# Patient Record
Sex: MALE | Race: WHITE | NOT HISPANIC OR LATINO | Employment: OTHER | URBAN - NONMETROPOLITAN AREA
[De-identification: names, ages, dates, MRNs, and addresses within clinical notes are randomized per-mention and may not be internally consistent; named-entity substitution may affect disease eponyms.]

---

## 2017-09-11 ENCOUNTER — OFFICE VISIT (OUTPATIENT)
Dept: URGENT CARE | Facility: CLINIC | Age: 57
End: 2017-09-11
Payer: COMMERCIAL

## 2017-09-11 PROCEDURE — 99283 EMERGENCY DEPT VISIT LOW MDM: CPT

## 2017-09-11 PROCEDURE — G0382 LEV 3 HOSP TYPE B ED VISIT: HCPCS

## 2018-11-29 ENCOUNTER — OFFICE VISIT (OUTPATIENT)
Dept: URGENT CARE | Facility: CLINIC | Age: 58
End: 2018-11-29
Payer: COMMERCIAL

## 2018-11-29 VITALS
RESPIRATION RATE: 20 BRPM | BODY MASS INDEX: 24.5 KG/M2 | TEMPERATURE: 99.2 F | WEIGHT: 175 LBS | HEIGHT: 71 IN | DIASTOLIC BLOOD PRESSURE: 75 MMHG | SYSTOLIC BLOOD PRESSURE: 133 MMHG | HEART RATE: 89 BPM

## 2018-11-29 DIAGNOSIS — J01.00 ACUTE MAXILLARY SINUSITIS, RECURRENCE NOT SPECIFIED: Primary | ICD-10-CM

## 2018-11-29 DIAGNOSIS — J45.909 UNCOMPLICATED ASTHMA, UNSPECIFIED ASTHMA SEVERITY, UNSPECIFIED WHETHER PERSISTENT: ICD-10-CM

## 2018-11-29 PROCEDURE — 99213 OFFICE O/P EST LOW 20 MIN: CPT | Performed by: PHYSICIAN ASSISTANT

## 2018-11-29 PROCEDURE — S9088 SERVICES PROVIDED IN URGENT: HCPCS | Performed by: PHYSICIAN ASSISTANT

## 2018-11-29 RX ORDER — ALBUTEROL SULFATE 90 UG/1
1 AEROSOL, METERED RESPIRATORY (INHALATION)
COMMUNITY
Start: 2015-10-09 | End: 2018-11-29 | Stop reason: SDUPTHER

## 2018-11-29 RX ORDER — ALBUTEROL SULFATE 2.5 MG/3ML
1 SOLUTION RESPIRATORY (INHALATION)
COMMUNITY
Start: 2017-09-11 | End: 2018-11-29 | Stop reason: SDUPTHER

## 2018-11-29 RX ORDER — ALBUTEROL SULFATE 2.5 MG/3ML
2.5 SOLUTION RESPIRATORY (INHALATION) EVERY 6 HOURS PRN
Qty: 25 VIAL | Refills: 0 | Status: SHIPPED | OUTPATIENT
Start: 2018-11-29 | End: 2019-12-30 | Stop reason: SDUPTHER

## 2018-11-29 RX ORDER — AMOXICILLIN AND CLAVULANATE POTASSIUM 875; 125 MG/1; MG/1
1 TABLET, FILM COATED ORAL EVERY 12 HOURS SCHEDULED
Qty: 20 TABLET | Refills: 0 | Status: SHIPPED | OUTPATIENT
Start: 2018-11-29 | End: 2018-12-09

## 2018-11-29 RX ORDER — ALBUTEROL SULFATE 90 UG/1
1 AEROSOL, METERED RESPIRATORY (INHALATION) EVERY 6 HOURS PRN
Qty: 1 INHALER | Refills: 0 | Status: SHIPPED | OUTPATIENT
Start: 2018-11-29 | End: 2018-12-08

## 2018-11-29 NOTE — PROGRESS NOTES
[address] [facility]  [facility] [phone]  (fax) [phone]        NAME: Vipul No is a 60 y o  male  : 1960    MRN: [id number]  DATE: 2018  TIME: 3:17 PM    Assessment and Plan   Acute maxillary sinusitis, recurrence not specified [J01 00]  1  Acute maxillary sinusitis, recurrence not specified  amoxicillin-clavulanate (AUGMENTIN) 875-125 mg per tablet   2  Uncomplicated asthma, unspecified asthma severity, unspecified whether persistent  albuterol (2 5 mg/3 mL) 0 083 % nebulizer solution    albuterol (PROVENTIL HFA) 90 mcg/act inhaler       Patient Instructions   I have prescribed an antibiotic for the infection  Please take the antibiotic as prescribed and finish the entire prescription  I recommend that the patient takes an over the counter probiotic or eats yogurt with live cultures in it [place]) to keep good bacteria in the gut and help prevent diarrhea  Wash hands frequently to prevent the spread of infection  Can use over the counter cough and cold medications to help with symptoms  Ibuprofen and/or tylenol as needed for pain or fever  If not improving over the next 3-5 days, follow up with PCP  To establish with PCP for maintainence medications, given find a doc sheet and instructed to call today for an apt  To present to the ER if symptoms worsen  Chief Complaint     Chief Complaint   Patient presents with    Cold Like Symptoms     nasal congestion and cough x 3 days          History of Present Illness   Vipul No presents to the clinic c/o  Patient also reports he is here for a refill on his asthma meds  He travels a lot and does not have a current PCP  Sinusitis   This is a new problem  The current episode started in the past 7 days  The problem is unchanged  There has been no fever  The pain is moderate  Associated symptoms include sinus pressure   Pertinent negatives include no chills, congestion, coughing, diaphoresis, ear pain, headaches, neck pain, shortness of breath, sneezing, sore throat or swollen glands  Past treatments include saline sprays  The treatment provided mild relief  Review of Systems   Review of Systems   Constitutional: Negative for activity change, appetite change, chills, diaphoresis, fatigue and fever  HENT: Positive for sinus pressure  Negative for congestion, ear discharge, ear pain, facial swelling, rhinorrhea, sinus pain, sneezing and sore throat  Eyes: Negative for photophobia, pain, discharge, redness, itching and visual disturbance  Respiratory: Negative for apnea, cough, chest tightness, shortness of breath and wheezing  Cardiovascular: Negative for chest pain  Gastrointestinal: Negative for abdominal distention, abdominal pain, anal bleeding, blood in stool, constipation, diarrhea, nausea and vomiting  Genitourinary: Negative for dysuria, flank pain, frequency, hematuria and urgency  Musculoskeletal: Negative for arthralgias, back pain, gait problem, joint swelling, myalgias, neck pain and neck stiffness  Skin: Negative for color change, rash and wound  Allergic/Immunologic: Negative for immunocompromised state  Neurological: Negative for dizziness, facial asymmetry and headaches  Hematological: Negative for adenopathy  Psychiatric/Behavioral: Negative for confusion and decreased concentration           Current Medications     Long-Term Prescriptions   Medication Sig Dispense Refill    mometasone-formoterol (DULERA) 100-5 MCG/ACT inhaler Inhale 2 puffs 2 (two) times a day         Current Allergies     Allergies as of 11/29/2018 - Reviewed 11/29/2018   Allergen Reaction Noted    Aspirin  10/06/2016    Ibuprofen Other (See Comments) 10/09/2015            The following portions of the patient's history were reviewed and updated as appropriate: allergies, current medications, past family history, past medical history, past social history, past surgical history and problem list   No past medical history on file  No past surgical history on file  Social History     Social History    Marital status: Single     Spouse name: N/A    Number of children: N/A    Years of education: N/A     Occupational History    Not on file  Social History Main Topics    Smoking status: Current Some Day Smoker    Smokeless tobacco: Never Used    Alcohol use Not on file    Drug use: Unknown    Sexual activity: Not on file     Other Topics Concern    Not on file     Social History Narrative    No narrative on file       Objective   /75 (BP Location: Right arm, Patient Position: Sitting, Cuff Size: Standard)   Pulse 89   Temp 99 2 °F (37 3 °C) (Tympanic)   Resp 20   Ht 5' 11" (1 803 m)   Wt 79 4 kg (175 lb)   BMI 24 41 kg/m²      Physical Exam     Physical Exam   Constitutional: He is oriented to person, place, and time  He appears well-developed and well-nourished  No distress  HENT:   Head: Normocephalic and atraumatic  Right Ear: Tympanic membrane and external ear normal    Left Ear: Tympanic membrane and external ear normal    Nose: Nose normal    Mouth/Throat: Oropharynx is clear and moist  No oropharyngeal exudate  Eyes: Pupils are equal, round, and reactive to light  Conjunctivae and EOM are normal  Right eye exhibits no discharge  Left eye exhibits no discharge  No scleral icterus  Neck: Normal range of motion  Neck supple  No JVD present  No tracheal deviation present  No thyromegaly present  Cardiovascular: Normal rate, regular rhythm and normal heart sounds  Exam reveals no gallop and no friction rub  No murmur heard  Pulmonary/Chest: Effort normal  No stridor  No respiratory distress  He has no decreased breath sounds  He has wheezes (resolved after coughing) in the right upper field  He has no rhonchi  He has no rales  He exhibits no tenderness  Musculoskeletal: Normal range of motion  He exhibits no tenderness or deformity  Lymphadenopathy:     He has no cervical adenopathy  Neurological: He is alert and oriented to person, place, and time  He has normal reflexes  Coordination normal    Skin: Skin is warm and dry  No rash noted  He is not diaphoretic  No erythema  No pallor  Psychiatric: He has a normal mood and affect  His behavior is normal  Judgment and thought content normal    Nursing note and vitals reviewed        Amira Rossi PA-C

## 2018-12-08 ENCOUNTER — OFFICE VISIT (OUTPATIENT)
Dept: URGENT CARE | Facility: CLINIC | Age: 58
End: 2018-12-08
Payer: COMMERCIAL

## 2018-12-08 VITALS
OXYGEN SATURATION: 93 % | DIASTOLIC BLOOD PRESSURE: 84 MMHG | WEIGHT: 175 LBS | BODY MASS INDEX: 24.5 KG/M2 | SYSTOLIC BLOOD PRESSURE: 135 MMHG | TEMPERATURE: 98.3 F | HEIGHT: 71 IN | HEART RATE: 76 BPM | RESPIRATION RATE: 20 BRPM

## 2018-12-08 DIAGNOSIS — J32.9 RECURRENT SINUSITIS: Primary | ICD-10-CM

## 2018-12-08 DIAGNOSIS — J45.20 MILD INTERMITTENT ASTHMA, UNSPECIFIED WHETHER COMPLICATED: ICD-10-CM

## 2018-12-08 DIAGNOSIS — J45.909 UNCOMPLICATED ASTHMA, UNSPECIFIED ASTHMA SEVERITY, UNSPECIFIED WHETHER PERSISTENT: ICD-10-CM

## 2018-12-08 PROCEDURE — 99213 OFFICE O/P EST LOW 20 MIN: CPT | Performed by: PHYSICIAN ASSISTANT

## 2018-12-08 PROCEDURE — S9088 SERVICES PROVIDED IN URGENT: HCPCS | Performed by: PHYSICIAN ASSISTANT

## 2018-12-08 RX ORDER — CEFPROZIL 250 MG/1
250 TABLET, FILM COATED ORAL 2 TIMES DAILY
Qty: 20 TABLET | Refills: 0 | Status: SHIPPED | OUTPATIENT
Start: 2018-12-08 | End: 2018-12-18

## 2018-12-08 RX ORDER — PREDNISONE 20 MG/1
TABLET ORAL
Qty: 15 TABLET | Refills: 0 | Status: SHIPPED | OUTPATIENT
Start: 2018-12-08 | End: 2019-10-31

## 2018-12-08 RX ORDER — ALBUTEROL SULFATE 2.5 MG/3ML
SOLUTION RESPIRATORY (INHALATION)
Qty: 75 ML | Refills: 0 | OUTPATIENT
Start: 2018-12-08

## 2018-12-08 RX ORDER — ALBUTEROL SULFATE 90 UG/1
2 AEROSOL, METERED RESPIRATORY (INHALATION) EVERY 4 HOURS PRN
Qty: 1 INHALER | Refills: 0 | Status: SHIPPED | OUTPATIENT
Start: 2018-12-08 | End: 2019-01-07

## 2018-12-08 NOTE — PROGRESS NOTES
[facility]        NAME: Adelita Wong is a 60 y o  male  : 1960    MRN: [id number]  DATE: 2018  TIME: 11:18 AM    Assessment and Plan   Recurrent sinusitis [J32 9]  1  Recurrent sinusitis  cefprozil (CEFZIL) 250 mg tablet    predniSONE 20 mg tablet   2  Mild intermittent asthma, unspecified whether complicated  albuterol (PROVENTIL HFA,VENTOLIN HFA) 90 mcg/act inhaler         Patient Instructions     Stop Augmentin and start Cefzil today  Take all the steroids in the morning with breakfast   If you take steroids later in the day they can keep you up at night  Follow up with PCP in 3-5 days  Proceed to  ER if symptoms worsen  Chief Complaint     Chief Complaint   Patient presents with    Cold Like Symptoms     sinus pressure and congestion and cough x 2weeks          History of Present Illness       HPI    Review of Systems   Review of Systems      Current Medications       Current Outpatient Prescriptions:     albuterol (2 5 mg/3 mL) 0 083 % nebulizer solution, Take 1 vial (2 5 mg total) by nebulization every 6 (six) hours as needed for wheezing or shortness of breath, Disp: 25 vial, Rfl: 0    amoxicillin-clavulanate (AUGMENTIN) 875-125 mg per tablet, Take 1 tablet by mouth every 12 (twelve) hours for 10 days, Disp: 20 tablet, Rfl: 0    FLUTICASONE PROPIONATE NA, Inhale 2 puffs, Disp: , Rfl:     mometasone-formoterol (DULERA) 100-5 MCG/ACT inhaler, Inhale 2 puffs 2 (two) times a day, Disp: , Rfl:     albuterol (PROVENTIL HFA,VENTOLIN HFA) 90 mcg/act inhaler, Inhale 2 puffs every 4 (four) hours as needed for wheezing or shortness of breath for up to 30 days, Disp: 1 Inhaler, Rfl: 0    cefprozil (CEFZIL) 250 mg tablet, Take 1 tablet (250 mg total) by mouth 2 (two) times a day for 10 days, Disp: 20 tablet, Rfl: 0    predniSONE 20 mg tablet, Three tablets once daily x3 days, 2 tablets once daily x2 days, 1 tablet daily x2 days  , Disp: 15 tablet, Rfl: 0    Current Allergies Allergies as of 12/08/2018 - Reviewed 12/08/2018   Allergen Reaction Noted    Aspirin  10/06/2016    Ibuprofen Other (See Comments) 10/09/2015            The following portions of the patient's history were reviewed and updated as appropriate: allergies, current medications, past family history, past medical history, past social history, past surgical history and problem list      History reviewed  No pertinent past medical history  No past surgical history on file  No family history on file  Medications have been verified          Objective   /84 (BP Location: Left arm, Patient Position: Sitting, Cuff Size: Standard)   Pulse 76   Temp 98 3 °F (36 8 °C) (Tympanic)   Resp 20   Ht 5' 11" (1 803 m)   Wt 79 4 kg (175 lb)   SpO2 93%   BMI 24 41 kg/m²        Physical Exam     Physical Exam

## 2018-12-08 NOTE — PATIENT INSTRUCTIONS

## 2019-08-26 ENCOUNTER — OFFICE VISIT (OUTPATIENT)
Dept: URGENT CARE | Facility: CLINIC | Age: 59
End: 2019-08-26
Payer: COMMERCIAL

## 2019-08-26 VITALS
DIASTOLIC BLOOD PRESSURE: 68 MMHG | RESPIRATION RATE: 18 BRPM | HEIGHT: 71 IN | HEART RATE: 93 BPM | WEIGHT: 175 LBS | SYSTOLIC BLOOD PRESSURE: 131 MMHG | TEMPERATURE: 98.7 F | OXYGEN SATURATION: 95 % | BODY MASS INDEX: 24.5 KG/M2

## 2019-08-26 DIAGNOSIS — J45.20 MILD INTERMITTENT ASTHMA WITHOUT COMPLICATION: ICD-10-CM

## 2019-08-26 DIAGNOSIS — J01.90 ACUTE NON-RECURRENT SINUSITIS, UNSPECIFIED LOCATION: Primary | ICD-10-CM

## 2019-08-26 PROCEDURE — S9088 SERVICES PROVIDED IN URGENT: HCPCS | Performed by: PHYSICIAN ASSISTANT

## 2019-08-26 PROCEDURE — 99213 OFFICE O/P EST LOW 20 MIN: CPT | Performed by: PHYSICIAN ASSISTANT

## 2019-08-26 RX ORDER — AZITHROMYCIN 250 MG/1
TABLET, FILM COATED ORAL
Qty: 6 TABLET | Refills: 0 | Status: SHIPPED | OUTPATIENT
Start: 2019-08-26 | End: 2019-08-30

## 2019-08-26 RX ORDER — ALBUTEROL SULFATE 90 UG/1
2 AEROSOL, METERED RESPIRATORY (INHALATION) EVERY 4 HOURS PRN
Qty: 1 INHALER | Refills: 0 | Status: SHIPPED | OUTPATIENT
Start: 2019-08-26 | End: 2019-09-25

## 2019-08-26 NOTE — PROGRESS NOTES
[facility]        NAME: Lisa Mcacin is a 60 y o  male  : 1960    MRN: [id number]  DATE: 2019  TIME: 1:00 PM    Assessment and Plan   Acute non-recurrent sinusitis, unspecified location [J01 90]  1  Acute non-recurrent sinusitis, unspecified location  azithromycin (ZITHROMAX) 250 mg tablet   2  Mild intermittent asthma without complication  albuterol (PROVENTIL HFA,VENTOLIN HFA) 90 mcg/act inhaler         Patient Instructions       Follow up with PCP in 3-5 days  Proceed to  ER if symptoms worsen  Chief Complaint     Chief Complaint   Patient presents with    Cold Like Symptoms     sinus pressure and congestion, cough x 1 day         History of Present Illness       Patient presents with a 2 day hx of sinus pressure, purulent nasal drainage and productive cough  Felt feverish this morning but didn't take his temperature  Also need refill on Albuterol inhaler for his asthma  Review of Systems   Review of Systems   Constitutional: Positive for chills and fever  HENT: Positive for congestion, postnasal drip, rhinorrhea and sinus pressure  Negative for ear pain and sore throat  Respiratory: Positive for cough  Negative for chest tightness, shortness of breath and wheezing  Cardiovascular: Negative for chest pain  Gastrointestinal: Negative for diarrhea, nausea and vomiting  Musculoskeletal: Negative for myalgias  Skin: Negative for rash  Neurological: Positive for headaches  Hematological: Negative for adenopathy           Current Medications       Current Outpatient Medications:     albuterol (2 5 mg/3 mL) 0 083 % nebulizer solution, Take 1 vial (2 5 mg total) by nebulization every 6 (six) hours as needed for wheezing or shortness of breath (Patient not taking: Reported on 2019), Disp: 25 vial, Rfl: 0    albuterol (PROVENTIL HFA,VENTOLIN HFA) 90 mcg/act inhaler, Inhale 2 puffs every 4 (four) hours as needed for wheezing or shortness of breath for up to 30 days, Disp: 1 Inhaler, Rfl: 0    azithromycin (ZITHROMAX) 250 mg tablet, Take 2 tablets today then 1 tablet daily x 4 days, Disp: 6 tablet, Rfl: 0    FLUTICASONE PROPIONATE NA, Inhale 2 puffs, Disp: , Rfl:     mometasone-formoterol (DULERA) 100-5 MCG/ACT inhaler, Inhale 2 puffs 2 (two) times a day, Disp: , Rfl:     predniSONE 20 mg tablet, Three tablets once daily x3 days, 2 tablets once daily x2 days, 1 tablet daily x2 days  (Patient not taking: Reported on 8/26/2019), Disp: 15 tablet, Rfl: 0    Current Allergies     Allergies as of 08/26/2019 - Reviewed 08/26/2019   Allergen Reaction Noted    Aspirin  10/06/2016    Ibuprofen Other (See Comments) 10/09/2015            The following portions of the patient's history were reviewed and updated as appropriate: allergies, current medications, past family history, past medical history, past social history, past surgical history and problem list      Past Medical History:   Diagnosis Date    Asthma        History reviewed  No pertinent surgical history  History reviewed  No pertinent family history  Medications have been verified  Objective   /68   Pulse 93   Temp 98 7 °F (37 1 °C)   Resp 18   Ht 5' 11" (1 803 m)   Wt 79 4 kg (175 lb)   SpO2 95%   BMI 24 41 kg/m²        Physical Exam     Physical Exam   Constitutional: He is oriented to person, place, and time  He appears well-developed and well-nourished  HENT:   Head: Normocephalic and atraumatic  Right Ear: External ear normal    Left Ear: External ear normal    Nose: Nose normal    Mouth/Throat: Oropharynx is clear and moist    Neck: Neck supple  Cardiovascular: Normal rate, regular rhythm and normal heart sounds  Pulmonary/Chest: Effort normal and breath sounds normal    Lymphadenopathy:     He has no cervical adenopathy  Neurological: He is alert and oriented to person, place, and time  Skin: Skin is warm and dry  Psychiatric: He has a normal mood and affect  His behavior is normal    Nursing note and vitals reviewed

## 2019-08-26 NOTE — PATIENT INSTRUCTIONS

## 2019-10-31 ENCOUNTER — OFFICE VISIT (OUTPATIENT)
Dept: FAMILY MEDICINE CLINIC | Facility: CLINIC | Age: 59
End: 2019-10-31
Payer: COMMERCIAL

## 2019-10-31 VITALS
RESPIRATION RATE: 18 BRPM | BODY MASS INDEX: 27.02 KG/M2 | HEART RATE: 101 BPM | HEIGHT: 71 IN | OXYGEN SATURATION: 94 % | WEIGHT: 193 LBS | SYSTOLIC BLOOD PRESSURE: 124 MMHG | DIASTOLIC BLOOD PRESSURE: 72 MMHG | TEMPERATURE: 98.3 F

## 2019-10-31 DIAGNOSIS — J01.10 ACUTE NON-RECURRENT FRONTAL SINUSITIS: ICD-10-CM

## 2019-10-31 DIAGNOSIS — H26.9 CATARACT OF BOTH EYES, UNSPECIFIED CATARACT TYPE: Primary | ICD-10-CM

## 2019-10-31 PROCEDURE — T1015 CLINIC SERVICE: HCPCS | Performed by: FAMILY MEDICINE

## 2019-10-31 RX ORDER — FLUTICASONE FUROATE AND VILANTEROL TRIFENATATE 100; 25 UG/1; UG/1
1 POWDER RESPIRATORY (INHALATION) DAILY
Refills: 1 | COMMUNITY
Start: 2019-10-05 | End: 2020-02-10 | Stop reason: SDUPTHER

## 2019-10-31 RX ORDER — AZITHROMYCIN 250 MG/1
250 TABLET, FILM COATED ORAL DAILY
Qty: 6 TABLET | Refills: 0 | Status: SHIPPED | OUTPATIENT
Start: 2019-10-31 | End: 2019-11-05

## 2019-10-31 NOTE — PROGRESS NOTES
Assessment/Plan:     Diagnoses and all orders for this visit:    Cataract of both eyes, unspecified cataract type  -     Ambulatory referral to Ophthalmology; Future    Acute non-recurrent frontal sinusitis  -     azithromycin (ZITHROMAX) 250 mg tablet; Take 1 tablet (250 mg total) by mouth daily for 5 days 2 pills today, then one daily for 4 more days    Other orders  -     BREO ELLIPTA 100-25 MCG/INH inhaler; INHALE 1 PUFF ONCE A DAY        RTC 3 weeks          Subjective:        Patient ID: Farheen Manuel is a 60 y o  male  Chief Complaint   Patient presents with    Asthma     routine check up    Nasal Congestion     patient states he started getting sick 3 days ago    Cough    Sore Throat    Generalized Body Aches    Cataract     patient states he was diagnosed with cataracts at the [facility] and needs a referral to opthalmology       61 yo male presents today with c/o nasal congestion, cough and sore throat x 3 days  Cough is productive yellow green colored mucus  Denies any fever or chills  Sore throat is from post nasal drip he reports  He also has sinus pressure  Needs referral to Ophthalmology  Cataracts were recently diagnosed at [facility] he reports  The following portions of the patient's history were reviewed and updated as appropriate: allergies, current medications, past family history, past medical history, past social history, past surgical history and problem list     There is no problem list on file for this patient        Current Outpatient Medications   Medication Sig Dispense Refill    albuterol (2 5 mg/3 mL) 0 083 % nebulizer solution Take 1 vial (2 5 mg total) by nebulization every 6 (six) hours as needed for wheezing or shortness of breath (Patient not taking: Reported on 8/26/2019) 25 vial 0    azithromycin (ZITHROMAX) 250 mg tablet Take 1 tablet (250 mg total) by mouth daily for 5 days 2 pills today, then one daily for 4 more days 6 tablet 0    BREO ELLIPTA 100-25 MCG/INH inhaler INHALE 1 PUFF ONCE A DAY  1    mometasone-formoterol (DULERA) 100-5 MCG/ACT inhaler Inhale 2 puffs 2 (two) times a day       No current facility-administered medications for this visit  Past Medical History:   Diagnosis Date    Asthma     Cataract         History reviewed  No pertinent surgical history  Social History     Socioeconomic History    Marital status: Single     Spouse name: Not on file    Number of children: Not on file    Years of education: Not on file    Highest education level: Not on file   Occupational History    Not on file   Social Needs    Financial resource strain: Not on file    Food insecurity:     Worry: Not on file     Inability: Not on file    Transportation needs:     Medical: Not on file     Non-medical: Not on file   Tobacco Use    Smoking status: Current Some Day Smoker    Smokeless tobacco: Never Used    Tobacco comment: smokes only when drinks   Substance and Sexual Activity    Alcohol use: Yes    Drug use: Never    Sexual activity: Not on file   Lifestyle    Physical activity:     Days per week: Not on file     Minutes per session: Not on file    Stress: Not on file   Relationships    Social connections:     Talks on phone: Not on file     Gets together: Not on file     Attends Muslim service: Not on file     Active member of club or organization: Not on file     Attends meetings of clubs or organizations: Not on file     Relationship status: Not on file    Intimate partner violence:     Fear of current or ex partner: Not on file     Emotionally abused: Not on file     Physically abused: Not on file     Forced sexual activity: Not on file   Other Topics Concern    Not on file   Social History Narrative    Not on file        Review of Systems   Constitutional: Positive for fatigue  HENT: Positive for congestion, postnasal drip, sinus pressure and sore throat  Eyes: Negative      Respiratory: Positive for cough and shortness of breath  Cardiovascular: Negative  Psychiatric/Behavioral: Negative  Objective:      /72   Pulse 101   Temp 98 3 °F (36 8 °C)   Resp 18   Ht 5' 11" (1 803 m)   Wt 87 5 kg (193 lb)   SpO2 94%   BMI 26 92 kg/m²          Physical Exam   Constitutional: He is oriented to person, place, and time  He appears well-developed and well-nourished  HENT:   Head: Normocephalic and atraumatic  Right Ear: External ear normal    Left Ear: External ear normal    Nasal mucosa is red and swollen with thick yellow discharge seen  PND is visible   Eyes: Pupils are equal, round, and reactive to light  Neck: Neck supple  No thyromegaly present  Cardiovascular: Regular rhythm  tachycardic   Pulmonary/Chest: Effort normal and breath sounds normal  He has no wheezes  He has no rales  Musculoskeletal:   Tenderness with palpation over both cheekbones   Lymphadenopathy:     He has no cervical adenopathy  Neurological: He is alert and oriented to person, place, and time  Skin: Skin is warm and dry  Psychiatric: He has a normal mood and affect  Nursing note and vitals reviewed

## 2019-12-10 ENCOUNTER — OFFICE VISIT (OUTPATIENT)
Dept: FAMILY MEDICINE CLINIC | Facility: CLINIC | Age: 59
End: 2019-12-10
Payer: COMMERCIAL

## 2019-12-10 VITALS
SYSTOLIC BLOOD PRESSURE: 152 MMHG | TEMPERATURE: 98.6 F | HEART RATE: 107 BPM | WEIGHT: 190 LBS | BODY MASS INDEX: 26.6 KG/M2 | DIASTOLIC BLOOD PRESSURE: 100 MMHG | RESPIRATION RATE: 18 BRPM | HEIGHT: 71 IN | OXYGEN SATURATION: 97 %

## 2019-12-10 DIAGNOSIS — Z11.59 ENCOUNTER FOR HEPATITIS C SCREENING TEST FOR LOW RISK PATIENT: ICD-10-CM

## 2019-12-10 DIAGNOSIS — Z12.5 SCREENING FOR PROSTATE CANCER: ICD-10-CM

## 2019-12-10 DIAGNOSIS — R03.0 ELEVATED BLOOD PRESSURE READING IN OFFICE WITHOUT DIAGNOSIS OF HYPERTENSION: ICD-10-CM

## 2019-12-10 DIAGNOSIS — Z12.11 SCREEN FOR COLON CANCER: ICD-10-CM

## 2019-12-10 DIAGNOSIS — J45.20 MILD INTERMITTENT ASTHMA WITHOUT COMPLICATION: Primary | ICD-10-CM

## 2019-12-10 DIAGNOSIS — E55.9 VITAMIN D DEFICIENCY: ICD-10-CM

## 2019-12-10 DIAGNOSIS — Z11.4 SCREENING FOR HIV (HUMAN IMMUNODEFICIENCY VIRUS): ICD-10-CM

## 2019-12-10 PROCEDURE — T1015 CLINIC SERVICE: HCPCS | Performed by: FAMILY MEDICINE

## 2019-12-10 NOTE — PROGRESS NOTES
Assessment/Plan:     Diagnoses and all orders for this visit:    Screen for colon cancer  -     Cologuard; Future    Mild intermittent asthma without complication  -     CBC and differential; Future    Elevated blood pressure reading in office without diagnosis of hypertension  -     Comprehensive metabolic panel; Future  -     Lipid panel; Future  -     TSH, 3rd generation with Free T4 reflex; Future    Encounter for hepatitis C screening test for low risk patient  -     Hepatitis C Qualitative by PCR; Future    Screening for HIV (human immunodeficiency virus)  -     HIV 1/2 AG-AB combo; Future    Vitamin D deficiency  -     Vitamin D 25 hydroxy; Future        Repeat b/p 142/90  He will RTC 7-10 days for b/p check with MA  LABS AS ORDERED  RTC 3 weeks with PCP            Subjective:        Patient ID: Andi Charles is a 60 y o  male  Chief Complaint   Patient presents with    Follow-up     patient is here for regular check up and refill on his inhaler        59 yo male presents for routine follow up  Today he reports having a hang over  Drinks too much "when I do drink"  Discussed b/p reading today  Previous c/o sinus infection have resolved  No complaints today otherwise  The following portions of the patient's history were reviewed and updated as appropriate: allergies, current medications, past family history, past medical history, past social history, past surgical history and problem list     There is no problem list on file for this patient        Current Outpatient Medications   Medication Sig Dispense Refill    albuterol (2 5 mg/3 mL) 0 083 % nebulizer solution Take 1 vial (2 5 mg total) by nebulization every 6 (six) hours as needed for wheezing or shortness of breath (Patient not taking: Reported on 8/26/2019) 25 vial 0    BREO ELLIPTA 100-25 MCG/INH inhaler INHALE 1 PUFF ONCE A DAY  1    mometasone-formoterol (DULERA) 100-5 MCG/ACT inhaler Inhale 2 puffs 2 (two) times a day       No current facility-administered medications for this visit  Past Medical History:   Diagnosis Date    Asthma     Cataract         History reviewed  No pertinent surgical history  Social History     Socioeconomic History    Marital status: Single     Spouse name: Not on file    Number of children: Not on file    Years of education: Not on file    Highest education level: Not on file   Occupational History    Not on file   Social Needs    Financial resource strain: Not on file    Food insecurity:     Worry: Not on file     Inability: Not on file    Transportation needs:     Medical: Not on file     Non-medical: Not on file   Tobacco Use    Smoking status: Current Some Day Smoker    Smokeless tobacco: Never Used    Tobacco comment: smokes only when drinks   Substance and Sexual Activity    Alcohol use: Yes    Drug use: Never    Sexual activity: Not on file   Lifestyle    Physical activity:     Days per week: Not on file     Minutes per session: Not on file    Stress: Not on file   Relationships    Social connections:     Talks on phone: Not on file     Gets together: Not on file     Attends Islam service: Not on file     Active member of club or organization: Not on file     Attends meetings of clubs or organizations: Not on file     Relationship status: Not on file    Intimate partner violence:     Fear of current or ex partner: Not on file     Emotionally abused: Not on file     Physically abused: Not on file     Forced sexual activity: Not on file   Other Topics Concern    Not on file   Social History Narrative    Not on file        Review of Systems   Constitutional: Negative  HENT: Negative  Eyes: Negative  Respiratory: Negative  Cardiovascular: Negative  Gastrointestinal: Negative  Endocrine: Negative  Genitourinary: Negative  Musculoskeletal: Negative  Skin: Negative  Allergic/Immunologic: Negative  Neurological: Negative      Hematological: Negative  Psychiatric/Behavioral: Negative  Objective:      /100   Pulse (!) 107   Temp 98 6 °F (37 °C)   Resp 18   Ht 5' 11" (1 803 m)   Wt 86 2 kg (190 lb)   SpO2 97%   BMI 26 50 kg/m²          Physical Exam   Constitutional: He is oriented to person, place, and time  He appears well-developed and well-nourished  HENT:   Head: Normocephalic and atraumatic  Right Ear: External ear normal    Left Ear: External ear normal    Eyes: Pupils are equal, round, and reactive to light  Neck: Neck supple  No thyromegaly present  Cardiovascular: Regular rhythm  tachycardic   Pulmonary/Chest: Effort normal and breath sounds normal  He has no wheezes  He has no rales  Musculoskeletal: He exhibits no edema  Lymphadenopathy:     He has no cervical adenopathy  Neurological: He is alert and oriented to person, place, and time  No cranial nerve deficit  Psychiatric: He has a normal mood and affect  Nursing note and vitals reviewed  BMI Counseling: Body mass index is 26 5 kg/m²  The BMI is above normal  No BMI follow-up plan is appropriate  Patient refused follow-up plan

## 2019-12-11 ENCOUNTER — APPOINTMENT (OUTPATIENT)
Dept: LAB | Facility: MEDICAL CENTER | Age: 59
End: 2019-12-11
Payer: COMMERCIAL

## 2019-12-11 ENCOUNTER — APPOINTMENT (OUTPATIENT)
Dept: RADIOLOGY | Facility: MEDICAL CENTER | Age: 59
End: 2019-12-11
Payer: COMMERCIAL

## 2019-12-11 DIAGNOSIS — Z12.5 SCREENING FOR PROSTATE CANCER: ICD-10-CM

## 2019-12-11 DIAGNOSIS — Z11.4 SCREENING FOR HIV (HUMAN IMMUNODEFICIENCY VIRUS): ICD-10-CM

## 2019-12-11 DIAGNOSIS — E55.9 VITAMIN D DEFICIENCY: ICD-10-CM

## 2019-12-11 DIAGNOSIS — R03.0 ELEVATED BLOOD PRESSURE READING IN OFFICE WITHOUT DIAGNOSIS OF HYPERTENSION: ICD-10-CM

## 2019-12-11 DIAGNOSIS — Z11.59 ENCOUNTER FOR HEPATITIS C SCREENING TEST FOR LOW RISK PATIENT: ICD-10-CM

## 2019-12-11 DIAGNOSIS — J45.20 MILD INTERMITTENT ASTHMA WITHOUT COMPLICATION: ICD-10-CM

## 2019-12-11 LAB
25(OH)D3 SERPL-MCNC: 19.7 NG/ML (ref 30–100)
ALBUMIN SERPL BCP-MCNC: 4 G/DL (ref 3.5–5)
ALP SERPL-CCNC: 74 U/L (ref 46–116)
ALT SERPL W P-5'-P-CCNC: 28 U/L (ref 12–78)
ANION GAP SERPL CALCULATED.3IONS-SCNC: 6 MMOL/L (ref 4–13)
AST SERPL W P-5'-P-CCNC: 16 U/L (ref 5–45)
BASOPHILS # BLD AUTO: 0.08 THOUSANDS/ΜL (ref 0–0.1)
BASOPHILS NFR BLD AUTO: 1 % (ref 0–1)
BILIRUB SERPL-MCNC: 0.74 MG/DL (ref 0.2–1)
BUN SERPL-MCNC: 16 MG/DL (ref 5–25)
CALCIUM SERPL-MCNC: 8.4 MG/DL (ref 8.3–10.1)
CHLORIDE SERPL-SCNC: 107 MMOL/L (ref 100–108)
CHOLEST SERPL-MCNC: 183 MG/DL (ref 50–200)
CO2 SERPL-SCNC: 26 MMOL/L (ref 21–32)
CREAT SERPL-MCNC: 1.13 MG/DL (ref 0.6–1.3)
EOSINOPHIL # BLD AUTO: 0.76 THOUSAND/ΜL (ref 0–0.61)
EOSINOPHIL NFR BLD AUTO: 10 % (ref 0–6)
ERYTHROCYTE [DISTWIDTH] IN BLOOD BY AUTOMATED COUNT: 12.5 % (ref 11.6–15.1)
GFR SERPL CREATININE-BSD FRML MDRD: 70 ML/MIN/1.73SQ M
GLUCOSE P FAST SERPL-MCNC: 91 MG/DL (ref 65–99)
HCT VFR BLD AUTO: 47.9 % (ref 36.5–49.3)
HDLC SERPL-MCNC: 43 MG/DL
HGB BLD-MCNC: 16 G/DL (ref 12–17)
IMM GRANULOCYTES # BLD AUTO: 0.02 THOUSAND/UL (ref 0–0.2)
IMM GRANULOCYTES NFR BLD AUTO: 0 % (ref 0–2)
LDLC SERPL CALC-MCNC: 88 MG/DL (ref 0–100)
LYMPHOCYTES # BLD AUTO: 1.88 THOUSANDS/ΜL (ref 0.6–4.47)
LYMPHOCYTES NFR BLD AUTO: 25 % (ref 14–44)
MCH RBC QN AUTO: 30.1 PG (ref 26.8–34.3)
MCHC RBC AUTO-ENTMCNC: 33.4 G/DL (ref 31.4–37.4)
MCV RBC AUTO: 90 FL (ref 82–98)
MONOCYTES # BLD AUTO: 0.93 THOUSAND/ΜL (ref 0.17–1.22)
MONOCYTES NFR BLD AUTO: 12 % (ref 4–12)
NEUTROPHILS # BLD AUTO: 4.01 THOUSANDS/ΜL (ref 1.85–7.62)
NEUTS SEG NFR BLD AUTO: 52 % (ref 43–75)
NONHDLC SERPL-MCNC: 140 MG/DL
NRBC BLD AUTO-RTO: 0 /100 WBCS
PLATELET # BLD AUTO: 231 THOUSANDS/UL (ref 149–390)
PMV BLD AUTO: 9.7 FL (ref 8.9–12.7)
POTASSIUM SERPL-SCNC: 4.1 MMOL/L (ref 3.5–5.3)
PROT SERPL-MCNC: 7 G/DL (ref 6.4–8.2)
RBC # BLD AUTO: 5.32 MILLION/UL (ref 3.88–5.62)
SODIUM SERPL-SCNC: 139 MMOL/L (ref 136–145)
TRIGL SERPL-MCNC: 259 MG/DL
TSH SERPL DL<=0.05 MIU/L-ACNC: 1.61 UIU/ML (ref 0.36–3.74)
WBC # BLD AUTO: 7.68 THOUSAND/UL (ref 4.31–10.16)

## 2019-12-11 PROCEDURE — 36415 COLL VENOUS BLD VENIPUNCTURE: CPT

## 2019-12-11 PROCEDURE — 85025 COMPLETE CBC W/AUTO DIFF WBC: CPT

## 2019-12-11 PROCEDURE — 87521 HEPATITIS C PROBE&RVRS TRNSC: CPT

## 2019-12-11 PROCEDURE — 80061 LIPID PANEL: CPT

## 2019-12-11 PROCEDURE — 87389 HIV-1 AG W/HIV-1&-2 AB AG IA: CPT

## 2019-12-11 PROCEDURE — 80053 COMPREHEN METABOLIC PANEL: CPT

## 2019-12-11 PROCEDURE — 71046 X-RAY EXAM CHEST 2 VIEWS: CPT

## 2019-12-11 PROCEDURE — 84153 ASSAY OF PSA TOTAL: CPT

## 2019-12-11 PROCEDURE — 84443 ASSAY THYROID STIM HORMONE: CPT

## 2019-12-11 PROCEDURE — 82306 VITAMIN D 25 HYDROXY: CPT

## 2019-12-11 PROCEDURE — 84154 ASSAY OF PSA FREE: CPT

## 2019-12-12 DIAGNOSIS — E55.9 VITAMIN D DEFICIENCY: Primary | ICD-10-CM

## 2019-12-12 LAB
HIV 1+2 AB+HIV1 P24 AG SERPL QL IA: NORMAL
PSA FREE MFR SERPL: 14.4 %
PSA FREE SERPL-MCNC: 0.62 NG/ML
PSA SERPL-MCNC: 4.3 NG/ML (ref 0–4)

## 2019-12-12 RX ORDER — ERGOCALCIFEROL 1.25 MG/1
50000 CAPSULE ORAL WEEKLY
Qty: 4 CAPSULE | Refills: 2 | Status: SHIPPED | OUTPATIENT
Start: 2019-12-12 | End: 2020-03-02

## 2019-12-13 ENCOUNTER — TELEPHONE (OUTPATIENT)
Dept: FAMILY MEDICINE CLINIC | Facility: HOME HEALTHCARE | Age: 59
End: 2019-12-13

## 2019-12-13 NOTE — TELEPHONE ENCOUNTER
Patient made aware    ----- Message from Lizette Lawrence PA-C sent at 12/12/2019  2:21 PM EST -----  Please call the patient regarding his abnormal result  Vit D is low  Rx sent to his pharmacy  Also PSA is mildly elevated  It will need to be followed closely

## 2019-12-14 LAB — HCV RNA SERPL QL NAA+PROBE: NEGATIVE

## 2019-12-27 ENCOUNTER — DOCTOR'S OFFICE (OUTPATIENT)
Dept: URBAN - NONMETROPOLITAN AREA CLINIC 1 | Facility: CLINIC | Age: 59
Setting detail: OPHTHALMOLOGY
End: 2019-12-27
Payer: COMMERCIAL

## 2019-12-27 ENCOUNTER — RX ONLY (RX ONLY)
Age: 59
End: 2019-12-27

## 2019-12-27 DIAGNOSIS — H25.13: ICD-10-CM

## 2019-12-27 PROCEDURE — 99204 OFFICE O/P NEW MOD 45 MIN: CPT | Performed by: OPHTHALMOLOGY

## 2019-12-27 ASSESSMENT — REFRACTION_CURRENTRX
OS_SPHERE: -4.25
OS_CYLINDER: -0.75
OD_CYLINDER: -1.25
OD_OVR_VA: 20/
OD_VPRISM_DIRECTION: SV
OS_OVR_VA: 20/
OD_SPHERE: -6.50
OS_VPRISM_DIRECTION: SV
OS_AXIS: 48
OD_AXIS: 84

## 2019-12-27 ASSESSMENT — REFRACTION_AUTOREFRACTION
OS_AXIS: 64
OD_CYLINDER: -0.75
OS_SPHERE: -6.00
OD_AXIS: 124
OS_CYLINDER: -0.75
OD_SPHERE: -9.25

## 2019-12-27 ASSESSMENT — CONFRONTATIONAL VISUAL FIELD TEST (CVF)
OD_FINDINGS: FULL
OS_FINDINGS: FULL

## 2019-12-27 ASSESSMENT — SPHEQUIV_DERIVED
OS_SPHEQUIV: -6.375
OD_SPHEQUIV: -9.625

## 2019-12-27 ASSESSMENT — VISUAL ACUITY
OD_BCVA: 20/70-2
OS_BCVA: 20/100-1

## 2019-12-30 ENCOUNTER — OFFICE VISIT (OUTPATIENT)
Dept: FAMILY MEDICINE CLINIC | Facility: CLINIC | Age: 59
End: 2019-12-30
Payer: COMMERCIAL

## 2019-12-30 VITALS
RESPIRATION RATE: 18 BRPM | TEMPERATURE: 97.6 F | OXYGEN SATURATION: 96 % | SYSTOLIC BLOOD PRESSURE: 144 MMHG | WEIGHT: 197 LBS | DIASTOLIC BLOOD PRESSURE: 94 MMHG | HEART RATE: 104 BPM | HEIGHT: 71 IN | BODY MASS INDEX: 27.58 KG/M2

## 2019-12-30 DIAGNOSIS — I10 ESSENTIAL HYPERTENSION: Primary | ICD-10-CM

## 2019-12-30 DIAGNOSIS — J06.9 VIRAL URI: ICD-10-CM

## 2019-12-30 DIAGNOSIS — J45.909 UNCOMPLICATED ASTHMA, UNSPECIFIED ASTHMA SEVERITY, UNSPECIFIED WHETHER PERSISTENT: ICD-10-CM

## 2019-12-30 PROCEDURE — T1015 CLINIC SERVICE: HCPCS | Performed by: FAMILY MEDICINE

## 2019-12-30 RX ORDER — LISINOPRIL 5 MG/1
5 TABLET ORAL DAILY
Qty: 30 TABLET | Refills: 3 | Status: SHIPPED | OUTPATIENT
Start: 2019-12-30 | End: 2020-04-28 | Stop reason: SDUPTHER

## 2019-12-30 RX ORDER — ALBUTEROL SULFATE 2.5 MG/3ML
2.5 SOLUTION RESPIRATORY (INHALATION) EVERY 6 HOURS PRN
Qty: 25 VIAL | Refills: 1 | Status: SHIPPED | OUTPATIENT
Start: 2019-12-30 | End: 2020-03-31

## 2019-12-30 RX ORDER — ALBUTEROL SULFATE 90 UG/1
2 AEROSOL, METERED RESPIRATORY (INHALATION) EVERY 6 HOURS PRN
COMMUNITY
End: 2020-01-17 | Stop reason: SDUPTHER

## 2019-12-30 NOTE — PROGRESS NOTES
Assessment/Plan:     Diagnoses and all orders for this visit:    Essential hypertension  -     lisinopril (ZESTRIL) 5 mg tablet; Take 1 tablet (5 mg total) by mouth daily    Uncomplicated asthma, unspecified asthma severity, unspecified whether persistent  -     albuterol (2 5 mg/3 mL) 0 083 % nebulizer solution; Take 1 vial (2 5 mg total) by nebulization every 6 (six) hours as needed for wheezing or shortness of breath    Viral URI    Other orders  -     albuterol (PROVENTIL HFA,VENTOLIN HFA) 90 mcg/act inhaler; Inhale 2 puffs every 6 (six) hours as needed for wheezing        Start Lisinopril as directed   Continue other meds  RTC 10 days for b/p check with MA  rtc 1 MONTH with PCP          Subjective:        Patient ID: Desire Bass is a 60 y o  male  Chief Complaint   Patient presents with    Asthma     patient needs albuterol refilled    Sinus Problem    Cough       61 yo male presents for follow up  Needs albuterol refilled today  Discussed b/p reading again and he is agreeable to meds  Reports he has a sinus infection again  He admits to post nasal drip  Has sinus pressure  Long standing hx of sinus issues  The following portions of the patient's history were reviewed and updated as appropriate: allergies, current medications, past family history, past medical history, past social history, past surgical history and problem list     There is no problem list on file for this patient        Current Outpatient Medications   Medication Sig Dispense Refill    albuterol (2 5 mg/3 mL) 0 083 % nebulizer solution Take 1 vial (2 5 mg total) by nebulization every 6 (six) hours as needed for wheezing or shortness of breath 25 vial 1    albuterol (PROVENTIL HFA,VENTOLIN HFA) 90 mcg/act inhaler Inhale 2 puffs every 6 (six) hours as needed for wheezing      BREO ELLIPTA 100-25 MCG/INH inhaler INHALE 1 PUFF ONCE A DAY  1    ergocalciferol (VITAMIN D2) 50,000 units Take 1 capsule (50,000 Units total) by mouth once a week 4 capsule 2    lisinopril (ZESTRIL) 5 mg tablet Take 1 tablet (5 mg total) by mouth daily 30 tablet 3    mometasone-formoterol (DULERA) 100-5 MCG/ACT inhaler Inhale 2 puffs 2 (two) times a day       No current facility-administered medications for this visit  Past Medical History:   Diagnosis Date    Asthma     Cataract         No past surgical history on file  Social History     Socioeconomic History    Marital status: Single     Spouse name: Not on file    Number of children: Not on file    Years of education: Not on file    Highest education level: Not on file   Occupational History    Not on file   Social Needs    Financial resource strain: Not on file    Food insecurity:     Worry: Not on file     Inability: Not on file    Transportation needs:     Medical: Not on file     Non-medical: Not on file   Tobacco Use    Smoking status: Current Some Day Smoker    Smokeless tobacco: Never Used    Tobacco comment: smokes only when drinks   Substance and Sexual Activity    Alcohol use: Yes    Drug use: Never    Sexual activity: Not on file   Lifestyle    Physical activity:     Days per week: Not on file     Minutes per session: Not on file    Stress: Not on file   Relationships    Social connections:     Talks on phone: Not on file     Gets together: Not on file     Attends Jewish service: Not on file     Active member of club or organization: Not on file     Attends meetings of clubs or organizations: Not on file     Relationship status: Not on file    Intimate partner violence:     Fear of current or ex partner: Not on file     Emotionally abused: Not on file     Physically abused: Not on file     Forced sexual activity: Not on file   Other Topics Concern    Not on file   Social History Narrative    Not on file        Review of Systems   Constitutional: Negative  HENT: Positive for congestion and postnasal drip  Eyes: Negative      Respiratory: Positive for cough  Cardiovascular: Negative  Psychiatric/Behavioral: Negative  Objective:      /94   Pulse 104   Temp 97 6 °F (36 4 °C)   Resp 18   Ht 5' 11" (1 803 m)   Wt 89 4 kg (197 lb)   SpO2 96%   BMI 27 48 kg/m²          Physical Exam   Constitutional: He is oriented to person, place, and time  He appears well-developed and well-nourished  HENT:   Head: Normocephalic and atraumatic  Right Ear: External ear normal    Left Ear: External ear normal    Mouth/Throat: Oropharynx is clear and moist    Mild nasal redness and swelling   Eyes: Pupils are equal, round, and reactive to light  Neck: Normal range of motion  Neck supple  Cardiovascular: Normal rate, regular rhythm and normal heart sounds  Pulmonary/Chest: Effort normal and breath sounds normal  He has no wheezes  He has no rales  Musculoskeletal: He exhibits no edema  Lymphadenopathy:     He has no cervical adenopathy  Neurological: He is alert and oriented to person, place, and time  Psychiatric: He has a normal mood and affect  Nursing note and vitals reviewed

## 2020-01-07 ENCOUNTER — OFFICE VISIT (OUTPATIENT)
Dept: URGENT CARE | Facility: CLINIC | Age: 60
End: 2020-01-07
Payer: COMMERCIAL

## 2020-01-07 VITALS
HEART RATE: 88 BPM | TEMPERATURE: 97.7 F | OXYGEN SATURATION: 94 % | DIASTOLIC BLOOD PRESSURE: 86 MMHG | HEIGHT: 71 IN | SYSTOLIC BLOOD PRESSURE: 143 MMHG | BODY MASS INDEX: 27.58 KG/M2 | RESPIRATION RATE: 18 BRPM | WEIGHT: 197 LBS
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
## 2020-01-07 DIAGNOSIS — A69.20 ERYTHEMA MIGRANS (LYME DISEASE): Primary | ICD-10-CM

## 2020-01-07 PROCEDURE — 99213 OFFICE O/P EST LOW 20 MIN: CPT | Performed by: PHYSICIAN ASSISTANT

## 2020-01-07 PROCEDURE — S9088 SERVICES PROVIDED IN URGENT: HCPCS | Performed by: PHYSICIAN ASSISTANT

## 2020-01-07 RX ORDER — DOXYCYCLINE 100 MG/1
100 TABLET ORAL 2 TIMES DAILY
Qty: 14 TABLET | Refills: 0 | Status: SHIPPED | OUTPATIENT
Start: 2020-01-07 | End: 2020-01-14

## 2020-01-07 NOTE — PROGRESS NOTES
[address] [facility]  (office) [phone]  (fax) [phone]        NAME: Topher Rivas is a 60 y o  male  : 1960    MRN: [id number]  DATE: 2020  TIME: 2:42 PM    Assessment and Plan   Erythema migrans (Lyme disease) [A69 20]  1  Erythema migrans (Lyme disease)     2  Tick bite, initial encounter  doxycycline (ADOXA) 100 MG tablet       Patient Instructions   One week of doxycycline provided for patient  To follow up with PCP within next week for further treatment  Did discuss with patient that one week of treatment is not enough if lyme  Furthermore further labs can be run by PCP to confirm lyme disease  Patient did verbalize understanding and reports that he will proceed to his PCP to make an apt now  To present to the ER if symptoms worsen  Chief Complaint     Chief Complaint   Patient presents with    Insect Bite     tick bite to right arm pulled out tick 3 days ago, bulls eye noted          History of Present Illness   Topher Rivas presents to the clinic c/o    Insect Bite   This is a new problem  The current episode started in the past 7 days (3 days ago removed tick, unsure how long tick was on for)  The problem occurs constantly  The problem has been unchanged  Associated symptoms include a rash (bulls eye rash around tick bite on right forearm)  Pertinent negatives include no abdominal pain, arthralgias, chest pain, chills, congestion, coughing, diaphoresis, fatigue, fever, headaches, joint swelling, myalgias, nausea, neck pain, sore throat or vomiting  Nothing aggravates the symptoms  He has tried nothing for the symptoms  The treatment provided no relief  Review of Systems   Review of Systems   Constitutional: Negative for activity change, appetite change, chills, diaphoresis, fatigue and fever     HENT: Negative for congestion, ear discharge, ear pain, facial swelling, rhinorrhea, sinus pressure, sinus pain, sneezing and sore throat  Eyes: Negative for photophobia, pain, discharge, redness, itching and visual disturbance  Respiratory: Negative for apnea, cough, chest tightness, shortness of breath and wheezing  Cardiovascular: Negative for chest pain  Gastrointestinal: Negative for abdominal distention, abdominal pain, constipation, diarrhea, nausea and vomiting  Genitourinary: Negative for dysuria, flank pain, frequency, hematuria and urgency  Musculoskeletal: Negative for arthralgias, back pain, gait problem, joint swelling, myalgias, neck pain and neck stiffness  Skin: Positive for rash (bulls eye rash around tick bite on right forearm)  Negative for color change and wound  Allergic/Immunologic: Negative for immunocompromised state  Neurological: Negative for dizziness and headaches  Hematological: Negative for adenopathy  Psychiatric/Behavioral: Negative for confusion  Current Medications     Long-Term Medications   Medication Sig Dispense Refill    BREO ELLIPTA 100-25 MCG/INH inhaler INHALE 1 PUFF ONCE A DAY  1    ergocalciferol (VITAMIN D2) 50,000 units Take 1 capsule (50,000 Units total) by mouth once a week 4 capsule 2    lisinopril (ZESTRIL) 5 mg tablet Take 1 tablet (5 mg total) by mouth daily 30 tablet 3    mometasone-formoterol (DULERA) 100-5 MCG/ACT inhaler Inhale 2 puffs 2 (two) times a day         Current Allergies     Allergies as of 01/07/2020 - Reviewed 01/07/2020   Allergen Reaction Noted    Aspirin  10/06/2016    Ibuprofen Other (See Comments) 10/09/2015            The following portions of the patient's history were reviewed and updated as appropriate: allergies, current medications, past family history, past medical history, past social history, past surgical history and problem list   Past Medical History:   Diagnosis Date    Asthma     Cataract     Hypertension      History reviewed  No pertinent surgical history    Social History     Socioeconomic History    Marital status: Single     Spouse name: Not on file    Number of children: Not on file    Years of education: Not on file    Highest education level: Not on file   Occupational History    Not on file   Social Needs    Financial resource strain: Not on file    Food insecurity:     Worry: Not on file     Inability: Not on file    Transportation needs:     Medical: Not on file     Non-medical: Not on file   Tobacco Use    Smoking status: Current Some Day Smoker    Smokeless tobacco: Never Used    Tobacco comment: smokes only when drinks   Substance and Sexual Activity    Alcohol use: Yes    Drug use: Never    Sexual activity: Not on file   Lifestyle    Physical activity:     Days per week: Not on file     Minutes per session: Not on file    Stress: Not on file   Relationships    Social connections:     Talks on phone: Not on file     Gets together: Not on file     Attends Amish service: Not on file     Active member of club or organization: Not on file     Attends meetings of clubs or organizations: Not on file     Relationship status: Not on file    Intimate partner violence:     Fear of current or ex partner: Not on file     Emotionally abused: Not on file     Physically abused: Not on file     Forced sexual activity: Not on file   Other Topics Concern    Not on file   Social History Narrative    Not on file       Objective   /86   Pulse 88   Temp 97 7 °F (36 5 °C)   Resp 18   Ht 5' 11" (1 803 m)   Wt 89 4 kg (197 lb)   SpO2 94%   BMI 27 48 kg/m²      Physical Exam     Physical Exam   Constitutional: He is oriented to person, place, and time  He appears well-developed and well-nourished  No distress  HENT:   Head: Normocephalic and atraumatic  Right Ear: Tympanic membrane and external ear normal    Left Ear: Tympanic membrane and external ear normal    Nose: Nose normal    Mouth/Throat: Oropharynx is clear and moist  No oropharyngeal exudate     Eyes: Pupils are equal, round, and reactive to light  Conjunctivae and EOM are normal  Right eye exhibits no discharge  Left eye exhibits no discharge  No scleral icterus  Neck: Normal range of motion  Neck supple  No JVD present  No tracheal deviation present  No thyromegaly present  Cardiovascular: Normal rate, regular rhythm and normal heart sounds  Exam reveals no gallop and no friction rub  No murmur heard  Pulmonary/Chest: Effort normal and breath sounds normal  No stridor  No respiratory distress  He has no decreased breath sounds  He has no wheezes  He has no rhonchi  He has no rales  He exhibits no tenderness  Musculoskeletal: Normal range of motion  He exhibits no tenderness or deformity  Lymphadenopathy:     He has no cervical adenopathy  Neurological: He is alert and oriented to person, place, and time  Coordination normal    Skin: Skin is warm and dry  No rash noted  He is not diaphoretic  No erythema  No pallor  Psychiatric: He has a normal mood and affect  His behavior is normal  Judgment and thought content normal    Nursing note and vitals reviewed        Melvi Dumont PA-C

## 2020-01-17 ENCOUNTER — OFFICE VISIT (OUTPATIENT)
Dept: FAMILY MEDICINE CLINIC | Facility: CLINIC | Age: 60
End: 2020-01-17
Payer: COMMERCIAL

## 2020-01-17 VITALS
OXYGEN SATURATION: 94 % | SYSTOLIC BLOOD PRESSURE: 114 MMHG | TEMPERATURE: 97.4 F | RESPIRATION RATE: 18 BRPM | DIASTOLIC BLOOD PRESSURE: 70 MMHG | HEIGHT: 71 IN | HEART RATE: 92 BPM | BODY MASS INDEX: 27.58 KG/M2 | WEIGHT: 197 LBS
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
## 2020-01-17 DIAGNOSIS — J01.90 ACUTE SINUSITIS, RECURRENCE NOT SPECIFIED, UNSPECIFIED LOCATION: ICD-10-CM

## 2020-01-17 DIAGNOSIS — J45.20 MILD INTERMITTENT ASTHMA WITHOUT COMPLICATION: ICD-10-CM

## 2020-01-17 PROCEDURE — 86618 LYME DISEASE ANTIBODY: CPT | Performed by: STUDENT IN AN ORGANIZED HEALTH CARE EDUCATION/TRAINING PROGRAM

## 2020-01-17 RX ORDER — DOXYCYCLINE HYCLATE 100 MG/1
100 CAPSULE ORAL EVERY 12 HOURS SCHEDULED
Qty: 14 CAPSULE | Refills: 0 | Status: SHIPPED | OUTPATIENT
Start: 2020-01-17 | End: 2020-01-24

## 2020-01-17 RX ORDER — DOXYCYCLINE HYCLATE 100 MG/1
CAPSULE ORAL
Qty: 2 CAPSULE | Refills: 0 | Status: CANCELLED | OUTPATIENT
Start: 2020-01-17 | End: 2020-01-17

## 2020-01-17 RX ORDER — ALBUTEROL SULFATE 90 UG/1
2 AEROSOL, METERED RESPIRATORY (INHALATION) EVERY 6 HOURS PRN
Qty: 1 INHALER | Refills: 3 | Status: SHIPPED | OUTPATIENT
Start: 2020-01-17 | End: 2020-06-30 | Stop reason: SDUPTHER

## 2020-01-17 NOTE — PROGRESS NOTES
Assessment/Plan:    No problem-specific Assessment & Plan notes found for this encounter  Diagnoses and all orders for this visit:    Mild intermittent asthma without complication  -     albuterol (PROVENTIL HFA,VENTOLIN HFA) 90 mcg/act inhaler; Inhale 2 puffs every 6 (six) hours as needed for wheezing    Tick bite, initial encounter  -     Lyme Antibody Profile with reflex to WB; Future  -     Lyme Antibody Profile with reflex to WB  -     doxycycline hyclate (VIBRAMYCIN) 100 mg capsule; Take 1 capsule (100 mg total) by mouth every 12 (twelve) hours for 7 days    Acute sinusitis, recurrence not specified, unspecified location    Other orders  -     Cancel: doxycycline hyclate (VIBRAMYCIN) 100 mg capsule; Take 2 100 mg caps once      Charito Holden is to RTC in 2-4 weeks for annual exam and flu shot  Charito Holden was prescribed another 7 day course of doxy to complete a total of 14 days of abx therapy for Tx of Lyme Disease as he had erythema migrans rash at the site of the tick bite  Lyme titers were also ordered  Doxy will also cover for the sinus infection  As his ASCVD risk is 13% he will need to be started on a low intensity statin therapy  Charito Holden understood, acknowledged and agreed to the plan above  All of his questions and concerns were answered and addressed  Case discussed with Dr Noe Joshi    Subjective:      Patient ID: Farheen Manuel is a 60 y o  male  Charito Holden is a 61 y/o man that was seen and examined in the office today for having a tick bit on his right forearm  He went to the ED where he was prescribed Doxy for 7 days  He stated that he had a rash at the site of the tick bite that was like a target  He also states that he may have a sinus infection  Review of Systems   All other systems reviewed and are negative          Objective:      /70   Pulse 92   Temp (!) 97 4 °F (36 3 °C) (Tympanic)   Resp 18   Ht 5' 11" (1 803 m)   Wt 89 4 kg (197 lb)   SpO2 94%   BMI 27 48 kg/m²          Physical Exam   Cardiovascular: Normal rate, regular rhythm, normal heart sounds and intact distal pulses  Pulmonary/Chest: Effort normal and breath sounds normal    Abdominal: Soft  Bowel sounds are normal    Skin: Rash noted  There is erythema  Right forearm redness at the sight of tick bite  Nontender  Blanching  Macular  1/2 cm well circumscribed/demarcated   Nursing note and vitals reviewed

## 2020-01-18 LAB — B BURGDOR IGG+IGM SER-ACNC: <0.91 ISR (ref 0–0.9)

## 2020-01-19 PROBLEM — J45.20 MILD INTERMITTENT ASTHMA WITHOUT COMPLICATION: Status: ACTIVE | Noted: 2020-01-19

## 2020-01-21 ENCOUNTER — DOCTOR'S OFFICE (OUTPATIENT)
Dept: URBAN - NONMETROPOLITAN AREA CLINIC 1 | Facility: CLINIC | Age: 60
Setting detail: OPHTHALMOLOGY
End: 2020-01-21
Payer: COMMERCIAL

## 2020-01-21 DIAGNOSIS — H25.11: ICD-10-CM

## 2020-01-21 PROCEDURE — 76519 ECHO EXAM OF EYE: CPT | Performed by: OPHTHALMOLOGY

## 2020-01-27 ENCOUNTER — OFFICE VISIT (OUTPATIENT)
Dept: FAMILY MEDICINE CLINIC | Facility: CLINIC | Age: 60
End: 2020-01-27
Payer: COMMERCIAL

## 2020-01-27 VITALS
SYSTOLIC BLOOD PRESSURE: 118 MMHG | OXYGEN SATURATION: 95 % | WEIGHT: 197.4 LBS | DIASTOLIC BLOOD PRESSURE: 78 MMHG | TEMPERATURE: 98.2 F | BODY MASS INDEX: 27.64 KG/M2 | RESPIRATION RATE: 18 BRPM | HEIGHT: 71 IN | HEART RATE: 98 BPM

## 2020-01-27 DIAGNOSIS — J34.2 DEVIATED SEPTUM: Primary | ICD-10-CM

## 2020-01-27 DIAGNOSIS — Z01.818 PRE-OPERATIVE CLEARANCE: ICD-10-CM

## 2020-01-27 PROCEDURE — T1015 CLINIC SERVICE: HCPCS | Performed by: FAMILY MEDICINE

## 2020-01-27 RX ORDER — OFLOXACIN 3 MG/ML
SOLUTION/ DROPS OPHTHALMIC
COMMUNITY
Start: 2020-01-21 | End: 2020-05-27

## 2020-01-27 RX ORDER — PREDNISOLONE ACETATE 10 MG/ML
SUSPENSION/ DROPS OPHTHALMIC
COMMUNITY
Start: 2020-01-21 | End: 2020-05-27

## 2020-01-27 NOTE — PROGRESS NOTES
Assessment/Plan:     Diagnoses and all orders for this visit:    Deviated septum  -     Ambulatory Referral to Otolaryngology; Future    Pre-operative clearance    Other orders  -     ofloxacin (OCUFLOX) 0 3 % ophthalmic solution  -     prednisoLONE acetate (PRED FORTE) 1 % ophthalmic suspension        He will continue on current meds  ENT referral  He is cleared for cataract extraction  RTC as scheduled          Subjective:        Patient ID: Aide Stephens is a 60 y o  male  Chief Complaint   Patient presents with    Pre-op Exam     cataracts     Follow-up     review lyme test        59 yo male presents for pre op clearance prior to cataract extraction  He forgot his paperwork and is unsure which eye is being first   Recent Lyme testing was negative  Today he requests appt with ENT for repair of deviated septum  Long standing hx of        The following portions of the patient's history were reviewed and updated as appropriate: allergies, current medications, past family history, past medical history, past social history, past surgical history and problem list     Patient Active Problem List   Diagnosis    Mild intermittent asthma without complication       Current Outpatient Medications   Medication Sig Dispense Refill    albuterol (2 5 mg/3 mL) 0 083 % nebulizer solution Take 1 vial (2 5 mg total) by nebulization every 6 (six) hours as needed for wheezing or shortness of breath 25 vial 1    albuterol (PROVENTIL HFA,VENTOLIN HFA) 90 mcg/act inhaler Inhale 2 puffs every 6 (six) hours as needed for wheezing 1 Inhaler 3    BREO ELLIPTA 100-25 MCG/INH inhaler Inhale 1 puff daily   1    ergocalciferol (VITAMIN D2) 50,000 units Take 1 capsule (50,000 Units total) by mouth once a week 4 capsule 2    lisinopril (ZESTRIL) 5 mg tablet Take 1 tablet (5 mg total) by mouth daily 30 tablet 3    ofloxacin (OCUFLOX) 0 3 % ophthalmic solution       prednisoLONE acetate (PRED FORTE) 1 % ophthalmic suspension       mometasone-formoterol (DULERA) 100-5 MCG/ACT inhaler Inhale 2 puffs 2 (two) times a day       No current facility-administered medications for this visit  Past Medical History:   Diagnosis Date    Asthma     Cataract     Hypertension         History reviewed  No pertinent surgical history  Social History     Socioeconomic History    Marital status: Single     Spouse name: Not on file    Number of children: Not on file    Years of education: Not on file    Highest education level: Not on file   Occupational History    Not on file   Social Needs    Financial resource strain: Not on file    Food insecurity:     Worry: Not on file     Inability: Not on file    Transportation needs:     Medical: Not on file     Non-medical: Not on file   Tobacco Use    Smoking status: Current Some Day Smoker    Smokeless tobacco: Never Used    Tobacco comment: smokes only when drinks   Substance and Sexual Activity    Alcohol use: Yes     Comment: occasionally    Drug use: Never    Sexual activity: Not on file   Lifestyle    Physical activity:     Days per week: Not on file     Minutes per session: Not on file    Stress: Not on file   Relationships    Social connections:     Talks on phone: Not on file     Gets together: Not on file     Attends Alevism service: Not on file     Active member of club or organization: Not on file     Attends meetings of clubs or organizations: Not on file     Relationship status: Not on file    Intimate partner violence:     Fear of current or ex partner: Not on file     Emotionally abused: Not on file     Physically abused: Not on file     Forced sexual activity: Not on file   Other Topics Concern    Not on file   Social History Narrative    Not on file        Review of Systems   Constitutional: Negative  HENT: Negative  Eyes:        Per HPI   Respiratory: Negative  Cardiovascular: Negative  Gastrointestinal: Negative  Endocrine: Negative  Genitourinary: Negative  Musculoskeletal: Negative  Skin: Negative  Allergic/Immunologic: Negative  Neurological: Negative  Hematological: Negative  Psychiatric/Behavioral: Negative  Objective:      /78   Pulse 98   Temp 98 2 °F (36 8 °C) (Tympanic)   Resp 18   Ht 5' 11" (1 803 m)   Wt 89 5 kg (197 lb 6 4 oz)   SpO2 95%   BMI 27 53 kg/m²          Physical Exam   Constitutional: He is oriented to person, place, and time  He appears well-developed and well-nourished  HENT:   Head: Normocephalic and atraumatic  Right Ear: External ear normal    Left Ear: External ear normal    Minimal PND visible  Eyes: Pupils are equal, round, and reactive to light  Neck: Normal range of motion  Neck supple  No thyromegaly present  Cardiovascular: Normal rate, regular rhythm and normal heart sounds  No murmur heard  Pulmonary/Chest: Effort normal and breath sounds normal  He has no wheezes  He has no rales  Abdominal: Soft  Bowel sounds are normal  He exhibits no mass  There is no tenderness  Musculoskeletal: Normal range of motion  He exhibits no edema  Lymphadenopathy:     He has no cervical adenopathy  Neurological: He is alert and oriented to person, place, and time  No cranial nerve deficit  Skin: Skin is warm and dry  Psychiatric: He has a normal mood and affect  Nursing note and vitals reviewed

## 2020-02-06 ENCOUNTER — AMBUL SURGICAL CARE (OUTPATIENT)
Dept: URBAN - NONMETROPOLITAN AREA SURGERY 1 | Facility: SURGERY | Age: 60
Setting detail: OPHTHALMOLOGY
End: 2020-02-06
Payer: COMMERCIAL

## 2020-02-06 DIAGNOSIS — H25.11: ICD-10-CM

## 2020-02-06 DIAGNOSIS — H25.011: ICD-10-CM

## 2020-02-06 PROCEDURE — 66984 XCAPSL CTRC RMVL W/O ECP: CPT | Performed by: OPHTHALMOLOGY

## 2020-02-06 PROCEDURE — G8918 PT W/O PREOP ORDER IV AB PRO: HCPCS | Performed by: CLINIC/CENTER

## 2020-02-06 PROCEDURE — G8907 PT DOC NO EVENTS ON DISCHARG: HCPCS | Performed by: CLINIC/CENTER

## 2020-02-06 PROCEDURE — G8907 PT DOC NO EVENTS ON DISCHARG: HCPCS | Performed by: OPHTHALMOLOGY

## 2020-02-06 PROCEDURE — G8918 PT W/O PREOP ORDER IV AB PRO: HCPCS | Performed by: OPHTHALMOLOGY

## 2020-02-06 PROCEDURE — 66984 XCAPSL CTRC RMVL W/O ECP: CPT | Performed by: CLINIC/CENTER

## 2020-02-07 ENCOUNTER — DOCTOR'S OFFICE (OUTPATIENT)
Dept: URBAN - NONMETROPOLITAN AREA CLINIC 1 | Facility: CLINIC | Age: 60
Setting detail: OPHTHALMOLOGY
End: 2020-02-07
Payer: COMMERCIAL

## 2020-02-07 ENCOUNTER — RX ONLY (RX ONLY)
Age: 60
End: 2020-02-07

## 2020-02-07 DIAGNOSIS — H25.12: ICD-10-CM

## 2020-02-07 DIAGNOSIS — Z96.1: ICD-10-CM

## 2020-02-07 DIAGNOSIS — J40 BRONCHITIS: Primary | ICD-10-CM

## 2020-02-07 PROCEDURE — 99024 POSTOP FOLLOW-UP VISIT: CPT | Performed by: OPHTHALMOLOGY

## 2020-02-07 PROCEDURE — 76519 ECHO EXAM OF EYE: CPT | Performed by: OPHTHALMOLOGY

## 2020-02-10 DIAGNOSIS — J45.30 MILD PERSISTENT ASTHMA WITHOUT COMPLICATION: Primary | ICD-10-CM

## 2020-02-10 RX ORDER — FLUTICASONE FUROATE AND VILANTEROL TRIFENATATE 100; 25 UG/1; UG/1
1 POWDER RESPIRATORY (INHALATION) DAILY
Qty: 3 INHALER | Refills: 1 | Status: SHIPPED | OUTPATIENT
Start: 2020-02-10 | End: 2020-03-27 | Stop reason: SDUPTHER

## 2020-02-10 RX ORDER — FLUTICASONE FUROATE AND VILANTEROL TRIFENATATE 100; 25 UG/1; UG/1
1 POWDER RESPIRATORY (INHALATION) DAILY
Qty: 1 INHALER | Refills: 1 | Status: CANCELLED | OUTPATIENT
Start: 2020-02-10

## 2020-02-10 ASSESSMENT — SPHEQUIV_DERIVED
OS_SPHEQUIV: -6.125
OD_SPHEQUIV: 0

## 2020-02-10 ASSESSMENT — VISUAL ACUITY
OD_BCVA: 20/70-2
OS_BCVA: 20/25-1

## 2020-02-10 ASSESSMENT — REFRACTION_CURRENTRX
OS_VPRISM_DIRECTION: SV
OS_SPHERE: -4.25
OD_VPRISM_DIRECTION: SV
OD_OVR_VA: 20/
OD_AXIS: 84
OS_OVR_VA: 20/
OS_CYLINDER: -0.75
OD_CYLINDER: -1.25
OS_AXIS: 48
OD_SPHERE: -6.50

## 2020-02-10 ASSESSMENT — REFRACTION_AUTOREFRACTION
OD_AXIS: 077
OS_SPHERE: -5.25
OS_CYLINDER: -1.75
OD_CYLINDER: -0.50
OS_AXIS: 054
OD_SPHERE: +0.25

## 2020-02-24 ENCOUNTER — TELEPHONE (OUTPATIENT)
Dept: FAMILY MEDICINE CLINIC | Facility: HOME HEALTHCARE | Age: 60
End: 2020-02-24

## 2020-02-24 NOTE — TELEPHONE ENCOUNTER
Tried calling patient but number is no longer working  I will mail out letter for patient to call office      ----- Message from Emiliano Lopez MD sent at 2/23/2020  6:10 PM EST -----  Jurgen Tapia,    Lyme Disease test came back negative  Thank you      Emiliano Lopez MD    ----- Message -----  From: Lab, Background User  Sent: 1/18/2020   8:05 PM EST  To: Emiliano Lopez MD

## 2020-02-29 DIAGNOSIS — E55.9 VITAMIN D DEFICIENCY: ICD-10-CM

## 2020-03-02 RX ORDER — ERGOCALCIFEROL 1.25 MG/1
CAPSULE ORAL
Qty: 4 CAPSULE | Refills: 2 | Status: SHIPPED | OUTPATIENT
Start: 2020-03-02 | End: 2021-06-21

## 2020-03-03 ENCOUNTER — OFFICE VISIT (OUTPATIENT)
Dept: URGENT CARE | Facility: CLINIC | Age: 60
End: 2020-03-03
Payer: COMMERCIAL

## 2020-03-03 VITALS
SYSTOLIC BLOOD PRESSURE: 130 MMHG | HEART RATE: 103 BPM | WEIGHT: 197 LBS | TEMPERATURE: 97.9 F | DIASTOLIC BLOOD PRESSURE: 65 MMHG | RESPIRATION RATE: 18 BRPM | BODY MASS INDEX: 27.58 KG/M2 | HEIGHT: 71 IN | OXYGEN SATURATION: 95 %

## 2020-03-03 DIAGNOSIS — J45.909 UNCOMPLICATED ASTHMA, UNSPECIFIED ASTHMA SEVERITY, UNSPECIFIED WHETHER PERSISTENT: ICD-10-CM

## 2020-03-03 DIAGNOSIS — J01.00 ACUTE MAXILLARY SINUSITIS, RECURRENCE NOT SPECIFIED: Primary | ICD-10-CM

## 2020-03-03 PROCEDURE — S9088 SERVICES PROVIDED IN URGENT: HCPCS | Performed by: PHYSICIAN ASSISTANT

## 2020-03-03 PROCEDURE — 99213 OFFICE O/P EST LOW 20 MIN: CPT | Performed by: PHYSICIAN ASSISTANT

## 2020-03-03 RX ORDER — AMOXICILLIN AND CLAVULANATE POTASSIUM 875; 125 MG/1; MG/1
1 TABLET, FILM COATED ORAL EVERY 12 HOURS SCHEDULED
Qty: 20 TABLET | Refills: 0 | Status: SHIPPED | OUTPATIENT
Start: 2020-03-03 | End: 2020-03-13

## 2020-03-03 NOTE — PROGRESS NOTES
[address] [facility]  (office) [phone]  (fax) [phone]        NAME: Marianne Gonsalez is a 60 y o  male  : 1960    MRN: [id number]  DATE: March 3, 2020  TIME: 9:15 AM    Assessment and Plan   Acute maxillary sinusitis, recurrence not specified [J01 00]  1  Acute maxillary sinusitis, recurrence not specified  amoxicillin-clavulanate (AUGMENTIN) 875-125 mg per tablet   2  Uncomplicated asthma, unspecified asthma severity, unspecified whether persistent         Patient Instructions   I have prescribed an antibiotic for the infection  Please take the antibiotic as prescribed and finish the entire prescription  I recommend that the patient takes an over the counter probiotic or eats yogurt with live cultures in it [place]) to keep good bacteria in the gut and help prevent diarrhea  Wash hands frequently to prevent the spread of infection  Can use over the counter cough and cold medications to help with symptoms  Ibuprofen and/or tylenol as needed for pain or fever  If not improving over the next 3-5 days, follow up with PCP  To present to the ER if symptoms worsen  Chief Complaint     Chief Complaint   Patient presents with    Cold Like Symptoms     simus pressure and congestion, and cough x 2 weeks          History of Present Illness   Marianne Gonsalez presents to the clinic c/o    Sinusitis   This is a new problem  The current episode started 1 to 4 weeks ago  The problem has been gradually worsening since onset  There has been no fever  The pain is moderate  Associated symptoms include congestion, coughing, ear pain and sinus pressure  Pertinent negatives include no chills, diaphoresis, headaches, neck pain, shortness of breath, sneezing or sore throat  Past treatments include saline sprays  The treatment provided no relief         Review of Systems   Review of Systems   Constitutional: Negative for activity change, appetite change, chills, diaphoresis, fatigue and fever  HENT: Positive for congestion, ear pain and sinus pressure  Negative for ear discharge, facial swelling, rhinorrhea, sinus pain, sneezing and sore throat  Eyes: Negative for photophobia, pain, discharge, redness, itching and visual disturbance  Respiratory: Positive for cough  Negative for apnea, chest tightness, shortness of breath and wheezing  Cardiovascular: Negative for chest pain  Gastrointestinal: Negative for abdominal distention, abdominal pain, constipation, diarrhea, nausea and vomiting  Genitourinary: Negative for dysuria, flank pain, frequency, hematuria and urgency  Musculoskeletal: Negative for arthralgias, back pain, gait problem, joint swelling, myalgias, neck pain and neck stiffness  Skin: Negative for color change, rash and wound  Allergic/Immunologic: Negative for immunocompromised state  Neurological: Negative for dizziness and headaches  Hematological: Negative for adenopathy  Psychiatric/Behavioral: Negative for confusion           Current Medications     Long-Term Medications   Medication Sig Dispense Refill    BREO ELLIPTA 100-25 MCG/INH inhaler Inhale 1 puff daily 3 Inhaler 1    ergocalciferol (VITAMIN D2) 50,000 units take 1 capsule by mouth weekly 4 capsule 2    lisinopril (ZESTRIL) 5 mg tablet Take 1 tablet (5 mg total) by mouth daily 30 tablet 3    prednisoLONE acetate (PRED FORTE) 1 % ophthalmic suspension          Current Allergies     Allergies as of 03/03/2020 - Reviewed 03/03/2020   Allergen Reaction Noted    Aspirin  10/06/2016    Ibuprofen Other (See Comments) 10/09/2015            The following portions of the patient's history were reviewed and updated as appropriate: allergies, current medications, past family history, past medical history, past social history, past surgical history and problem list   Past Medical History:   Diagnosis Date    Asthma     Cataract     Hypertension      Past Surgical History:   Procedure Laterality Date    EYE SURGERY       Social History     Socioeconomic History    Marital status: Single     Spouse name: Not on file    Number of children: Not on file    Years of education: Not on file    Highest education level: Not on file   Occupational History    Not on file   Social Needs    Financial resource strain: Not on file    Food insecurity:     Worry: Not on file     Inability: Not on file    Transportation needs:     Medical: Not on file     Non-medical: Not on file   Tobacco Use    Smoking status: Current Some Day Smoker    Smokeless tobacco: Never Used    Tobacco comment: smokes only when drinks   Substance and Sexual Activity    Alcohol use: Yes     Comment: occasionally    Drug use: Never    Sexual activity: Not on file   Lifestyle    Physical activity:     Days per week: Not on file     Minutes per session: Not on file    Stress: Not on file   Relationships    Social connections:     Talks on phone: Not on file     Gets together: Not on file     Attends [employer] service: Not on file     Active member of club or organization: Not on file     Attends meetings of clubs or organizations: Not on file     Relationship status: Not on file    Intimate partner violence:     Fear of current or ex partner: Not on file     Emotionally abused: Not on file     Physically abused: Not on file     Forced sexual activity: Not on file   Other Topics Concern    Not on file   Social History Narrative    Not on file       Objective   /65   Pulse 103   Temp 97 9 °F (36 6 °C)   Resp 18   Ht 5' 11" (1 803 m)   Wt 89 4 kg (197 lb)   SpO2 95%   BMI 27 48 kg/m²      Physical Exam     Physical Exam   Constitutional: He is oriented to person, place, and time  He appears well-developed and well-nourished  No distress  HENT:   Head: Normocephalic and atraumatic     Right Ear: Tympanic membrane and external ear normal    Left Ear: Tympanic membrane and external ear normal    Nose: Mucosal edema present  Right sinus exhibits maxillary sinus tenderness  Right sinus exhibits no frontal sinus tenderness  Left sinus exhibits maxillary sinus tenderness  Left sinus exhibits no frontal sinus tenderness  Mouth/Throat: Posterior oropharyngeal erythema present  No oropharyngeal exudate  Eyes: Conjunctivae are normal  Right eye exhibits no discharge  Left eye exhibits no discharge  No scleral icterus  Neck: Normal range of motion  Neck supple  No JVD present  No tracheal deviation present  No thyromegaly present  Cardiovascular: Normal rate, regular rhythm and normal heart sounds  Exam reveals no gallop and no friction rub  No murmur heard  Pulmonary/Chest: Effort normal and breath sounds normal  No stridor  No respiratory distress  He has no decreased breath sounds  He has no wheezes  He has no rhonchi  He has no rales  He exhibits no tenderness  Musculoskeletal: Normal range of motion  He exhibits no tenderness or deformity  Lymphadenopathy:     He has no cervical adenopathy  Neurological: He is alert and oriented to person, place, and time  Coordination normal    Skin: Skin is warm and dry  No rash noted  He is not diaphoretic  No erythema  No pallor  Psychiatric: He has a normal mood and affect  His behavior is normal  Judgment and thought content normal    Nursing note and vitals reviewed        Shadia Alejo PA-C

## 2020-03-27 DIAGNOSIS — J45.30 MILD PERSISTENT ASTHMA WITHOUT COMPLICATION: ICD-10-CM

## 2020-03-30 DIAGNOSIS — J45.40 MODERATE PERSISTENT ASTHMA WITHOUT COMPLICATION: Primary | ICD-10-CM

## 2020-03-30 DIAGNOSIS — J45.30 MILD PERSISTENT ASTHMA WITHOUT COMPLICATION: Primary | ICD-10-CM

## 2020-03-30 RX ORDER — FLUTICASONE FUROATE AND VILANTEROL TRIFENATATE 100; 25 UG/1; UG/1
1 POWDER RESPIRATORY (INHALATION) DAILY
Qty: 3 INHALER | Refills: 1 | Status: SHIPPED | OUTPATIENT
Start: 2020-03-30 | End: 2020-03-30 | Stop reason: CLARIF

## 2020-03-30 NOTE — TELEPHONE ENCOUNTER
Pt went to pharmacy for a RF on his Breo Inhaler and the pharmacy told him it needed a prior auth  If there is something else in that  Class that he can be switched to that would be fine or can you do a prior [place] [facility]  Please let him know which it will be so he knows when to go check at the pharmacy again    TY

## 2020-03-31 DIAGNOSIS — J45.909 UNCOMPLICATED ASTHMA, UNSPECIFIED ASTHMA SEVERITY, UNSPECIFIED WHETHER PERSISTENT: ICD-10-CM

## 2020-03-31 RX ORDER — ALBUTEROL SULFATE 2.5 MG/3ML
SOLUTION RESPIRATORY (INHALATION)
Qty: 75 ML | Refills: 1 | Status: SHIPPED | OUTPATIENT
Start: 2020-03-31 | End: 2020-08-03 | Stop reason: SDUPTHER

## 2020-04-28 DIAGNOSIS — I10 ESSENTIAL HYPERTENSION: ICD-10-CM

## 2020-04-28 RX ORDER — LISINOPRIL 5 MG/1
5 TABLET ORAL DAILY
Qty: 30 TABLET | Refills: 3 | Status: SHIPPED | OUTPATIENT
Start: 2020-04-28 | End: 2020-06-30 | Stop reason: SDUPTHER

## 2020-04-29 ENCOUNTER — TELEPHONE (OUTPATIENT)
Dept: FAMILY MEDICINE CLINIC | Facility: CLINIC | Age: 60
End: 2020-04-29

## 2020-04-29 DIAGNOSIS — J01.10 ACUTE NON-RECURRENT FRONTAL SINUSITIS: Primary | ICD-10-CM

## 2020-04-29 RX ORDER — AMOXICILLIN AND CLAVULANATE POTASSIUM 875; 125 MG/1; MG/1
1 TABLET, FILM COATED ORAL 2 TIMES DAILY
Qty: 14 TABLET | Refills: 0 | Status: SHIPPED | OUTPATIENT
Start: 2020-04-29 | End: 2020-05-06

## 2020-05-18 ENCOUNTER — DOCTOR'S OFFICE (OUTPATIENT)
Dept: URBAN - NONMETROPOLITAN AREA CLINIC 1 | Facility: CLINIC | Age: 60
Setting detail: OPHTHALMOLOGY
End: 2020-05-18
Payer: COMMERCIAL

## 2020-05-18 DIAGNOSIS — Z96.1: ICD-10-CM

## 2020-05-18 DIAGNOSIS — H43.811: ICD-10-CM

## 2020-05-18 DIAGNOSIS — H25.12: ICD-10-CM

## 2020-05-18 PROCEDURE — 92014 COMPRE OPH EXAM EST PT 1/>: CPT | Performed by: OPHTHALMOLOGY

## 2020-05-18 ASSESSMENT — REFRACTION_AUTOREFRACTION
OD_AXIS: 0
OS_CYLINDER: -0.75
OS_SPHERE: -5.50
OD_CYLINDER: 0.00
OS_AXIS: 30
OD_SPHERE: 0.00

## 2020-05-18 ASSESSMENT — CONFRONTATIONAL VISUAL FIELD TEST (CVF)
OS_FINDINGS: FULL
OD_FINDINGS: FULL

## 2020-05-18 ASSESSMENT — SPHEQUIV_DERIVED
OS_SPHEQUIV: -5.875
OD_SPHEQUIV: 0

## 2020-05-18 ASSESSMENT — REFRACTION_CURRENTRX
OD_OVR_VA: 20/
OD_VPRISM_DIRECTION: SV
OD_CYLINDER: -1.25
OD_SPHERE: -6.50
OS_VPRISM_DIRECTION: SV
OS_AXIS: 48
OS_CYLINDER: -0.75
OD_AXIS: 84
OS_OVR_VA: 20/
OS_SPHERE: -4.25

## 2020-05-18 ASSESSMENT — VISUAL ACUITY
OS_BCVA: 20/20
OD_BCVA: 20/70-2

## 2020-05-27 ENCOUNTER — OFFICE VISIT (OUTPATIENT)
Dept: FAMILY MEDICINE CLINIC | Facility: CLINIC | Age: 60
End: 2020-05-27
Payer: COMMERCIAL

## 2020-05-27 VITALS
BODY MASS INDEX: 28.28 KG/M2 | TEMPERATURE: 97.6 F | OXYGEN SATURATION: 97 % | HEART RATE: 99 BPM | HEIGHT: 71 IN | DIASTOLIC BLOOD PRESSURE: 94 MMHG | SYSTOLIC BLOOD PRESSURE: 162 MMHG | RESPIRATION RATE: 18 BRPM | WEIGHT: 202 LBS

## 2020-05-27 DIAGNOSIS — Z01.818 PRE-OP EXAMINATION: Primary | ICD-10-CM

## 2020-05-27 DIAGNOSIS — I10 ESSENTIAL HYPERTENSION: ICD-10-CM

## 2020-05-27 PROCEDURE — T1015 CLINIC SERVICE: HCPCS | Performed by: FAMILY MEDICINE

## 2020-06-02 ENCOUNTER — CLINICAL SUPPORT (OUTPATIENT)
Dept: FAMILY MEDICINE CLINIC | Facility: CLINIC | Age: 60
End: 2020-06-02

## 2020-06-02 VITALS — DIASTOLIC BLOOD PRESSURE: 74 MMHG | HEART RATE: 84 BPM | TEMPERATURE: 97.2 F | SYSTOLIC BLOOD PRESSURE: 122 MMHG

## 2020-06-02 DIAGNOSIS — I10 ESSENTIAL HYPERTENSION: Primary | ICD-10-CM

## 2020-06-08 ENCOUNTER — AMBUL SURGICAL CARE (OUTPATIENT)
Dept: URBAN - NONMETROPOLITAN AREA SURGERY 1 | Facility: SURGERY | Age: 60
Setting detail: OPHTHALMOLOGY
End: 2020-06-08
Payer: COMMERCIAL

## 2020-06-08 DIAGNOSIS — H25.12: ICD-10-CM

## 2020-06-08 DIAGNOSIS — H25.012: ICD-10-CM

## 2020-06-08 PROCEDURE — 66984 XCAPSL CTRC RMVL W/O ECP: CPT | Performed by: OPHTHALMOLOGY

## 2020-06-08 PROCEDURE — G8907 PT DOC NO EVENTS ON DISCHARG: HCPCS | Performed by: OPHTHALMOLOGY

## 2020-06-08 PROCEDURE — G8907 PT DOC NO EVENTS ON DISCHARG: HCPCS | Performed by: CLINIC/CENTER

## 2020-06-08 PROCEDURE — G8918 PT W/O PREOP ORDER IV AB PRO: HCPCS | Performed by: OPHTHALMOLOGY

## 2020-06-08 PROCEDURE — 66984 XCAPSL CTRC RMVL W/O ECP: CPT | Performed by: CLINIC/CENTER

## 2020-06-08 PROCEDURE — G8918 PT W/O PREOP ORDER IV AB PRO: HCPCS | Performed by: CLINIC/CENTER

## 2020-06-09 ENCOUNTER — RX ONLY (RX ONLY)
Age: 60
End: 2020-06-09

## 2020-06-09 ENCOUNTER — DOCTOR'S OFFICE (OUTPATIENT)
Dept: URBAN - NONMETROPOLITAN AREA CLINIC 1 | Facility: CLINIC | Age: 60
Setting detail: OPHTHALMOLOGY
End: 2020-06-09
Payer: COMMERCIAL

## 2020-06-09 DIAGNOSIS — Z96.1: ICD-10-CM

## 2020-06-09 PROBLEM — H25.12 CATARACT NUCLEAR SCLEROSIS; LEFT EYE: Status: RESOLVED | Noted: 2020-02-07 | Resolved: 2020-06-09

## 2020-06-09 PROCEDURE — 99024 POSTOP FOLLOW-UP VISIT: CPT | Performed by: OPHTHALMOLOGY

## 2020-06-09 ASSESSMENT — KERATOMETRY
OS_AXISANGLE_DEGREES: 136
OD_K1POWER_DIOPTERS: 42.50
OS_K2POWER_DIOPTERS: 42.75
OD_K2POWER_DIOPTERS: 42.50
OS_K1POWER_DIOPTERS: 42.00

## 2020-06-09 ASSESSMENT — SPHEQUIV_DERIVED
OD_SPHEQUIV: 0
OS_SPHEQUIV: 0.25

## 2020-06-09 ASSESSMENT — REFRACTION_AUTOREFRACTION
OS_AXIS: 029
OD_AXIS: 0
OS_CYLINDER: -1.00
OD_CYLINDER: 0.00
OD_SPHERE: 0.00
OS_SPHERE: +0.75

## 2020-06-09 ASSESSMENT — REFRACTION_CURRENTRX
OD_AXIS: 84
OD_OVR_VA: 20/
OS_SPHERE: -4.25
OS_OVR_VA: 20/
OD_CYLINDER: -1.25
OD_SPHERE: -6.50
OS_CYLINDER: -0.75
OS_AXIS: 48
OS_VPRISM_DIRECTION: SV
OD_VPRISM_DIRECTION: SV

## 2020-06-09 ASSESSMENT — VISUAL ACUITY
OS_BCVA: 20/30
OD_BCVA: 20/30-2

## 2020-06-09 ASSESSMENT — AXIALLENGTH_DERIVED
OS_AL: 23.912
OD_AL: 23.965

## 2020-06-15 ENCOUNTER — DOCTOR'S OFFICE (OUTPATIENT)
Dept: URBAN - NONMETROPOLITAN AREA CLINIC 1 | Facility: CLINIC | Age: 60
Setting detail: OPHTHALMOLOGY
End: 2020-06-15

## 2020-06-15 DIAGNOSIS — Z96.1: ICD-10-CM

## 2020-06-15 PROCEDURE — 99024 POSTOP FOLLOW-UP VISIT: CPT | Performed by: OPTOMETRIST

## 2020-06-15 ASSESSMENT — KERATOMETRY
OS_K2POWER_DIOPTERS: 42.75
OD_K2POWER_DIOPTERS: 42.50
OS_K1POWER_DIOPTERS: 42.00
OS_AXISANGLE_DEGREES: 136
OD_K1POWER_DIOPTERS: 42.50

## 2020-06-15 ASSESSMENT — AXIALLENGTH_DERIVED
OS_AL: 23.3263
OD_AL: 24.1167

## 2020-06-15 ASSESSMENT — REFRACTION_CURRENTRX
OD_CYLINDER: -1.25
OD_VPRISM_DIRECTION: SV
OS_VPRISM_DIRECTION: SV
OS_SPHERE: -4.25
OD_SPHERE: -6.50
OS_OVR_VA: 20/
OS_CYLINDER: -0.75
OD_OVR_VA: 20/
OD_AXIS: 84
OS_AXIS: 48

## 2020-06-15 ASSESSMENT — VISUAL ACUITY
OD_BCVA: 20/30-1
OS_BCVA: 20/25+2

## 2020-06-15 ASSESSMENT — SPHEQUIV_DERIVED
OS_SPHEQUIV: 1.75
OD_SPHEQUIV: -0.375

## 2020-06-15 ASSESSMENT — REFRACTION_AUTOREFRACTION
OD_CYLINDER: -0.25
OS_CYLINDER: -1.50
OD_SPHERE: -0.25
OS_SPHERE: +2.50
OS_AXIS: 068
OD_AXIS: 068

## 2020-06-30 DIAGNOSIS — J45.20 MILD INTERMITTENT ASTHMA WITHOUT COMPLICATION: ICD-10-CM

## 2020-06-30 DIAGNOSIS — I10 ESSENTIAL HYPERTENSION: ICD-10-CM

## 2020-06-30 DIAGNOSIS — J45.40 MODERATE PERSISTENT ASTHMA WITHOUT COMPLICATION: ICD-10-CM

## 2020-06-30 RX ORDER — ALBUTEROL SULFATE 90 UG/1
2 AEROSOL, METERED RESPIRATORY (INHALATION) EVERY 6 HOURS PRN
Qty: 1 INHALER | Refills: 3 | Status: SHIPPED | OUTPATIENT
Start: 2020-06-30 | End: 2020-08-03 | Stop reason: SDUPTHER

## 2020-06-30 RX ORDER — LISINOPRIL 5 MG/1
5 TABLET ORAL DAILY
Qty: 30 TABLET | Refills: 3 | Status: SHIPPED | OUTPATIENT
Start: 2020-06-30 | End: 2020-12-07

## 2020-08-03 DIAGNOSIS — J45.909 UNCOMPLICATED ASTHMA, UNSPECIFIED ASTHMA SEVERITY, UNSPECIFIED WHETHER PERSISTENT: ICD-10-CM

## 2020-08-03 DIAGNOSIS — J45.40 MODERATE PERSISTENT ASTHMA WITHOUT COMPLICATION: ICD-10-CM

## 2020-08-03 DIAGNOSIS — J45.20 MILD INTERMITTENT ASTHMA WITHOUT COMPLICATION: ICD-10-CM

## 2020-08-03 RX ORDER — ALBUTEROL SULFATE 90 UG/1
2 AEROSOL, METERED RESPIRATORY (INHALATION) EVERY 6 HOURS PRN
Qty: 1 INHALER | Refills: 3 | Status: SHIPPED | OUTPATIENT
Start: 2020-08-03 | End: 2021-03-04 | Stop reason: SDUPTHER

## 2020-08-03 RX ORDER — ALBUTEROL SULFATE 2.5 MG/3ML
2.5 SOLUTION RESPIRATORY (INHALATION) EVERY 6 HOURS PRN
Qty: 75 ML | Refills: 1 | Status: SHIPPED | OUTPATIENT
Start: 2020-08-03 | End: 2020-12-07

## 2020-11-24 ENCOUNTER — TELEPHONE (OUTPATIENT)
Dept: FAMILY MEDICINE CLINIC | Facility: CLINIC | Age: 60
End: 2020-11-24

## 2020-11-24 DIAGNOSIS — J01.90 ACUTE NON-RECURRENT SINUSITIS, UNSPECIFIED LOCATION: Primary | ICD-10-CM

## 2020-11-24 RX ORDER — AZITHROMYCIN 250 MG/1
250 TABLET, FILM COATED ORAL DAILY
Qty: 6 TABLET | Refills: 0 | Status: SHIPPED | OUTPATIENT
Start: 2020-11-24 | End: 2020-11-29

## 2020-12-05 DIAGNOSIS — J45.909 UNCOMPLICATED ASTHMA, UNSPECIFIED ASTHMA SEVERITY, UNSPECIFIED WHETHER PERSISTENT: ICD-10-CM

## 2020-12-05 DIAGNOSIS — I10 ESSENTIAL HYPERTENSION: ICD-10-CM

## 2020-12-07 RX ORDER — ALBUTEROL SULFATE 2.5 MG/3ML
SOLUTION RESPIRATORY (INHALATION)
Qty: 75 ML | Refills: 1 | Status: SHIPPED | OUTPATIENT
Start: 2020-12-07 | End: 2021-02-11

## 2020-12-07 RX ORDER — LISINOPRIL 5 MG/1
TABLET ORAL
Qty: 30 TABLET | Refills: 3 | Status: SHIPPED | OUTPATIENT
Start: 2020-12-07 | End: 2021-03-04 | Stop reason: SDUPTHER

## 2020-12-14 ENCOUNTER — OFFICE VISIT (OUTPATIENT)
Dept: URGENT CARE | Facility: CLINIC | Age: 60
End: 2020-12-14
Payer: COMMERCIAL

## 2020-12-14 VITALS
RESPIRATION RATE: 18 BRPM | HEIGHT: 71 IN | BODY MASS INDEX: 25.2 KG/M2 | OXYGEN SATURATION: 97 % | HEART RATE: 75 BPM | TEMPERATURE: 97 F | WEIGHT: 180 LBS

## 2020-12-14 DIAGNOSIS — J01.00 ACUTE NON-RECURRENT MAXILLARY SINUSITIS: Primary | ICD-10-CM

## 2020-12-14 PROCEDURE — 99203 OFFICE O/P NEW LOW 30 MIN: CPT | Performed by: PHYSICIAN ASSISTANT

## 2020-12-14 PROCEDURE — S9088 SERVICES PROVIDED IN URGENT: HCPCS | Performed by: PHYSICIAN ASSISTANT

## 2020-12-14 PROCEDURE — U0003 INFECTIOUS AGENT DETECTION BY NUCLEIC ACID (DNA OR RNA); SEVERE ACUTE RESPIRATORY SYNDROME CORONAVIRUS 2 (SARS-COV-2) (CORONAVIRUS DISEASE [COVID-19]), AMPLIFIED PROBE TECHNIQUE, MAKING USE OF HIGH THROUGHPUT TECHNOLOGIES AS DESCRIBED BY CMS-2020-01-R: HCPCS | Performed by: PHYSICIAN ASSISTANT

## 2020-12-14 RX ORDER — AMOXICILLIN AND CLAVULANATE POTASSIUM 875; 125 MG/1; MG/1
1 TABLET, FILM COATED ORAL EVERY 12 HOURS SCHEDULED
Qty: 20 TABLET | Refills: 0 | Status: SHIPPED | OUTPATIENT
Start: 2020-12-14 | End: 2020-12-24

## 2020-12-15 LAB — SARS-COV-2 RNA SPEC QL NAA+PROBE: NOT DETECTED

## 2020-12-16 ENCOUNTER — TELEPHONE (OUTPATIENT)
Dept: FAMILY MEDICINE CLINIC | Facility: CLINIC | Age: 60
End: 2020-12-16

## 2020-12-16 ENCOUNTER — NURSE TRIAGE (OUTPATIENT)
Dept: OTHER | Facility: OTHER | Age: 60
End: 2020-12-16

## 2020-12-16 ENCOUNTER — ALLERGY (OUTPATIENT)
Dept: FAMILY MEDICINE CLINIC | Facility: CLINIC | Age: 60
End: 2020-12-16

## 2020-12-16 DIAGNOSIS — J01.91 ACUTE RECURRENT SINUSITIS, UNSPECIFIED LOCATION: Primary | ICD-10-CM

## 2020-12-16 DIAGNOSIS — R21 RASH: Primary | ICD-10-CM

## 2020-12-16 RX ORDER — CLARITHROMYCIN 500 MG/1
500 TABLET, COATED ORAL EVERY 12 HOURS SCHEDULED
Qty: 14 TABLET | Refills: 0 | Status: SHIPPED | OUTPATIENT
Start: 2020-12-16 | End: 2020-12-23

## 2021-02-11 DIAGNOSIS — J45.909 UNCOMPLICATED ASTHMA, UNSPECIFIED ASTHMA SEVERITY, UNSPECIFIED WHETHER PERSISTENT: ICD-10-CM

## 2021-02-11 RX ORDER — ALBUTEROL SULFATE 2.5 MG/3ML
SOLUTION RESPIRATORY (INHALATION)
Qty: 75 ML | Refills: 1 | Status: SHIPPED | OUTPATIENT
Start: 2021-02-11 | End: 2021-03-04 | Stop reason: SDUPTHER

## 2021-03-04 ENCOUNTER — TELEMEDICINE (OUTPATIENT)
Dept: FAMILY MEDICINE CLINIC | Facility: HOME HEALTHCARE | Age: 61
End: 2021-03-04
Payer: COMMERCIAL

## 2021-03-04 DIAGNOSIS — J45.40 MODERATE PERSISTENT ASTHMA WITHOUT COMPLICATION: ICD-10-CM

## 2021-03-04 DIAGNOSIS — J45.20 MILD INTERMITTENT ASTHMA WITHOUT COMPLICATION: ICD-10-CM

## 2021-03-04 DIAGNOSIS — I10 ESSENTIAL HYPERTENSION: ICD-10-CM

## 2021-03-04 DIAGNOSIS — J32.9 CHRONIC RECURRENT SINUSITIS: Primary | ICD-10-CM

## 2021-03-04 DIAGNOSIS — J45.909 UNCOMPLICATED ASTHMA, UNSPECIFIED ASTHMA SEVERITY, UNSPECIFIED WHETHER PERSISTENT: ICD-10-CM

## 2021-03-04 PROCEDURE — 99213 OFFICE O/P EST LOW 20 MIN: CPT | Performed by: FAMILY MEDICINE

## 2021-03-04 RX ORDER — LEVOFLOXACIN 500 MG/1
500 TABLET, FILM COATED ORAL EVERY 24 HOURS
Qty: 7 TABLET | Refills: 0 | Status: SHIPPED | OUTPATIENT
Start: 2021-03-04 | End: 2021-03-11

## 2021-03-04 RX ORDER — LISINOPRIL 5 MG/1
5 TABLET ORAL DAILY
Qty: 90 TABLET | Refills: 1 | Status: SHIPPED | OUTPATIENT
Start: 2021-03-04 | End: 2021-08-31 | Stop reason: SDUPTHER

## 2021-03-04 RX ORDER — ALBUTEROL SULFATE 2.5 MG/3ML
2.5 SOLUTION RESPIRATORY (INHALATION) EVERY 6 HOURS PRN
Qty: 75 ML | Refills: 1 | Status: SHIPPED | OUTPATIENT
Start: 2021-03-04 | End: 2021-05-24 | Stop reason: SDUPTHER

## 2021-03-04 RX ORDER — PREDNISONE 20 MG/1
20 TABLET ORAL DAILY
Qty: 7 TABLET | Refills: 0 | Status: SHIPPED | OUTPATIENT
Start: 2021-03-04 | End: 2021-03-11

## 2021-03-04 RX ORDER — ALBUTEROL SULFATE 90 UG/1
2 AEROSOL, METERED RESPIRATORY (INHALATION) EVERY 6 HOURS PRN
Qty: 1 INHALER | Refills: 3 | Status: SHIPPED | OUTPATIENT
Start: 2021-03-04 | End: 2021-05-24 | Stop reason: SDUPTHER

## 2021-03-04 NOTE — PROGRESS NOTES
Virtual Regular Visit      Assessment/Plan:    Problem List Items Addressed This Visit        Respiratory    Mild intermittent asthma without complication    Relevant Medications    albuterol (PROVENTIL HFA,VENTOLIN HFA) 90 mcg/act inhaler    mometasone-formoterol (DULERA) 100-5 MCG/ACT inhaler    albuterol (2 5 mg/3 mL) 0 083 % nebulizer solution      Other Visit Diagnoses     Chronic recurrent sinusitis    -  Primary    Relevant Medications    levofloxacin (LEVAQUIN) 500 mg tablet    predniSONE 20 mg tablet    Other Relevant Orders    Ambulatory Referral to Otolaryngology    Moderate persistent asthma without complication        Relevant Medications    albuterol (PROVENTIL HFA,VENTOLIN HFA) 90 mcg/act inhaler    mometasone-formoterol (DULERA) 100-5 MCG/ACT inhaler    albuterol (2 5 mg/3 mL) 0 083 % nebulizer solution    Uncomplicated asthma, unspecified asthma severity, unspecified whether persistent        Relevant Medications    albuterol (PROVENTIL HFA,VENTOLIN HFA) 90 mcg/act inhaler    mometasone-formoterol (DULERA) 100-5 MCG/ACT inhaler    albuterol (2 5 mg/3 mL) 0 083 % nebulizer solution    Essential hypertension        Relevant Medications    lisinopril (ZESTRIL) 5 mg tablet        -patient with allergy to augmentin  Will treat with levaquin and steroid burst  Advised patient to continue flonase and try humidifier in bedroom at night  ENT referral placed  Reason for visit is   Chief Complaint   Patient presents with    Virtual Regular Visit        Encounter provider Garrett Elder PA-C    Provider located at [address] [place] [address]  [phone]      Recent Visits  No visits were found meeting these conditions     Showing recent visits within past 7 days and meeting all other requirements     Today's Visits  Date Type Provider Dept   03/04/21 Telemedicine Lindsey Cifuentes PA-C [address] today's visits and meeting all other requirements     Future Appointments  No visits were found meeting these conditions  Showing future appointments within next 150 days and meeting all other requirements        The patient was identified by name and date of birth  Woody Cockayne was informed that this is a telemedicine visit and that the visit is being conducted through [employer] and patient was informed that this is not a secure, HIPAA-compliant platform  He agrees to proceed     My office door was closed  No one else was in the room  He acknowledged consent and understanding of privacy and security of the video platform  The patient has agreed to participate and understands they can discontinue the visit at any time  Patient is aware this is a billable service  Subjective  Woody Cockayne is a 63 y o  male who presents via telemedicine for concern of sinusitis  Patient was treated at urgent care for sinusitis December 2020 and states since then he has continues maxillary and frontal pressure, congestion, PND and sore throat  His abx therapy at that time improved the symptoms but did not eliminate them  He has intermittent cough associated with the PND  He states his wife has been taking his temperature, t Max 99 8  No associated headaches or SOB, chest pressure  No sick contacts, COVID exposure or travel  He has been using flonase spray x 1 week with minimal improvement  He notes a history of a deviated septum  He is interested in being evaluated by ENT  He is requesting refills on his albuterol, dulera, and lisinopril       HPI     Past Medical History:   Diagnosis Date    Asthma     Cataract     Hypertension        Past Surgical History:   Procedure Laterality Date    EYE SURGERY         Current Outpatient Medications   Medication Sig Dispense Refill    albuterol (2 5 mg/3 mL) 0 083 % nebulizer solution Take 1 vial (2 5 mg total) by nebulization every 6 (six) hours as needed for wheezing or shortness of breath 75 mL 1    albuterol (PROVENTIL HFA,VENTOLIN HFA) 90 mcg/act inhaler Inhale 2 puffs every 6 (six) hours as needed for wheezing 1 Inhaler 3    lisinopril (ZESTRIL) 5 mg tablet Take 1 tablet (5 mg total) by mouth daily 90 tablet 1    mometasone-formoterol (DULERA) 100-5 MCG/ACT inhaler Inhale 2 puffs 2 (two) times a day Rinse mouth after use  1 Inhaler 3    ergocalciferol (VITAMIN D2) 50,000 units take 1 capsule by mouth weekly (Patient not taking: Reported on 3/4/2021) 4 capsule 2    levofloxacin (LEVAQUIN) 500 mg tablet Take 1 tablet (500 mg total) by mouth every 24 hours for 7 days 7 tablet 0    predniSONE 20 mg tablet Take 1 tablet (20 mg total) by mouth daily for 7 days 7 tablet 0     No current facility-administered medications for this visit  Allergies   Allergen Reactions    Aspirin Anaphylaxis    Ibuprofen Other (See Comments)     Asthma flare    Augmentin [Amoxicillin-Pot Clavulanate] Rash       Review of Systems   Constitutional: Negative for activity change, appetite change, chills, diaphoresis, fatigue and fever  HENT: Positive for congestion, postnasal drip, sinus pressure, sinus pain and sore throat  Negative for rhinorrhea  Respiratory: Positive for cough  Negative for chest tightness, shortness of breath and wheezing  Cardiovascular: Negative for chest pain  Gastrointestinal: Negative for abdominal pain, constipation, diarrhea, nausea and vomiting  Skin: Negative for rash  Neurological: Negative for dizziness and headaches  Video Exam    There were no vitals filed for this visit  Physical Exam  Constitutional:       General: He is not in acute distress  Appearance: Normal appearance  He is normal weight  He is not ill-appearing or toxic-appearing  Comments: Sounds verbally congested   HENT:      Head: Normocephalic and atraumatic  Pulmonary:      Effort: Pulmonary effort is normal  No respiratory distress     Neurological:      Mental Status: He is alert and oriented to person, place, and time  Mental status is at baseline  Psychiatric:         Mood and Affect: Mood normal          Behavior: Behavior normal          Thought Content: Thought content normal          Judgment: Judgment normal           I spent 10 minutes with patient today in which greater than 50% of the time was spent in counseling/coordination of care regarding assessment and plan of care      VIRTUAL VISIT Danielle 159 acknowledges that he has consented to an online visit or consultation  He understands that the online visit is based solely on information provided by him, and that, in the absence of a face-to-face physical evaluation by the physician, the diagnosis he receives is both limited and provisional in terms of accuracy and completeness  This is not intended to replace a full medical face-to-face evaluation by the physician  Lisa Padilla understands and accepts these terms

## 2021-03-12 ENCOUNTER — TELEPHONE (OUTPATIENT)
Dept: OTOLARYNGOLOGY | Facility: CLINIC | Age: 61
End: 2021-03-12

## 2021-04-06 ENCOUNTER — OFFICE VISIT (OUTPATIENT)
Dept: OTOLARYNGOLOGY | Facility: CLINIC | Age: 61
End: 2021-04-06
Payer: COMMERCIAL

## 2021-04-06 VITALS
TEMPERATURE: 96.7 F | HEIGHT: 71 IN | BODY MASS INDEX: 25.06 KG/M2 | SYSTOLIC BLOOD PRESSURE: 120 MMHG | HEART RATE: 83 BPM | DIASTOLIC BLOOD PRESSURE: 78 MMHG | WEIGHT: 179 LBS | OXYGEN SATURATION: 94 %

## 2021-04-06 DIAGNOSIS — J45.909 SAMTER'S TRIAD: ICD-10-CM

## 2021-04-06 DIAGNOSIS — J33.9 NASAL POLYPOSIS: ICD-10-CM

## 2021-04-06 DIAGNOSIS — J33.9 SAMTER'S TRIAD: ICD-10-CM

## 2021-04-06 DIAGNOSIS — J32.9 CHRONIC SINUSITIS, UNSPECIFIED LOCATION: Primary | ICD-10-CM

## 2021-04-06 DIAGNOSIS — Z88.8 ASPIRIN SENSITIVITY: ICD-10-CM

## 2021-04-06 DIAGNOSIS — Z88.6 SAMTER'S TRIAD: ICD-10-CM

## 2021-04-06 PROCEDURE — 99204 OFFICE O/P NEW MOD 45 MIN: CPT | Performed by: OTOLARYNGOLOGY

## 2021-04-06 RX ORDER — FLUTICASONE PROPIONATE 50 MCG
2 SPRAY, SUSPENSION (ML) NASAL DAILY
Qty: 1 BOTTLE | Refills: 3 | Status: SHIPPED | OUTPATIENT
Start: 2021-04-06 | End: 2021-08-31 | Stop reason: SDUPTHER

## 2021-04-06 NOTE — PROGRESS NOTES
Review of Systems   Constitutional: Negative  HENT: Positive for congestion, postnasal drip, rhinorrhea, sinus pressure and sinus pain  Eyes: Negative  Respiratory: Positive for cough  Cardiovascular: Negative  Gastrointestinal: Negative  Endocrine: Negative  Genitourinary: Negative  Musculoskeletal: Negative  Skin: Negative  Allergic/Immunologic: Negative  Neurological: Negative  Hematological: Negative  Psychiatric/Behavioral: Negative
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
## 2021-04-12 ENCOUNTER — HOSPITAL ENCOUNTER (OUTPATIENT)
Dept: CT IMAGING | Facility: HOSPITAL | Age: 61
Discharge: HOME | End: 2021-04-12
Payer: COMMERCIAL

## 2021-04-12 DIAGNOSIS — J33.9 SAMTER'S TRIAD: ICD-10-CM

## 2021-04-12 DIAGNOSIS — J33.9 NASAL POLYPOSIS: ICD-10-CM

## 2021-04-12 DIAGNOSIS — J45.909 SAMTER'S TRIAD: ICD-10-CM

## 2021-04-12 DIAGNOSIS — Z88.6 SAMTER'S TRIAD: ICD-10-CM

## 2021-04-12 DIAGNOSIS — J32.9 CHRONIC SINUSITIS, UNSPECIFIED LOCATION: ICD-10-CM

## 2021-04-12 PROCEDURE — G1004 CDSM NDSC: HCPCS

## 2021-04-12 PROCEDURE — 70486 CT MAXILLOFACIAL W/O DYE: CPT

## 2021-04-16 ENCOUNTER — IMMUNIZATIONS (OUTPATIENT)
Dept: FAMILY MEDICINE CLINIC | Facility: HOSPITAL | Age: 61
End: 2021-04-16

## 2021-04-16 DIAGNOSIS — Z23 ENCOUNTER FOR IMMUNIZATION: Primary | ICD-10-CM

## 2021-04-16 PROCEDURE — 91300 SARS-COV-2 / COVID-19 MRNA VACCINE (PFIZER-BIONTECH) 30 MCG: CPT

## 2021-04-16 PROCEDURE — 0001A SARS-COV-2 / COVID-19 MRNA VACCINE (PFIZER-BIONTECH) 30 MCG: CPT

## 2021-04-30 ENCOUNTER — OFFICE VISIT (OUTPATIENT)
Dept: OTOLARYNGOLOGY | Facility: CLINIC | Age: 61
End: 2021-04-30
Payer: COMMERCIAL

## 2021-04-30 VITALS
TEMPERATURE: 97.3 F | SYSTOLIC BLOOD PRESSURE: 122 MMHG | DIASTOLIC BLOOD PRESSURE: 80 MMHG | HEIGHT: 71 IN | OXYGEN SATURATION: 95 % | HEART RATE: 72 BPM | BODY MASS INDEX: 28 KG/M2 | WEIGHT: 200 LBS

## 2021-04-30 DIAGNOSIS — Z88.6 SAMTER'S TRIAD: ICD-10-CM

## 2021-04-30 DIAGNOSIS — J34.2 NASAL SEPTAL DEVIATION: ICD-10-CM

## 2021-04-30 DIAGNOSIS — J32.4 CHRONIC PANSINUSITIS: Primary | ICD-10-CM

## 2021-04-30 DIAGNOSIS — J32.9 CHRONIC RECURRENT SINUSITIS: ICD-10-CM

## 2021-04-30 DIAGNOSIS — J45.20 MILD INTERMITTENT ASTHMA WITHOUT COMPLICATION: ICD-10-CM

## 2021-04-30 DIAGNOSIS — J34.3 NASAL TURBINATE HYPERTROPHY: ICD-10-CM

## 2021-04-30 DIAGNOSIS — Z88.8 ASPIRIN SENSITIVITY: ICD-10-CM

## 2021-04-30 DIAGNOSIS — J33.9 SAMTER'S TRIAD: ICD-10-CM

## 2021-04-30 DIAGNOSIS — J34.89 NASAL OBSTRUCTION: ICD-10-CM

## 2021-04-30 DIAGNOSIS — J45.909 SAMTER'S TRIAD: ICD-10-CM

## 2021-04-30 DIAGNOSIS — J33.9 NASAL POLYPOSIS: ICD-10-CM

## 2021-04-30 PROCEDURE — 3008F BODY MASS INDEX DOCD: CPT | Performed by: OTOLARYNGOLOGY

## 2021-04-30 PROCEDURE — NC001 PR NO CHARGE: Performed by: OTOLARYNGOLOGY

## 2021-04-30 PROCEDURE — 99214 OFFICE O/P EST MOD 30 MIN: CPT | Performed by: OTOLARYNGOLOGY

## 2021-04-30 PROCEDURE — 31231 NASAL ENDOSCOPY DX: CPT | Performed by: OTOLARYNGOLOGY

## 2021-04-30 NOTE — PROGRESS NOTES
Chencho Correia is a 57 y o male who presents for re-evaluation of  Bilateral nasal polyposis, aspirin sensitivity, and asthma  He underwent CT scan which demonstrated significant for nasal polyposis and chronic pansinusitis  He denies any significant improvement in his symptoms  He has a history of aspirin sensitivity and asthma  He uses [facility] for [facility] asthma and feels that is well controlled  No purulent nasal drainage  Past Medical History:   Diagnosis Date    Asthma     Cataract     Hypertension        /80 (BP Location: Left arm, Cuff Size: Standard)   Pulse 72   Temp (!) 97 3 °F (36 3 °C) (Temporal)   Ht 5' 11" (1 803 m)   Wt 90 7 kg (200 lb)   SpO2 95%   BMI 27 89 kg/m²       Physical Exam   Constitutional: Oriented to person, place, and time  Well-developed and well-nourished, no apparent distress, non-toxic appearance  Cooperative, able to hear and answer questions without difficulty  Voice: Normal voice quality  Head: Normocephalic, atraumatic  No scars, masses or lesions  Face: Symmetric, no edema, no sinus tenderness  Eyes: Vision grossly intact, extra-ocular movement intact  Ears: External ear normal   Bilateral tympanic membranes are intact with intact normal landmarks  No post-auricular erythema or tenderness  Nose: Septum midline, nares clear  Mucosa moist, turbinates well appearing  No crusting, polyps or discharge evident  Oral cavity: Dentition intact  Mucosa moist, lips normal   Tongue mobile, floor of mouth normal   Hard palate unremarkable  No masses or lesions  Oropharynx: Uvula is midline, soft palate normal   Unremarkable oropharyngeal inlet  Tonsils unremarkable  Posterior pharyngeal wall clear  No masses or lesions  Salivary glands:  Parotid glands and submandibular glands symmetric, no enlargement or tenderness  Neck: Normal laryngeal elevation with swallow  Trachea midline  No masses or lesions  No palpable adenopathy    Thyroid: normal in size, unremarkable without tenderness or palpable nodules  Pulmonary/Chest: Normal effort and rate  No respiratory distress  Musculoskeletal: Normal range of motion  Neurological: Cranial nerves 2-12 intact  Skin: Skin is warm and dry  Psychiatric: Normal mood and affect  Procedure: Nasal endoscopy    Indications: Evaluate posterior nasal cavity for areas unvisualized on anterior rhinoscopy  Procedure in detail: After informed verbal consent was obtained the nose was anesthetized using 4% lidocaine and neosynephrine spray  After adequate time for anesthesia the scope was guided easily along the nasal cavity bilaterally  Bilateral middle meatus and sphenoethmoid recesses were evaluated  The scope was removed without difficulty and the patient tolerated the procedure well  Findings: Bilateral severe nasal polyposis  Significant turbinate hypertrophy and S shaped septal deviation to the right caudally and left posteriorly  A/P: Chronic sinusitis:  We discussed risks, benefits, and alternatives to functional endoscopic sinus surgery  We discussed risks including bleeding, infection, and scarring  We discussed risks of hyposmia, injury to the eyes, vision loss, diplopia, injury to the brain, leakage of cerebrospinal fluid, and infection of the brain, among others  He elected to move forward with sinus surgery  We discussed possible need for septoplasty and turbinate reduction for access  We discussed that he will need to irrigate his sinuses afterwards  We discussed follow up 1 week after surgery  He will speak with our office to schedule surgery

## 2021-05-24 ENCOUNTER — TELEMEDICINE (OUTPATIENT)
Dept: FAMILY MEDICINE CLINIC | Facility: CLINIC | Age: 61
End: 2021-05-24

## 2021-05-24 DIAGNOSIS — J45.909 UNCOMPLICATED ASTHMA, UNSPECIFIED ASTHMA SEVERITY, UNSPECIFIED WHETHER PERSISTENT: ICD-10-CM

## 2021-05-24 DIAGNOSIS — J45.20 MILD INTERMITTENT ASTHMA WITHOUT COMPLICATION: ICD-10-CM

## 2021-05-24 DIAGNOSIS — J01.90 ACUTE BACTERIAL SINUSITIS: Primary | ICD-10-CM

## 2021-05-24 DIAGNOSIS — B96.89 ACUTE BACTERIAL SINUSITIS: Primary | ICD-10-CM

## 2021-05-24 DIAGNOSIS — J32.4 CHRONIC PANSINUSITIS: ICD-10-CM

## 2021-05-24 RX ORDER — PREDNISONE 20 MG/1
40 TABLET ORAL DAILY
Qty: 10 TABLET | Refills: 0 | Status: SHIPPED | OUTPATIENT
Start: 2021-05-24 | End: 2021-06-21

## 2021-05-24 RX ORDER — ALBUTEROL SULFATE 2.5 MG/3ML
2.5 SOLUTION RESPIRATORY (INHALATION) EVERY 6 HOURS PRN
Qty: 75 ML | Refills: 2 | Status: SHIPPED | OUTPATIENT
Start: 2021-05-24 | End: 2021-08-31 | Stop reason: SDUPTHER

## 2021-05-24 RX ORDER — DOXYCYCLINE 100 MG/1
100 TABLET ORAL 2 TIMES DAILY
Qty: 14 TABLET | Refills: 0 | Status: SHIPPED | OUTPATIENT
Start: 2021-05-24 | End: 2021-05-31

## 2021-05-24 RX ORDER — ALBUTEROL SULFATE 90 UG/1
2 AEROSOL, METERED RESPIRATORY (INHALATION) EVERY 6 HOURS PRN
Qty: 18 G | Refills: 2 | Status: SHIPPED | OUTPATIENT
Start: 2021-05-24 | End: 2021-08-31 | Stop reason: SDUPTHER

## 2021-05-24 NOTE — PROGRESS NOTES
Virtual Regular Visit      Assessment/Plan:    Problem List Items Addressed This Visit        Respiratory    Mild intermittent asthma without complication    Relevant Medications    albuterol (PROVENTIL HFA,VENTOLIN HFA) 90 mcg/act inhaler    albuterol (2 5 mg/3 mL) 0 083 % nebulizer solution    Chronic pansinusitis      Other Visit Diagnoses     Acute bacterial sinusitis    -  Primary    Doxycycline  F/U with ENT as scheduled for upcoming sinus surgery  Relevant Medications    predniSONE 20 mg tablet    doxycycline (ADOXA) 100 MG tablet    Uncomplicated asthma, unspecified asthma severity, unspecified whether persistent        Prednisone burst  Albuterol reordered  Relevant Medications    albuterol (PROVENTIL HFA,VENTOLIN HFA) 90 mcg/act inhaler    albuterol (2 5 mg/3 mL) 0 083 % nebulizer solution               Reason for visit is   Chief Complaint   Patient presents with    Virtual Regular Visit     Patient has a sinus infection  he often gets them  He has sinus pressure and headache  Encounter provider OSCAR Morgan    Provider located at [address] [id number]      Recent Visits  No visits were found meeting these conditions  Showing recent visits within past 7 days and meeting all other requirements     Today's Visits  Date Type Provider Dept   05/24/21 [facility], [address] today's visits and meeting all other requirements     Future Appointments  No visits were found meeting these conditions  Showing future appointments within next 150 days and meeting all other requirements        The patient was identified by name and date of birth  [place] was informed that this is a telemedicine visit and that the visit is being conducted through [address] [place] [address] and patient was informed that this is not a secure, HIPAA-compliant platform  He agrees to proceed  Leanna Nyica My office door was closed  No one else was in the room  He acknowledged consent and understanding of privacy and security of the video platform  The patient has agreed to participate and understands they can discontinue the visit at any time  Patient is aware this is a billable service  Subjective  Foreign Martinez is a 57 y o  male who presents for sinusitis   57 y o  male for telemedicine video visit  Hx recurrent sinusitis following with ENT as well as hx asthma  Hx sinus CT revealing multiple polyps with plans to undergo sinus surgery in June with Dr Edith Huang  Exact date TBS per patient  Patient reports sinusitis symptoms in addition to some expiratory wheezing and dyspnea with moderate exertion  Reports he has been out of both his albuterol inhaler and albuterol nebs but has been using Flonase  Sinusitis  This is a chronic problem  The current episode started 1 to 4 weeks ago  The problem has been gradually worsening since onset  There has been no fever  His pain is at a severity of 8/10  The pain is severe  Associated symptoms include congestion, coughing (productive for yellow phlegm), ear pain (Left ear), shortness of breath, sinus pressure and a sore throat  Pertinent negatives include no headaches  Past treatments include lying down and sitting up  The treatment provided no relief          Past Medical History:   Diagnosis Date    Asthma     Cataract     Hypertension        Past Surgical History:   Procedure Laterality Date    EYE SURGERY         Current Outpatient Medications   Medication Sig Dispense Refill    albuterol (2 5 mg/3 mL) 0 083 % nebulizer solution Take 1 vial (2 5 mg total) by nebulization every 6 (six) hours as needed for wheezing or shortness of breath 75 mL 2    albuterol (PROVENTIL HFA,VENTOLIN HFA) 90 mcg/act inhaler Inhale 2 puffs every 6 (six) hours as needed for wheezing or shortness of breath 18 g 2    doxycycline (ADOXA) 100 MG tablet Take 1 tablet (100 mg total) by mouth 2 (two) times a day for 7 days 14 tablet 0    ergocalciferol (VITAMIN D2) 50,000 units take 1 capsule by mouth weekly 4 capsule 2    fluticasone (FLONASE) 50 mcg/act nasal spray 2 sprays into each nostril daily 1 Bottle 3    lisinopril (ZESTRIL) 5 mg tablet Take 1 tablet (5 mg total) by mouth daily 90 tablet 1    mometasone-formoterol (DULERA) 100-5 MCG/ACT inhaler Inhale 2 puffs 2 (two) times a day Rinse mouth after use  1 Inhaler 3    predniSONE 20 mg tablet Take 2 tablets (40 mg total) by mouth daily 10 tablet 0     No current facility-administered medications for this visit  Allergies   Allergen Reactions    Aspirin Anaphylaxis    Ibuprofen Other (See Comments)     Asthma flare    Augmentin [Amoxicillin-Pot Clavulanate] Rash       Review of Systems   Constitutional: Negative for activity change, appetite change, fatigue and unexpected weight change  HENT: Positive for congestion, ear pain (Left ear), sinus pressure and sore throat  Negative for hearing loss, nosebleeds, rhinorrhea, sinus pain and trouble swallowing  Eyes: Negative for photophobia  Respiratory: Positive for cough (productive for yellow phlegm) and shortness of breath  Negative for choking and wheezing  Cardiovascular: Negative for chest pain, palpitations and leg swelling  Gastrointestinal: Negative for abdominal pain, blood in stool, constipation, diarrhea and nausea  Endocrine: Negative for polydipsia, polyphagia and polyuria  Genitourinary: Negative for difficulty urinating, dysuria, frequency, hematuria and urgency  Musculoskeletal: Negative for arthralgias, back pain and myalgias  Skin: Negative for color change, rash and wound  Neurological: Negative for dizziness, tremors, syncope, weakness and headaches  Psychiatric/Behavioral: Negative for agitation, confusion, self-injury and suicidal ideas  Video Exam    There were no vitals filed for this visit      Physical Exam  Vitals signs and nursing note reviewed  Constitutional:       Appearance: He is ill-appearing  HENT:      Nose: Congestion present  Mouth/Throat:      Lips: Pink  Eyes:      Conjunctiva/sclera: Conjunctivae normal    Neurological:      Mental Status: He is alert and oriented to person, place, and time  Psychiatric:         Mood and Affect: Mood normal          Behavior: Behavior normal           I spent 11 minutes directly with the patient during this visit      [address] [place] [address] acknowledges that he has consented to an online visit or consultation  He understands that the online visit is based solely on information provided by him, and that, in the absence of a face-to-face physical evaluation by the physician, the diagnosis he receives is both limited and provisional in terms of accuracy and completeness  This is not intended to replace a full medical face-to-face evaluation by the physician  Janet Wan understands and accepts these terms

## 2021-06-04 DIAGNOSIS — B96.89 ACUTE BACTERIAL SINUSITIS: ICD-10-CM

## 2021-06-04 DIAGNOSIS — J01.90 ACUTE BACTERIAL SINUSITIS: ICD-10-CM

## 2021-06-09 ENCOUNTER — TELEPHONE (OUTPATIENT)
Dept: PALLIATIVE MEDICINE | Facility: CLINIC | Age: 61
End: 2021-06-09

## 2021-06-09 NOTE — TELEPHONE ENCOUNTER
EVANS that Julianne Molina should call Monse at [phone] opt #[phone]  She is Dr Mill[facility] surgery scheduler  I also sent Lina Farrar a message to call Julianne Molina about his surgery  That he would like it done ASAP because of all the sinus infections he is having

## 2021-06-21 NOTE — PRE-PROCEDURE INSTRUCTIONS
Pre-Surgery Instructions:   Medication Instructions    albuterol (2 5 mg/3 mL) 0 083 % nebulizer solution Instructed to take as needed including DOS    albuterol (PROVENTIL HFA,VENTOLIN HFA) 90 mcg/act inhaler Instructed to take as needed including DOS    fluticasone (FLONASE) 50 mcg/act nasal spray Instructed to take as needed including DOS    lisinopril (ZESTRIL) 5 mg tablet Instructed to take per normal schedule except DOS    mometasone-formoterol (DULERA) 100-5 MCG/ACT inhaler Instructed to take per normal schedule including DOS     Pre-op medication, and showering instructions with antibacteral soap reviewed  Instructed to avoid all ASA and OTC Vit/Supp 1 week prior to surgery and to avoid NSAIDs 3 days prior to surgery  Tylenol ok to take prn  Pt  Verbalized an understanding of all instructions reviewed and offers no concerns at this time

## 2021-06-22 ENCOUNTER — APPOINTMENT (OUTPATIENT)
Dept: LAB | Facility: HOSPITAL | Age: 61
End: 2021-06-22
Payer: COMMERCIAL

## 2021-06-22 ENCOUNTER — OFFICE VISIT (OUTPATIENT)
Dept: LAB | Facility: HOSPITAL | Age: 61
End: 2021-06-22
Payer: COMMERCIAL

## 2021-06-22 DIAGNOSIS — J34.2 NASAL SEPTAL DEVIATION: ICD-10-CM

## 2021-06-22 DIAGNOSIS — J32.4 CHRONIC PANSINUSITIS: ICD-10-CM

## 2021-06-22 DIAGNOSIS — J34.3 NASAL TURBINATE HYPERTROPHY: ICD-10-CM

## 2021-06-22 DIAGNOSIS — J33.9 NASAL POLYPOSIS: ICD-10-CM

## 2021-06-22 LAB
ANION GAP SERPL CALCULATED.3IONS-SCNC: 6 MMOL/L (ref 4–13)
BASOPHILS # BLD AUTO: 0.05 THOUSANDS/ΜL (ref 0–0.1)
BASOPHILS NFR BLD AUTO: 1 % (ref 0–1)
BUN SERPL-MCNC: 15 MG/DL (ref 5–25)
CALCIUM SERPL-MCNC: 8.1 MG/DL (ref 8.3–10.1)
CHLORIDE SERPL-SCNC: 102 MMOL/L (ref 100–108)
CO2 SERPL-SCNC: 28 MMOL/L (ref 21–32)
CREAT SERPL-MCNC: 1.33 MG/DL (ref 0.6–1.3)
EOSINOPHIL # BLD AUTO: 0.88 THOUSAND/ΜL (ref 0–0.61)
EOSINOPHIL NFR BLD AUTO: 11 % (ref 0–6)
ERYTHROCYTE [DISTWIDTH] IN BLOOD BY AUTOMATED COUNT: 12.5 % (ref 11.6–15.1)
GFR SERPL CREATININE-BSD FRML MDRD: 57 ML/MIN/1.73SQ M
GLUCOSE SERPL-MCNC: 88 MG/DL (ref 65–140)
HCT VFR BLD AUTO: 46.1 % (ref 36.5–49.3)
HGB BLD-MCNC: 15.7 G/DL (ref 12–17)
IMM GRANULOCYTES # BLD AUTO: 0.02 THOUSAND/UL (ref 0–0.2)
IMM GRANULOCYTES NFR BLD AUTO: 0 % (ref 0–2)
LYMPHOCYTES # BLD AUTO: 2.22 THOUSANDS/ΜL (ref 0.6–4.47)
LYMPHOCYTES NFR BLD AUTO: 27 % (ref 14–44)
MCH RBC QN AUTO: 30.5 PG (ref 26.8–34.3)
MCHC RBC AUTO-ENTMCNC: 34.1 G/DL (ref 31.4–37.4)
MCV RBC AUTO: 90 FL (ref 82–98)
MONOCYTES # BLD AUTO: 1.11 THOUSAND/ΜL (ref 0.17–1.22)
MONOCYTES NFR BLD AUTO: 14 % (ref 4–12)
NEUTROPHILS # BLD AUTO: 3.97 THOUSANDS/ΜL (ref 1.85–7.62)
NEUTS SEG NFR BLD AUTO: 47 % (ref 43–75)
NRBC BLD AUTO-RTO: 0 /100 WBCS
PLATELET # BLD AUTO: 247 THOUSANDS/UL (ref 149–390)
PMV BLD AUTO: 9.1 FL (ref 8.9–12.7)
POTASSIUM SERPL-SCNC: 3.8 MMOL/L (ref 3.5–5.3)
RBC # BLD AUTO: 5.15 MILLION/UL (ref 3.88–5.62)
SODIUM SERPL-SCNC: 136 MMOL/L (ref 136–145)
WBC # BLD AUTO: 8.25 THOUSAND/UL (ref 4.31–10.16)

## 2021-06-22 PROCEDURE — 85025 COMPLETE CBC W/AUTO DIFF WBC: CPT

## 2021-06-22 PROCEDURE — 36415 COLL VENOUS BLD VENIPUNCTURE: CPT

## 2021-06-22 PROCEDURE — 93005 ELECTROCARDIOGRAM TRACING: CPT

## 2021-06-22 PROCEDURE — 80048 BASIC METABOLIC PNL TOTAL CA: CPT

## 2021-06-23 ENCOUNTER — ANESTHESIA EVENT (OUTPATIENT)
Dept: PERIOP | Facility: HOSPITAL | Age: 61
End: 2021-06-23
Payer: COMMERCIAL

## 2021-06-25 LAB
ATRIAL RATE: 66 BPM
P AXIS: 34 DEGREES
PR INTERVAL: 188 MS
QRS AXIS: 32 DEGREES
QRSD INTERVAL: 96 MS
QT INTERVAL: 398 MS
QTC INTERVAL: 417 MS
T WAVE AXIS: 15 DEGREES
VENTRICULAR RATE: 66 BPM

## 2021-06-25 PROCEDURE — 93010 ELECTROCARDIOGRAM REPORT: CPT | Performed by: INTERNAL MEDICINE

## 2021-07-01 ENCOUNTER — ANESTHESIA (OUTPATIENT)
Dept: PERIOP | Facility: HOSPITAL | Age: 61
End: 2021-07-01
Payer: COMMERCIAL

## 2021-07-01 ENCOUNTER — HOSPITAL ENCOUNTER (OUTPATIENT)
Facility: HOSPITAL | Age: 61
Setting detail: OUTPATIENT SURGERY
Discharge: HOME | End: 2021-07-01
Attending: OTOLARYNGOLOGY | Admitting: OTOLARYNGOLOGY
Payer: COMMERCIAL

## 2021-07-01 VITALS
OXYGEN SATURATION: 92 % | HEART RATE: 71 BPM | TEMPERATURE: 97 F | HEIGHT: 71 IN | SYSTOLIC BLOOD PRESSURE: 156 MMHG | BODY MASS INDEX: 28 KG/M2 | RESPIRATION RATE: 18 BRPM | DIASTOLIC BLOOD PRESSURE: 88 MMHG | WEIGHT: 200 LBS

## 2021-07-01 DIAGNOSIS — Z88.8 ASPIRIN SENSITIVITY: ICD-10-CM

## 2021-07-01 DIAGNOSIS — J32.4 CHRONIC PANSINUSITIS: ICD-10-CM

## 2021-07-01 DIAGNOSIS — J45.909 SAMTER'S TRIAD: Primary | ICD-10-CM

## 2021-07-01 DIAGNOSIS — J34.3 NASAL TURBINATE HYPERTROPHY: ICD-10-CM

## 2021-07-01 DIAGNOSIS — J33.9 SAMTER'S TRIAD: Primary | ICD-10-CM

## 2021-07-01 DIAGNOSIS — J33.9 NASAL POLYPOSIS: ICD-10-CM

## 2021-07-01 DIAGNOSIS — J34.2 NASAL SEPTAL DEVIATION: ICD-10-CM

## 2021-07-01 DIAGNOSIS — Z88.6 SAMTER'S TRIAD: Primary | ICD-10-CM

## 2021-07-01 DIAGNOSIS — J45.20 MILD INTERMITTENT ASTHMA WITHOUT COMPLICATION: ICD-10-CM

## 2021-07-01 PROBLEM — F17.200 SMOKING: Status: ACTIVE | Noted: 2021-07-01

## 2021-07-01 PROBLEM — I10 HTN (HYPERTENSION): Status: ACTIVE | Noted: 2021-07-01

## 2021-07-01 PROCEDURE — 31276 NSL/SINS NDSC FRNT TISS RMVL: CPT | Performed by: OTOLARYNGOLOGY

## 2021-07-01 PROCEDURE — 88304 TISSUE EXAM BY PATHOLOGIST: CPT | Performed by: PATHOLOGY

## 2021-07-01 PROCEDURE — 31257 NSL/SINS NDSC TOT W/SPHENDT: CPT | Performed by: OTOLARYNGOLOGY

## 2021-07-01 PROCEDURE — 31267 ENDOSCOPY MAXILLARY SINUS: CPT | Performed by: OTOLARYNGOLOGY

## 2021-07-01 PROCEDURE — 30520 REPAIR OF NASAL SEPTUM: CPT | Performed by: OTOLARYNGOLOGY

## 2021-07-01 PROCEDURE — 61782 SCAN PROC CRANIAL EXTRA: CPT | Performed by: OTOLARYNGOLOGY

## 2021-07-01 PROCEDURE — C2625 STENT, NON-COR, TEM W/DEL SY: HCPCS | Performed by: OTOLARYNGOLOGY

## 2021-07-01 DEVICE — PROPEL MINI SINUS IMPLANT
Type: IMPLANTABLE DEVICE | Site: NOSE | Status: FUNCTIONAL
Brand: PROPEL MINI

## 2021-07-01 RX ORDER — PROPOFOL 10 MG/ML
INJECTION, EMULSION INTRAVENOUS CONTINUOUS PRN
Status: DISCONTINUED | OUTPATIENT
Start: 2021-07-01 | End: 2021-07-01

## 2021-07-01 RX ORDER — DEXAMETHASONE SODIUM PHOSPHATE 10 MG/ML
INJECTION, SOLUTION INTRAMUSCULAR; INTRAVENOUS AS NEEDED
Status: DISCONTINUED | OUTPATIENT
Start: 2021-07-01 | End: 2021-07-01

## 2021-07-01 RX ORDER — ROCURONIUM BROMIDE 10 MG/ML
INJECTION, SOLUTION INTRAVENOUS AS NEEDED
Status: DISCONTINUED | OUTPATIENT
Start: 2021-07-01 | End: 2021-07-01

## 2021-07-01 RX ORDER — SUCCINYLCHOLINE/SOD CL,ISO/PF 100 MG/5ML
SYRINGE (ML) INTRAVENOUS AS NEEDED
Status: DISCONTINUED | OUTPATIENT
Start: 2021-07-01 | End: 2021-07-01

## 2021-07-01 RX ORDER — EPHEDRINE SULFATE 50 MG/ML
INJECTION INTRAVENOUS AS NEEDED
Status: DISCONTINUED | OUTPATIENT
Start: 2021-07-01 | End: 2021-07-01

## 2021-07-01 RX ORDER — ACETAMINOPHEN 325 MG/1
650 TABLET ORAL EVERY 6 HOURS PRN
Status: DISCONTINUED | OUTPATIENT
Start: 2021-07-01 | End: 2021-07-01 | Stop reason: HOSPADM

## 2021-07-01 RX ORDER — MIDAZOLAM HYDROCHLORIDE 2 MG/2ML
INJECTION, SOLUTION INTRAMUSCULAR; INTRAVENOUS AS NEEDED
Status: DISCONTINUED | OUTPATIENT
Start: 2021-07-01 | End: 2021-07-01

## 2021-07-01 RX ORDER — GLYCOPYRROLATE 0.2 MG/ML
INJECTION INTRAMUSCULAR; INTRAVENOUS AS NEEDED
Status: DISCONTINUED | OUTPATIENT
Start: 2021-07-01 | End: 2021-07-01

## 2021-07-01 RX ORDER — OXYCODONE HCL 5 MG/5 ML
5 SOLUTION, ORAL ORAL EVERY 4 HOURS PRN
Status: DISCONTINUED | OUTPATIENT
Start: 2021-07-01 | End: 2021-07-01 | Stop reason: HOSPADM

## 2021-07-01 RX ORDER — DEXMEDETOMIDINE HYDROCHLORIDE 100 UG/ML
INJECTION, SOLUTION INTRAVENOUS AS NEEDED
Status: DISCONTINUED | OUTPATIENT
Start: 2021-07-01 | End: 2021-07-01

## 2021-07-01 RX ORDER — MAGNESIUM HYDROXIDE 1200 MG/15ML
LIQUID ORAL AS NEEDED
Status: DISCONTINUED | OUTPATIENT
Start: 2021-07-01 | End: 2021-07-01 | Stop reason: HOSPADM

## 2021-07-01 RX ORDER — OXYMETAZOLINE HYDROCHLORIDE 0.05 G/100ML
SPRAY NASAL AS NEEDED
Status: DISCONTINUED | OUTPATIENT
Start: 2021-07-01 | End: 2021-07-01 | Stop reason: HOSPADM

## 2021-07-01 RX ORDER — SODIUM CHLORIDE, SODIUM LACTATE, POTASSIUM CHLORIDE, CALCIUM CHLORIDE 600; 310; 30; 20 MG/100ML; MG/100ML; MG/100ML; MG/100ML
125 INJECTION, SOLUTION INTRAVENOUS CONTINUOUS
Status: DISCONTINUED | OUTPATIENT
Start: 2021-07-01 | End: 2021-07-01 | Stop reason: HOSPADM

## 2021-07-01 RX ORDER — FENTANYL CITRATE/PF 50 MCG/ML
25 SYRINGE (ML) INJECTION
Status: DISCONTINUED | OUTPATIENT
Start: 2021-07-01 | End: 2021-07-01 | Stop reason: HOSPADM

## 2021-07-01 RX ORDER — LIDOCAINE HYDROCHLORIDE AND EPINEPHRINE 10; 10 MG/ML; UG/ML
INJECTION, SOLUTION INFILTRATION; PERINEURAL AS NEEDED
Status: DISCONTINUED | OUTPATIENT
Start: 2021-07-01 | End: 2021-07-01 | Stop reason: HOSPADM

## 2021-07-01 RX ORDER — LIDOCAINE HYDROCHLORIDE 10 MG/ML
INJECTION, SOLUTION EPIDURAL; INFILTRATION; INTRACAUDAL; PERINEURAL AS NEEDED
Status: DISCONTINUED | OUTPATIENT
Start: 2021-07-01 | End: 2021-07-01

## 2021-07-01 RX ORDER — LIDOCAINE HYDROCHLORIDE 10 MG/ML
0.5 INJECTION, SOLUTION EPIDURAL; INFILTRATION; INTRACAUDAL; PERINEURAL ONCE AS NEEDED
Status: DISCONTINUED | OUTPATIENT
Start: 2021-07-01 | End: 2021-07-01 | Stop reason: HOSPADM

## 2021-07-01 RX ORDER — FENTANYL CITRATE 50 UG/ML
INJECTION, SOLUTION INTRAMUSCULAR; INTRAVENOUS AS NEEDED
Status: DISCONTINUED | OUTPATIENT
Start: 2021-07-01 | End: 2021-07-01

## 2021-07-01 RX ORDER — ONDANSETRON 2 MG/ML
INJECTION INTRAMUSCULAR; INTRAVENOUS AS NEEDED
Status: DISCONTINUED | OUTPATIENT
Start: 2021-07-01 | End: 2021-07-01

## 2021-07-01 RX ORDER — GINSENG 100 MG
CAPSULE ORAL AS NEEDED
Status: DISCONTINUED | OUTPATIENT
Start: 2021-07-01 | End: 2021-07-01 | Stop reason: HOSPADM

## 2021-07-01 RX ORDER — PROPOFOL 10 MG/ML
INJECTION, EMULSION INTRAVENOUS AS NEEDED
Status: DISCONTINUED | OUTPATIENT
Start: 2021-07-01 | End: 2021-07-01

## 2021-07-01 RX ORDER — SODIUM CHLORIDE, SODIUM LACTATE, POTASSIUM CHLORIDE, CALCIUM CHLORIDE 600; 310; 30; 20 MG/100ML; MG/100ML; MG/100ML; MG/100ML
INJECTION, SOLUTION INTRAVENOUS CONTINUOUS PRN
Status: DISCONTINUED | OUTPATIENT
Start: 2021-07-01 | End: 2021-07-01

## 2021-07-01 RX ORDER — OXYMETAZOLINE HYDROCHLORIDE 0.05 G/100ML
2 SPRAY NASAL
Status: DISPENSED | OUTPATIENT
Start: 2021-07-01 | End: 2021-07-01

## 2021-07-01 RX ORDER — NEOSTIGMINE METHYLSULFATE 1 MG/ML
INJECTION INTRAVENOUS AS NEEDED
Status: DISCONTINUED | OUTPATIENT
Start: 2021-07-01 | End: 2021-07-01

## 2021-07-01 RX ORDER — OXYCODONE HYDROCHLORIDE 5 MG/1
5 TABLET ORAL EVERY 4 HOURS PRN
Qty: 10 TABLET | Refills: 0 | Status: SHIPPED | OUTPATIENT
Start: 2021-07-01 | End: 2021-07-11

## 2021-07-01 RX ORDER — MEPERIDINE HYDROCHLORIDE 25 MG/ML
25 INJECTION INTRAMUSCULAR; INTRAVENOUS; SUBCUTANEOUS ONCE AS NEEDED
Status: DISCONTINUED | OUTPATIENT
Start: 2021-07-01 | End: 2021-07-01 | Stop reason: HOSPADM

## 2021-07-01 RX ADMIN — OXYMETAZOLINE HYDROCHLORIDE 2 SPRAY: 0.05 SPRAY NASAL at 11:40

## 2021-07-01 RX ADMIN — PROPOFOL 200 MG: 10 INJECTION, EMULSION INTRAVENOUS at 11:49

## 2021-07-01 RX ADMIN — FENTANYL CITRATE 50 MCG: 50 INJECTION, SOLUTION INTRAMUSCULAR; INTRAVENOUS at 12:08

## 2021-07-01 RX ADMIN — FENTANYL CITRATE 25 MCG: 50 INJECTION INTRAMUSCULAR; INTRAVENOUS at 13:34

## 2021-07-01 RX ADMIN — OXYMETAZOLINE HYDROCHLORIDE 2 SPRAY: 0.05 SPRAY NASAL at 11:26

## 2021-07-01 RX ADMIN — FENTANYL CITRATE 50 MCG: 50 INJECTION, SOLUTION INTRAMUSCULAR; INTRAVENOUS at 11:49

## 2021-07-01 RX ADMIN — ONDANSETRON 4 MG: 2 INJECTION INTRAMUSCULAR; INTRAVENOUS at 11:56

## 2021-07-01 RX ADMIN — Medication 100 MG: at 11:49

## 2021-07-01 RX ADMIN — SODIUM CHLORIDE, SODIUM LACTATE, POTASSIUM CHLORIDE, AND CALCIUM CHLORIDE: .6; .31; .03; .02 INJECTION, SOLUTION INTRAVENOUS at 11:46

## 2021-07-01 RX ADMIN — REMIFENTANIL HYDROCHLORIDE 0.3 MCG/KG/MIN: 1 INJECTION, POWDER, LYOPHILIZED, FOR SOLUTION INTRAVENOUS at 11:54

## 2021-07-01 RX ADMIN — ROCURONIUM BROMIDE 20 MG: 10 SOLUTION INTRAVENOUS at 11:55

## 2021-07-01 RX ADMIN — PROPOFOL 120 MCG/KG/MIN: 10 INJECTION, EMULSION INTRAVENOUS at 11:54

## 2021-07-01 RX ADMIN — MIDAZOLAM HYDROCHLORIDE 2 MG: 1 INJECTION, SOLUTION INTRAMUSCULAR; INTRAVENOUS at 11:45

## 2021-07-01 RX ADMIN — DEXAMETHASONE SODIUM PHOSPHATE 10 MG: 10 INJECTION, SOLUTION INTRAMUSCULAR; INTRAVENOUS at 11:49

## 2021-07-01 RX ADMIN — DEXMEDETOMIDINE HYDROCHLORIDE 12 MCG: 100 INJECTION, SOLUTION INTRAVENOUS at 12:52

## 2021-07-01 RX ADMIN — NEOSTIGMINE METHYLSULFATE 3 MG: 1 INJECTION INTRAVENOUS at 13:03

## 2021-07-01 RX ADMIN — LIDOCAINE HYDROCHLORIDE 100 MG: 10 INJECTION, SOLUTION EPIDURAL; INFILTRATION; INTRACAUDAL at 11:49

## 2021-07-01 RX ADMIN — GLYCOPYRROLATE 0.4 MG: 0.2 INJECTION, SOLUTION INTRAMUSCULAR; INTRAVENOUS at 13:03

## 2021-07-01 RX ADMIN — EPHEDRINE SULFATE 15 MG: 50 INJECTION, SOLUTION INTRAVENOUS at 12:17

## 2021-07-01 RX ADMIN — FENTANYL CITRATE 25 MCG: 50 INJECTION INTRAMUSCULAR; INTRAVENOUS at 13:45

## 2021-07-01 RX ADMIN — OXYCODONE HYDROCHLORIDE 5 MG: 5 SOLUTION ORAL at 14:39

## 2021-07-01 RX ADMIN — EPHEDRINE SULFATE 15 MG: 50 INJECTION, SOLUTION INTRAVENOUS at 12:38

## 2021-07-01 RX ADMIN — SODIUM CHLORIDE, SODIUM LACTATE, POTASSIUM CHLORIDE, AND CALCIUM CHLORIDE: .6; .31; .03; .02 INJECTION, SOLUTION INTRAVENOUS at 12:21

## 2021-07-01 NOTE — DISCHARGE INSTRUCTIONS
Functional Endoscopic Sinus Surgery for Rhinosinusitis   Office [phone]  Cell [phone]      WHAT YOU SHOULD KNOW:   Functional endoscopic sinus surgery (FESS) removes tissue that blocks your sinus openings  AFTER YOU LEAVE:   Medicines:   · Medicines  can help decrease pain or prevent bacterial infections  Ask your healthcare provider how to take prescription pain medicine safely  · Take your medicine as directed  Call your healthcare provider if you think your medicine is not helping or if you have side effects  Tell him if you are allergic to any medicine  Keep a list of the medicines, vitamins, and herbs you take  Include the amounts, and when and why you take them  Bring the list or the pill bottles to follow-up visits  Carry your medicine list with you in case of an emergency  Follow up with your healthcare provider as directed:  Write down your questions so you remember to ask them during your visits  Home care:   · No nose blowing until instructed  · Drink plenty of liquids  Ask your healthcare provider how much liquid to drink each day and which liquids are best for you  Limit the amount of caffeine you drink  · Rinse your nose with salt water  This may help loosen your thick mucous so that it comes out more easily when you blow your nose  Start using the [employer] Sinus rinse the night of surgery and continue to use twice daily until otherwise instructed  · Twice weekly, clean the sinus rinse bottle with hot, soapy water and microwave while wet for 60 seconds  · Use a cool-mist vaporizer or humidifier  to add moisture to the air  This helps thin your nasal discharge and prevent colds  Wash the humidifier each day with soap and warm water to keep it free of germs  · Wash your hands frequently  Wash your hands after you come into contact with a person who has a cold   Germ-killing hand lotion or gel may be used to clean your hands when there is no water available  · Sleep with the head of bed elevated to reduce swelling within the nose        You may use ibuprofen (Motrin, Advil) and acetaminophen (Tyelnol) for pain control in addition to any prescribed pain medications  Nasal packing:  Ask your healthcare provider how long the nasal packing will stay inside your nose  If it needs to be removed and replaced at home, ask your healthcare provider how to properly do this  Contact your healthcare provider if:   · You have a fever  · You have chills, a cough, or feel weak and achy  · You have bruises or swelling around your nose or eyes  · Any vision changes    · You have nausea or vomiting  · Blood soaks through your nasal packing  · Your skin is itchy, swollen, or has a rash  · You have questions or concerns about your condition or care  Seek care immediately or call 911 if:   · You have a stiff neck or eye pain, especially when you look directly at light  · You have a severe headache that does not go away even after you take pain medicine  · You have clear fluid coming from your nose  · You have pus or a foul-smelling odor coming from your nose  · You have trouble breathing, seeing, talking, or thinking clearly  · Your face is getting numb  · Your symptoms come back or become worse

## 2021-07-01 NOTE — H&P
Stephanie Myers is a 57 y o male who presents for re-evaluation of  Bilateral nasal polyposis, aspirin sensitivity, and asthma  He underwent CT scan which demonstrated significant for nasal polyposis and chronic pansinusitis  He denies any significant improvement in his symptoms  He has a history of aspirin sensitivity and asthma  He uses Felicia Porter for his asthma and feels that is well controlled  No purulent nasal drainage  Unchanged from previous  Past Medical History:   Diagnosis Date    Asthma     Cataract     Hypertension        /90   Pulse 76   Temp (!) 97 2 °F (36 2 °C) (Tympanic)   Resp 20   Ht 5' 11" (1 803 m)   Wt 90 7 kg (200 lb)   SpO2 93%   BMI 27 89 kg/m²       Physical Exam   Constitutional: Oriented to person, place, and time  Well-developed and well-nourished, no apparent distress, non-toxic appearance  Cooperative, able to hear and answer questions without difficulty  Voice: Normal voice quality  Head: Normocephalic, atraumatic  No scars, masses or lesions  Face: Symmetric, no edema, no sinus tenderness  Eyes: Vision grossly intact, extra-ocular movement intact  Ears: External ear normal   Bilateral tympanic membranes are intact with intact normal landmarks  No post-auricular erythema or tenderness  Nose: Septum midline, nares clear  Mucosa moist, turbinates well appearing  No crusting, polyps or discharge evident  Oral cavity: Dentition intact  Mucosa moist, lips normal   Tongue mobile, floor of mouth normal   Hard palate unremarkable  No masses or lesions  Oropharynx: Uvula is midline, soft palate normal   Unremarkable oropharyngeal inlet  Tonsils unremarkable  Posterior pharyngeal wall clear  No masses or lesions  Salivary glands:  Parotid glands and submandibular glands symmetric, no enlargement or tenderness  Neck: Normal laryngeal elevation with swallow  Trachea midline  No masses or lesions  No palpable adenopathy    Thyroid: normal in size, unremarkable without tenderness or palpable nodules  Pulmonary/Chest: Normal effort and rate  No respiratory distress  Musculoskeletal: Normal range of motion  Neurological: Cranial nerves 2-12 intact  Skin: Skin is warm and dry  Psychiatric: Normal mood and affect  CTAB  RRR  Abd Soft NTND      A/P: Chronic sinusitis:  We discussed risks, benefits, and alternatives to functional endoscopic sinus surgery  We discussed risks including bleeding, infection, and scarring  We discussed risks of hyposmia, injury to the eyes, vision loss, diplopia, injury to the brain, leakage of cerebrospinal fluid, and infection of the brain, among others  He elected to move forward with sinus surgery  We discussed possible need for septoplasty and turbinate reduction for access  We discussed that he will need to irrigate his sinuses afterwards  We discussed follow up 1 week after surgery

## 2021-07-01 NOTE — ANESTHESIA PREPROCEDURE EVALUATION
Procedure:  SEPTOPLASTY (N/A Nose)  SUBMUCOSAL RESECTION OF TURBINATES (N/A Nose)  FUNCTIONAL ENDOSCOPIC SINUS SURGERY (FESS) IMAGED GUIDED, ALL SINUSES (N/A Nose)    Relevant Problems   ANESTHESIA (within normal limits)      CARDIO   (+) HTN (hypertension)      ENDO (within normal limits)      GI/HEPATIC (within normal limits)      HEMATOLOGY (within normal limits)      PULMONARY   (+) Mild intermittent asthma without complication   (+) Samter's triad   (+) Smoking      Other   (+) Chronic pansinusitis   (+) Nasal polyposis        Physical Exam    Airway    Mallampati score: I  TM Distance: >3 FB  Neck ROM: full     Dental       Cardiovascular      Pulmonary      Other Findings        Anesthesia Plan  ASA Score- 2     Anesthesia Type- general with ASA Monitors  Additional Monitors:   Airway Plan: ETT  Plan Factors-Exercise tolerance (METS): >4 METS  Chart reviewed  EKG reviewed  Existing labs reviewed  Patient summary reviewed  Patient is a current smoker  Patient did not smoke on day of surgery  Induction- intravenous  Postoperative Plan-     Informed Consent- Anesthetic plan and risks discussed with patient  I personally reviewed this patient with the CRNA  Discussed and agreed on the Anesthesia Plan with the CRNA  Alcides Garay

## 2021-07-01 NOTE — OP NOTE
OPERATIVE REPORT  PATIENT NAME: Charito Yu    :  1960  MRN: [id number]  Pt Location: [facility]    SURGERY DATE: 2021    Surgeon(s) and Role:     * Citlalli Cuellar MD - Primary    PREOPERATIVE DIAGNOSES:  1  Chronic maxillary sinusitis  2  Chronic ethmoid sinusitis  3  Chronic sphenoid sinusitis  4  Chronic frontal sinusitis  5  Severe septal deviation    POSTOPERATIVE DIAGNOSES:  Same    PROCEDURES:  1  Bilateral maxillary antrostomies  2  Bilateral total ethmoidectomies  3  Bilateral sphenoidotomies  4  Bilateral frontal sinusotomies  5  Septoplasty  6  Computer guided intraoperative surgical navigation    SURGEON:  Mihai Sadler MD    ASSISTANTS: None    ANESTHESIA:  General endotracheal    ESTIMATED BLOOD LOSS:  < 100 cc    FLUIDS:  Crystalloid    FINDINGS: Highly fractured nasal septum  Bilateral severe nasal polyposis with near complete loss of bone of the ethmoid cavity, bilateral middle turbinates and tissues around the maxillary os  COMPLICATIONS:  None  INDICATIONS FOR PROCEDURE:  This is a 57y o  year old male whom I've seen previously in consultation regarding severe nasal obstruction  Risks and benefits of the above procedures were discussed at length, including but not limited to bleeding, infection, external or internal nasal scars, septal perforation, hyposmia, injury to the eyes, or skull base, vision changes, CSF leakage, or brain injury/infection, among others  Patient understands and consents to proceed  PROCEDURE IN DETAIL:  After informed written consent was obtained from the patient, he was brought to the operating room, laid in the supine position, and general endotracheal anesthesia was administered  Time out was performed  Images were brought up and sides were confirmed  The nose was decongested with oxymetazoline pledges  The nose was anesthetized with 6 5 ml of 1% lidocaine with 1:100,000 epinephrine   The face was prepped and draped in standard fashion  After adequate time for anesthesia the procedure was performed begun  COMPUTER-GUIDED INTRAOPERATIVE SURGICAL NAVIGATION: At this point, the Fusion Image Guidance System was brought near the surgical field, and the patient's facial landmarks were co-registered with the pre-existing image-guided CT scan for accurate, less than 1 mm intraoperative surgical navigation of the bilateral paranasal sinuses  This was used throughout the patient's case to confirm intraoperative localization  Septoplasty was performed as follows: A left-sided hemitransfixion incision was made with a #15 blade  A submucoperichondrial plane was elevated posteriorly to the bony septum, inferiorly to the floor of the nose, and superiorly to the dorsum  An incision was made 1 0 cm from the caudal border of the septum at the maxillary crest  This was carried to the dorsal septum with a 1 5 cm dorsal strut remaining to form an L-shaped strut of cartilage to preserve septal support for the tip support  Care was taken to preserve the connection to the caudal septum to the lower lateral cartilages bilaterally  Dissection was performed septum in the same plane on the opposite side  Cartilaginous septum was removed  Bony deviations were removed using through biting instrumentation  BILATERAL MAXILLARY ANTROSTOMIES: First the left, and then the right, nasal cavities were dissected  The middle turbinate was gently medialized, to allow visualization of the uncinate process  This was first identified using a ball probe, and then a back-biter was used to make superior and inferior flaps along the uncinate process, and into the maxillary sinus  These flaps were removed using hand instrumentation, as well as a microdebrider  A wide maxillary antrostomy was required, given the amount of edematous and floppy uncinate tissue that was found intraoperatively   The antrostomy was taken from the area of the maxillary sinus ostium to define this structure, posteriorly to the posterior wall of the maxillary sinus  On the right side, a similar procedure was performed, in which the uncinate process was identified, and dissected away using back-biters, as well as the microdebrider instrument  A wide antrostomy was also felt to be obtained, and on this side, a fair amount of bleeding was noted upon uncinectomy, probably indicative of the severe nasal polyposis  An edematous mound of tissue was noted along the uncinate process and along the orbit that was safely and carefully dissected away  BILATERAL TOTAL ETHMOIDECTOMIES: First on the left side, the ethmoid bulla was noted, and a J curette was used to fracture this structure forward, and a 45-degree Blakesley forceps was used to remove this from the nasal cavity  Thereafter, the basal lamella was penetrated using the J curette, into the posterior ethmoid sinus, and in so doing, edematous tissue was noted along the bony fragments of the ethmoid sinus and along the basal lamella, quite suggestive of recurrent infections and chronic rhinosinusitis in this patient  Basal lamella was safely resected, leaving a small attachment site of the middle turbinate to the lateral nasal wall  This safely exposed the superior turbinate, and the inferior 1/3 of this was resected to allow proper visualization of the sphenoid ostia on the left side  On the right side, a similar approach was employed in which the ethmoid bulla was safely fractured anteriorly and removed from the nasal cavity, and posterior ethmoid sinus was dissected by penetrating the basal lamella and removing diseased bony partitions and mucosa between the maxillary sinus and the sphenoid sinus  On the right side nearly the entire middle turbinate was taken over by polyp   A remaining medial turbinate wall     During subsequent aspects of the dissection after sphenoid localization below, a 30-degree reverse telescope was used to dissect along the skull base of the anterior and posterior ethmoids  Diseased and edematous tissue was especially noted along the ethmoid skull base and along the medial aspect of the orbit, and these partitions were removed with direct visualization and sharp through-cutting hand instrumentation  BILATERAL SPHENOIDOTOMIES: Once the sphenoidostomy could be identified, image guidance was used to confirm intraoperative placement  A freer was used to open and define the sphenoidotomy, which was then further widened using a microdebrider  The skull base was identified posteriorly and superiorly as our landmark for further ethmoid surgery  On the right side, a similar approach was used to explore the right sphenoid sinus, and widen it to identify the skull base this landmark appropriately  The lateral recesses of the sphenoid were adequately dissected, in order to perform a safe sphenoidotomy in this patient  BILATERAL FRONTAL SINUSOTOMIES: Finally, after achieving the dissection of the ethmoid sinuses and sphenoid sinuses, we came from a posterior and anterior direction towards the frontal recesses bilaterally  On the left side, a fair amount of edematous tissue was noted between the middle turbinate and the frontal recess  First, the polypoid tissue along the middle turbinate was smoothly removed using a microdebrider  Thereafter, the axilla of the middle turbinate was resected using a frontal Kerrison  Then frontal cells were noted along the area of the frontal recess, and these were sharply dissected using frontal sinus hand instrumentation, as well as a 70-degree reverse telescope  This gave appropriate visualization of the frontal sinus ostium and the frontal sinus was readily cannulated with the posterior table was readily identified  A propel frontal sinus stent was then placed into the left frontal sinus for future postoperative management       On the right side, a similar procedure was performed, in which edematous tissue was noted along the frontal sinuses and frontal recess, and this was carefully resected using both a curved microdebrider, a 70-degree reverse telescope, as well as hand instrumentation  The axilla of the middle turbinate was removed, to allow access to the frontal recess, and edematous tissue and frontal sinus cells with diseased bone and tissue were resected in a graduated manner  This allowed acces to, and entry into, the frontal sinus, and the posterior table was readily identified on the right side  With this performed, the frontal sinusotomy was complete, and a propel frontal sinus stent was placed into the right frontal sinus for future management  The hemitransfixion incision was closed using multiple interrupted 5-0 chromic suture  The septal space was closed using a mattressed 4-0 plane gut suture on a Mack needle  Rod splints were placed after bacitracin ointment was added  The splints were sewn in place with 4-0 nylon suture  The patient was returned to the care of Anesthesia, extubated without difficulty, and taken to the recovery area in stable condition  All instruments and sponge counts were correct at the end of the procedure  Faith Breaux MD, was present for and performed all key elements of the procedure       I was present for the entire procedure and A qualified resident physician was not available    Patient Disposition:  PACU  and extubated and stable    SIGNATURE: Marilee Giang MD  DATE: July 1, 2021  TIME: 1:13 PM

## 2021-07-07 ENCOUNTER — TELEPHONE (OUTPATIENT)
Dept: PALLIATIVE MEDICINE | Facility: CLINIC | Age: 61
End: 2021-07-07

## 2021-07-16 ENCOUNTER — OFFICE VISIT (OUTPATIENT)
Dept: OTOLARYNGOLOGY | Facility: CLINIC | Age: 61
End: 2021-07-16
Payer: COMMERCIAL

## 2021-07-16 VITALS
TEMPERATURE: 98.2 F | HEART RATE: 84 BPM | BODY MASS INDEX: 27.8 KG/M2 | HEIGHT: 71 IN | WEIGHT: 198.6 LBS | SYSTOLIC BLOOD PRESSURE: 138 MMHG | DIASTOLIC BLOOD PRESSURE: 81 MMHG

## 2021-07-16 DIAGNOSIS — J33.9 NASAL POLYPOSIS: ICD-10-CM

## 2021-07-16 DIAGNOSIS — J32.4 CHRONIC PANSINUSITIS: Primary | ICD-10-CM

## 2021-07-16 PROCEDURE — 99024 POSTOP FOLLOW-UP VISIT: CPT | Performed by: OTOLARYNGOLOGY

## 2021-07-16 PROCEDURE — 31237 NSL/SINS NDSC SURG BX POLYPC: CPT | Performed by: OTOLARYNGOLOGY

## 2021-07-16 NOTE — PROGRESS NOTES
Review of Systems   HENT: Positive for congestion, postnasal drip, sinus pressure and sinus pain  All other systems reviewed and are negative

## 2021-07-16 NOTE — PROGRESS NOTES
Stan Morales comes in for follow-up approximately 2 weeks after surgery  he is doing well  No significant pain  Exam:  Incisions are healing nicely  The external nose shows good contour  Good tip support  Intransally, the mucosa is intact as is the septum  A/P:  He is doing well postoperatively  Instructions were reviewed with the patient  We asked him to return in about 3-4 months, or contact us sooner should any problems arise  Procedure: Nasal endoscopy with debridement bilaterally     Indications: Evaluation and debridement postoperatively    Procedure in detail: After informed verbal consent was obtained the nose was anesthetized using 4% lidocaine and neosynephrine spray  After adequate time for anesthesia the scope was guided easily along the nasal cavity bilaterally  Bilateral middle meatus and sphenoethmoid recesses were evaluated  The scope was removed without difficulty and the patient tolerated the procedure well  Findings:  Bilateral crusting debrided  From the ethmoid cavities and portions of the left stent removed from the ethmoid cavity  Bilateral maxillary sinus is widely patent

## 2021-08-31 ENCOUNTER — DOCTOR'S OFFICE (OUTPATIENT)
Dept: URBAN - NONMETROPOLITAN AREA CLINIC 1 | Facility: CLINIC | Age: 61
Setting detail: OPHTHALMOLOGY
End: 2021-08-31
Payer: COMMERCIAL

## 2021-08-31 ENCOUNTER — OFFICE VISIT (OUTPATIENT)
Dept: FAMILY MEDICINE CLINIC | Facility: CLINIC | Age: 61
End: 2021-08-31
Payer: COMMERCIAL

## 2021-08-31 VITALS
HEART RATE: 79 BPM | WEIGHT: 203.8 LBS | RESPIRATION RATE: 16 BRPM | BODY MASS INDEX: 28.42 KG/M2 | DIASTOLIC BLOOD PRESSURE: 68 MMHG | TEMPERATURE: 97.7 F | OXYGEN SATURATION: 97 % | SYSTOLIC BLOOD PRESSURE: 110 MMHG

## 2021-08-31 DIAGNOSIS — J32.9 CHRONIC SINUSITIS, UNSPECIFIED LOCATION: ICD-10-CM

## 2021-08-31 DIAGNOSIS — H33.052: ICD-10-CM

## 2021-08-31 DIAGNOSIS — H33.022: ICD-10-CM

## 2021-08-31 DIAGNOSIS — J45.40 MODERATE PERSISTENT ASTHMA WITHOUT COMPLICATION: ICD-10-CM

## 2021-08-31 DIAGNOSIS — H33.012: ICD-10-CM

## 2021-08-31 DIAGNOSIS — H33.22: ICD-10-CM

## 2021-08-31 DIAGNOSIS — J33.9 NASAL POLYPOSIS: ICD-10-CM

## 2021-08-31 DIAGNOSIS — H33.032: ICD-10-CM

## 2021-08-31 DIAGNOSIS — J45.20 MILD INTERMITTENT ASTHMA WITHOUT COMPLICATION: ICD-10-CM

## 2021-08-31 DIAGNOSIS — H33.042: ICD-10-CM

## 2021-08-31 DIAGNOSIS — Z01.818 PRE-OPERATIVE CLEARANCE: Primary | ICD-10-CM

## 2021-08-31 DIAGNOSIS — H33.002: ICD-10-CM

## 2021-08-31 DIAGNOSIS — H33.302: ICD-10-CM

## 2021-08-31 DIAGNOSIS — J45.909 UNCOMPLICATED ASTHMA, UNSPECIFIED ASTHMA SEVERITY, UNSPECIFIED WHETHER PERSISTENT: ICD-10-CM

## 2021-08-31 DIAGNOSIS — I10 ESSENTIAL HYPERTENSION: ICD-10-CM

## 2021-08-31 PROBLEM — M77.00 GOLFERS ELBOW: Status: ACTIVE | Noted: 2017-09-11

## 2021-08-31 PROCEDURE — T1015 CLINIC SERVICE: HCPCS | Performed by: NURSE PRACTITIONER

## 2021-08-31 PROCEDURE — 76512 OPH US DX B-SCAN: CPT | Performed by: OPHTHALMOLOGY

## 2021-08-31 PROCEDURE — 92134 CPTRZ OPH DX IMG PST SGM RTA: CPT | Performed by: OPHTHALMOLOGY

## 2021-08-31 PROCEDURE — 92014 COMPRE OPH EXAM EST PT 1/>: CPT | Performed by: OPHTHALMOLOGY

## 2021-08-31 PROCEDURE — 93005 ELECTROCARDIOGRAM TRACING: CPT | Performed by: NURSE PRACTITIONER

## 2021-08-31 RX ORDER — ALBUTEROL SULFATE 2.5 MG/3ML
2.5 SOLUTION RESPIRATORY (INHALATION) EVERY 6 HOURS PRN
Qty: 75 ML | Refills: 2 | Status: SHIPPED | OUTPATIENT
Start: 2021-08-31 | End: 2021-11-12 | Stop reason: SDUPTHER

## 2021-08-31 RX ORDER — FLUTICASONE PROPIONATE 50 MCG
2 SPRAY, SUSPENSION (ML) NASAL DAILY
Qty: 16 G | Refills: 2 | Status: SHIPPED | OUTPATIENT
Start: 2021-08-31 | End: 2022-07-12

## 2021-08-31 RX ORDER — LISINOPRIL 5 MG/1
5 TABLET ORAL DAILY
Qty: 90 TABLET | Refills: 1 | Status: SHIPPED | OUTPATIENT
Start: 2021-08-31 | End: 2022-04-06

## 2021-08-31 RX ORDER — ALBUTEROL SULFATE 90 UG/1
2 AEROSOL, METERED RESPIRATORY (INHALATION) EVERY 6 HOURS PRN
Qty: 18 G | Refills: 2 | Status: SHIPPED | OUTPATIENT
Start: 2021-08-31 | End: 2021-11-12 | Stop reason: SDUPTHER

## 2021-08-31 ASSESSMENT — KERATOMETRY
OD_K1POWER_DIOPTERS: 42.50
OD_K2POWER_DIOPTERS: 42.50
OS_K2POWER_DIOPTERS: 50.25
OS_K1POWER_DIOPTERS: 42.25
OS_AXISANGLE_DEGREES: 106

## 2021-08-31 ASSESSMENT — VISUAL ACUITY
OS_BCVA: 20/25+2
OD_BCVA: 20/40-1

## 2021-08-31 ASSESSMENT — REFRACTION_CURRENTRX
OS_AXIS: 48
OD_SPHERE: -6.50
OD_OVR_VA: 20/
OD_CYLINDER: -1.25
OS_SPHERE: -4.25
OS_OVR_VA: 20/
OS_VPRISM_DIRECTION: SV
OD_VPRISM_DIRECTION: SV
OS_CYLINDER: -0.75
OD_AXIS: 84

## 2021-08-31 ASSESSMENT — CONFRONTATIONAL VISUAL FIELD TEST (CVF)
OS_FINDINGS: FULL
OD_FINDINGS: FULL

## 2021-08-31 NOTE — PATIENT INSTRUCTIONS
Retinal Detachment   AMBULATORY CARE:   Retinal detachment  is when your retina separates from the back of your eye  The retina is the thin layer of tissue that lines the back of your eye  It captures light and sends messages to the brain  Retinal detachment usually happens in 1 eye but may happen in both  Common symptoms include the following:  Symptoms usually happen suddenly  You may have any of the following:  · Seeing floaters, such as spots, cobwebs, strings, or specks    · Seeing flashes of light    · A dark or blind spot in the center of your vision    Contact your healthcare provider if:   · You have a sudden change in your vision or loss of vision  · You have eye pain  · You see more floaters or flashes of light than usual      · You have questions or concerns about your condition or care  Treatment for retinal detachment  is often immediate surgery to reattach your retina  There are many types of surgery for retinal detachment  Talk to your healthcare provider about which eye surgery is right for you  Follow up with your healthcare provider as directed:  Write down your questions so you remember to ask them during your visits  © Copyright [employer] 2021 Information is for End User's use only and may not be sold, redistributed or otherwise used for commercial purposes  All illustrations and images included in CareNotes® are the copyrighted property of [employer]  or [facility] Dominique Salazar   The above information is an  only  It is not intended as medical advice for individual conditions or treatments  Talk to your doctor, nurse or pharmacist before following any medical regimen to see if it is safe and effective for you

## 2021-08-31 NOTE — PROGRESS NOTES
Assessment/Plan:     Diagnoses and all orders for this visit:    Pre-operative clearance  Comments:  NSR  Orders:  -     POCT ECG    Uncomplicated asthma, unspecified asthma severity, unspecified whether persistent  Comments:  Prednisone burst  Albuterol reordered  Orders:  -     albuterol (2 5 mg/3 mL) 0 083 % nebulizer solution; Take 3 mL (2 5 mg total) by nebulization every 6 (six) hours as needed for wheezing or shortness of breath    Mild intermittent asthma without complication  Comments:  Prednisone burst  Albuterol reordered  Orders:  -     albuterol (PROVENTIL HFA,VENTOLIN HFA) 90 mcg/act inhaler; Inhale 2 puffs every 6 (six) hours as needed for wheezing or shortness of breath  -     Complete PFT with post bronchodilator; Future    Nasal polyposis  -     fluticasone (FLONASE) 50 mcg/act nasal spray; 2 sprays into each nostril daily    Chronic sinusitis, unspecified location  -     fluticasone (FLONASE) 50 mcg/act nasal spray; 2 sprays into each nostril daily    Essential hypertension  -     lisinopril (ZESTRIL) 5 mg tablet; Take 1 tablet (5 mg total) by mouth daily  -     CBC and differential; Future  -     Comprehensive metabolic panel; Future    Moderate persistent asthma without complication  -     mometasone-formoterol (DULERA) 100-5 MCG/ACT inhaler; Inhale 2 puffs 2 (two) times a day Rinse mouth after use  BMI 28 0-28 9,adult  -     Lipid panel; Future  -     TSH, 3rd generation with Free T4 reflex; Future            No follow-ups on file  Subjective:        Patient ID: Kiya Barobur is a 57 y o  male  Chief Complaint   Patient presents with    Pre-op Clearance     detached retina - emergency surgery       PMH asthma, HTN, and smoking presents for emergent annual physial per request of ophthalmologist due to retinal detatchment  Patient has not had in-office visit since May 2020  Has had 2 telemed visits since then  Had sinus surgery in May    Reports 1 week ago he had a "shade pulling over his left eye " States eye feels numb  Saw [facility] today who is sending patient for emergent surgery tomorrow [address]  Smokes intermittently - typically while drinking - and smokes 5 or 6 cigarettes while he drinks alcohol  1 pack of cigarettes every 2 weeks  History of smoking 1 ppd x 20 years  Reports using maintenance inhaler daily and albuterol inhaler 1 to 2 times a day  PFTs ordered  Given emergent nature of patient's condition and need for surgery to repair retinal detachment, patient optimized for surgery at this time  Will have him follow up in the office in 1 month for annual physical and further evaluation of labs and respiratory status  The following portions of the patient's history were reviewed and updated as appropriate: allergies, current medications, past family history, past medical history, past social history, past surgical history and problem list     Patient Active Problem List   Diagnosis    Mild intermittent asthma without complication    Chronic pansinusitis    Nasal polyposis    Aspirin sensitivity    Samter's triad    HTN (hypertension)    Smoking    Asthma exacerbation    Elevated blood-pressure reading without diagnosis of hypertension    Fatigue    Golfers elbow       Current Outpatient Medications   Medication Sig Dispense Refill    albuterol (2 5 mg/3 mL) 0 083 % nebulizer solution Take 3 mL (2 5 mg total) by nebulization every 6 (six) hours as needed for wheezing or shortness of breath 75 mL 2    albuterol (PROVENTIL HFA,VENTOLIN HFA) 90 mcg/act inhaler Inhale 2 puffs every 6 (six) hours as needed for wheezing or shortness of breath 18 g 2    fluticasone (FLONASE) 50 mcg/act nasal spray 2 sprays into each nostril daily 16 g 2    lisinopril (ZESTRIL) 5 mg tablet Take 1 tablet (5 mg total) by mouth daily 90 tablet 1    mometasone-formoterol (DULERA) 100-5 MCG/ACT inhaler Inhale 2 puffs 2 (two) times a day Rinse mouth after use   15 g 2     No current facility-administered medications for this visit  Past Medical History:   Diagnosis Date    Asthma     Cataract     Hypertension         Past Surgical History:   Procedure Laterality Date    EYE SURGERY      NM REPAIR OF NASAL SEPTUM N/A 7/1/2021    Procedure: SEPTOPLASTY;  Surgeon: Amparo Padilla MD;  [facility];  Service: ENT    NM STEREOTACTIC COMP ASSIST PROC,CRANIAL,EXTRADURAL N/A 7/1/2021    Procedure: FUNCTIONAL ENDOSCOPIC SINUS SURGERY (FESS) IMAGED GUIDED, ALL SINUSES;  Surgeon: Amparo Padilla MD;  [facility];  Service: ENT        Social History     Socioeconomic History    Marital status: Single     Spouse name: Not on file    Number of children: Not on file    Years of education: Not on file    Highest education level: Not on file   Occupational History    Not on file   Tobacco Use    Smoking status: Current Some Day Smoker    Smokeless tobacco: Never Used    Tobacco comment: smokes only when drinks 2 cigs   Vaping Use    Vaping Use: Never used   Substance and Sexual Activity    Alcohol use: Yes     Comment: occasionally/ Weekends    Drug use: Never    Sexual activity: Not on file   Other Topics Concern    Not on file   Social History Narrative    Not on file     Social Determinants of Health     Financial Resource Strain:     Difficulty of Paying Living Expenses:    Food Insecurity:     Worried About Running Out of Food in the Last Year:     Ran Out of Food in the Last Year:    Transportation Needs:     Lack of Transportation (Medical):      Lack of Transportation (Non-Medical):    Physical Activity:     Days of Exercise per Week:     Minutes of Exercise per Session:    Stress:     Feeling of Stress :    Social Connections:     Frequency of Communication with Friends and Family:     Frequency of Social Gatherings with Friends and Family:     Attends Orthodoxy Services:     Active Member of Clubs or Organizations:     Attends Club or Organization Meetings:     Marital Status:    Intimate Partner Violence:     Fear of Current or Ex-Partner:     Emotionally Abused:     Physically Abused:     Sexually Abused:    Review of Systems   Constitutional: Negative for activity change, appetite change, fatigue and unexpected weight change  HENT: Negative for congestion, ear pain, hearing loss, nosebleeds, rhinorrhea, sinus pain and trouble swallowing  Eyes: Positive for visual disturbance (left eye)  Negative for photophobia  Respiratory: Negative for choking, shortness of breath and wheezing  Cardiovascular: Negative for chest pain, palpitations and leg swelling  Gastrointestinal: Negative for abdominal pain, blood in stool, constipation, diarrhea and nausea  Endocrine: Negative for polydipsia, polyphagia and polyuria  Genitourinary: Negative for difficulty urinating, dysuria, frequency, hematuria and urgency  Musculoskeletal: Negative for arthralgias, back pain and myalgias  Skin: Negative for color change, rash and wound  Neurological: Negative for dizziness, tremors, syncope, weakness and headaches  Psychiatric/Behavioral: Negative for agitation, confusion, self-injury and suicidal ideas  Objective:      /68   Pulse 79   Temp 97 7 °F (36 5 °C)   Resp 16   Wt 92 4 kg (203 lb 12 8 oz)   SpO2 97%   BMI 28 42 kg/m²          Physical Exam  Vitals and nursing note reviewed  Constitutional:       Appearance: Normal appearance  HENT:      Head: Normocephalic and atraumatic  Nose: Nose normal       Mouth/Throat:      Mouth: Mucous membranes are moist       Pharynx: Oropharynx is clear  Eyes:      Pupils: Pupils are equal, round, and reactive to light  Comments: Pupils remain dilated from ophthalmology appointment today  Cardiovascular:      Rate and Rhythm: Normal rate and regular rhythm     Pulmonary:      Effort: Pulmonary effort is normal       Breath sounds: Normal breath sounds  Abdominal:      General: Abdomen is flat  Bowel sounds are normal       Palpations: Abdomen is soft  Genitourinary:     Comments: Exam deferred  Musculoskeletal:         General: Normal range of motion  Cervical back: Normal range of motion and neck supple  Skin:     General: Skin is warm and dry  Capillary Refill: Capillary refill takes less than 2 seconds  Neurological:      General: No focal deficit present  Mental Status: He is alert and oriented to person, place, and time     Psychiatric:         Mood and Affect: Mood normal          Behavior: Behavior normal

## 2021-09-01 ENCOUNTER — AMBUL SURGICAL CARE (OUTPATIENT)
Dept: URBAN - METROPOLITAN AREA SURGERY 29 | Facility: SURGERY | Age: 61
Setting detail: OPHTHALMOLOGY
End: 2021-09-01
Payer: COMMERCIAL

## 2021-09-01 DIAGNOSIS — H33.022: ICD-10-CM

## 2021-09-01 PROCEDURE — 67039 LASER TREATMENT OF RETINA: CPT | Performed by: OPHTHALMOLOGY

## 2021-09-01 PROCEDURE — G8918 PT W/O PREOP ORDER IV AB PRO: HCPCS | Performed by: OPHTHALMOLOGY

## 2021-09-01 PROCEDURE — G8907 PT DOC NO EVENTS ON DISCHARG: HCPCS | Performed by: OPHTHALMOLOGY

## 2021-09-01 PROCEDURE — 67113 REPAIR RETINAL DETACH CPLX: CPT | Performed by: OPHTHALMOLOGY

## 2021-09-02 ENCOUNTER — RX ONLY (RX ONLY)
Age: 61
End: 2021-09-02

## 2021-09-02 ENCOUNTER — DOCTOR'S OFFICE (OUTPATIENT)
Dept: URBAN - NONMETROPOLITAN AREA CLINIC 1 | Facility: CLINIC | Age: 61
Setting detail: OPHTHALMOLOGY
End: 2021-09-02
Payer: COMMERCIAL

## 2021-09-02 DIAGNOSIS — H33.022: ICD-10-CM

## 2021-09-02 PROCEDURE — 99024 POSTOP FOLLOW-UP VISIT: CPT | Performed by: OPHTHALMOLOGY

## 2021-09-02 ASSESSMENT — REFRACTION_CURRENTRX
OS_AXIS: 48
OD_OVR_VA: 20/
OD_VPRISM_DIRECTION: SV
OS_VPRISM_DIRECTION: SV
OD_AXIS: 84
OS_OVR_VA: 20/
OD_CYLINDER: -1.25
OS_SPHERE: -4.25
OD_SPHERE: -6.50
OS_CYLINDER: -0.75

## 2021-09-02 ASSESSMENT — TONOMETRY: OS_IOP_MMHG: 20

## 2021-09-02 ASSESSMENT — KERATOMETRY
OS_K1POWER_DIOPTERS: 42.25
OS_K2POWER_DIOPTERS: 50.25
OD_K2POWER_DIOPTERS: 42.50
OS_AXISANGLE_DEGREES: 106
OD_K1POWER_DIOPTERS: 42.50

## 2021-09-02 ASSESSMENT — VISUAL ACUITY
OD_BCVA: 20/HM
OS_BCVA: 20/25-1

## 2021-09-17 ENCOUNTER — DOCTOR'S OFFICE (OUTPATIENT)
Dept: URBAN - NONMETROPOLITAN AREA CLINIC 1 | Facility: CLINIC | Age: 61
Setting detail: OPHTHALMOLOGY
End: 2021-09-17
Payer: COMMERCIAL

## 2021-09-17 DIAGNOSIS — H33.022: ICD-10-CM

## 2021-09-17 DIAGNOSIS — H33.302: ICD-10-CM

## 2021-09-17 DIAGNOSIS — H43.811: ICD-10-CM

## 2021-09-17 PROCEDURE — 92134 CPTRZ OPH DX IMG PST SGM RTA: CPT | Performed by: OPHTHALMOLOGY

## 2021-09-17 PROCEDURE — 99024 POSTOP FOLLOW-UP VISIT: CPT | Performed by: OPHTHALMOLOGY

## 2021-09-17 PROCEDURE — 92201 OPSCPY EXTND RTA DRAW UNI/BI: CPT | Performed by: OPHTHALMOLOGY

## 2021-09-17 ASSESSMENT — REFRACTION_CURRENTRX
OD_SPHERE: -6.50
OD_CYLINDER: -1.25
OS_OVR_VA: 20/
OS_SPHERE: -4.25
OS_CYLINDER: -0.75
OD_OVR_VA: 20/
OD_VPRISM_DIRECTION: SV
OS_AXIS: 48
OS_VPRISM_DIRECTION: SV
OD_AXIS: 84

## 2021-09-17 ASSESSMENT — KERATOMETRY
OD_K1POWER_DIOPTERS: 42.50
OD_K2POWER_DIOPTERS: 42.50
OS_K1POWER_DIOPTERS: 42.25
OS_K2POWER_DIOPTERS: 50.25
OS_AXISANGLE_DEGREES: 106

## 2021-09-17 ASSESSMENT — VISUAL ACUITY
OS_BCVA: 20/25-1
OD_BCVA: 20/100+1

## 2021-09-17 ASSESSMENT — CONFRONTATIONAL VISUAL FIELD TEST (CVF)
OS_FINDINGS: FULL
OD_FINDINGS: FULL

## 2021-11-12 ENCOUNTER — TELEMEDICINE (OUTPATIENT)
Dept: FAMILY MEDICINE CLINIC | Facility: CLINIC | Age: 61
End: 2021-11-12
Payer: COMMERCIAL

## 2021-11-12 DIAGNOSIS — J06.9 VIRAL UPPER RESPIRATORY TRACT INFECTION: Primary | ICD-10-CM

## 2021-11-12 DIAGNOSIS — J45.909 UNCOMPLICATED ASTHMA, UNSPECIFIED ASTHMA SEVERITY, UNSPECIFIED WHETHER PERSISTENT: ICD-10-CM

## 2021-11-12 DIAGNOSIS — J45.901 MILD ASTHMA WITH EXACERBATION, UNSPECIFIED WHETHER PERSISTENT: ICD-10-CM

## 2021-11-12 DIAGNOSIS — J45.20 MILD INTERMITTENT ASTHMA WITHOUT COMPLICATION: ICD-10-CM

## 2021-11-12 PROCEDURE — 99212 OFFICE O/P EST SF 10 MIN: CPT | Performed by: FAMILY MEDICINE

## 2021-11-12 RX ORDER — ALBUTEROL SULFATE 2.5 MG/3ML
2.5 SOLUTION RESPIRATORY (INHALATION) EVERY 6 HOURS PRN
Qty: 75 ML | Refills: 2 | Status: SHIPPED | OUTPATIENT
Start: 2021-11-12 | End: 2022-03-08

## 2021-11-12 RX ORDER — PREDNISONE 20 MG/1
40 TABLET ORAL DAILY
Qty: 10 TABLET | Refills: 0 | Status: SHIPPED | OUTPATIENT
Start: 2021-11-12 | End: 2021-11-17

## 2021-11-12 RX ORDER — ALBUTEROL SULFATE 90 UG/1
2 AEROSOL, METERED RESPIRATORY (INHALATION) EVERY 6 HOURS PRN
Qty: 18 G | Refills: 2 | Status: SHIPPED | OUTPATIENT
Start: 2021-11-12 | End: 2022-03-08

## 2021-11-15 ENCOUNTER — TELEPHONE (OUTPATIENT)
Dept: FAMILY MEDICINE CLINIC | Facility: CLINIC | Age: 61
End: 2021-11-15

## 2021-11-15 DIAGNOSIS — U07.1 COVID: Primary | ICD-10-CM

## 2021-11-24 ENCOUNTER — RX ONLY (RX ONLY)
Age: 61
End: 2021-11-24

## 2021-11-24 ENCOUNTER — DOCTOR'S OFFICE (OUTPATIENT)
Dept: URBAN - METROPOLITAN AREA CLINIC 125 | Facility: CLINIC | Age: 61
Setting detail: OPHTHALMOLOGY
End: 2021-11-24
Payer: COMMERCIAL

## 2021-11-24 DIAGNOSIS — H10.9: ICD-10-CM

## 2021-11-24 DIAGNOSIS — H33.022: ICD-10-CM

## 2021-11-24 DIAGNOSIS — H33.302: ICD-10-CM

## 2021-11-24 PROCEDURE — 92134 CPTRZ OPH DX IMG PST SGM RTA: CPT | Performed by: OPHTHALMOLOGY

## 2021-11-24 PROCEDURE — 92201 OPSCPY EXTND RTA DRAW UNI/BI: CPT | Performed by: OPHTHALMOLOGY

## 2021-11-24 PROCEDURE — 99024 POSTOP FOLLOW-UP VISIT: CPT | Performed by: OPHTHALMOLOGY

## 2021-11-24 ASSESSMENT — KERATOMETRY
OD_K2POWER_DIOPTERS: 42.50
OS_K2POWER_DIOPTERS: 50.25
OD_K1POWER_DIOPTERS: 42.50
OS_K1POWER_DIOPTERS: 42.25
OS_AXISANGLE_DEGREES: 106

## 2021-11-24 ASSESSMENT — REFRACTION_CURRENTRX
OD_AXIS: 84
OD_CYLINDER: -1.25
OD_VPRISM_DIRECTION: SV
OS_SPHERE: -4.25
OS_OVR_VA: 20/
OS_CYLINDER: -0.75
OD_SPHERE: -6.50
OD_OVR_VA: 20/
OS_AXIS: 48
OS_VPRISM_DIRECTION: SV

## 2021-11-24 ASSESSMENT — VISUAL ACUITY
OD_BCVA: 20/40+2
OS_BCVA: 20/25-1

## 2021-11-24 ASSESSMENT — CONFRONTATIONAL VISUAL FIELD TEST (CVF)
OD_FINDINGS: FULL
OS_FINDINGS: FULL

## 2021-12-09 ENCOUNTER — RX ONLY (RX ONLY)
Age: 61
End: 2021-12-09

## 2021-12-09 ENCOUNTER — DOCTOR'S OFFICE (OUTPATIENT)
Dept: URBAN - NONMETROPOLITAN AREA CLINIC 1 | Facility: CLINIC | Age: 61
Setting detail: OPHTHALMOLOGY
End: 2021-12-09
Payer: COMMERCIAL

## 2021-12-09 DIAGNOSIS — H10.9: ICD-10-CM

## 2021-12-09 PROCEDURE — 99211 OFF/OP EST MAY X REQ PHY/QHP: CPT | Performed by: OPHTHALMOLOGY

## 2021-12-09 ASSESSMENT — CONFRONTATIONAL VISUAL FIELD TEST (CVF)
OD_FINDINGS: FULL
OS_FINDINGS: FULL

## 2021-12-09 ASSESSMENT — VISUAL ACUITY
OS_BCVA: 20/30-2
OD_BCVA: 20/50+2

## 2021-12-09 ASSESSMENT — REFRACTION_CURRENTRX
OS_CYLINDER: -0.75
OD_OVR_VA: 20/
OD_VPRISM_DIRECTION: SV
OS_SPHERE: -4.25
OD_CYLINDER: -1.25
OD_SPHERE: -6.50
OD_AXIS: 84
OS_OVR_VA: 20/
OS_AXIS: 48
OS_VPRISM_DIRECTION: SV

## 2021-12-09 ASSESSMENT — KERATOMETRY
OD_K2POWER_DIOPTERS: 42.50
OS_K2POWER_DIOPTERS: 50.25
OD_K1POWER_DIOPTERS: 42.50
OS_AXISANGLE_DEGREES: 106
OS_K1POWER_DIOPTERS: 42.25

## 2022-01-27 ENCOUNTER — OFFICE VISIT (OUTPATIENT)
Dept: FAMILY MEDICINE CLINIC | Facility: CLINIC | Age: 62
End: 2022-01-27
Payer: COMMERCIAL

## 2022-01-27 VITALS
RESPIRATION RATE: 18 BRPM | SYSTOLIC BLOOD PRESSURE: 112 MMHG | TEMPERATURE: 97.8 F | HEART RATE: 105 BPM | WEIGHT: 207 LBS | DIASTOLIC BLOOD PRESSURE: 78 MMHG | BODY MASS INDEX: 28.98 KG/M2 | OXYGEN SATURATION: 94 % | HEIGHT: 71 IN

## 2022-01-27 DIAGNOSIS — J06.9 URI WITH COUGH AND CONGESTION: Primary | ICD-10-CM

## 2022-01-27 DIAGNOSIS — R06.2 WHEEZING: ICD-10-CM

## 2022-01-27 PROCEDURE — T1015 CLINIC SERVICE: HCPCS | Performed by: FAMILY MEDICINE

## 2022-01-27 RX ORDER — PREDNISONE 20 MG/1
40 TABLET ORAL DAILY
Qty: 10 TABLET | Refills: 0 | Status: SHIPPED | OUTPATIENT
Start: 2022-01-27 | End: 2022-02-01

## 2022-01-27 RX ORDER — DOXYCYCLINE HYCLATE 100 MG/1
100 CAPSULE ORAL EVERY 12 HOURS SCHEDULED
Qty: 14 CAPSULE | Refills: 0 | Status: SHIPPED | OUTPATIENT
Start: 2022-01-27 | End: 2022-02-03

## 2022-01-27 NOTE — PROGRESS NOTES
Assessment/Plan:    No problem-specific Assessment & Plan notes found for this encounter  Diagnoses and all orders for this visit:    URI with cough and congestion  -     predniSONE 20 mg tablet; Take 2 tablets (40 mg total) by mouth daily for 5 days  -     doxycycline hyclate (VIBRAMYCIN) 100 mg capsule; Take 1 capsule (100 mg total) by mouth every 12 (twelve) hours for 7 days    Wheezing  -     predniSONE 20 mg tablet; Take 2 tablets (40 mg total) by mouth daily for 5 days        -puls ox stable, wheezing on exam  abx and prednisone until complete  Continue neb PRN for wheezing/SOB  Reviewed strict return precautions  He verbalized understanding   -RTC 1 month for annual PE or sooner if concerns arise  [facility] to complete blood work prior to apt  Subjective:      Patient ID: Marissa Mcduffie is a 57 y o  male PMH asthma, chronic sinusits s/p reconstruction 7/2021, HTN presents for same day eval of cough, congestion, sore throat, SOB  Notes he feels very congested in his sinus and chest  Had positive home covid test 1/4  Notes symptoms since then have resolved but recently restarted  Took home test yesterday 1/26 which was negative  Using neb 3x yesterday with minimal relief of symptoms  HPI    The following portions of the patient's history were reviewed and updated as appropriate: allergies, current medications, past family history, past medical history, past social history, past surgical history and problem list     Review of Systems   Constitutional: Positive for activity change  Negative for appetite change, chills, diaphoresis, fatigue and fever  HENT: Positive for congestion, sinus pressure and sore throat  Negative for postnasal drip, rhinorrhea, sinus pain and sneezing  Respiratory: Positive for cough, shortness of breath and wheezing  Cardiovascular: Negative for chest pain  Gastrointestinal: Negative for abdominal pain, constipation, diarrhea, nausea and vomiting  Neurological: Positive for headaches  Objective:      /78   Pulse 105   Temp 97 8 °F (36 6 °C)   Resp 18   Ht 5' 11" (1 803 m)   Wt 93 9 kg (207 lb)   SpO2 94%   BMI 28 87 kg/m²          Physical Exam  Vitals reviewed  Constitutional:       General: He is not in acute distress  Appearance: He is well-developed  He is ill-appearing  He is not toxic-appearing  HENT:      Head: Normocephalic and atraumatic  Right Ear: Tympanic membrane and ear canal normal       Left Ear: Tympanic membrane and ear canal normal       Mouth/Throat:      Mouth: Mucous membranes are moist       Pharynx: Posterior oropharyngeal erythema present  No pharyngeal swelling or oropharyngeal exudate  Eyes:      Conjunctiva/sclera: Conjunctivae normal       Pupils: Pupils are equal, round, and reactive to light  Neck:      Thyroid: No thyromegaly  Cardiovascular:      Rate and Rhythm: Normal rate and regular rhythm  Pulses: Normal pulses  No decreased pulses  Heart sounds: Normal heart sounds  No murmur heard  Pulmonary:      Effort: Pulmonary effort is normal  No tachypnea, bradypnea, accessory muscle usage or respiratory distress  Breath sounds: No stridor  Examination of the right-upper field reveals wheezing  Examination of the left-upper field reveals wheezing  Examination of the right-middle field reveals wheezing  Examination of the left-middle field reveals wheezing  Examination of the right-lower field reveals wheezing  Examination of the left-lower field reveals wheezing  Wheezing present  No decreased breath sounds, rhonchi or rales  Abdominal:      General: Bowel sounds are normal  There is no distension  Palpations: Abdomen is soft  Tenderness: There is no abdominal tenderness  Musculoskeletal:         General: Normal range of motion  Cervical back: Normal range of motion and neck supple  Skin:     General: Skin is warm and dry     Neurological: Mental Status: He is alert and oriented to person, place, and time  Mental status is at baseline  Psychiatric:         Mood and Affect: Mood normal          Behavior: Behavior normal          Thought Content:  Thought content normal          Judgment: Judgment normal

## 2022-03-08 DIAGNOSIS — J45.20 MILD INTERMITTENT ASTHMA WITHOUT COMPLICATION: ICD-10-CM

## 2022-03-08 DIAGNOSIS — J45.909 UNCOMPLICATED ASTHMA, UNSPECIFIED ASTHMA SEVERITY, UNSPECIFIED WHETHER PERSISTENT: ICD-10-CM

## 2022-03-08 RX ORDER — ALBUTEROL SULFATE 90 UG/1
AEROSOL, METERED RESPIRATORY (INHALATION)
Qty: 18 G | Refills: 2 | Status: SHIPPED | OUTPATIENT
Start: 2022-03-08 | End: 2022-07-12 | Stop reason: SDUPTHER

## 2022-03-08 RX ORDER — ALBUTEROL SULFATE 2.5 MG/3ML
SOLUTION RESPIRATORY (INHALATION)
Qty: 75 ML | Refills: 2 | Status: SHIPPED | OUTPATIENT
Start: 2022-03-08 | End: 2022-07-12 | Stop reason: SDUPTHER

## 2022-03-14 ENCOUNTER — CONSULT (OUTPATIENT)
Dept: FAMILY MEDICINE CLINIC | Facility: CLINIC | Age: 62
End: 2022-03-14
Payer: COMMERCIAL

## 2022-03-14 ENCOUNTER — DOCTOR'S OFFICE (OUTPATIENT)
Dept: URBAN - NONMETROPOLITAN AREA CLINIC 1 | Facility: CLINIC | Age: 62
Setting detail: OPHTHALMOLOGY
End: 2022-03-14
Payer: COMMERCIAL

## 2022-03-14 VITALS
SYSTOLIC BLOOD PRESSURE: 142 MMHG | HEIGHT: 71 IN | WEIGHT: 210.2 LBS | RESPIRATION RATE: 18 BRPM | BODY MASS INDEX: 29.43 KG/M2 | TEMPERATURE: 97.8 F | HEART RATE: 93 BPM | OXYGEN SATURATION: 96 % | DIASTOLIC BLOOD PRESSURE: 80 MMHG

## 2022-03-14 DIAGNOSIS — Z01.818 PREOPERATIVE GENERAL PHYSICAL EXAMINATION: Primary | ICD-10-CM

## 2022-03-14 DIAGNOSIS — F19.11 HISTORY OF DRUG ABUSE (HCC): ICD-10-CM

## 2022-03-14 DIAGNOSIS — H33.21: ICD-10-CM

## 2022-03-14 PROCEDURE — 92134 CPTRZ OPH DX IMG PST SGM RTA: CPT | Performed by: OPHTHALMOLOGY

## 2022-03-14 PROCEDURE — T1015 CLINIC SERVICE: HCPCS | Performed by: FAMILY MEDICINE

## 2022-03-14 PROCEDURE — 99214 OFFICE O/P EST MOD 30 MIN: CPT | Performed by: OPHTHALMOLOGY

## 2022-03-14 ASSESSMENT — REFRACTION_CURRENTRX
OD_VPRISM_DIRECTION: SV
OS_OVR_VA: 20/
OD_SPHERE: -6.50
OD_CYLINDER: -1.25
OD_AXIS: 84
OD_OVR_VA: 20/
OS_SPHERE: -4.25
OS_CYLINDER: -0.75
OS_VPRISM_DIRECTION: SV
OS_AXIS: 48

## 2022-03-14 ASSESSMENT — CONFRONTATIONAL VISUAL FIELD TEST (CVF)
OS_FINDINGS: FULL
OD_FINDINGS: FULL

## 2022-03-14 ASSESSMENT — VISUAL ACUITY
OS_BCVA: 20/400
OD_BCVA: 20/50+2

## 2022-03-14 ASSESSMENT — KERATOMETRY
OS_AXISANGLE_DEGREES: 106
OD_K1POWER_DIOPTERS: 42.50
OD_K2POWER_DIOPTERS: 42.50
OS_K2POWER_DIOPTERS: 50.25
OS_K1POWER_DIOPTERS: 42.25

## 2022-03-14 NOTE — PROGRESS NOTES
Assessment/Plan:     Diagnoses and all orders for this visit:    Preoperative general physical examination  Comments:  Patient optimized for right eye retinal detatchment repair on Wednesday 3 16 2022  Prior EKG showed NSR    History of drug abuse ([facility] [address] )  Comments:  Echo for cardiac evaluation  Orders:  -     Echo complete w/ contrast if indicated; Future            Return in about 4 weeks (around 4/11/2022) for Annual physical        Subjective:        Patient ID: Lion Diana is a 60 y o  male  Chief Complaint   Patient presents with    Pre-op Exam       PMH asthma, HTN, drug abuse (in remission), and smoking presents for emergent preop clearance per request of ophthalmologist due to retinal detatchment  Reports on Friday he started to lose vision in his right eye " Saw [facility] today who is sending patient for emergent surgery on Wednesday 3 16 2021 in [address]  Smokes intermittently - typically while drinking - and smokes 5 or 6 cigarettes while he drinks alcohol  1 pack of cigarettes every 1 weeks  History of smoking 1 ppd x 20 years  Reports using maintenance inhaler daily and albuterol inhaler 1 to 2 times a day  PFTs ordered  Given emergent nature of patient's condition and need for surgery to repair retinal detachment, patient optimized for surgery at this time  Will have him follow up in the office in 1 month for annual physical and further evaluation of labs and respiratory status        The following portions of the patient's history were reviewed and updated as appropriate: allergies, current medications, past family history, past medical history, past social history, past surgical history and problem list     Patient Active Problem List   Diagnosis    Mild intermittent asthma without complication    Chronic pansinusitis    Nasal polyposis    Aspirin sensitivity    Samter's triad    HTN (hypertension)    Smoking    Asthma exacerbation    Elevated blood-pressure reading without diagnosis of hypertension    Fatigue    Golfers elbow    History of drug abuse (HCC)       Current Outpatient Medications   Medication Sig Dispense Refill    albuterol (2 5 mg/3 mL) 0 083 % nebulizer solution inhale contents of 1 vial ( 3 milliliters ) in nebulizer by mouth and INTO THE LUNGS every 6 hours if needed wheezing or shortness of breath 75 mL 2    albuterol (PROVENTIL HFA,VENTOLIN HFA) 90 mcg/act inhaler inhale 2 puffs by mouth and INTO THE LUNGS every 6 hours if needed wheezing or shortness of breath 18 g 2    fluticasone (FLONASE) 50 mcg/act nasal spray 2 sprays into each nostril daily 16 g 2    lisinopril (ZESTRIL) 5 mg tablet Take 1 tablet (5 mg total) by mouth daily 90 tablet 1    mometasone-formoterol (DULERA) 100-5 MCG/ACT inhaler Inhale 2 puffs 2 (two) times a day Rinse mouth after use  13 g 2     No current facility-administered medications for this visit          Past Medical History:   Diagnosis Date    Asthma     Cataract     Hypertension         Past Surgical History:   Procedure Laterality Date    EYE SURGERY      UT REPAIR OF NASAL SEPTUM N/A 7/1/2021    Procedure: SEPTOPLASTY;  Surgeon: Angela Ayers MD;  [facility];  Service: ENT    UT STEREOTACTIC COMP ASSIST PROC,CRANIAL,EXTRADURAL N/A 7/1/2021    Procedure: FUNCTIONAL ENDOSCOPIC SINUS SURGERY (FESS) IMAGED GUIDED, ALL SINUSES;  Surgeon: Angela Ayers MD;  [facility];  Service: ENT        Social History     Socioeconomic History    Marital status: Single     Spouse name: Not on file    Number of children: Not on file    Years of education: Not on file    Highest education level: Not on file   Occupational History    Not on file   Tobacco Use    Smoking status: Current Some Day Smoker    Smokeless tobacco: Never Used    Tobacco comment: smokes only when drinks 2 cigs   Vaping Use    Vaping Use: Never used   Substance and Sexual Activity    Alcohol use: Yes     Comment: occasionally/ Weekends    Drug use: Never    Sexual activity: Not on file   Other Topics Concern    Not on file   Social History Narrative    Not on file     Social Determinants of Health     Financial Resource Strain: Not on file   Food Insecurity: Not on file   Transportation Needs: Not on file   Physical Activity: Not on file   Stress: Not on file   Social Connections: Not on file   Intimate Partner Violence: Not on file   Housing Stability: Not on file        Review of Systems   Constitutional: Negative for activity change, appetite change, fatigue and unexpected weight change  HENT: Negative for congestion, ear pain, hearing loss, nosebleeds, rhinorrhea, sinus pain and trouble swallowing  Eyes: Positive for visual disturbance (right eye)  Negative for photophobia  Respiratory: Negative for choking, shortness of breath and wheezing  Cardiovascular: Negative for chest pain, palpitations and leg swelling  Gastrointestinal: Negative for abdominal pain, blood in stool, constipation, diarrhea and nausea  Endocrine: Negative for polydipsia, polyphagia and polyuria  Genitourinary: Negative for difficulty urinating, dysuria, frequency, hematuria and urgency  Musculoskeletal: Negative for arthralgias, back pain and myalgias  Skin: Negative for color change, rash and wound  Neurological: Negative for dizziness, tremors, syncope, weakness and headaches  Psychiatric/Behavioral: Negative for agitation, confusion, self-injury and suicidal ideas  Objective:      /80   Pulse 93   Temp 97 8 °F (36 6 °C) (Tympanic)   Resp 18   Ht 5' 11" (1 803 m)   Wt 95 3 kg (210 lb 3 2 oz)   SpO2 96%   BMI 29 32 kg/m²          Physical Exam  Vitals and nursing note reviewed  Constitutional:       Appearance: Normal appearance  HENT:      Head: Normocephalic and atraumatic  Nose: Nose normal       Mouth/Throat:      Mouth: Mucous membranes are moist       Pharynx: Oropharynx is clear  Eyes:      Pupils: Pupils are equal, round, and reactive to light  Comments: Pupils dilated today from ophthalmology exam earlier this morning per pt  Cardiovascular:      Rate and Rhythm: Normal rate and regular rhythm  Pulmonary:      Effort: Pulmonary effort is normal       Breath sounds: Normal breath sounds  Abdominal:      General: Abdomen is flat  Bowel sounds are normal       Palpations: Abdomen is soft  Genitourinary:     Comments: Exam deferred  Musculoskeletal:         General: Normal range of motion  Cervical back: Normal range of motion and neck supple  Skin:     General: Skin is warm and dry  Capillary Refill: Capillary refill takes less than 2 seconds  Neurological:      General: No focal deficit present  Mental Status: He is alert and oriented to person, place, and time     Psychiatric:         Mood and Affect: Mood normal          Behavior: Behavior normal

## 2022-03-14 NOTE — PATIENT INSTRUCTIONS
Retinal Detachment   AMBULATORY CARE:   Retinal detachment  is when your retina separates from the back of your eye  The retina is the thin layer of tissue that lines the back of your eye  It captures light and sends messages to the brain  Retinal detachment usually happens in 1 eye but may happen in both  Common symptoms include the following:  Symptoms usually happen suddenly  You may have any of the following:  · Seeing floaters, such as spots, cobwebs, strings, or specks    · Seeing flashes of light    · A dark or blind spot in the center of your vision    Call your doctor or ophthalmologist if:   · You have a sudden change in your vision or loss of vision  · You have eye pain  · You see more floaters or flashes of light than usual     · You have questions or concerns about your condition or care  Treatment for retinal detachment  is often immediate surgery to reattach your retina  Many types of surgery for retinal detachment are available  Talk to your healthcare provider about which eye surgery is right for you  Protect your vision:   · Get an eye exam as often as directed  Eye exams are done to check for or monitor diabetic retinopathy or other problems  Early diagnosis and treatment of eye problems may prevent permanent vision damage  · Manage health conditions that can cause vision problems  Common examples include diabetes, high blood pressure, and high cholesterol  Follow up with healthcare providers who manage these conditions  · Wear sunglasses with ultraviolet (UV) light protection  UV light from the sun can damage your eyes  It can increase your risk for vision loss  · Eat foods that contain eye-healthy nutrients  Healthy nutrients include vitamin A, vitamin C, vitamin E, omega-3 fatty acids, lutein, and zeaxanthin  They can be found in foods such as spinach, kale, matilda greens, fish, and vegetable oils   You may need to take a vitamin or supplement to help you get enough of these nutrients  · Exercise as directed  Ask your healthcare provider about the best exercise plan for you  · Do not smoke  Nicotine can damage blood vessels in your eyes  Do not use e-cigarettes or smokeless tobacco in place of cigarettes or to help you quit  They still contain nicotine  Ask your healthcare provider for information if you currently smoke and need help to quit  Follow up with your doctor or ophthalmologist as directed: You will need to return for an eye exam to make sure your retina is healing  Write down your questions so you remember to ask them during your visits  © Copyright [address] 2022 Information is for End User's use only and may not be sold, redistributed or otherwise used for commercial purposes  All illustrations and images included in CareNotes® are the copyrighted property of [employer]  or Doug Salazar   The above information is an  only  It is not intended as medical advice for individual conditions or treatments  Talk to your doctor, nurse or pharmacist before following any medical regimen to see if it is safe and effective for you

## 2022-03-16 ENCOUNTER — AMBUL SURGICAL CARE (OUTPATIENT)
Dept: URBAN - METROPOLITAN AREA SURGERY 29 | Facility: SURGERY | Age: 62
Setting detail: OPHTHALMOLOGY
End: 2022-03-16
Payer: COMMERCIAL

## 2022-03-16 DIAGNOSIS — H33.031: ICD-10-CM

## 2022-03-16 PROCEDURE — 67113 REPAIR RETINAL DETACH CPLX: CPT | Performed by: OPHTHALMOLOGY

## 2022-03-16 PROCEDURE — G8907 PT DOC NO EVENTS ON DISCHARG: HCPCS | Performed by: OPHTHALMOLOGY

## 2022-03-16 PROCEDURE — G8918 PT W/O PREOP ORDER IV AB PRO: HCPCS | Performed by: OPHTHALMOLOGY

## 2022-03-17 ENCOUNTER — DOCTOR'S OFFICE (OUTPATIENT)
Dept: URBAN - NONMETROPOLITAN AREA CLINIC 1 | Facility: CLINIC | Age: 62
Setting detail: OPHTHALMOLOGY
End: 2022-03-17
Payer: COMMERCIAL

## 2022-03-17 ENCOUNTER — RX ONLY (RX ONLY)
Age: 62
End: 2022-03-17

## 2022-03-17 DIAGNOSIS — H33.041: ICD-10-CM

## 2022-03-17 PROCEDURE — 99024 POSTOP FOLLOW-UP VISIT: CPT | Performed by: OPHTHALMOLOGY

## 2022-03-17 ASSESSMENT — KERATOMETRY
OS_K2POWER_DIOPTERS: 50.25
OS_K1POWER_DIOPTERS: 42.25
OD_K1POWER_DIOPTERS: 42.50
OD_K2POWER_DIOPTERS: 42.50
OS_AXISANGLE_DEGREES: 106

## 2022-03-17 ASSESSMENT — CONFRONTATIONAL VISUAL FIELD TEST (CVF)
OS_FINDINGS: FULL
OD_FINDINGS: FULL

## 2022-03-17 ASSESSMENT — REFRACTION_CURRENTRX
OS_VPRISM_DIRECTION: SV
OS_AXIS: 48
OD_OVR_VA: 20/
OS_SPHERE: -4.25
OD_CYLINDER: -1.25
OS_CYLINDER: -0.75
OD_SPHERE: -6.50
OS_OVR_VA: 20/
OD_AXIS: 84
OD_VPRISM_DIRECTION: SV

## 2022-03-17 ASSESSMENT — VISUAL ACUITY
OD_BCVA: 20/30-1
OS_BCVA: 20/CF 2FT

## 2022-03-22 ENCOUNTER — APPOINTMENT (OUTPATIENT)
Dept: LAB | Facility: MEDICAL CENTER | Age: 62
End: 2022-03-22
Payer: COMMERCIAL

## 2022-03-22 DIAGNOSIS — I10 ESSENTIAL HYPERTENSION: ICD-10-CM

## 2022-03-22 LAB
ALBUMIN SERPL BCP-MCNC: 3.6 G/DL (ref 3.5–5)
ALP SERPL-CCNC: 63 U/L (ref 46–116)
ALT SERPL W P-5'-P-CCNC: 51 U/L (ref 12–78)
ANION GAP SERPL CALCULATED.3IONS-SCNC: 5 MMOL/L (ref 4–13)
AST SERPL W P-5'-P-CCNC: 28 U/L (ref 5–45)
BASOPHILS # BLD AUTO: 0.06 THOUSANDS/ΜL (ref 0–0.1)
BASOPHILS NFR BLD AUTO: 1 % (ref 0–1)
BILIRUB SERPL-MCNC: 0.37 MG/DL (ref 0.2–1)
BUN SERPL-MCNC: 17 MG/DL (ref 5–25)
CALCIUM SERPL-MCNC: 8.6 MG/DL (ref 8.3–10.1)
CHLORIDE SERPL-SCNC: 108 MMOL/L (ref 100–108)
CHOLEST SERPL-MCNC: 209 MG/DL
CO2 SERPL-SCNC: 24 MMOL/L (ref 21–32)
CREAT SERPL-MCNC: 1.18 MG/DL (ref 0.6–1.3)
EOSINOPHIL # BLD AUTO: 0.91 THOUSAND/ΜL (ref 0–0.61)
EOSINOPHIL NFR BLD AUTO: 12 % (ref 0–6)
ERYTHROCYTE [DISTWIDTH] IN BLOOD BY AUTOMATED COUNT: 13.1 % (ref 11.6–15.1)
GFR SERPL CREATININE-BSD FRML MDRD: 65 ML/MIN/1.73SQ M
GLUCOSE P FAST SERPL-MCNC: 104 MG/DL (ref 65–99)
HCT VFR BLD AUTO: 49.3 % (ref 36.5–49.3)
HDLC SERPL-MCNC: 40 MG/DL
HGB BLD-MCNC: 16.6 G/DL (ref 12–17)
IMM GRANULOCYTES # BLD AUTO: 0.02 THOUSAND/UL (ref 0–0.2)
IMM GRANULOCYTES NFR BLD AUTO: 0 % (ref 0–2)
LDLC SERPL CALC-MCNC: 122 MG/DL (ref 0–100)
LYMPHOCYTES # BLD AUTO: 1.97 THOUSANDS/ΜL (ref 0.6–4.47)
LYMPHOCYTES NFR BLD AUTO: 26 % (ref 14–44)
MCH RBC QN AUTO: 30.3 PG (ref 26.8–34.3)
MCHC RBC AUTO-ENTMCNC: 33.7 G/DL (ref 31.4–37.4)
MCV RBC AUTO: 90 FL (ref 82–98)
MONOCYTES # BLD AUTO: 1.11 THOUSAND/ΜL (ref 0.17–1.22)
MONOCYTES NFR BLD AUTO: 14 % (ref 4–12)
NEUTROPHILS # BLD AUTO: 3.65 THOUSANDS/ΜL (ref 1.85–7.62)
NEUTS SEG NFR BLD AUTO: 47 % (ref 43–75)
NONHDLC SERPL-MCNC: 169 MG/DL
NRBC BLD AUTO-RTO: 0 /100 WBCS
PLATELET # BLD AUTO: 261 THOUSANDS/UL (ref 149–390)
PMV BLD AUTO: 9.6 FL (ref 8.9–12.7)
POTASSIUM SERPL-SCNC: 4.3 MMOL/L (ref 3.5–5.3)
PROT SERPL-MCNC: 7.4 G/DL (ref 6.4–8.2)
RBC # BLD AUTO: 5.48 MILLION/UL (ref 3.88–5.62)
SODIUM SERPL-SCNC: 137 MMOL/L (ref 136–145)
TRIGL SERPL-MCNC: 233 MG/DL
TSH SERPL DL<=0.05 MIU/L-ACNC: 1.82 UIU/ML (ref 0.36–3.74)
WBC # BLD AUTO: 7.72 THOUSAND/UL (ref 4.31–10.16)

## 2022-03-22 PROCEDURE — 36415 COLL VENOUS BLD VENIPUNCTURE: CPT

## 2022-03-22 PROCEDURE — 80053 COMPREHEN METABOLIC PANEL: CPT

## 2022-03-22 PROCEDURE — 80061 LIPID PANEL: CPT

## 2022-03-22 PROCEDURE — 84443 ASSAY THYROID STIM HORMONE: CPT

## 2022-03-22 PROCEDURE — 85025 COMPLETE CBC W/AUTO DIFF WBC: CPT

## 2022-04-06 DIAGNOSIS — J45.40 MODERATE PERSISTENT ASTHMA WITHOUT COMPLICATION: ICD-10-CM

## 2022-04-06 DIAGNOSIS — I10 ESSENTIAL HYPERTENSION: ICD-10-CM

## 2022-04-06 RX ORDER — LISINOPRIL 5 MG/1
TABLET ORAL
Qty: 90 TABLET | Refills: 1 | Status: SHIPPED | OUTPATIENT
Start: 2022-04-06

## 2022-04-18 ENCOUNTER — DOCTOR'S OFFICE (OUTPATIENT)
Dept: URBAN - NONMETROPOLITAN AREA CLINIC 1 | Facility: CLINIC | Age: 62
Setting detail: OPHTHALMOLOGY
End: 2022-04-18
Payer: COMMERCIAL

## 2022-04-18 DIAGNOSIS — H33.022: ICD-10-CM

## 2022-04-18 DIAGNOSIS — H33.041: ICD-10-CM

## 2022-04-18 DIAGNOSIS — H33.302: ICD-10-CM

## 2022-04-18 PROCEDURE — 92134 CPTRZ OPH DX IMG PST SGM RTA: CPT | Performed by: OPHTHALMOLOGY

## 2022-04-18 PROCEDURE — 99024 POSTOP FOLLOW-UP VISIT: CPT | Performed by: OPHTHALMOLOGY

## 2022-04-18 ASSESSMENT — REFRACTION_CURRENTRX
OS_SPHERE: -4.25
OS_AXIS: 48
OD_OVR_VA: 20/
OD_AXIS: 84
OS_CYLINDER: -0.75
OS_OVR_VA: 20/
OD_VPRISM_DIRECTION: SV
OD_SPHERE: -6.50
OS_VPRISM_DIRECTION: SV
OD_CYLINDER: -1.25

## 2022-04-18 ASSESSMENT — KERATOMETRY
OS_K1POWER_DIOPTERS: 42.25
OD_K1POWER_DIOPTERS: 42.50
OD_K2POWER_DIOPTERS: 42.50
OS_AXISANGLE_DEGREES: 106
OS_K2POWER_DIOPTERS: 50.25

## 2022-04-18 ASSESSMENT — CONFRONTATIONAL VISUAL FIELD TEST (CVF)
OS_FINDINGS: FULL
OD_FINDINGS: FULL

## 2022-04-18 ASSESSMENT — VISUAL ACUITY
OD_BCVA: 20/30-1
OS_BCVA: 20/70+2

## 2022-04-24 DIAGNOSIS — Z23 NEED FOR SHINGLES VACCINE: Primary | ICD-10-CM

## 2022-04-24 RX ORDER — ZOSTER VACCINE RECOMBINANT, ADJUVANTED 50 MCG/0.5
0.5 KIT INTRAMUSCULAR ONCE
Qty: 1 EACH | Refills: 1 | Status: SHIPPED | OUTPATIENT
Start: 2022-04-24 | End: 2022-04-24

## 2022-04-25 ENCOUNTER — HOSPITAL ENCOUNTER (OUTPATIENT)
Dept: NON INVASIVE DIAGNOSTICS | Facility: HOSPITAL | Age: 62
Discharge: HOME | End: 2022-04-25
Payer: COMMERCIAL

## 2022-04-25 VITALS
WEIGHT: 210 LBS | HEIGHT: 71 IN | DIASTOLIC BLOOD PRESSURE: 80 MMHG | BODY MASS INDEX: 29.4 KG/M2 | HEART RATE: 78 BPM | SYSTOLIC BLOOD PRESSURE: 128 MMHG

## 2022-04-25 DIAGNOSIS — F19.11 HISTORY OF DRUG ABUSE (HCC): ICD-10-CM

## 2022-04-25 LAB
AORTIC ROOT: 3.5 CM
AORTIC VALVE MEAN VELOCITY: 9.4 M/S
APICAL FOUR CHAMBER EJECTION FRACTION: 62 %
ASCENDING AORTA: 3.3 CM (ref 2.13–3.19)
AV LVOT MEAN GRADIENT: 2 MMHG
AV LVOT PEAK GRADIENT: 5 MMHG
AV MEAN GRADIENT: 4 MMHG
AV PEAK GRADIENT: 7 MMHG
AV VELOCITY RATIO: 0.82
DOP CALC AO PEAK VEL: 1.29 M/S
DOP CALC AO VTI: 25.6 CM
DOP CALC LVOT PEAK VEL VTI: 21.89 CM
DOP CALC LVOT PEAK VEL: 1.06 M/S
DOP CALC RVOT PEAK VEL: 1.22 M/S
DOP CALC RVOT VTI: 23.54 CM
E WAVE DECELERATION TIME: 286 MS
FRACTIONAL SHORTENING: 39 % (ref 28–44)
INTERVENTRICULAR SEPTUM IN DIASTOLE (PARASTERNAL SHORT AXIS VIEW): 0.9 CM
INTERVENTRICULAR SEPTUM: 0.9 CM (ref 0.55–1.04)
LAAS-AP4: 13.8 CM2
LEFT ATRIUM SIZE: 3.5 CM
LEFT INTERNAL DIMENSION IN SYSTOLE: 3 CM (ref 3.77–5.7)
LEFT VENTRICULAR INTERNAL DIMENSION IN DIASTOLE: 4.9 CM (ref 6.25–9.32)
LEFT VENTRICULAR POSTERIOR WALL IN END DIASTOLE: 0.9 CM (ref 0.54–1.02)
LEFT VENTRICULAR STROKE VOLUME: 79 ML
LVSV (TEICH): 79 ML
MV E'TISSUE VEL-SEP: 9 CM/S
MV PEAK A VEL: 0.79 M/S
MV PEAK E VEL: 56 CM/S
MV STENOSIS PRESSURE HALF TIME: 83 MS
MV VALVE AREA P 1/2 METHOD: 2.65 CM2
PV MEAN GRADIENT: 4 MMHG
PV MEAN VELOCITY: 97 CM/S
PV PEAK GRADIENT: 9 MMHG
PV PEAK VELOCITY: 147 CM/S
PV VTI: 29.05 CM
RIGHT VENTRICLE ID DIMENSION: 2.6 CM
SL CV LV EF: 60
SL CV PED ECHO LEFT VENTRICLE DIASTOLIC VOLUME (MOD BIPLANE) 2D: 114 ML
SL CV PED ECHO LEFT VENTRICLE SYSTOLIC VOLUME (MOD BIPLANE) 2D: 35 ML
SL CV RVOT MAX GRADIENT: 6 MMHG
SL CV RVOT MEAN GRADIENT: 3 MMHG
SL CV RVOT VMEAN: 0.8 M/S
TR MAX PG: 14 MMHG
TR PEAK VELOCITY: 1.9 M/S
TRICUSPID VALVE PEAK REGURGITATION VELOCITY: 1.86 M/S
Z-SCORE OF ASCENDING AORTA: 2.39
Z-SCORE OF INTERVENTRICULAR SEPTUM IN END DIASTOLE: 0.84
Z-SCORE OF LEFT VENTRICULAR DIMENSION IN END DIASTOLE: -4.43
Z-SCORE OF LEFT VENTRICULAR DIMENSION IN END SYSTOLE: -3.44
Z-SCORE OF LEFT VENTRICULAR POSTERIOR WALL IN END DIASTOLE: 0.95

## 2022-04-25 PROCEDURE — 93306 TTE W/DOPPLER COMPLETE: CPT

## 2022-04-25 PROCEDURE — 93306 TTE W/DOPPLER COMPLETE: CPT | Performed by: INTERNAL MEDICINE

## 2022-04-26 ENCOUNTER — OFFICE VISIT (OUTPATIENT)
Dept: FAMILY MEDICINE CLINIC | Facility: CLINIC | Age: 62
End: 2022-04-26
Payer: COMMERCIAL

## 2022-04-26 VITALS
HEIGHT: 71 IN | WEIGHT: 208 LBS | BODY MASS INDEX: 29.12 KG/M2 | OXYGEN SATURATION: 97 % | DIASTOLIC BLOOD PRESSURE: 84 MMHG | HEART RATE: 100 BPM | SYSTOLIC BLOOD PRESSURE: 124 MMHG | RESPIRATION RATE: 19 BRPM | TEMPERATURE: 98.5 F

## 2022-04-26 DIAGNOSIS — Z00.00 ANNUAL PHYSICAL EXAM: Primary | ICD-10-CM

## 2022-04-26 DIAGNOSIS — H65.04 RECURRENT ACUTE SEROUS OTITIS MEDIA OF RIGHT EAR: ICD-10-CM

## 2022-04-26 DIAGNOSIS — N52.9 ERECTILE DYSFUNCTION, UNSPECIFIED ERECTILE DYSFUNCTION TYPE: ICD-10-CM

## 2022-04-26 DIAGNOSIS — J45.41 MODERATE PERSISTENT ASTHMA WITH EXACERBATION: ICD-10-CM

## 2022-04-26 DIAGNOSIS — R73.01 ELEVATED FASTING BLOOD SUGAR: ICD-10-CM

## 2022-04-26 DIAGNOSIS — Z91.89 NEED FOR DENTAL CARE: ICD-10-CM

## 2022-04-26 DIAGNOSIS — E55.9 VITAMIN D DEFICIENCY: ICD-10-CM

## 2022-04-26 DIAGNOSIS — Z91.09 ENVIRONMENTAL ALLERGIES: ICD-10-CM

## 2022-04-26 PROCEDURE — T1015 CLINIC SERVICE: HCPCS | Performed by: NURSE PRACTITIONER

## 2022-04-26 RX ORDER — PREDNISONE 20 MG/1
40 TABLET ORAL DAILY
Qty: 10 TABLET | Refills: 0 | Status: SHIPPED | OUTPATIENT
Start: 2022-04-26 | End: 2022-06-14

## 2022-04-26 RX ORDER — SILDENAFIL 25 MG/1
25 TABLET, FILM COATED ORAL DAILY PRN
Qty: 10 TABLET | Refills: 0 | Status: SHIPPED | OUTPATIENT
Start: 2022-04-26

## 2022-04-26 RX ORDER — MONTELUKAST SODIUM 10 MG/1
10 TABLET ORAL
Qty: 30 TABLET | Refills: 1 | Status: SHIPPED | OUTPATIENT
Start: 2022-04-26 | End: 2022-06-14 | Stop reason: SDUPTHER

## 2022-04-26 NOTE — PATIENT INSTRUCTIONS

## 2022-04-26 NOTE — PROGRESS NOTES
[address]    NAME: Huong Garcias  AGE: 60 y o  SEX: male  : 1960     DATE: 2022     Assessment and Plan:     Problem List Items Addressed This Visit        Respiratory    Asthma exacerbation    Relevant Medications    montelukast (SINGULAIR) 10 mg tablet    fluticasone-salmeterol (Wixela Inhub) 250-50 mcg/dose inhaler    predniSONE 20 mg tablet       Nervous and Auditory    Recurrent acute serous otitis media of right ear    Relevant Medications    predniSONE 20 mg tablet       Other    Environmental allergies    Relevant Medications    montelukast (SINGULAIR) 10 mg tablet    Other Relevant Orders    [place] Allergy Panel, Adult    Erectile dysfunction    Relevant Medications    sildenafil (VIAGRA) 25 MG tablet    Other Relevant Orders    Testosterone, free, total    Hemoglobin A1C    Elevated fasting blood sugar    Relevant Orders    Hemoglobin A1C      Other Visit Diagnoses     Annual physical exam    -  Primary    Need for dental care        Relevant Orders    Ambulatory referral to Dentistry    Vitamin D deficiency        Relevant Orders    Vitamin D 25 hydroxy          Immunizations and preventive care screenings were discussed with patient today  Appropriate education was printed on patient's after visit summary  Counseling:  Alcohol/drug use: discussed moderation in alcohol intake, the recommendations for healthy alcohol use, and avoidance of illicit drug use  Dental Health: discussed importance of regular tooth brushing, flossing, and dental visits  Injury prevention: discussed safety/seat belts, safety helmets, smoke detectors, carbon dioxide detectors, and smoking near bedding or upholstery  Sexual health: discussed sexually transmitted diseases, partner selection, use of condoms, avoidance of unintended pregnancy, and contraceptive alternatives    · Exercise: the importance of regular exercise/physical activity was discussed  Recommend exercise 3-5 times per week for at least 30 minutes  BMI Counseling: Body mass index is 29 01 kg/m²  The BMI is above normal  Nutrition recommendations include decreasing portion sizes, encouraging healthy choices of fruits and vegetables, decreasing fast food intake, consuming healthier snacks, limiting drinks that contain sugar, moderation in carbohydrate intake, increasing intake of lean protein, reducing intake of saturated and trans fat and reducing intake of cholesterol  Exercise recommendations include exercising 3-5 times per week  No pharmacotherapy was ordered  Rationale for BMI follow-up plan is due to patient being overweight or obese  Tobacco Cessation Counseling: Tobacco cessation counseling was provided  The patient is sincerely urged to quit consumption of tobacco  He is not ready to quit tobacco        Return in about 4 weeks (around 5/24/2022) for Recheck allergies and E D      Chief Complaint:     Chief Complaint   Patient presents with    review of tests    Medication Refill     patient would like a script for viagra and testosterone     Allergies      History of Present Illness:     Adult Annual Physical   Patient here for a comprehensive physical exam  The patient reports problems - nasal congestion and allergies       Diet and Physical Activity  · Diet/Nutrition: well balanced diet, frequent junk food and limited fruits/vegetables  · Exercise: walking, 5-7 times a week on average and 1-2 hours on average  Depression Screening  PHQ-2/9 Depression Screening    Little interest or pleasure in doing things: 0 - not at all  Feeling down, depressed, or hopeless: 0 - not at all  PHQ-2 Score: 0  PHQ-2 Interpretation: Negative depression screen       General Health  · Sleep: sleeps well and gets 4-6 hours of sleep on average     · Hearing: normal - bilateral   · Vision: no vision problems, most recent eye exam <1 year ago and previous LASIK surgery  · Dental: no dental visits for >1 year, brushes teeth twice daily and flosses teeth occasionally   Health  · Symptoms include: erectile dysfunction     Review of Systems:     Review of Systems   Constitutional: Negative for activity change, appetite change, fatigue and unexpected weight change  HENT: Positive for congestion, hearing loss (right ear), postnasal drip, rhinorrhea, sinus pressure, sinus pain and sneezing  Negative for ear pain, nosebleeds and trouble swallowing  Eyes: Negative for photophobia  Respiratory: Positive for cough, shortness of breath and wheezing  Negative for choking  Cardiovascular: Negative for chest pain, palpitations and leg swelling  Gastrointestinal: Negative for abdominal pain, blood in stool, constipation, diarrhea and nausea  Endocrine: Negative for polydipsia, polyphagia and polyuria  Genitourinary: Negative for difficulty urinating, dysuria, frequency, hematuria and urgency  Musculoskeletal: Negative for arthralgias, back pain and myalgias  Skin: Negative for color change, rash and wound  Neurological: Negative for dizziness, tremors, syncope, weakness and headaches  Psychiatric/Behavioral: Negative for agitation, confusion, self-injury and suicidal ideas        Past Medical History:     Past Medical History:   Diagnosis Date    Asthma     Cataract     Hypertension       Past Surgical History:     Past Surgical History:   Procedure Laterality Date    EYE SURGERY      ID REPAIR OF NASAL SEPTUM N/A 7/1/2021    Procedure: SEPTOPLASTY;  Surgeon: Pily Reyes MD;  [facility];  Service: ENT    ID STEREOTACTIC COMP ASSIST PROC,CRANIAL,EXTRADURAL N/A 7/1/2021    Procedure: FUNCTIONAL ENDOSCOPIC SINUS SURGERY (FESS) IMAGED GUIDED, ALL SINUSES;  Surgeon: Pily Reyes MD;  [facility];  Service: ENT      Family History:     Family History   Problem Relation Age of Onset    No Known Problems Mother     Heart attack Father       Social History:     Social History     Socioeconomic History    Marital status: Single     Spouse name: None    Number of children: None    Years of education: None    Highest education level: None   Occupational History    None   Tobacco Use    Smoking status: Current Some Day Smoker    Smokeless tobacco: Never Used    Tobacco comment: smokes only when drinks 2 cigs   Vaping Use    Vaping Use: Never used   Substance and Sexual Activity    Alcohol use: Yes     Comment: occasionally/ Weekends    Drug use: Never    Sexual activity: None   Other Topics Concern    None   Social History Narrative    None     Social Determinants of Health     Financial Resource Strain: Not on file   Food Insecurity: Not on file   Transportation Needs: Not on file   Physical Activity: Not on file   Stress: Not on file   Social Connections: Not on file   Intimate Partner Violence: Not on file   Housing Stability: Not on file      Current Medications:     Current Outpatient Medications   Medication Sig Dispense Refill    albuterol (2 5 mg/3 mL) 0 083 % nebulizer solution inhale contents of 1 vial ( 3 milliliters ) in nebulizer by mouth and INTO THE LUNGS every 6 hours if needed wheezing or shortness of breath 75 mL 2    albuterol (PROVENTIL HFA,VENTOLIN HFA) 90 mcg/act inhaler inhale 2 puffs by mouth and INTO THE LUNGS every 6 hours if needed wheezing or shortness of breath 18 g 2    fluticasone (FLONASE) 50 mcg/act nasal spray 2 sprays into each nostril daily 16 g 2    lisinopril (ZESTRIL) 5 mg tablet take 1 tablet by mouth once daily 90 tablet 1    fluticasone-salmeterol (Wixela Inhub) 250-50 mcg/dose inhaler Inhale 1 puff 2 (two) times a day Rinse mouth after use   60 blister 3    montelukast (SINGULAIR) 10 mg tablet Take 1 tablet (10 mg total) by mouth daily at bedtime 30 tablet 1    predniSONE 20 mg tablet Take 2 tablets (40 mg total) by mouth daily 10 tablet 0    sildenafil (VIAGRA) 25 MG tablet Take 1 tablet (25 mg total) by mouth daily as needed for erectile dysfunction 10 tablet 0     No current facility-administered medications for this visit  Allergies: Allergies   Allergen Reactions    Aspirin Anaphylaxis    Ibuprofen Other (See Comments)     Asthma flare    Augmentin [Amoxicillin-Pot Clavulanate] Rash      Physical Exam:     /84 (BP Location: Left arm, Patient Position: Sitting, Cuff Size: Standard)   Pulse 100   Temp 98 5 °F (36 9 °C) (Axillary)   Resp 19   Ht 5' 11" (1 803 m)   Wt 94 3 kg (208 lb)   SpO2 97%   BMI 29 01 kg/m²     Physical Exam  Vitals and nursing note reviewed  Constitutional:       Appearance: He is well-developed  HENT:      Head: Normocephalic and atraumatic  Right Ear: A middle ear effusion is present  Tympanic membrane is not erythematous  Nose:      Right Turbinates: Swollen  Left Turbinates: Swollen  Eyes:      Conjunctiva/sclera: Conjunctivae normal    Cardiovascular:      Rate and Rhythm: Normal rate and regular rhythm  Heart sounds: No murmur heard  Pulmonary:      Effort: Pulmonary effort is normal  No respiratory distress  Breath sounds: Wheezing present  Abdominal:      Palpations: Abdomen is soft  Tenderness: There is no abdominal tenderness  Skin:     General: Skin is warm and dry  Neurological:      Mental Status: He is alert and oriented to person, place, and time     Psychiatric:         Mood and Affect: Mood normal          Behavior: Behavior normal           Brittany Correa 28

## 2022-05-20 ENCOUNTER — DOCTOR'S OFFICE (OUTPATIENT)
Dept: URBAN - NONMETROPOLITAN AREA CLINIC 1 | Facility: CLINIC | Age: 62
Setting detail: OPHTHALMOLOGY
End: 2022-05-20
Payer: COMMERCIAL

## 2022-05-20 ENCOUNTER — RX ONLY (RX ONLY)
Age: 62
End: 2022-05-20

## 2022-05-20 DIAGNOSIS — H33.302: ICD-10-CM

## 2022-05-20 DIAGNOSIS — H33.022: ICD-10-CM

## 2022-05-20 DIAGNOSIS — H33.041: ICD-10-CM

## 2022-05-20 PROCEDURE — 92134 CPTRZ OPH DX IMG PST SGM RTA: CPT | Performed by: OPHTHALMOLOGY

## 2022-05-20 PROCEDURE — 99024 POSTOP FOLLOW-UP VISIT: CPT | Performed by: OPHTHALMOLOGY

## 2022-05-20 ASSESSMENT — REFRACTION_CURRENTRX
OD_AXIS: 84
OS_VPRISM_DIRECTION: SV
OD_SPHERE: -6.50
OD_CYLINDER: -1.25
OS_CYLINDER: -0.75
OS_OVR_VA: 20/
OD_VPRISM_DIRECTION: SV
OD_OVR_VA: 20/
OS_AXIS: 48
OS_SPHERE: -4.25

## 2022-05-20 ASSESSMENT — VISUAL ACUITY
OD_BCVA: 20/30-1
OS_BCVA: 20/80-1

## 2022-05-20 ASSESSMENT — CONFRONTATIONAL VISUAL FIELD TEST (CVF)
OD_FINDINGS: FULL
OS_FINDINGS: FULL

## 2022-06-02 ENCOUNTER — DOCTOR'S OFFICE (OUTPATIENT)
Dept: URBAN - NONMETROPOLITAN AREA CLINIC 1 | Facility: CLINIC | Age: 62
Setting detail: OPHTHALMOLOGY
End: 2022-06-02
Payer: COMMERCIAL

## 2022-06-02 DIAGNOSIS — H33.041: ICD-10-CM

## 2022-06-02 PROCEDURE — 99024 POSTOP FOLLOW-UP VISIT: CPT | Performed by: OPHTHALMOLOGY

## 2022-06-02 PROCEDURE — 92134 CPTRZ OPH DX IMG PST SGM RTA: CPT | Performed by: OPHTHALMOLOGY

## 2022-06-02 ASSESSMENT — REFRACTION_CURRENTRX
OS_AXIS: 48
OD_SPHERE: -6.50
OS_VPRISM_DIRECTION: SV
OD_VPRISM_DIRECTION: SV
OS_SPHERE: -4.25
OD_CYLINDER: -1.25
OD_OVR_VA: 20/
OS_OVR_VA: 20/
OD_AXIS: 84
OS_CYLINDER: -0.75

## 2022-06-02 ASSESSMENT — KERATOMETRY
OS_AXISANGLE_DEGREES: 106
OS_K2POWER_DIOPTERS: 50.25
OS_K1POWER_DIOPTERS: 42.25
OD_K2POWER_DIOPTERS: 42.50
OD_K1POWER_DIOPTERS: 42.50

## 2022-06-02 ASSESSMENT — CONFRONTATIONAL VISUAL FIELD TEST (CVF)
OS_FINDINGS: FULL
OD_FINDINGS: FULL

## 2022-06-02 ASSESSMENT — VISUAL ACUITY
OD_BCVA: 20/40-2
OS_BCVA: 20/400

## 2022-06-07 ENCOUNTER — APPOINTMENT (OUTPATIENT)
Dept: LAB | Facility: MEDICAL CENTER | Age: 62
End: 2022-06-07
Payer: COMMERCIAL

## 2022-06-07 ENCOUNTER — CONSULT (OUTPATIENT)
Dept: FAMILY MEDICINE CLINIC | Facility: CLINIC | Age: 62
End: 2022-06-07
Payer: COMMERCIAL

## 2022-06-07 VITALS
BODY MASS INDEX: 28.56 KG/M2 | HEART RATE: 59 BPM | RESPIRATION RATE: 16 BRPM | HEIGHT: 71 IN | SYSTOLIC BLOOD PRESSURE: 126 MMHG | TEMPERATURE: 97.1 F | DIASTOLIC BLOOD PRESSURE: 72 MMHG | OXYGEN SATURATION: 94 % | WEIGHT: 204 LBS

## 2022-06-07 DIAGNOSIS — E55.9 VITAMIN D DEFICIENCY: ICD-10-CM

## 2022-06-07 DIAGNOSIS — N52.9 ERECTILE DYSFUNCTION, UNSPECIFIED ERECTILE DYSFUNCTION TYPE: ICD-10-CM

## 2022-06-07 DIAGNOSIS — Z01.818 PREOPERATIVE CLEARANCE: Primary | ICD-10-CM

## 2022-06-07 DIAGNOSIS — R73.01 ELEVATED FASTING BLOOD SUGAR: ICD-10-CM

## 2022-06-07 DIAGNOSIS — Z91.09 ENVIRONMENTAL ALLERGIES: ICD-10-CM

## 2022-06-07 LAB
25(OH)D3 SERPL-MCNC: 38.2 NG/ML (ref 30–100)
EST. AVERAGE GLUCOSE BLD GHB EST-MCNC: 111 MG/DL
HBA1C MFR BLD: 5.5 %

## 2022-06-07 PROCEDURE — T1015 CLINIC SERVICE: HCPCS | Performed by: FAMILY MEDICINE

## 2022-06-07 PROCEDURE — 36415 COLL VENOUS BLD VENIPUNCTURE: CPT

## 2022-06-07 PROCEDURE — 83036 HEMOGLOBIN GLYCOSYLATED A1C: CPT

## 2022-06-07 PROCEDURE — 86003 ALLG SPEC IGE CRUDE XTRC EA: CPT

## 2022-06-07 PROCEDURE — 84402 ASSAY OF FREE TESTOSTERONE: CPT

## 2022-06-07 PROCEDURE — 84403 ASSAY OF TOTAL TESTOSTERONE: CPT

## 2022-06-07 PROCEDURE — 82306 VITAMIN D 25 HYDROXY: CPT

## 2022-06-07 PROCEDURE — 82785 ASSAY OF IGE: CPT

## 2022-06-07 NOTE — PROGRESS NOTES
Assessment/Plan:      Diagnoses and all orders for this visit:    Preoperative clearance  Comments:  PPW received from Dr Schmidt Cezar office reviewed  He is at acceptable risk for proposed procedure  Subjective:     Patient ID: Huong Garcias is a 60 y o  male  This appointment to obtain preoperative clearance  He is scheduled to undergo right eye complex retinal detachment repair on June 8, 2022 with Jr Valenzuela  He recently underwent this same procedure about 10 weeks ago and did well in the immediate postoperative period but subsequently developed complications  He has a history of hypertension which is well controlled he is not diabetic  Last labs in March showed normal hemoglobin and platelet count  He is not on blood thinners, he reports allergies to Motrin and aspirin  No history of easy bleeding or bruising  He can go up a flight of stairs without getting chest pain or shortness of breath  Review of Systems   Constitutional: Negative for chills and fever  HENT: Negative for ear pain and sore throat  Eyes: Negative for pain and visual disturbance  Respiratory: Negative for cough and shortness of breath  Cardiovascular: Negative for chest pain and palpitations  Gastrointestinal: Negative for abdominal pain and vomiting  Genitourinary: Negative for dysuria and hematuria  Musculoskeletal: Negative for arthralgias and back pain  Skin: Negative for color change and rash  Neurological: Negative for seizures and syncope  Psychiatric/Behavioral: Negative for self-injury and sleep disturbance  The patient is not nervous/anxious  All other systems reviewed and are negative  Objective:     Physical Exam  Vitals reviewed  Constitutional:       Appearance: Normal appearance  HENT:      Head: Normocephalic and atraumatic  Nose: Nose normal       Mouth/Throat:      Mouth: Mucous membranes are moist       Pharynx: Oropharynx is clear     Eyes: Extraocular Movements: Extraocular movements intact  Conjunctiva/sclera: Conjunctivae normal       Pupils: Pupils are equal, round, and reactive to light  Cardiovascular:      Rate and Rhythm: Normal rate and regular rhythm  Pulses: Normal pulses  Heart sounds: Normal heart sounds  No murmur heard  No friction rub  No gallop  Pulmonary:      Effort: Pulmonary effort is normal       Breath sounds: Normal breath sounds  No wheezing, rhonchi or rales  Chest:      Chest wall: No tenderness  Abdominal:      Tenderness: There is no abdominal tenderness  There is no guarding  Musculoskeletal:         General: Normal range of motion  Cervical back: Normal range of motion  Right lower leg: No edema  Left lower leg: No edema  Skin:     General: Skin is warm  Neurological:      General: No focal deficit present  Mental Status: He is alert and oriented to person, place, and time  Mental status is at baseline             Wt Readings from Last 3 Encounters:   06/07/22 92 5 kg (204 lb)   04/26/22 94 3 kg (208 lb)   04/25/22 95 3 kg (210 lb)     Temp Readings from Last 3 Encounters:   06/07/22 (!) 97 1 °F (36 2 °C)   04/26/22 98 5 °F (36 9 °C) (Axillary)   03/14/22 97 8 °F (36 6 °C) (Tympanic)     BP Readings from Last 3 Encounters:   06/07/22 126/72   04/26/22 124/84   04/25/22 128/80     Pulse Readings from Last 3 Encounters:   06/07/22 59   04/26/22 100   04/25/22 78

## 2022-06-08 ENCOUNTER — AMBUL SURGICAL CARE (OUTPATIENT)
Dept: URBAN - METROPOLITAN AREA SURGERY 29 | Facility: SURGERY | Age: 62
Setting detail: OPHTHALMOLOGY
End: 2022-06-08
Payer: COMMERCIAL

## 2022-06-08 DIAGNOSIS — H33.021: ICD-10-CM

## 2022-06-08 DIAGNOSIS — E55.9 VITAMIN D DEFICIENCY: Primary | ICD-10-CM

## 2022-06-08 LAB
A ALTERNATA IGE QN: 0.59 KUA/I
A FUMIGATUS IGE QN: 0.32 KUA/I
BERMUDA GRASS IGE QN: 1.7 KUA/I
BOXELDER IGE QN: 0.84 KUA/I
C HERBARUM IGE QN: 0.34 KUA/I
CAT DANDER IGE QN: 11.6 KUA/I
CMN PIGWEED IGE QN: 0.46 KUA/I
COMMON RAGWEED IGE QN: 19.3 KUA/I
COTTONWOOD IGE QN: 0.29 KUA/I
D FARINAE IGE QN: 1.28 KUA/I
D PTERONYSS IGE QN: 2.1 KUA/I
DOG DANDER IGE QN: 47 KUA/I
LONDON PLANE IGE QN: 1.07 KUA/I
MOUSE URINE PROT IGE QN: 1.63 KUA/I
MT JUNIPER IGE QN: 0.39 KUA/I
MUGWORT IGE QN: 4.26 KUA/I
P NOTATUM IGE QN: 0.36 KUA/I
ROACH IGE QN: 0.65 KUA/I
SHEEP SORREL IGE QN: 0.43 KUA/I
SILVER BIRCH IGE QN: 8.73 KUA/I
TESTOST FREE SERPL-MCNC: 9.2 PG/ML (ref 6.6–18.1)
TESTOST SERPL-MCNC: 416 NG/DL (ref 264–916)
TIMOTHY IGE QN: 9.56 KUA/I
TOTAL IGE SMQN RAST: 2642 KU/L (ref 0–113)
WALNUT IGE QN: 0.38 KUA/I
WHITE ASH IGE QN: 0.37 KUA/I
WHITE ELM IGE QN: 1.2 KUA/I
WHITE MULBERRY IGE QN: 0.19 KUA/I
WHITE OAK IGE QN: 19.8 KUA/I

## 2022-06-08 PROCEDURE — G8918 PT W/O PREOP ORDER IV AB PRO: HCPCS | Performed by: OPHTHALMOLOGY

## 2022-06-08 PROCEDURE — 67113 REPAIR RETINAL DETACH CPLX: CPT | Performed by: OPHTHALMOLOGY

## 2022-06-08 PROCEDURE — G8907 PT DOC NO EVENTS ON DISCHARG: HCPCS | Performed by: OPHTHALMOLOGY

## 2022-06-08 RX ORDER — ERGOCALCIFEROL 1.25 MG/1
50000 CAPSULE ORAL WEEKLY
Qty: 13 CAPSULE | Refills: 1 | Status: SHIPPED | OUTPATIENT
Start: 2022-06-08 | End: 2022-08-29 | Stop reason: SDUPTHER

## 2022-06-09 ENCOUNTER — RX ONLY (RX ONLY)
Age: 62
End: 2022-06-09

## 2022-06-09 ENCOUNTER — DOCTOR'S OFFICE (OUTPATIENT)
Dept: URBAN - NONMETROPOLITAN AREA CLINIC 1 | Facility: CLINIC | Age: 62
Setting detail: OPHTHALMOLOGY
End: 2022-06-09

## 2022-06-09 DIAGNOSIS — H33.021: ICD-10-CM

## 2022-06-09 DIAGNOSIS — H43.811: ICD-10-CM

## 2022-06-09 PROCEDURE — 99024 POSTOP FOLLOW-UP VISIT: CPT | Performed by: OPHTHALMOLOGY

## 2022-06-09 ASSESSMENT — KERATOMETRY
OD_K1POWER_DIOPTERS: 42.50
OS_K1POWER_DIOPTERS: 42.25
OS_K2POWER_DIOPTERS: 50.25
OD_K2POWER_DIOPTERS: 42.50
OS_AXISANGLE_DEGREES: 106

## 2022-06-09 ASSESSMENT — REFRACTION_CURRENTRX
OS_SPHERE: -4.25
OS_CYLINDER: -0.75
OD_AXIS: 84
OD_VPRISM_DIRECTION: SV
OS_AXIS: 48
OS_OVR_VA: 20/
OD_OVR_VA: 20/
OD_CYLINDER: -1.25
OD_SPHERE: -6.50
OS_VPRISM_DIRECTION: SV

## 2022-06-09 ASSESSMENT — VISUAL ACUITY
OD_BCVA: 20/40
OS_BCVA: 20/400

## 2022-06-14 ENCOUNTER — OFFICE VISIT (OUTPATIENT)
Dept: FAMILY MEDICINE CLINIC | Facility: CLINIC | Age: 62
End: 2022-06-14
Payer: COMMERCIAL

## 2022-06-14 VITALS
OXYGEN SATURATION: 96 % | TEMPERATURE: 98.4 F | SYSTOLIC BLOOD PRESSURE: 118 MMHG | WEIGHT: 206.13 LBS | HEART RATE: 76 BPM | BODY MASS INDEX: 28.86 KG/M2 | HEIGHT: 71 IN | DIASTOLIC BLOOD PRESSURE: 72 MMHG

## 2022-06-14 DIAGNOSIS — Z23 NEED FOR PROPHYLACTIC VACCINATION AGAINST HEPATITIS A AND HEPATITIS B: ICD-10-CM

## 2022-06-14 DIAGNOSIS — Z12.11 COLON CANCER SCREENING: ICD-10-CM

## 2022-06-14 DIAGNOSIS — L21.9 SEBORRHEIC DERMATITIS: ICD-10-CM

## 2022-06-14 DIAGNOSIS — Z91.09 ENVIRONMENTAL ALLERGIES: Primary | ICD-10-CM

## 2022-06-14 PROCEDURE — T1015 CLINIC SERVICE: HCPCS | Performed by: FAMILY MEDICINE

## 2022-06-14 RX ORDER — MONTELUKAST SODIUM 10 MG/1
10 TABLET ORAL
Qty: 90 TABLET | Refills: 2 | Status: SHIPPED | OUTPATIENT
Start: 2022-06-14

## 2022-06-14 RX ORDER — SELENIUM SULFIDE 2.5 MG/100ML
LOTION TOPICAL 2 TIMES WEEKLY
Qty: 118 ML | Refills: 1 | Status: SHIPPED | OUTPATIENT
Start: 2022-06-16

## 2022-06-14 RX ORDER — PREDNISOLONE ACETATE 10 MG/ML
SUSPENSION/ DROPS OPHTHALMIC
COMMUNITY
Start: 2022-06-08

## 2022-06-14 RX ORDER — SELENIUM SULFIDE 2.5 MG/100ML
LOTION TOPICAL AS NEEDED
Qty: 118 ML | Refills: 1 | Status: SHIPPED | OUTPATIENT
Start: 2022-06-14 | End: 2022-06-14

## 2022-06-14 RX ORDER — OFLOXACIN 3 MG/ML
SOLUTION/ DROPS OPHTHALMIC
COMMUNITY
Start: 2022-06-08

## 2022-06-14 NOTE — PROGRESS NOTES
Assessment/Plan:     Diagnoses and all orders for this visit:    Environmental allergies  Comments:  Greatly improved with montelukast  Continue flonase daily  Albuterol inhaler PRN  NE allergy panel reviewed    Orders:  -     montelukast (SINGULAIR) 10 mg tablet; Take 1 tablet (10 mg total) by mouth daily at bedtime    Seborrheic dermatitis  Comments:  Selsun 2x a week as needed for mgmt  RTC if no improvement  Orders:  -     Discontinue: selenium sulfide (SELSUN) 2 5 % shampoo; Apply topically if needed for irritation, itching or dandruff  -     selenium sulfide (SELSUN) 2 5 % shampoo; Apply topically 2 (two) times a week    Need for prophylactic vaccination against hepatitis A and hepatitis B  -     Hepatitis A hepatitis B combined vaccine IM    Colon cancer screening  -     Cologuarachel    Other orders  -     ofloxacin (OCUFLOX) 0 3 % ophthalmic solution; INSTILL 1 DROP INTO RIGHT EYE THREE TIMES DAILY STARTING AFTER SURGERY  -     prednisoLONE acetate (PRED FORTE) 1 % ophthalmic suspension; INSTILL 1 DROP INTO RIGHT EYE THREE TIMES A DAY STARTING AFTER SURGERY            Return in about 6 months (around 12/14/2022) for Recheck HTN and allergies  Subjective:        Patient ID: Fernando Faith is a 60 y o  male  Chief Complaint   Patient presents with    Follow-up     Wants to review labs        PMH asthma, HTN, and smoking presents for follow up of allergies and E D  Is using Viagra without any concerns and reports improvement in E D  symptoms  States he has only had to use rescue inhaler rarely since starting montelukast  Has not needed to use wixela  Will hold inhaler with instructions for patient to notify us if he is using rescue inhaler 2x a week or more        The following portions of the patient's history were reviewed and updated as appropriate: allergies, current medications, past family history, past medical history, past social history, past surgical history and problem list     Patient Active Problem List   Diagnosis    Mild intermittent asthma without complication    Chronic pansinusitis    Nasal polyposis    Aspirin sensitivity    Samter's triad    HTN (hypertension)    Smoking    Asthma exacerbation    Elevated blood-pressure reading without diagnosis of hypertension    Fatigue    Golfers elbow    History of drug abuse ([address] )    Environmental allergies    Erectile dysfunction    Recurrent acute serous otitis media of right ear    Elevated fasting blood sugar       Current Outpatient Medications   Medication Sig Dispense Refill    albuterol (2 5 mg/3 mL) 0 083 % nebulizer solution inhale contents of 1 vial ( 3 milliliters ) in nebulizer by mouth and INTO THE LUNGS every 6 hours if needed wheezing or shortness of breath 75 mL 2    albuterol (PROVENTIL HFA,VENTOLIN HFA) 90 mcg/act inhaler inhale 2 puffs by mouth and INTO THE LUNGS every 6 hours if needed wheezing or shortness of breath 18 g 2    ergocalciferol (VITAMIN D2) 50,000 units Take 1 capsule (50,000 Units total) by mouth once a week 13 capsule 1    fluticasone (FLONASE) 50 mcg/act nasal spray 2 sprays into each nostril daily 16 g 2    lisinopril (ZESTRIL) 5 mg tablet take 1 tablet by mouth once daily 90 tablet 1    montelukast (SINGULAIR) 10 mg tablet Take 1 tablet (10 mg total) by mouth daily at bedtime 90 tablet 2    ofloxacin (OCUFLOX) 0 3 % ophthalmic solution INSTILL 1 DROP INTO RIGHT EYE THREE TIMES DAILY STARTING AFTER SURGERY      prednisoLONE acetate (PRED FORTE) 1 % ophthalmic suspension INSTILL 1 DROP INTO RIGHT EYE THREE TIMES A DAY STARTING AFTER SURGERY      [START ON 6/16/2022] selenium sulfide (SELSUN) 2 5 % shampoo Apply topically 2 (two) times a week 118 mL 1    sildenafil (VIAGRA) 25 MG tablet Take 1 tablet (25 mg total) by mouth daily as needed for erectile dysfunction 10 tablet 0     No current facility-administered medications for this visit          Past Medical History:   Diagnosis Date    Asthma  Cataract     Hypertension         Past Surgical History:   Procedure Laterality Date    EYE SURGERY      AK REPAIR OF NASAL SEPTUM N/A 7/1/2021    Procedure: SEPTOPLASTY;  Surgeon: Nadine Long MD;  [facility];  Service: ENT    AK STEREOTACTIC COMP ASSIST PROC,CRANIAL,EXTRADURAL N/A 7/1/2021    Procedure: FUNCTIONAL ENDOSCOPIC SINUS SURGERY (FESS) IMAGED GUIDED, ALL SINUSES;  Surgeon: Nadine Long MD;  [facility];  Service: ENT        Social History     Socioeconomic History    Marital status: Single     Spouse name: Not on file    Number of children: Not on file    Years of education: Not on file    Highest education level: Not on file   Occupational History    Not on file   Tobacco Use    Smoking status: Current Some Day Smoker    Smokeless tobacco: Never Used    Tobacco comment: smokes only when drinks 2 cigs   Vaping Use    Vaping Use: Never used   Substance and Sexual Activity    Alcohol use: Yes     Comment: occasionally/ Weekends    Drug use: Never    Sexual activity: Not on file   Other Topics Concern    Not on file   Social History Narrative    Not on file     Social Determinants of Health     Financial Resource Strain: Not on file   Food Insecurity: Not on file   Transportation Needs: Not on file   Physical Activity: Not on file   Stress: Not on file   Social Connections: Not on file   Intimate Partner Violence: Not on file   Housing Stability: Not on file        Review of Systems   Constitutional: Negative for activity change, appetite change, fatigue and unexpected weight change  HENT: Negative for congestion, ear pain, hearing loss, nosebleeds, rhinorrhea, sinus pain and trouble swallowing  Eyes: Negative for photophobia  Respiratory: Negative for choking, shortness of breath and wheezing  Cardiovascular: Negative for chest pain, palpitations and leg swelling     Gastrointestinal: Negative for abdominal pain, blood in stool, constipation, diarrhea and nausea  Endocrine: Negative for polydipsia, polyphagia and polyuria  Genitourinary: Negative for difficulty urinating, dysuria, frequency, hematuria and urgency  Musculoskeletal: Negative for arthralgias, back pain and myalgias  Skin: Positive for rash (scalp)  Negative for color change and wound  Neurological: Negative for dizziness, tremors, syncope, weakness and headaches  Psychiatric/Behavioral: Negative for agitation, confusion, self-injury and suicidal ideas  Objective:      /72 (BP Location: Left arm, Patient Position: Sitting)   Pulse 76   Temp 98 4 °F (36 9 °C)   Ht 5' 11" (1 803 m)   Wt 93 5 kg (206 lb 2 oz)   SpO2 96%   BMI 28 75 kg/m²          Physical Exam  Vitals and nursing note reviewed  Constitutional:       Appearance: Normal appearance  HENT:      Head: Normocephalic and atraumatic  Nose: Nose normal       Mouth/Throat:      Mouth: Mucous membranes are moist    Eyes:      Conjunctiva/sclera: Conjunctivae normal    Cardiovascular:      Rate and Rhythm: Normal rate and regular rhythm  Pulmonary:      Effort: Pulmonary effort is normal       Breath sounds: Normal breath sounds  Abdominal:      General: Abdomen is flat  Bowel sounds are normal       Palpations: Abdomen is soft  Genitourinary:     Comments: Exam deferred  Skin:     General: Skin is warm and dry  Capillary Refill: Capillary refill takes less than 2 seconds  Findings: Rash (scalp; maculopapular) present  Rash is papular and scaling  Neurological:      General: No focal deficit present  Mental Status: He is alert and oriented to person, place, and time     Psychiatric:         Mood and Affect: Mood normal          Behavior: Behavior normal

## 2022-06-14 NOTE — PATIENT INSTRUCTIONS
Allergies   AMBULATORY CARE:   Allergies  are an immune system reaction to a substance called an allergen  Your immune system sees the allergen as harmful and attacks it  Common signs and symptoms include the following:   Mild symptoms  include sneezing and a runny, itchy, or stuffy nose  You may also have swollen, watery, or itchy eyes, or skin itching  You may have swelling or pain where an insect bit or stung you  Anaphylaxis symptoms  include trouble breathing or swallowing, a rash or hives, or severe swelling  You may also have a cough, wheezing, or feel lightheaded or dizzy  Anaphylaxis is a sudden, life-threatening reaction that needs immediate treatment  Call 911 for signs or symptoms of anaphylaxis,  such as trouble breathing, swelling in your mouth or throat, or wheezing  You may also have itching, a rash, hives, or feel like you are going to faint  Seek care immediately if:   You have tingling in your hands or feet  Your skin is red or flushed  Contact your healthcare provider if:   You have questions or concerns about your condition or care  Steps to take for signs or symptoms of anaphylaxis:   Immediately  give 1 shot of epinephrine only into the outer thigh muscle  Leave the shot in place  as directed  Your healthcare provider may recommend you leave it in place for up to 10 seconds before you remove it  This helps make sure all of the epinephrine is delivered  Call 911 and go to the emergency department,  even if the shot improved symptoms  Do not drive yourself  Bring the used epinephrine shot with you  Treatment for allergies  may include any of the following:  Antihistamines  help decrease itching, sneezing, and swelling  You may take them as a pill or use drops in your nose or eyes  Decongestants  help your nose feel less stuffy  Steroids  decrease swelling and redness  Topical treatments  help decrease itching or swelling   You also may be given nasal sprays or eyedrops  Epinephrine  is medicine used to treat severe allergic reactions such as anaphylaxis  Desensitization  gets your body used to allergens you cannot avoid  Your healthcare provider will give you a shot that contains a small amount of an allergen  He or she will treat any allergic reaction you have  Your provider will give you more of the allergen a little at a time until your body gets used to it  Your reaction to the allergen may be less serious after this treatment  Your healthcare provider will tell you how long to get the shots  Safety precautions to take if you are at risk for anaphylaxis:   Keep 2 shots of epinephrine with you at all times  You may need a second shot, because epinephrine only works for about 20 minutes and symptoms may return  Your healthcare provider can show you and family members how to give the shot  Check the expiration date every month and replace it before it expires  Create an action plan  Your healthcare provider can help you create a written plan that explains the allergy and an emergency plan to treat a reaction  The plan explains when to give a second epinephrine shot if symptoms return or do not improve after the first  Give copies of the action plan and emergency instructions to family members and work staff  Show them how to give a shot of epinephrine  Be careful when you exercise  If you have had exercise-induced anaphylaxis, do not exercise right after you eat  Stop exercising right away if you start to develop any signs or symptoms of anaphylaxis  You may first feel tired, warm, or have itchy skin  Hives, swelling, and severe breathing problems may develop if you continue to exercise  Carry medical alert identification  Wear medical alert jewelry or carry a card that explains the allergy  Ask your healthcare provider where to get these items  Inform all healthcare providers of the allergy    This includes dentists, nurses, doctors, and surgeons  Manage allergies:   Use nasal rinses as directed  Rinse with a saline solution daily  This will help clear allergens out of your nose  Use distilled water if possible  You can also boil tap water and let it cool before you use it  Do not use tap water that has not been boiled  Do not smoke  Allergy symptoms may decrease if you are not around smoke  Nicotine and other chemicals in cigarettes and cigars can cause lung damage  Ask your healthcare provider for information if you currently smoke and need help to quit  E-cigarettes or smokeless tobacco still contain nicotine  Talk to your healthcare provider before you use these products  Prevent an allergic reaction:   Do not go outside when pollen counts are high if you have seasonal allergies  Your symptoms may be better if you go outside only in the morning or evening  Use your air conditioner, and change air filters often  Avoid dust, fur, and mold  Dust and vacuum your home often  You may want to wear a mask when you vacuum  Keep pets in certain rooms, and bathe them often  Use a dehumidifier (machine that decreases moisture) to help prevent mold  Do not use products that contain latex if you have a latex allergy  Use nonlatex gloves if you work in healthcare or in food preparation  Always tell healthcare providers about a latex allergy  Avoid areas that attract insects if you have an insect bite or sting allergy  Areas include trash cans, gardens, and picnics  Do not wear bright clothing or strong scents when you will be outside  Prevent an allergic reaction caused by food  You may have a reaction if your food is not prepared safely  For example, you could be served food that touched your trigger food during preparation  This is called cross-contamination  Kitchen tools can also cause cross-contamination  You may also eat baked foods that contain a trigger food you do not know about   Ask if the food contains your trigger food before you handle or eat it  Follow up with your healthcare provider as directed:  Write down your questions so you remember to ask them during your visits  When you have an allergic reaction, write down everything you were exposed to in the 2 hours before the reaction  Take that information to your next visit  © [employer] 2022 Information is for End User's use only and may not be sold, redistributed or otherwise used for commercial purposes  All illustrations and images included in CareNotes® are the copyrighted property of [employer]  or Doug Salazar   The above information is an  only  It is not intended as medical advice for individual conditions or treatments  Talk to your doctor, nurse or pharmacist before following any medical regimen to see if it is safe and effective for you  Seborrheic Dermatitis   AMBULATORY CARE:   Seborrheic dermatitis  is a skin condition that causes a rash and flaking, scaling skin  The condition usually affects hairy areas of the body, such as the scalp  Your face, ears, chest, groin, or back may be affected  Seborrheic dermatitis can happen at any age  The condition often goes away on its own in infants, but it may return during adolescence  Seborrheic dermatitis may be caused by a fungal infection, immune system problems, or hormone changes  Common signs and symptoms: You may have symptoms during the winter but not during the summer  Stress or a lack of sleep can make your symptoms worse  You may have any of the following:  Skin flakes, or red, itching, or stinging skin    Scaly patches (scales) of skin that are also greasy    White or yellow crust on the skin or eyelids    Scaling on the scalp commonly known as dandruff    Call your doctor or dermatologist if:   You have new or worsening symptoms  Your symptoms make it difficult for you to do your daily activities      Your symptoms do not improve even after treatment  You have questions or concerns about your condition or care  Treatment  may not be needed  The following are commonly used when seborrheic dermatitis needs to be treated:  Medicines  may be given to treat a fungal or bacterial infection  You may also need a steroid medicine  You may be given these medicines in pill form or in a cream to apply to your skin  Dandruff shampoo  may help control symptoms  The shampoo may be used on your scalp and hair, and also on your skin  Your healthcare provider may recommend that you start with a mild dandruff shampoo  You may need to use a stronger shampoo or alternate shampoos if your symptoms do not improve  Ask which kind is right for your hair  Some shampoos used to manage seborrheic dermatitis contain coal tar or other ingredients that may discolor light hair  Light therapy  may be used if other treatments do not work  You will receive a medicine to make your skin more sensitive to light  Then your skin will be put under an ultraviolet light  The light helps control skin growth  Manage seborrheic dermatitis:   Wash your skin and hair often  Your healthcare provider can tell you how often to wash  You may need to wash your hair every day or two, or once per week, depending on the kind of hair you have  Apply a gentle moisturizer to your skin after you wash  Use mild soaps and moisturizers  Do not use any product that contains alcohol  Alcohol can dry your skin and make your symptoms worse  Protect your scalp if you use coal tar shampoo  Coal tar shampoo can make your skin more sensitive to light  Wear a hat when you are outside  Do not use tanning beds or sun lamps  Remove scales after you soften them  Do not pull on the scales  This can spread infection and may cause hair loss  Apply mineral oil or olive oil to the skin and let it sit for 1 hour  Then use a soft-bristled brush to remove the scales or shampoo your hair      Clean your eyelids, if needed  Use baby shampoo to wash your eyelids every night  Use a cotton swab to remove scales  A warm compress may also help control symptoms  To make a warm compress, soak a soft washcloth in warm water  Wring out the extra water and apply the cloth to your eyelid for a few minutes  Consider shaving off your beard or mustache  Your symptoms may be worse under your beard or mustache  Shaving may help reduce your symptoms and prevent them from returning in this area  Follow up with your doctor or dermatologist as directed:  Write down your questions so you remember to ask them during your visits  © Copyright [employer] 2022 Information is for End User's use only and may not be sold, redistributed or otherwise used for commercial purposes  All illustrations and images included in CareNotes® are the copyrighted property of [employer]  or Doug Conklin  The above information is an  only  It is not intended as medical advice for individual conditions or treatments  Talk to your doctor, nurse or pharmacist before following any medical regimen to see if it is safe and effective for you

## 2022-06-23 ENCOUNTER — DOCTOR'S OFFICE (OUTPATIENT)
Dept: URBAN - NONMETROPOLITAN AREA CLINIC 1 | Facility: CLINIC | Age: 62
Setting detail: OPHTHALMOLOGY
End: 2022-06-23
Payer: COMMERCIAL

## 2022-06-23 DIAGNOSIS — H33.023: ICD-10-CM

## 2022-06-23 PROCEDURE — 99024 POSTOP FOLLOW-UP VISIT: CPT | Performed by: OPHTHALMOLOGY

## 2022-06-23 PROCEDURE — 92134 CPTRZ OPH DX IMG PST SGM RTA: CPT | Performed by: OPHTHALMOLOGY

## 2022-06-23 ASSESSMENT — REFRACTION_CURRENTRX
OD_OVR_VA: 20/
OS_CYLINDER: -0.75
OD_VPRISM_DIRECTION: SV
OD_AXIS: 84
OD_CYLINDER: -1.25
OD_SPHERE: -6.50
OS_SPHERE: -4.25
OS_AXIS: 48
OS_VPRISM_DIRECTION: SV
OS_OVR_VA: 20/

## 2022-06-23 ASSESSMENT — KERATOMETRY
OD_K2POWER_DIOPTERS: 42.50
OD_K1POWER_DIOPTERS: 42.50
OS_K2POWER_DIOPTERS: 50.25
OS_K1POWER_DIOPTERS: 42.25
OS_AXISANGLE_DEGREES: 106

## 2022-06-23 ASSESSMENT — VISUAL ACUITY
OD_BCVA: 20/40+2
OS_BCVA: 20/400

## 2022-06-23 ASSESSMENT — CONFRONTATIONAL VISUAL FIELD TEST (CVF)
OS_FINDINGS: FULL
OD_FINDINGS: FULL

## 2022-07-09 DIAGNOSIS — J32.9 CHRONIC SINUSITIS, UNSPECIFIED LOCATION: ICD-10-CM

## 2022-07-09 DIAGNOSIS — J33.9 NASAL POLYPOSIS: ICD-10-CM

## 2022-07-12 ENCOUNTER — OFFICE VISIT (OUTPATIENT)
Dept: FAMILY MEDICINE CLINIC | Facility: CLINIC | Age: 62
End: 2022-07-12
Payer: COMMERCIAL

## 2022-07-12 VITALS
TEMPERATURE: 97.8 F | WEIGHT: 206 LBS | BODY MASS INDEX: 28.84 KG/M2 | OXYGEN SATURATION: 92 % | HEART RATE: 72 BPM | HEIGHT: 71 IN | RESPIRATION RATE: 15 BRPM

## 2022-07-12 DIAGNOSIS — J45.20 MILD INTERMITTENT ASTHMA WITHOUT COMPLICATION: ICD-10-CM

## 2022-07-12 DIAGNOSIS — J31.0 RHINOSINUSITIS: Primary | ICD-10-CM

## 2022-07-12 DIAGNOSIS — J30.2 SEASONAL ALLERGIES: ICD-10-CM

## 2022-07-12 DIAGNOSIS — J45.909 UNCOMPLICATED ASTHMA, UNSPECIFIED ASTHMA SEVERITY, UNSPECIFIED WHETHER PERSISTENT: ICD-10-CM

## 2022-07-12 DIAGNOSIS — J32.9 RHINOSINUSITIS: Primary | ICD-10-CM

## 2022-07-12 PROCEDURE — T1015 CLINIC SERVICE: HCPCS | Performed by: FAMILY MEDICINE

## 2022-07-12 RX ORDER — CETIRIZINE HYDROCHLORIDE 10 MG/1
10 TABLET ORAL DAILY
Qty: 30 TABLET | Refills: 2 | Status: SHIPPED | OUTPATIENT
Start: 2022-07-12 | End: 2022-08-29 | Stop reason: SDUPTHER

## 2022-07-12 RX ORDER — ALBUTEROL SULFATE 90 UG/1
2 AEROSOL, METERED RESPIRATORY (INHALATION) EVERY 6 HOURS PRN
Qty: 18 G | Refills: 2 | Status: SHIPPED | OUTPATIENT
Start: 2022-07-12 | End: 2022-08-29 | Stop reason: SDUPTHER

## 2022-07-12 RX ORDER — PREDNISONE 20 MG/1
20 TABLET ORAL 2 TIMES DAILY WITH MEALS
Qty: 10 TABLET | Refills: 0 | Status: SHIPPED | OUTPATIENT
Start: 2022-07-12 | End: 2022-07-17

## 2022-07-12 RX ORDER — FLUTICASONE PROPIONATE 50 MCG
SPRAY, SUSPENSION (ML) NASAL
Qty: 16 G | Refills: 2 | Status: SHIPPED | OUTPATIENT
Start: 2022-07-12 | End: 2022-08-29 | Stop reason: SDUPTHER

## 2022-07-12 RX ORDER — ALBUTEROL SULFATE 2.5 MG/3ML
2.5 SOLUTION RESPIRATORY (INHALATION) EVERY 6 HOURS PRN
Qty: 75 ML | Refills: 2 | Status: SHIPPED | OUTPATIENT
Start: 2022-07-12 | End: 2022-08-29 | Stop reason: SDUPTHER

## 2022-07-12 NOTE — PROGRESS NOTES
Assessment/Plan:     Diagnoses and all orders for this visit:    Rhinosinusitis  -     predniSONE 20 mg tablet; Take 1 tablet (20 mg total) by mouth 2 (two) times a day with meals for 5 days  -     cetirizine (ZyrTEC) 10 mg tablet; Take 1 tablet (10 mg total) by mouth in the morning    Seasonal allergies  -     Ambulatory Referral to Allergy; Future    Uncomplicated asthma, unspecified asthma severity, unspecified whether persistent  Comments:  Prednisone burst  Albuterol reordered  Orders:  -     albuterol (2 5 mg/3 mL) 0 083 % nebulizer solution; Take 3 mL (2 5 mg total) by nebulization every 6 (six) hours as needed for wheezing or shortness of breath    Mild intermittent asthma without complication  Comments:  Prednisone burst  Albuterol reordered  Orders:  -     albuterol (PROVENTIL HFA,VENTOLIN HFA) 90 mcg/act inhaler; Inhale 2 puffs every 6 (six) hours as needed for wheezing or shortness of breath          Subjective:      Patient ID: Jeff Hamilton is a 60 y o  male  Pleasant 58-year-old male with a history of chronic rhinosinusitis status post deviated septum and sinus repair in 2021, seasonal allergies, asthma presents complaining of green nasal discharge and sputum  Symptoms began x4 days ago and are associated with pressure sensation in the nasal and facial sinuses, mild visual blurring without scotomas or photophobia, cough with occasional phlegm production and postnasal drip, generalized fatigue  Had brief subjective fever days ago that has resolved  Shortness of breath with increased albuterol nebulizer + inhaler usage since symptom onset, was running low on these  Denies pain, dizziness or imbalance, headache, current fever or chills  Notes that he usually gets antibiotics and prednisone which help his sinusitis flares  For seasonal allergies, has tried some OTC medications without much effect, specifically naming claritin   Was agreeable to try cetirizine if prescribed but avoids OTC medications unless needed  Counseled about potential sedative effects and need to avoid driving & power tool/heavy equipment usage if feeling drowsy  Patient does not have an allergist and was referred to allergy  Review of Systems   Constitutional: Positive for fatigue  Negative for chills and fever  HENT: Positive for congestion, postnasal drip, sinus pressure and sore throat  Negative for hearing loss and sinus pain  Eyes: Negative for discharge and redness  Respiratory: Positive for cough (Green/yellow phlegm) and shortness of breath  Cardiovascular: Negative for chest pain  Gastrointestinal: Positive for nausea (Dry heaves resolved)  Negative for abdominal pain and vomiting  Genitourinary: Negative for difficulty urinating  Musculoskeletal: Negative for arthralgias and myalgias  Skin: Negative for rash  Neurological: Negative for dizziness, light-headedness and headaches  Weakness: generalized  Negative imbalance   Psychiatric/Behavioral: Negative for confusion  Objective:      Pulse 72   Temp 97 8 °F (36 6 °C)   Resp 15   Ht 5' 11" (1 803 m)   Wt 93 4 kg (206 lb)   SpO2 92%   BMI 28 73 kg/m²          Physical Exam  Constitutional:       General: He is not in acute distress  Appearance: Normal appearance  HENT:      Head: Normocephalic  Nose:      Comments: Difficulty breathing through nose  Positive erythematous turbinates, not significantly swollen, no nasal discharge noted  Mouth/Throat:      Mouth: Mucous membranes are moist       Pharynx: Oropharynx is clear  No oropharyngeal exudate or posterior oropharyngeal erythema  Eyes:      General: No scleral icterus  Right eye: No discharge  Left eye: No discharge  Comments: Negative redness   Cardiovascular:      Rate and Rhythm: Normal rate and regular rhythm  Heart sounds: Normal heart sounds  No murmur heard  No friction rub  No gallop     Pulmonary:      Effort: Pulmonary effort is normal  No respiratory distress  Breath sounds: Normal breath sounds  No wheezing, rhonchi or rales  Skin:     Coloration: Skin is not pale  Neurological:      Mental Status: He is alert  Psychiatric:         Behavior: Behavior normal          I, Gerhardt Hale, attest that I have seen and cared for the patient as described above under the supervision of Dr Hanna

## 2022-07-18 ENCOUNTER — DOCTOR'S OFFICE (OUTPATIENT)
Dept: URBAN - NONMETROPOLITAN AREA CLINIC 1 | Facility: CLINIC | Age: 62
Setting detail: OPHTHALMOLOGY
End: 2022-07-18
Payer: COMMERCIAL

## 2022-07-18 DIAGNOSIS — Z03.89: ICD-10-CM

## 2022-07-18 PROCEDURE — 92082 INTERMEDIATE VISUAL FIELD XM: CPT | Performed by: OPHTHALMOLOGY

## 2022-07-21 ENCOUNTER — DOCTOR'S OFFICE (OUTPATIENT)
Dept: URBAN - NONMETROPOLITAN AREA CLINIC 1 | Facility: CLINIC | Age: 62
Setting detail: OPHTHALMOLOGY
End: 2022-07-21
Payer: COMMERCIAL

## 2022-07-21 DIAGNOSIS — H33.023: ICD-10-CM

## 2022-07-21 DIAGNOSIS — H33.302: ICD-10-CM

## 2022-07-21 PROBLEM — H10.9 CONJUNCTIVITIS, UNSPECIFIED ; LEFT EYE: Status: ACTIVE | Noted: 2021-11-24

## 2022-07-21 PROBLEM — Z96.1: Status: ACTIVE | Noted: 2020-02-07

## 2022-07-21 PROCEDURE — 99024 POSTOP FOLLOW-UP VISIT: CPT | Performed by: OPHTHALMOLOGY

## 2022-07-21 PROCEDURE — 92250 FUNDUS PHOTOGRAPHY W/I&R: CPT | Performed by: OPHTHALMOLOGY

## 2022-07-21 PROCEDURE — 92235 FLUORESCEIN ANGRPH MLTIFRAME: CPT | Performed by: OPHTHALMOLOGY

## 2022-07-21 ASSESSMENT — REFRACTION_CURRENTRX
OD_AXIS: 84
OD_SPHERE: -6.50
OS_CYLINDER: -0.75
OS_SPHERE: -4.25
OS_VPRISM_DIRECTION: SV
OD_VPRISM_DIRECTION: SV
OS_OVR_VA: 20/
OD_OVR_VA: 20/
OD_CYLINDER: -1.25
OS_AXIS: 48

## 2022-07-21 ASSESSMENT — CONFRONTATIONAL VISUAL FIELD TEST (CVF)
OS_FINDINGS: FULL
OD_FINDINGS: FULL

## 2022-07-21 ASSESSMENT — KERATOMETRY
OS_AXISANGLE_DEGREES: 106
OD_K2POWER_DIOPTERS: 42.50
OS_K2POWER_DIOPTERS: 50.25
OD_K1POWER_DIOPTERS: 42.50
OS_K1POWER_DIOPTERS: 42.25

## 2022-07-21 ASSESSMENT — VISUAL ACUITY
OS_BCVA: 20/200-1
OD_BCVA: 20/30-2

## 2022-08-29 DIAGNOSIS — J32.9 RHINOSINUSITIS: ICD-10-CM

## 2022-08-29 DIAGNOSIS — N52.9 ERECTILE DYSFUNCTION, UNSPECIFIED ERECTILE DYSFUNCTION TYPE: ICD-10-CM

## 2022-08-29 DIAGNOSIS — J45.909 UNCOMPLICATED ASTHMA, UNSPECIFIED ASTHMA SEVERITY, UNSPECIFIED WHETHER PERSISTENT: ICD-10-CM

## 2022-08-29 DIAGNOSIS — E55.9 VITAMIN D DEFICIENCY: ICD-10-CM

## 2022-08-29 DIAGNOSIS — Z91.09 ENVIRONMENTAL ALLERGIES: ICD-10-CM

## 2022-08-29 DIAGNOSIS — J32.9 CHRONIC SINUSITIS, UNSPECIFIED LOCATION: ICD-10-CM

## 2022-08-29 DIAGNOSIS — L21.9 SEBORRHEIC DERMATITIS: ICD-10-CM

## 2022-08-29 DIAGNOSIS — I10 ESSENTIAL HYPERTENSION: ICD-10-CM

## 2022-08-29 DIAGNOSIS — J45.20 MILD INTERMITTENT ASTHMA WITHOUT COMPLICATION: ICD-10-CM

## 2022-08-29 DIAGNOSIS — J33.9 NASAL POLYPOSIS: ICD-10-CM

## 2022-08-29 DIAGNOSIS — J31.0 RHINOSINUSITIS: ICD-10-CM

## 2022-08-30 RX ORDER — ALBUTEROL SULFATE 90 UG/1
2 AEROSOL, METERED RESPIRATORY (INHALATION) EVERY 6 HOURS PRN
Qty: 18 G | Refills: 2 | Status: SHIPPED | OUTPATIENT
Start: 2022-08-30

## 2022-08-30 RX ORDER — MONTELUKAST SODIUM 10 MG/1
10 TABLET ORAL
Qty: 90 TABLET | Refills: 2 | Status: SHIPPED | OUTPATIENT
Start: 2022-08-30

## 2022-08-30 RX ORDER — SELENIUM SULFIDE 2.5 MG/100ML
LOTION TOPICAL 2 TIMES WEEKLY
Qty: 118 ML | Refills: 1 | Status: SHIPPED | OUTPATIENT
Start: 2022-09-01

## 2022-08-30 RX ORDER — CETIRIZINE HYDROCHLORIDE 10 MG/1
10 TABLET ORAL DAILY
Qty: 30 TABLET | Refills: 2 | Status: SHIPPED | OUTPATIENT
Start: 2022-08-30 | End: 2022-09-29

## 2022-08-30 RX ORDER — ERGOCALCIFEROL 1.25 MG/1
50000 CAPSULE ORAL WEEKLY
Qty: 13 CAPSULE | Refills: 1 | Status: SHIPPED | OUTPATIENT
Start: 2022-08-30

## 2022-08-30 RX ORDER — LISINOPRIL 5 MG/1
5 TABLET ORAL DAILY
Qty: 90 TABLET | Refills: 1 | Status: SHIPPED | OUTPATIENT
Start: 2022-08-30

## 2022-08-30 RX ORDER — FLUTICASONE PROPIONATE 50 MCG
2 SPRAY, SUSPENSION (ML) NASAL DAILY
Qty: 16 G | Refills: 2 | Status: SHIPPED | OUTPATIENT
Start: 2022-08-30

## 2022-08-30 RX ORDER — TADALAFIL 5 MG/1
5 TABLET ORAL DAILY PRN
Qty: 10 TABLET | Refills: 0 | Status: SHIPPED | OUTPATIENT
Start: 2022-08-30

## 2022-08-30 RX ORDER — ALBUTEROL SULFATE 2.5 MG/3ML
2.5 SOLUTION RESPIRATORY (INHALATION) EVERY 6 HOURS PRN
Qty: 75 ML | Refills: 2 | Status: SHIPPED | OUTPATIENT
Start: 2022-08-30

## 2022-08-30 RX ORDER — SILDENAFIL 25 MG/1
25 TABLET, FILM COATED ORAL DAILY PRN
Qty: 10 TABLET | Refills: 0 | Status: CANCELLED | OUTPATIENT
Start: 2022-08-30

## 2022-09-23 ENCOUNTER — OFFICE VISIT (OUTPATIENT)
Dept: URGENT CARE | Facility: MEDICAL CENTER | Age: 62
End: 2022-09-23
Payer: COMMERCIAL

## 2022-09-23 VITALS
HEART RATE: 105 BPM | WEIGHT: 215 LBS | TEMPERATURE: 98.1 F | BODY MASS INDEX: 29.12 KG/M2 | OXYGEN SATURATION: 96 % | HEIGHT: 72 IN | RESPIRATION RATE: 20 BRPM

## 2022-09-23 DIAGNOSIS — J01.00 ACUTE NON-RECURRENT MAXILLARY SINUSITIS: Primary | ICD-10-CM

## 2022-09-23 PROCEDURE — S9088 SERVICES PROVIDED IN URGENT: HCPCS | Performed by: NURSE PRACTITIONER

## 2022-09-23 PROCEDURE — 99213 OFFICE O/P EST LOW 20 MIN: CPT | Performed by: NURSE PRACTITIONER

## 2022-09-23 RX ORDER — METHYLPREDNISOLONE 4 MG/1
TABLET ORAL
Qty: 21 TABLET | Refills: 0 | Status: SHIPPED | OUTPATIENT
Start: 2022-09-23

## 2022-09-23 RX ORDER — DOXYCYCLINE 100 MG/1
100 TABLET ORAL 2 TIMES DAILY
Qty: 14 TABLET | Refills: 0 | Status: SHIPPED | OUTPATIENT
Start: 2022-09-23 | End: 2022-09-30

## 2022-09-23 NOTE — PROGRESS NOTES
[facility]        NAME: Paris Peck is a 60 y o  male  : 1960    MRN: [id number]  DATE: 2022  TIME: 3:14 PM    Assessment and Plan   Acute non-recurrent maxillary sinusitis [J01 00]  1  Acute non-recurrent maxillary sinusitis  doxycycline (ADOXA) 100 MG tablet    methylPREDNISolone 4 MG tablet therapy pack         Patient Instructions     Patient Instructions   Take medication as directed  Rest and drink plenty of fluids  A cool mist humidifier and saline rinse can be helpful  If you develop a worsening cough, chest pain, shortness of breath, palpitations, coughing up blood, prolonged high fever, severe headache, dizziness, any new or concerning symptoms please return or proceed ER  Recommend following up with PCP in 3-5 days          Follow up with PCP in 3-5 days  Proceed to  ER if symptoms worsen  Chief Complaint     Chief Complaint   Patient presents with    Cold Like Symptoms     Sinus pressure and congestion with cough x 1 week          History of Present Illness       Sinusitis  This is a new problem  The current episode started in the past 7 days  The problem is unchanged  There has been no fever  The pain is moderate  Associated symptoms include congestion and sinus pressure  Pertinent negatives include no chills, coughing, diaphoresis, ear pain, headaches, hoarse voice, neck pain, shortness of breath, sneezing, sore throat or swollen glands  Past treatments include nothing  The treatment provided mild relief  Review of Systems   Review of Systems   Constitutional: Negative for chills, diaphoresis, fatigue and fever  HENT: Positive for congestion, postnasal drip, rhinorrhea, sinus pressure and sinus pain  Negative for ear pain, facial swelling, hoarse voice, mouth sores, sneezing, sore throat and trouble swallowing  Eyes: Negative for photophobia and visual disturbance     Respiratory: Negative for cough, chest tightness, shortness of breath, wheezing and stridor  Cardiovascular: Negative for chest pain and palpitations  Gastrointestinal: Negative for abdominal pain, diarrhea, nausea and vomiting  Genitourinary: Negative  Musculoskeletal: Negative for arthralgias, back pain, joint swelling, myalgias, neck pain and neck stiffness  Skin: Negative for rash  Neurological: Negative for dizziness, facial asymmetry, weakness, light-headedness, numbness and headaches           Current Medications       Current Outpatient Medications:     albuterol (2 5 mg/3 mL) 0 083 % nebulizer solution, Take 3 mL (2 5 mg total) by nebulization every 6 (six) hours as needed for wheezing or shortness of breath, Disp: 75 mL, Rfl: 2    albuterol (PROVENTIL HFA,VENTOLIN HFA) 90 mcg/act inhaler, Inhale 2 puffs every 6 (six) hours as needed for wheezing or shortness of breath, Disp: 18 g, Rfl: 2    cetirizine (ZyrTEC) 10 mg tablet, Take 1 tablet (10 mg total) by mouth in the morning, Disp: 30 tablet, Rfl: 2    doxycycline (ADOXA) 100 MG tablet, Take 1 tablet (100 mg total) by mouth 2 (two) times a day for 7 days, Disp: 14 tablet, Rfl: 0    ergocalciferol (VITAMIN D2) 50,000 units, Take 1 capsule (50,000 Units total) by mouth once a week, Disp: 13 capsule, Rfl: 1    fluticasone (FLONASE) 50 mcg/act nasal spray, 2 sprays into each nostril daily, Disp: 16 g, Rfl: 2    lisinopril (ZESTRIL) 5 mg tablet, Take 1 tablet (5 mg total) by mouth daily, Disp: 90 tablet, Rfl: 1    methylPREDNISolone 4 MG tablet therapy pack, Use as directed on package, Disp: 21 tablet, Rfl: 0    montelukast (SINGULAIR) 10 mg tablet, Take 1 tablet (10 mg total) by mouth daily at bedtime, Disp: 90 tablet, Rfl: 2    ofloxacin (OCUFLOX) 0 3 % ophthalmic solution, INSTILL 1 DROP INTO RIGHT EYE THREE TIMES DAILY STARTING AFTER SURGERY, Disp: , Rfl:     prednisoLONE acetate (PRED FORTE) 1 % ophthalmic suspension, INSTILL 1 DROP INTO RIGHT EYE THREE TIMES A DAY STARTING AFTER SURGERY, Disp: , Rfl:    selenium sulfide (SELSUN) 2 5 % shampoo, Apply topically 2 (two) times a week, Disp: 118 mL, Rfl: 1    tadalafil (Cialis) 5 MG tablet, Take 1 tablet (5 mg total) by mouth daily as needed for erectile dysfunction, Disp: 10 tablet, Rfl: 0    Current Allergies     Allergies as of 09/23/2022 - Reviewed 09/23/2022   Allergen Reaction Noted    Aspirin Anaphylaxis 10/06/2016    Ibuprofen Other (See Comments) 10/09/2015    Augmentin [amoxicillin-pot clavulanate] Rash 12/16/2020            The following portions of the patient's history were reviewed and updated as appropriate: allergies, current medications, past family history, past medical history, past social history, past surgical history and problem list      Past Medical History:   Diagnosis Date    Asthma     Cataract     Hypertension        Past Surgical History:   Procedure Laterality Date    EYE SURGERY      MO REPAIR OF NASAL SEPTUM N/A 7/1/2021    Procedure: SEPTOPLASTY;  Surgeon: Marleny Gurrola MD;  [facility];  Service: ENT    MO STEREOTACTIC COMP ASSIST PROC,CRANIAL,EXTRADURAL N/A 7/1/2021    Procedure: FUNCTIONAL ENDOSCOPIC SINUS SURGERY (FESS) IMAGED GUIDED, ALL SINUSES;  Surgeon: Marleny Gurrola MD;  [facility];  Service: ENT       Family History   Problem Relation Age of Onset    No Known Problems Mother     Heart attack Father          Medications have been verified  Objective   Pulse 105   Temp 98 1 °F (36 7 °C)   Resp 20   Ht 5' 11 5" (1 816 m)   Wt 97 5 kg (215 lb)   SpO2 96%   BMI 29 57 kg/m²   No LMP for male patient  Physical Exam     Physical Exam  Constitutional:       General: He is not in acute distress  Appearance: He is well-developed  HENT:      Head: Normocephalic and atraumatic        Right Ear: Hearing, tympanic membrane, ear canal and external ear normal       Left Ear: Hearing, tympanic membrane, ear canal and external ear normal       Nose: Mucosal edema, congestion and rhinorrhea present  Right Sinus: Maxillary sinus tenderness present  No frontal sinus tenderness  Left Sinus: Maxillary sinus tenderness present  No frontal sinus tenderness  Mouth/Throat:      Mouth: Mucous membranes are moist       Pharynx: Oropharynx is clear  Uvula midline  No oropharyngeal exudate or posterior oropharyngeal erythema  Tonsils: No tonsillar exudate or tonsillar abscesses  1+ on the right  1+ on the left  Cardiovascular:      Rate and Rhythm: Normal rate and regular rhythm  Heart sounds: Normal heart sounds, S1 normal and S2 normal    Pulmonary:      Effort: Pulmonary effort is normal       Breath sounds: Normal breath sounds and air entry  Lymphadenopathy:      Cervical: No cervical adenopathy  Skin:     General: Skin is warm and dry  Capillary Refill: Capillary refill takes less than 2 seconds  Neurological:      Mental Status: He is alert and oriented to person, place, and time

## 2022-09-23 NOTE — PATIENT INSTRUCTIONS
Take medication as directed  Rest and drink plenty of fluids  A cool mist humidifier and saline rinse can be helpful  If you develop a worsening cough, chest pain, shortness of breath, palpitations, coughing up blood, prolonged high fever, severe headache, dizziness, any new or concerning symptoms please return or proceed ER    Recommend following up with PCP in 3-5 days

## 2022-10-11 PROBLEM — H65.04 RECURRENT ACUTE SEROUS OTITIS MEDIA OF RIGHT EAR: Status: RESOLVED | Noted: 2022-04-26 | Resolved: 2022-10-11

## 2022-10-12 PROBLEM — J45.909 SAMTER'S TRIAD: Status: RESOLVED | Noted: 2021-04-30 | Resolved: 2022-10-12

## 2022-10-12 PROBLEM — J33.9 SAMTER'S TRIAD: Status: RESOLVED | Noted: 2021-04-30 | Resolved: 2022-10-12

## 2022-10-12 PROBLEM — Z88.6 SAMTER'S TRIAD: Status: RESOLVED | Noted: 2021-04-30 | Resolved: 2022-10-12

## 2022-11-09 ENCOUNTER — OFFICE VISIT (OUTPATIENT)
Dept: URGENT CARE | Facility: MEDICAL CENTER | Age: 62
End: 2022-11-09

## 2022-11-09 VITALS
OXYGEN SATURATION: 94 % | HEIGHT: 72 IN | DIASTOLIC BLOOD PRESSURE: 70 MMHG | SYSTOLIC BLOOD PRESSURE: 130 MMHG | TEMPERATURE: 98.2 F | WEIGHT: 212 LBS | RESPIRATION RATE: 20 BRPM | BODY MASS INDEX: 28.71 KG/M2 | HEART RATE: 70 BPM

## 2022-11-09 DIAGNOSIS — J45.909 UNCOMPLICATED ASTHMA, UNSPECIFIED ASTHMA SEVERITY, UNSPECIFIED WHETHER PERSISTENT: ICD-10-CM

## 2022-11-09 DIAGNOSIS — J01.90 ACUTE NON-RECURRENT SINUSITIS, UNSPECIFIED LOCATION: ICD-10-CM

## 2022-11-09 RX ORDER — ALBUTEROL SULFATE 2.5 MG/3ML
2.5 SOLUTION RESPIRATORY (INHALATION) EVERY 6 HOURS PRN
Qty: 75 ML | Refills: 2 | Status: SHIPPED | OUTPATIENT
Start: 2022-11-09

## 2022-11-09 RX ORDER — DOXYCYCLINE HYCLATE 100 MG
100 TABLET ORAL 2 TIMES DAILY
Qty: 20 TABLET | Refills: 0 | Status: SHIPPED | OUTPATIENT
Start: 2022-11-09 | End: 2022-11-19

## 2022-11-09 RX ORDER — METHYLPREDNISOLONE 4 MG/1
TABLET ORAL
Qty: 1 EACH | Refills: 0 | Status: SHIPPED | OUTPATIENT
Start: 2022-11-09

## 2022-11-09 NOTE — PROGRESS NOTES
[facility]        NAME: Kae Hightower is a 60 y o  male  : 1960    MRN: [id number]  DATE: 2022  TIME: 3:08 PM    Assessment and Plan   No primary diagnosis found  1  Acute non-recurrent sinusitis, unspecified location  doxycycline hyclate (VIBRA-TABS) 100 mg tablet    methylPREDNISolone 4 MG tablet therapy pack   2  Uncomplicated asthma, unspecified asthma severity, unspecified whether persistent  albuterol (2 5 mg/3 mL) 0 083 % nebulizer solution    Prednisone burst  Albuterol reordered  Patient Instructions       Follow up with PCP in 3-5 days  Proceed to  ER if symptoms worsen  Chief Complaint     Chief Complaint   Patient presents with   • Cold Like Symptoms     Sinus pressure and congestion, cough and shortness of breath x 1 week   Took home covid test this morning with negative results          History of Present Illness       Patient presents with a one-week history of low-grade fever runny stuffy nose purulent nasal drainage sinus pressure sore throat and a productive cough  He does have a history of asthma and needs his albuterol nebulizer solution refilled  Review of Systems   Review of Systems   Constitutional: Positive for fever  HENT: Positive for congestion, rhinorrhea, sinus pressure and sore throat  Respiratory: Positive for cough  Gastrointestinal: Negative for diarrhea, nausea and vomiting  Musculoskeletal: Negative for myalgias  Neurological: Negative for headaches           Current Medications       Current Outpatient Medications:   •  albuterol (2 5 mg/3 mL) 0 083 % nebulizer solution, Take 3 mL (2 5 mg total) by nebulization every 6 (six) hours as needed for wheezing or shortness of breath, Disp: 75 mL, Rfl: 2  •  albuterol (PROVENTIL HFA,VENTOLIN HFA) 90 mcg/act inhaler, Inhale 2 puffs every 6 (six) hours as needed for wheezing or shortness of breath, Disp: 18 g, Rfl: 2  •  doxycycline hyclate (VIBRA-TABS) 100 mg tablet, Take 1 tablet (100 mg total) by mouth 2 (two) times a day for 10 days, Disp: 20 tablet, Rfl: 0  •  ergocalciferol (VITAMIN D2) 50,000 units, Take 1 capsule (50,000 Units total) by mouth once a week, Disp: 13 capsule, Rfl: 1  •  fluticasone (FLONASE) 50 mcg/act nasal spray, 2 sprays into each nostril daily, Disp: 16 g, Rfl: 2  •  lisinopril (ZESTRIL) 5 mg tablet, Take 1 tablet (5 mg total) by mouth daily, Disp: 90 tablet, Rfl: 1  •  methylPREDNISolone 4 MG tablet therapy pack, Use as directed on package, Disp: 1 each, Rfl: 0  •  montelukast (SINGULAIR) 10 mg tablet, Take 1 tablet (10 mg total) by mouth daily at bedtime, Disp: 90 tablet, Rfl: 2  •  ofloxacin (OCUFLOX) 0 3 % ophthalmic solution, INSTILL 1 DROP INTO RIGHT EYE THREE TIMES DAILY STARTING AFTER SURGERY, Disp: , Rfl:   •  prednisoLONE acetate (PRED FORTE) 1 % ophthalmic suspension, INSTILL 1 DROP INTO RIGHT EYE THREE TIMES A DAY STARTING AFTER SURGERY, Disp: , Rfl:   •  selenium sulfide (SELSUN) 2 5 % shampoo, Apply topically 2 (two) times a week, Disp: 118 mL, Rfl: 1  •  tadalafil (Cialis) 5 MG tablet, Take 1 tablet (5 mg total) by mouth daily as needed for erectile dysfunction, Disp: 10 tablet, Rfl: 0  •  cetirizine (ZyrTEC) 10 mg tablet, Take 1 tablet (10 mg total) by mouth in the morning, Disp: 30 tablet, Rfl: 2  •  methylPREDNISolone 4 MG tablet therapy pack, Use as directed on package (Patient not taking: Reported on 11/9/2022), Disp: 21 tablet, Rfl: 0    Current Allergies     Allergies as of 11/09/2022 - Reviewed 11/09/2022   Allergen Reaction Noted   • Aspirin Anaphylaxis 10/06/2016   • Ibuprofen Other (See Comments) 10/09/2015   • Augmentin [amoxicillin-pot clavulanate] Rash 12/16/2020            The following portions of the patient's history were reviewed and updated as appropriate: allergies, current medications, past family history, past medical history, past social history, past surgical history and problem list      Past Medical History:   Diagnosis Date   • Asthma    • Cataract    • Hypertension        Past Surgical History:   Procedure Laterality Date   • EYE SURGERY     • OR REPAIR OF NASAL SEPTUM N/A 7/1/2021    Procedure: SEPTOPLASTY;  Surgeon: Candi Mohs, MD;  [facility];  Service: ENT   • OR STEREOTACTIC COMP ASSIST PROC,CRANIAL,EXTRADURAL N/A 7/1/2021    Procedure: FUNCTIONAL ENDOSCOPIC SINUS SURGERY (FESS) IMAGED GUIDED, ALL SINUSES;  Surgeon: Candi Mohs, MD;  [facility];  Service: ENT       Family History   Problem Relation Age of Onset   • No Known Problems Mother    • Heart attack Father          Medications have been verified  Objective   /70   Pulse 70   Temp 98 2 °F (36 8 °C)   Resp 20   Ht 5' 11 5" (1 816 m)   Wt 96 2 kg (212 lb)   SpO2 94%   BMI 29 16 kg/m²   No LMP for male patient  Physical Exam     Physical Exam  Vitals and nursing note reviewed  Constitutional:       Appearance: Normal appearance  HENT:      Head: Normocephalic and atraumatic  Right Ear: Tympanic membrane normal       Left Ear: Tympanic membrane normal       Mouth/Throat:      Mouth: Mucous membranes are moist       Pharynx: Oropharynx is clear  Eyes:      Conjunctiva/sclera: Conjunctivae normal    Cardiovascular:      Rate and Rhythm: Normal rate and regular rhythm  Heart sounds: Normal heart sounds  Pulmonary:      Effort: Pulmonary effort is normal       Breath sounds: Wheezing present  Skin:     General: Skin is warm  Neurological:      Mental Status: He is alert

## 2022-12-02 ENCOUNTER — TELEPHONE (OUTPATIENT)
Dept: FAMILY MEDICINE CLINIC | Facility: CLINIC | Age: 62
End: 2022-12-02

## 2022-12-08 DIAGNOSIS — J45.20 MILD INTERMITTENT ASTHMA WITHOUT COMPLICATION: ICD-10-CM

## 2022-12-08 DIAGNOSIS — J45.909 UNCOMPLICATED ASTHMA, UNSPECIFIED ASTHMA SEVERITY, UNSPECIFIED WHETHER PERSISTENT: ICD-10-CM

## 2022-12-08 RX ORDER — ALBUTEROL SULFATE 2.5 MG/3ML
2.5 SOLUTION RESPIRATORY (INHALATION) EVERY 6 HOURS PRN
Qty: 75 ML | Refills: 2 | Status: SHIPPED | OUTPATIENT
Start: 2022-12-08

## 2022-12-08 RX ORDER — ALBUTEROL SULFATE 90 UG/1
2 AEROSOL, METERED RESPIRATORY (INHALATION) EVERY 6 HOURS PRN
Qty: 18 G | Refills: 2 | Status: SHIPPED | OUTPATIENT
Start: 2022-12-08

## 2022-12-08 NOTE — TELEPHONE ENCOUNTER
PT CALLED AND STATED HIS ALBUTEROL NEBULIZER SOULTION AND HIS ALBUTEROL INHALER WERE CANCELED AND HE IS NOT SURE WHY  I RE-ORDERED AND HE SAID HE IS GOOD WITH HIS OTHER MEDS TIL HIS VISIT WITH YOU ON December, 15TH  I WILL CALL HIM BACK IF YOU APPROVE    Micki Montoya

## 2023-02-23 DIAGNOSIS — I10 ESSENTIAL HYPERTENSION: ICD-10-CM

## 2023-02-23 RX ORDER — LISINOPRIL 5 MG/1
5 TABLET ORAL DAILY
Qty: 90 TABLET | Refills: 1 | Status: SHIPPED | OUTPATIENT
Start: 2023-02-23

## 2023-03-27 DIAGNOSIS — J45.909 UNCOMPLICATED ASTHMA, UNSPECIFIED ASTHMA SEVERITY, UNSPECIFIED WHETHER PERSISTENT: ICD-10-CM

## 2023-03-27 DIAGNOSIS — J45.20 MILD INTERMITTENT ASTHMA WITHOUT COMPLICATION: ICD-10-CM

## 2023-03-27 NOTE — TELEPHONE ENCOUNTER
My name is David Mohamud, [id number]  I need my albuterol inhaler and my albuterol rescue inhaler refilled  Please give me a call back at [phone]  Thank you

## 2023-03-28 RX ORDER — ALBUTEROL SULFATE 2.5 MG/3ML
SOLUTION RESPIRATORY (INHALATION)
Qty: 75 ML | Refills: 2 | Status: SHIPPED | OUTPATIENT
Start: 2023-03-28

## 2023-03-28 RX ORDER — ALBUTEROL SULFATE 90 UG/1
AEROSOL, METERED RESPIRATORY (INHALATION)
Qty: 18 G | Refills: 2 | Status: SHIPPED | OUTPATIENT
Start: 2023-03-28

## 2023-04-18 ENCOUNTER — OFFICE VISIT (OUTPATIENT)
Dept: FAMILY MEDICINE CLINIC | Facility: CLINIC | Age: 63
End: 2023-04-18

## 2023-04-18 VITALS
RESPIRATION RATE: 18 BRPM | HEART RATE: 80 BPM | HEIGHT: 71 IN | BODY MASS INDEX: 28.98 KG/M2 | WEIGHT: 207 LBS | OXYGEN SATURATION: 94 % | TEMPERATURE: 97.5 F | DIASTOLIC BLOOD PRESSURE: 82 MMHG | SYSTOLIC BLOOD PRESSURE: 124 MMHG

## 2023-04-18 DIAGNOSIS — J32.9 CHRONIC SINUSITIS, UNSPECIFIED LOCATION: ICD-10-CM

## 2023-04-18 DIAGNOSIS — I10 HYPERTENSION, UNSPECIFIED TYPE: ICD-10-CM

## 2023-04-18 DIAGNOSIS — J34.89 SINUS PRESSURE: ICD-10-CM

## 2023-04-18 DIAGNOSIS — Z91.09 ENVIRONMENTAL ALLERGIES: ICD-10-CM

## 2023-04-18 DIAGNOSIS — Z91.14 NONCOMPLIANCE WITH MEDICATION REGIMEN: ICD-10-CM

## 2023-04-18 DIAGNOSIS — J45.41 MODERATE PERSISTENT ASTHMA WITH ACUTE EXACERBATION: ICD-10-CM

## 2023-04-18 DIAGNOSIS — J45.901 BRONCHITIS, ALLERGIC, UNSPECIFIED ASTHMA SEVERITY, WITH ACUTE EXACERBATION: Primary | ICD-10-CM

## 2023-04-18 RX ORDER — GUAIFENESIN 600 MG/1
600 TABLET, EXTENDED RELEASE ORAL EVERY 12 HOURS
COMMUNITY

## 2023-04-18 RX ORDER — ALBUTEROL SULFATE 2.5 MG/3ML
2.5 SOLUTION RESPIRATORY (INHALATION) EVERY 4 HOURS PRN
Qty: 25 ML | Refills: 2 | Status: SHIPPED | OUTPATIENT
Start: 2023-04-18

## 2023-04-18 RX ORDER — METHYLPREDNISOLONE 4 MG/1
TABLET ORAL
Qty: 1 EACH | Refills: 0 | Status: SHIPPED | OUTPATIENT
Start: 2023-04-18

## 2023-04-18 RX ORDER — ALBUTEROL SULFATE 90 UG/1
2 AEROSOL, METERED RESPIRATORY (INHALATION) EVERY 6 HOURS PRN
Qty: 18 G | Refills: 2 | Status: SHIPPED | OUTPATIENT
Start: 2023-04-18

## 2023-04-18 RX ORDER — FLUTICASONE PROPIONATE 50 MCG
2 SPRAY, SUSPENSION (ML) NASAL DAILY
Qty: 16 G | Refills: 2 | Status: SHIPPED | OUTPATIENT
Start: 2023-04-18

## 2023-04-18 RX ORDER — MONTELUKAST SODIUM 10 MG/1
10 TABLET ORAL
Qty: 90 TABLET | Refills: 2 | Status: SHIPPED | OUTPATIENT
Start: 2023-04-18

## 2023-04-18 RX ORDER — CETIRIZINE HYDROCHLORIDE 10 MG/1
10 TABLET ORAL DAILY
Qty: 30 TABLET | Refills: 2 | Status: CANCELLED | OUTPATIENT
Start: 2023-04-18 | End: 2023-07-17

## 2023-04-18 NOTE — PROGRESS NOTES
FAMILY MEDICINE OFFICE VISIT  [facility]      NAME: Libby Skelton  AGE: 58 y o  SEX: male    DATE OF ENCOUNTER: 4/18/2023    Assessment and Plan     1  Bronchitis, allergic, unspecified asthma severity, with acute exacerbation  Comments:  Currently not on maintenance medication  Albuterol nebulization every 4 hours  We will start steroid taper dose therapy pack  f/up in 10 days    Orders:  -     albuterol (2 5 mg/3 mL) 0 083 % nebulizer solution; Take 3 mL (2 5 mg total) by nebulization every 4 (four) hours as needed for wheezing or shortness of breath  -     methylPREDNISolone 4 MG tablet therapy pack; Use as directed on package    2  Moderate persistent asthma with acute exacerbation  Comments:  Reports better symptom control when he was on daily steroid inhaler  Will consider resumimg daily steroid inhaler in future  Recommend PFTs  Orders:  -     albuterol (PROVENTIL HFA,VENTOLIN HFA) 90 mcg/act inhaler; Inhale 2 puffs every 6 (six) hours as needed for wheezing or shortness of breath  -     Complete PFT without post bronchodilator; Future; Expected date: 05/02/2023    3  Hypertension, unspecified type  Comments:  BP within normal range  Continue lisinopril 5 mg  Recommend low-sodium diet    4  Environmental allergies  Comments:  Patient reports severe environmental allergies with season change  Did well when he was on montelukast  Lost follow-up,  Refills provided as requested  Orders:  -     montelukast (SINGULAIR) 10 mg tablet; Take 1 tablet (10 mg total) by mouth daily at bedtime    5  Chronic sinusitis, unspecified location  -     fluticasone (FLONASE) 50 mcg/act nasal spray; 2 sprays into each nostril daily    6  Sinus pressure  -     pseudoephedrine-acetaminophen (TYLENOL SINUS)  MG TABS; Take 1 tablet by mouth every 6 (six) hours as needed for mild pain or congestion for up to 30 doses    7   Noncompliance with medication regimen  Comments:  Patient lost follow-up because of "the frequent out of the state travel  Counsled medication compliance          Chief Complaint     Chief Complaint   Patient presents with   • Asthma     Pt is interested in seeking some medication to help his congestion         History of Present Illness     Patient is 59-year-old male presents to the office with shortness of breath, chest tightness, cough with increased mucus production  Using albuterol inhaler 6 times a day and last 2 weeks  Denies fevers  The following portions of the patient's history were reviewed and updated as appropriate: allergies, current medications, past family history, past medical history, past social history, past surgical history and problem list     Review of Systems     Review of Systems   Constitutional: Negative for activity change, chills and fever  HENT: Negative for congestion, postnasal drip and rhinorrhea  Respiratory: Positive for cough, chest tightness, shortness of breath and wheezing  Cardiovascular: Negative for chest pain, palpitations and leg swelling  Gastrointestinal: Negative for abdominal pain, diarrhea and nausea  Genitourinary: Negative for difficulty urinating  Skin: Negative for rash  Neurological: Negative for light-headedness and headaches  Psychiatric/Behavioral: The patient is not nervous/anxious  All other systems reviewed and are negative        Active Problem List     Patient Active Problem List   Diagnosis   • Mild intermittent asthma without complication   • Chronic pansinusitis   • Nasal polyposis   • Aspirin sensitivity   • HTN (hypertension)   • Smoking   • Asthma exacerbation   • Elevated blood-pressure reading without diagnosis of hypertension   • Fatigue   • Golfers elbow   • History of drug abuse ([address] )   • Environmental allergies   • Erectile dysfunction   • Elevated fasting blood sugar       Objective     /82   Pulse 80   Temp 97 5 °F (36 4 °C)   Resp 18   Ht 5' 11\" (1 803 m)   Wt 93 9 kg (207 lb)   SpO2 " 94%   BMI 28 87 kg/m²     Physical Exam  Vitals and nursing note reviewed  Exam conducted with a chaperone present  Constitutional:       General: He is not in acute distress  Appearance: Normal appearance  HENT:      Head: Normocephalic and atraumatic  Right Ear: External ear normal       Left Ear: External ear normal       Nose: No congestion or rhinorrhea  Mouth/Throat:      Mouth: Mucous membranes are moist       Pharynx: Oropharynx is clear  Eyes:      Extraocular Movements: Extraocular movements intact  Conjunctiva/sclera: Conjunctivae normal    Cardiovascular:      Rate and Rhythm: Normal rate and regular rhythm  Pulses: Normal pulses  Heart sounds: Normal heart sounds  No murmur heard  Pulmonary:      Effort: Pulmonary effort is normal  No respiratory distress  Breath sounds: Wheezing present  No rales  Abdominal:      General: Bowel sounds are normal       Palpations: Abdomen is soft  Tenderness: There is no abdominal tenderness  Musculoskeletal:      Cervical back: Normal range of motion and neck supple  Right lower leg: No edema  Left lower leg: No edema  Skin:     General: Skin is warm and dry  Capillary Refill: Capillary refill takes less than 2 seconds  Neurological:      General: No focal deficit present  Mental Status: He is alert and oriented to person, place, and time        Gait: Gait normal    Psychiatric:         Behavior: Behavior normal          Pertinent Laboratory/Diagnostic Studies:  {HKAMBLABLINKS:84757}  {[id number]:42892}      Current Medications     Current Outpatient Medications:   •  albuterol (2 5 mg/3 mL) 0 083 % nebulizer solution, Take 3 mL (2 5 mg total) by nebulization every 4 (four) hours as needed for wheezing or shortness of breath, Disp: 25 mL, Rfl: 2  •  albuterol (PROVENTIL HFA,VENTOLIN HFA) 90 mcg/act inhaler, Inhale 2 puffs every 6 (six) hours as needed for wheezing or shortness of breath, Disp: 18 g, Rfl: 2  •  ergocalciferol (VITAMIN D2) 50,000 units, Take 1 capsule (50,000 Units total) by mouth once a week, Disp: 13 capsule, Rfl: 1  •  fluticasone (FLONASE) 50 mcg/act nasal spray, 2 sprays into each nostril daily, Disp: 16 g, Rfl: 2  •  guaiFENesin (MUCINEX) 600 mg 12 hr tablet, Take 600 mg by mouth every 12 (twelve) hours, Disp: , Rfl:   •  lisinopril (ZESTRIL) 5 mg tablet, Take 1 tablet (5 mg total) by mouth daily, Disp: 90 tablet, Rfl: 1  •  methylPREDNISolone 4 MG tablet therapy pack, Use as directed on package, Disp: 1 each, Rfl: 0  •  montelukast (SINGULAIR) 10 mg tablet, Take 1 tablet (10 mg total) by mouth daily at bedtime, Disp: 90 tablet, Rfl: 2  •  pseudoephedrine-acetaminophen (TYLENOL SINUS)  MG TABS, Take 1 tablet by mouth every 6 (six) hours as needed for mild pain or congestion for up to 30 doses, Disp: 30 tablet, Rfl: 0  •  selenium sulfide (SELSUN) 2 5 % shampoo, Apply topically 2 (two) times a week, Disp: 118 mL, Rfl: 1  •  tadalafil (Cialis) 5 MG tablet, Take 1 tablet (5 mg total) by mouth daily as needed for erectile dysfunction, Disp: 10 tablet, Rfl: 0    Health Maintenance     Health Maintenance   Topic Date Due   • Colorectal Cancer Screening  Never done   • COVID-19 Vaccine (2 - Pfizer series) 05/07/2021   • BMI: Followup Plan  04/26/2023   • Annual Physical  04/26/2023   • Depression Screening  07/12/2023   • Influenza Vaccine (1) 06/30/2023 (Originally 9/1/2022)   • Pneumococcal Vaccine: Pediatrics (0 to 5 Years) and At-Risk Patients (6 to 64 Years) (2 - PCV) 04/18/2024 (Originally 11/9/2017)   • Hepatitis B Vaccine (2 of 3 - Risk 3-dose series) 04/18/2024 (Originally 7/12/2022)   • Hepatitis A Vaccine (2 of 3 - Hep A Twinrix risk 3-dose series) 04/18/2024 (Originally 7/12/2022)   • BMI: Adult  04/18/2024   • DTaP,Tdap,and Td Vaccines (2 - Td or Tdap) 11/09/2026   • HIV Screening  Completed   • Hepatitis C Screening  Completed   • HIB Vaccine  Aged Out   • IPV Vaccine  Aged Out   • Meningococcal ACWY Vaccine  Aged Out   • HPV Vaccine  Aged Out     Immunization History   Administered Date(s) Administered   • COVID-19 PFIZER VACCINE 0 3 ML IM 04/16/2021   • Hep A / Hep B 06/14/2022, 06/14/2022   • Influenza Quadrivalent Preservative Free 3 years and older IM 11/09/2016   • Pneumococcal Polysaccharide PPV23 11/09/2016   • Tdap 11/09/2016           Randal Guillen MD   Family Medicine  PGY-2  4/18/2023 11:11 AM

## 2023-05-20 ENCOUNTER — OFFICE VISIT (OUTPATIENT)
Dept: URGENT CARE | Facility: CLINIC | Age: 63
End: 2023-05-20

## 2023-05-20 ENCOUNTER — APPOINTMENT (OUTPATIENT)
Dept: RADIOLOGY | Facility: CLINIC | Age: 63
End: 2023-05-20
Payer: COMMERCIAL

## 2023-05-20 VITALS
DIASTOLIC BLOOD PRESSURE: 86 MMHG | HEART RATE: 86 BPM | RESPIRATION RATE: 20 BRPM | SYSTOLIC BLOOD PRESSURE: 163 MMHG | HEIGHT: 70 IN | OXYGEN SATURATION: 97 % | WEIGHT: 207 LBS | TEMPERATURE: 97.4 F | BODY MASS INDEX: 29.63 KG/M2

## 2023-05-20 DIAGNOSIS — J40 SINOBRONCHITIS: Primary | ICD-10-CM

## 2023-05-20 DIAGNOSIS — J40 SINOBRONCHITIS: ICD-10-CM

## 2023-05-20 DIAGNOSIS — J32.9 SINOBRONCHITIS: ICD-10-CM

## 2023-05-20 DIAGNOSIS — J32.9 SINOBRONCHITIS: Primary | ICD-10-CM

## 2023-05-20 PROCEDURE — 71046 X-RAY EXAM CHEST 2 VIEWS: CPT

## 2023-05-20 RX ORDER — BENZONATATE 200 MG/1
200 CAPSULE ORAL 3 TIMES DAILY PRN
Qty: 20 CAPSULE | Refills: 0 | Status: SHIPPED | OUTPATIENT
Start: 2023-05-20 | End: 2023-06-03

## 2023-05-20 RX ORDER — PREDNISONE 10 MG/1
10 TABLET ORAL DAILY
Qty: 21 TABLET | Refills: 0 | Status: SHIPPED | OUTPATIENT
Start: 2023-05-20

## 2023-05-20 RX ORDER — AZITHROMYCIN 250 MG/1
TABLET, FILM COATED ORAL
Qty: 6 TABLET | Refills: 0 | Status: SHIPPED | OUTPATIENT
Start: 2023-05-20 | End: 2023-05-24

## 2023-05-20 NOTE — PROGRESS NOTES
[facility]        NAME: Sudha Orr is a 58 y o  male  : 1960    MRN: [id number]  DATE: May 20, 2023  TIME: 12:16 PM    Assessment and Plan   Sinobronchitis [J32 9, J40]  1  Sinobronchitis  XR chest pa & lateral    predniSONE 10 mg tablet    azithromycin (ZITHROMAX) 250 mg tablet    benzonatate (TESSALON) 200 MG capsule            Patient Instructions   Medications as prescribed  Drink plenty fluids  Continue with nebulizer treatments  Follow up with PCP in 3-5 days  Proceed to  ER if symptoms worsen  Chief Complaint     Chief Complaint   Patient presents with   • Shortness of Breath     Pt reports URI sx for the past week, pt reports productive cough as well as sob with walking around and wheezing  Pt using neb treatments and inhaler at home with some relief  History of Present Illness       Patient is a 41-year-old male with significant past medical history of hypertension and asthma presents the office planing of congestion, rhinorrhea, sore throat, cough for 1 week  Reports chest congestion, chest tightness, dyspnea on exertion  Fever of 99 9  Denies chest pain, nausea, vomiting, abdominal pain, or rashes  He has been using over-the-counter cold and flu but symptoms are getting worse  Review of Systems   Review of Systems   Constitutional: Positive for fever  HENT: Positive for congestion, postnasal drip, rhinorrhea and sore throat  Respiratory: Positive for cough, chest tightness and wheezing  Negative for shortness of breath  Cardiovascular: Negative for chest pain and palpitations  Gastrointestinal: Negative for abdominal pain, diarrhea, nausea and vomiting  Skin: Negative for rash  Neurological: Negative for dizziness, light-headedness and headaches           Current Medications       Current Outpatient Medications:   •  albuterol (2 5 mg/3 mL) 0 083 % nebulizer solution, Take 3 mL (2 5 mg total) by nebulization every 4 (four) hours as needed for wheezing or shortness of breath, Disp: 25 mL, Rfl: 2  •  albuterol (PROVENTIL HFA,VENTOLIN HFA) 90 mcg/act inhaler, Inhale 2 puffs every 6 (six) hours as needed for wheezing or shortness of breath, Disp: 18 g, Rfl: 2  •  azithromycin (ZITHROMAX) 250 mg tablet, Take 2 tablets today then 1 tablet daily x 4 days, Disp: 6 tablet, Rfl: 0  •  benzonatate (TESSALON) 200 MG capsule, Take 1 capsule (200 mg total) by mouth 3 (three) times a day as needed for cough for up to 14 days, Disp: 20 capsule, Rfl: 0  •  ergocalciferol (VITAMIN D2) 50,000 units, Take 1 capsule (50,000 Units total) by mouth once a week, Disp: 13 capsule, Rfl: 1  •  fluticasone (FLONASE) 50 mcg/act nasal spray, 2 sprays into each nostril daily, Disp: 16 g, Rfl: 2  •  lisinopril (ZESTRIL) 5 mg tablet, Take 1 tablet (5 mg total) by mouth daily, Disp: 90 tablet, Rfl: 1  •  montelukast (SINGULAIR) 10 mg tablet, Take 1 tablet (10 mg total) by mouth daily at bedtime, Disp: 90 tablet, Rfl: 2  •  predniSONE 10 mg tablet, Take 1 tablet (10 mg total) by mouth daily Take 6 on day 1, take 5 on day 2, take 4 on day 3, take 3 on day 4, take 2 on day 5, take 1 on day 6 , Disp: 21 tablet, Rfl: 0  •  pseudoephedrine-acetaminophen (TYLENOL SINUS)  MG TABS, Take 1 tablet by mouth every 6 (six) hours as needed for mild pain or congestion for up to 30 doses, Disp: 30 tablet, Rfl: 0  •  selenium sulfide (SELSUN) 2 5 % shampoo, Apply topically 2 (two) times a week, Disp: 118 mL, Rfl: 1  •  tadalafil (Cialis) 5 MG tablet, Take 1 tablet (5 mg total) by mouth daily as needed for erectile dysfunction, Disp: 10 tablet, Rfl: 0    Current Allergies     Allergies as of 05/20/2023 - Reviewed 05/20/2023   Allergen Reaction Noted   • Aspirin Anaphylaxis 10/06/2016   • Ibuprofen Other (See Comments) 10/09/2015   • Augmentin [amoxicillin-pot clavulanate] Rash 12/16/2020            The following portions of the patient's history were reviewed and updated as appropriate: allergies, "current medications, past family history, past medical history, past social history, past surgical history and problem list      Past Medical History:   Diagnosis Date   • Asthma    • Cataract    • Hypertension        Past Surgical History:   Procedure Laterality Date   • EYE SURGERY     • [place] SEPTOPLASTY/SUBMUCOUS RESECJ W/WO CARTILAGE GRF N/A 7/1/2021    Procedure: SEPTOPLASTY;  Surgeon: Annika Barrera MD;  [facility];  Service: ENT   • [place] STRTCTC CPTR ASSTD [address] N/A 7/1/2021    Procedure: FUNCTIONAL ENDOSCOPIC SINUS SURGERY (FESS) IMAGED GUIDED, ALL SINUSES;  Surgeon: Annika Barrera MD;  [facility];  Service: ENT       Family History   Problem Relation Age of Onset   • No Known Problems Mother    • Heart attack Father          Medications have been verified  Objective   /86   Pulse 86   Temp (!) 97 4 °F (36 3 °C)   Resp 20   Ht 5' 10\" (1 778 m)   Wt 93 9 kg (207 lb)   SpO2 97%   BMI 29 70 kg/m²   No LMP for male patient  Physical Exam     Physical Exam  Vitals and nursing note reviewed  Constitutional:       Appearance: Normal appearance  He is well-developed  HENT:      Head: Normocephalic and atraumatic  Right Ear: Tympanic membrane, ear canal and external ear normal       Left Ear: Tympanic membrane, ear canal and external ear normal       Nose: Congestion and rhinorrhea present  Mouth/Throat:      Pharynx: Uvula midline  Eyes:      General: Lids are normal       Conjunctiva/sclera: Conjunctivae normal       Pupils: Pupils are equal, round, and reactive to light  Cardiovascular:      Rate and Rhythm: Normal rate and regular rhythm  Heart sounds: Normal heart sounds  No murmur heard  No friction rub  No gallop  Pulmonary:      Effort: Pulmonary effort is normal       Breath sounds: No stridor  Examination of the left-middle field reveals decreased breath sounds  Examination of the right-lower field reveals wheezing   " Examination of the left-lower field reveals decreased breath sounds  Decreased breath sounds and wheezing present  No rhonchi or rales  Musculoskeletal:         General: Normal range of motion  Cervical back: Neck supple  Skin:     General: Skin is warm and dry  Capillary Refill: Capillary refill takes less than 2 seconds  Neurological:      Mental Status: He is alert  Chest xr: No evidence of acute osseous normalities  Radiology dictation pending

## 2023-06-23 ENCOUNTER — HOSPITAL ENCOUNTER (OUTPATIENT)
Dept: PULMONOLOGY | Facility: HOSPITAL | Age: 63
End: 2023-06-23
Payer: COMMERCIAL

## 2023-06-23 DIAGNOSIS — J45.41 MODERATE PERSISTENT ASTHMA WITH ACUTE EXACERBATION: ICD-10-CM

## 2023-06-23 PROCEDURE — 94010 BREATHING CAPACITY TEST: CPT

## 2023-06-23 PROCEDURE — 94760 N-INVAS EAR/PLS OXIMETRY 1: CPT

## 2023-06-23 PROCEDURE — 94726 PLETHYSMOGRAPHY LUNG VOLUMES: CPT

## 2023-06-23 PROCEDURE — 94729 DIFFUSING CAPACITY: CPT

## 2023-06-23 PROCEDURE — 94200 LUNG FUNCTION TEST (MBC/MVV): CPT

## 2023-07-28 ENCOUNTER — OFFICE VISIT (OUTPATIENT)
Dept: URGENT CARE | Facility: CLINIC | Age: 63
End: 2023-07-28
Payer: COMMERCIAL

## 2023-07-28 VITALS
BODY MASS INDEX: 29.63 KG/M2 | SYSTOLIC BLOOD PRESSURE: 158 MMHG | OXYGEN SATURATION: 92 % | WEIGHT: 207 LBS | HEIGHT: 70 IN | HEART RATE: 91 BPM | TEMPERATURE: 97.4 F | DIASTOLIC BLOOD PRESSURE: 82 MMHG | RESPIRATION RATE: 18 BRPM

## 2023-07-28 DIAGNOSIS — J32.9 SINOBRONCHITIS: Primary | ICD-10-CM

## 2023-07-28 DIAGNOSIS — L23.7 POISON IVY DERMATITIS: ICD-10-CM

## 2023-07-28 DIAGNOSIS — J40 SINOBRONCHITIS: Primary | ICD-10-CM

## 2023-07-28 PROCEDURE — S9088 SERVICES PROVIDED IN URGENT: HCPCS | Performed by: PHYSICIAN ASSISTANT

## 2023-07-28 PROCEDURE — 99213 OFFICE O/P EST LOW 20 MIN: CPT | Performed by: PHYSICIAN ASSISTANT

## 2023-07-28 RX ORDER — PREDNISONE 10 MG/1
10 TABLET ORAL DAILY
Qty: 21 TABLET | Refills: 0 | Status: SHIPPED | OUTPATIENT
Start: 2023-07-28

## 2023-07-28 RX ORDER — ALBUTEROL SULFATE 90 UG/1
2 AEROSOL, METERED RESPIRATORY (INHALATION) EVERY 4 HOURS PRN
Qty: 18 G | Refills: 0 | Status: SHIPPED | OUTPATIENT
Start: 2023-07-28

## 2023-07-28 RX ORDER — AZITHROMYCIN 250 MG/1
TABLET, FILM COATED ORAL
Qty: 6 TABLET | Refills: 0 | Status: SHIPPED | OUTPATIENT
Start: 2023-07-28 | End: 2023-08-01

## 2023-07-28 RX ORDER — ALBUTEROL SULFATE 2.5 MG/3ML
2.5 SOLUTION RESPIRATORY (INHALATION) EVERY 6 HOURS PRN
Qty: 75 ML | Refills: 0 | Status: SHIPPED | OUTPATIENT
Start: 2023-07-28

## 2023-07-28 NOTE — PROGRESS NOTES
[facility]        NAME: Ryne Maynard is a 58 y.o. male  : 1960    MRN: [id number]  DATE: 2023  TIME: 10:47 AM    Assessment and Plan   Sinobronchitis [J32.9, J40]  1. Sinobronchitis  azithromycin (ZITHROMAX) 250 mg tablet    albuterol (Proventil HFA) 90 mcg/act inhaler    predniSONE 10 mg tablet    albuterol (2.5 mg/3 mL) 0.083 % nebulizer solution      2. Poison ivy dermatitis              Patient Instructions   Medications as prescribed. Plenty fluids. Patient to follow-up with PCP and pulmonology. Follow up with PCP in 3-5 days. Proceed to  ER if symptoms worsen. Chief Complaint     Chief Complaint   Patient presents with   • Cold Like Symptoms     Pt reports sinus congestion, cough, sore throat and chest congestion for the past 3 days. History of Present Illness       Patient is a 18-year-old male with significant past medical history of hypertension, asthma, and smoking presents the office complaining of congestion, sore throat, cough, chest congestion for 3 days. Denies fevers, chills, chest pain, shortness of breath. Reports he ran out of his albuterol inhaler and nebulizer solution. Reports he gets symptoms like this every 3 months and it resolves with prednisone and antibiotic. He is also complaining of poison ivy rash to his right forearm and hand which is not resolving. Review of Systems   Review of Systems   Constitutional: Negative for fever. HENT: Positive for congestion, postnasal drip, rhinorrhea and sore throat. Respiratory: Positive for cough and chest tightness. Negative for shortness of breath. Cardiovascular: Negative for chest pain and palpitations. Gastrointestinal: Negative for abdominal pain, diarrhea, nausea and vomiting. Skin: Positive for rash. Neurological: Negative for dizziness, light-headedness and headaches.          Current Medications       Current Outpatient Medications:   •  albuterol (2.5 mg/3 mL) 0.083 % nebulizer solution, Take 3 mL (2.5 mg total) by nebulization every 6 (six) hours as needed for wheezing or shortness of breath, Disp: 75 mL, Rfl: 0  •  albuterol (Proventil HFA) 90 mcg/act inhaler, Inhale 2 puffs every 4 (four) hours as needed for wheezing or shortness of breath, Disp: 18 g, Rfl: 0  •  azithromycin (ZITHROMAX) 250 mg tablet, Take 2 tablets today then 1 tablet daily x 4 days, Disp: 6 tablet, Rfl: 0  •  ergocalciferol (VITAMIN D2) 50,000 units, Take 1 capsule (50,000 Units total) by mouth once a week, Disp: 13 capsule, Rfl: 1  •  lisinopril (ZESTRIL) 5 mg tablet, Take 1 tablet (5 mg total) by mouth daily, Disp: 90 tablet, Rfl: 1  •  montelukast (SINGULAIR) 10 mg tablet, Take 1 tablet (10 mg total) by mouth daily at bedtime, Disp: 90 tablet, Rfl: 2  •  predniSONE 10 mg tablet, Take 1 tablet (10 mg total) by mouth daily Take 6 on day 1, take 5 on day 2, take 4 on day 3, take 3 on day 4, take 2 on day 5, take 1 on day 6., Disp: 21 tablet, Rfl: 0  •  selenium sulfide (SELSUN) 2.5 % shampoo, Apply topically 2 (two) times a week, Disp: 118 mL, Rfl: 1  •  tadalafil (Cialis) 5 MG tablet, Take 1 tablet (5 mg total) by mouth daily as needed for erectile dysfunction, Disp: 10 tablet, Rfl: 0    Current Allergies     Allergies as of 07/28/2023 - Reviewed 07/28/2023   Allergen Reaction Noted   • Aspirin Anaphylaxis 10/06/2016   • Ibuprofen Other (See Comments) 10/09/2015   • Augmentin [amoxicillin-pot clavulanate] Rash 12/16/2020            The following portions of the patient's history were reviewed and updated as appropriate: allergies, current medications, past family history, past medical history, past social history, past surgical history and problem list.     Past Medical History:   Diagnosis Date   • Asthma    • Cataract    • Hypertension        Past Surgical History:   Procedure Laterality Date   • EYE SURGERY     • [place] SEPTOPLASTY/SUBMUCOUS RESECJ W/WO CARTILAGE GRF N/A 7/1/2021    Procedure: SEPTOPLASTY; Surgeon: Mirna Ortega MD;  [facility];  Service: ENT   • CT STRTCTC CPTR ASSTD 12[facility] N/A 7/1/2021    Procedure: FUNCTIONAL ENDOSCOPIC SINUS SURGERY (FESS) IMAGED GUIDED, ALL SINUSES;  Surgeon: Mirna Ortega MD;  [facility];  Service: ENT       Family History   Problem Relation Age of Onset   • No Known Problems Mother    • Heart attack Father          Medications have been verified. Objective   /82   Pulse 91   Temp (!) 97.4 °F (36.3 °C)   Resp 18   Ht 5' 10" (1.778 m)   Wt 93.9 kg (207 lb)   SpO2 92%   BMI 29.70 kg/m²   No LMP for male patient. Physical Exam     Physical Exam  Vitals and nursing note reviewed. Constitutional:       Appearance: Normal appearance. He is well-developed. HENT:      Head: Normocephalic and atraumatic. Right Ear: Tympanic membrane, ear canal and external ear normal.      Left Ear: Tympanic membrane, ear canal and external ear normal.      Nose: Congestion present. Mouth/Throat:      Pharynx: Uvula midline. Posterior oropharyngeal erythema present. No pharyngeal swelling. Tonsils: No tonsillar exudate or tonsillar abscesses. Eyes:      General: Lids are normal.      Conjunctiva/sclera: Conjunctivae normal.      Pupils: Pupils are equal, round, and reactive to light. Cardiovascular:      Rate and Rhythm: Normal rate and regular rhythm. Heart sounds: Normal heart sounds. No murmur heard. No friction rub. No gallop. Pulmonary:      Effort: Pulmonary effort is normal.      Breath sounds: No stridor. Examination of the right-lower field reveals decreased breath sounds. Examination of the left-lower field reveals decreased breath sounds. Decreased breath sounds present. No wheezing, rhonchi or rales. Musculoskeletal:         General: Normal range of motion. Cervical back: Neck supple. Skin:     General: Skin is warm and dry.       Capillary Refill: Capillary refill takes less than 2 seconds. Findings: Rash (Poison ivy to right forearm and fingers) present. Neurological:      Mental Status: He is alert.

## 2023-08-09 ENCOUNTER — VBI (OUTPATIENT)
Dept: ADMINISTRATIVE | Facility: OTHER | Age: 63
End: 2023-08-09

## 2023-08-09 NOTE — TELEPHONE ENCOUNTER
08/09/23 11:21 AM     [employer] SmartForm used to document caregap status.     Harjinder Johnson MA

## 2023-08-21 ENCOUNTER — APPOINTMENT (OUTPATIENT)
Dept: RADIOLOGY | Facility: CLINIC | Age: 63
End: 2023-08-21
Payer: COMMERCIAL

## 2023-08-21 ENCOUNTER — OFFICE VISIT (OUTPATIENT)
Dept: URGENT CARE | Facility: CLINIC | Age: 63
End: 2023-08-21
Payer: COMMERCIAL

## 2023-08-21 VITALS
SYSTOLIC BLOOD PRESSURE: 161 MMHG | BODY MASS INDEX: 29.26 KG/M2 | OXYGEN SATURATION: 90 % | TEMPERATURE: 98.4 F | HEIGHT: 71 IN | RESPIRATION RATE: 28 BRPM | WEIGHT: 209 LBS | HEART RATE: 88 BPM | DIASTOLIC BLOOD PRESSURE: 91 MMHG

## 2023-08-21 DIAGNOSIS — J40 SINOBRONCHITIS: ICD-10-CM

## 2023-08-21 DIAGNOSIS — J32.9 SINOBRONCHITIS: ICD-10-CM

## 2023-08-21 DIAGNOSIS — J32.9 SINOBRONCHITIS: Primary | ICD-10-CM

## 2023-08-21 DIAGNOSIS — J40 SINOBRONCHITIS: Primary | ICD-10-CM

## 2023-08-21 LAB
SARS-COV-2 AG UPPER RESP QL IA: NEGATIVE
VALID CONTROL: NORMAL

## 2023-08-21 PROCEDURE — 71046 X-RAY EXAM CHEST 2 VIEWS: CPT

## 2023-08-21 PROCEDURE — 99213 OFFICE O/P EST LOW 20 MIN: CPT | Performed by: PHYSICIAN ASSISTANT

## 2023-08-21 PROCEDURE — S9088 SERVICES PROVIDED IN URGENT: HCPCS | Performed by: PHYSICIAN ASSISTANT

## 2023-08-21 PROCEDURE — 87811 SARS-COV-2 COVID19 W/OPTIC: CPT | Performed by: PHYSICIAN ASSISTANT

## 2023-08-21 RX ORDER — ALBUTEROL SULFATE 90 UG/1
2 AEROSOL, METERED RESPIRATORY (INHALATION) EVERY 4 HOURS PRN
Qty: 18 G | Refills: 0 | Status: SHIPPED | OUTPATIENT
Start: 2023-08-21

## 2023-08-21 RX ORDER — DOXYCYCLINE 100 MG/1
100 TABLET ORAL 2 TIMES DAILY
Qty: 20 TABLET | Refills: 0 | Status: SHIPPED | OUTPATIENT
Start: 2023-08-21 | End: 2023-08-31

## 2023-08-21 RX ORDER — PREDNISONE 10 MG/1
10 TABLET ORAL DAILY
Qty: 21 TABLET | Refills: 0 | Status: SHIPPED | OUTPATIENT
Start: 2023-08-21

## 2023-08-21 RX ORDER — ALBUTEROL SULFATE 2.5 MG/3ML
2.5 SOLUTION RESPIRATORY (INHALATION) EVERY 6 HOURS PRN
Qty: 75 ML | Refills: 0 | Status: SHIPPED | OUTPATIENT
Start: 2023-08-21

## 2023-08-21 NOTE — PATIENT INSTRUCTIONS
Monitor wound for signs of infection such as redness, swelling, discharge, pain, fever, or chills. Monitor for signs of Lymes disease such as flu-like symptoms as bulls-eye rash. Rash may appear at site of tick bite or other parts of body. Take doxycycline with food. Do not consume dairy or reflux medications 2 hours before to hours after as this inhibit the absorption of the antibiotic. Common side effects include nausea, vomiting, diarrhea, upset stomach. Take a probiotic to avoid this. Avoid sun exposure due to photophobia.

## 2023-08-21 NOTE — PROGRESS NOTES
[facility]        NAME: Park Franks is a 58 y.o. male  : 1960    MRN: [id number]  DATE: 2023  TIME: 12:03 PM    Assessment and Plan   Sinobronchitis [J32.9, J40]  1. Sinobronchitis  XR chest pa & lateral    Poct Covid 19 Rapid Antigen Test    albuterol (2.5 mg/3 mL) 0.083 % nebulizer solution    albuterol (Proventil HFA) 90 mcg/act inhaler    Ambulatory Referral to Otolaryngology    predniSONE 10 mg tablet    doxycycline (ADOXA) 100 MG tablet    Ambulatory referral to [facility]            Patient Instructions   Medications as prescribed. Made appoint with ENT. Plan with family practice. Take doxycycline with food. Do not consume dairy or reflux medications 2 hours before to hours after as this inhibit the absorption of the antibiotic. Common side effects include nausea, vomiting, diarrhea, upset stomach. Take a probiotic to avoid this. Avoid sun exposure due to photophobia. Follow up with PCP in 3-5 days. Proceed to  ER if symptoms worsen. Chief Complaint     Chief Complaint   Patient presents with   • Cold Like Symptoms     Cough with chest congestion and sinus congestion starting 3 days ago         History of Present Illness       Patient is a 59-year-old male with significant past medical history of hypertension and asthma presents the office complaining of congestion, cough, wheeze, shortness of breath for 3 days. Pain is rated 5 out of 10. Denies fevers, chills, chest pain, sore throat, nausea, vomiting, abdominal pain, or rashes. Reports he has been using his nebulizer with good temporary relief. He has had similar symptoms twice in the last 4 months with resolution via antibiotics and steroids. He tried following up with his PCP but states his provider left the practice and was unable to be seen in history. Review of Systems   Review of Systems   Constitutional: Negative for chills and fever. HENT: Positive for congestion.  Negative for sore throat. Respiratory: Positive for cough. Negative for shortness of breath. Cardiovascular: Negative for chest pain and palpitations. Gastrointestinal: Negative for abdominal pain, diarrhea, nausea and vomiting. Musculoskeletal: Negative for myalgias. Neurological: Negative for dizziness, light-headedness and headaches.          Current Medications       Current Outpatient Medications:   •  albuterol (2.5 mg/3 mL) 0.083 % nebulizer solution, Take 3 mL (2.5 mg total) by nebulization every 6 (six) hours as needed for wheezing or shortness of breath, Disp: 75 mL, Rfl: 0  •  albuterol (2.5 mg/3 mL) 0.083 % nebulizer solution, Take 3 mL (2.5 mg total) by nebulization every 6 (six) hours as needed for wheezing or shortness of breath, Disp: 75 mL, Rfl: 0  •  albuterol (Proventil HFA) 90 mcg/act inhaler, Inhale 2 puffs every 4 (four) hours as needed for wheezing or shortness of breath, Disp: 18 g, Rfl: 0  •  albuterol (Proventil HFA) 90 mcg/act inhaler, Inhale 2 puffs every 4 (four) hours as needed for wheezing or shortness of breath, Disp: 18 g, Rfl: 0  •  doxycycline (ADOXA) 100 MG tablet, Take 1 tablet (100 mg total) by mouth 2 (two) times a day for 10 days, Disp: 20 tablet, Rfl: 0  •  lisinopril (ZESTRIL) 5 mg tablet, Take 1 tablet (5 mg total) by mouth daily, Disp: 90 tablet, Rfl: 1  •  montelukast (SINGULAIR) 10 mg tablet, Take 1 tablet (10 mg total) by mouth daily at bedtime, Disp: 90 tablet, Rfl: 2  •  predniSONE 10 mg tablet, Take 1 tablet (10 mg total) by mouth daily Take 6 on day 1, take 5 on day 2, take 4 on day 3, take 3 on day 4, take 2 on day 5, take 1 on day 6., Disp: 21 tablet, Rfl: 0  •  selenium sulfide (SELSUN) 2.5 % shampoo, Apply topically 2 (two) times a week, Disp: 118 mL, Rfl: 1  •  tadalafil (Cialis) 5 MG tablet, Take 1 tablet (5 mg total) by mouth daily as needed for erectile dysfunction, Disp: 10 tablet, Rfl: 0    Current Allergies     Allergies as of 08/21/2023 - Reviewed 08/21/2023   Allergen Reaction Noted   • Aspirin Anaphylaxis 10/06/2016   • Ibuprofen Other (See Comments) 10/09/2015   • Augmentin [amoxicillin-pot clavulanate] Rash 12/16/2020            The following portions of the patient's history were reviewed and updated as appropriate: allergies, current medications, past family history, past medical history, past social history, past surgical history and problem list.     Past Medical History:   Diagnosis Date   • Asthma    • Cataract    • Hypertension        Past Surgical History:   Procedure Laterality Date   • EYE SURGERY     • DC SEPTOPLASTY/SUBMUCOUS RESECJ W/WO CARTILAGE GRF N/A 7/1/2021    Procedure: SEPTOPLASTY;  Surgeon: Brandon Newman MD;  [facility];  Service: ENT   • DC STRTCTC CPTR ASSTD PX EXTRADURAL CRANIAL N/A 7/1/2021    Procedure: FUNCTIONAL ENDOSCOPIC SINUS SURGERY (FESS) IMAGED GUIDED, ALL SINUSES;  Surgeon: Brandon Newman MD;  [facility];  Service: ENT       Family History   Problem Relation Age of Onset   • No Known Problems Mother    • Heart attack Father          Medications have been verified. Objective   /91   Pulse 88   Temp 98.4 °F (36.9 °C)   Resp (!) 28   Ht 5' 11" (1.803 m)   Wt 94.8 kg (209 lb)   SpO2 90%   BMI 29.15 kg/m²   No LMP for male patient. Physical Exam     Physical Exam  Vitals and nursing note reviewed. Constitutional:       Appearance: Normal appearance. He is well-developed. HENT:      Head: Normocephalic and atraumatic. Right Ear: Tympanic membrane, ear canal and external ear normal.      Left Ear: Tympanic membrane, ear canal and external ear normal.      Nose: Congestion and rhinorrhea present. Mouth/Throat:      Pharynx: Uvula midline. Eyes:      General: Lids are normal.      Conjunctiva/sclera: Conjunctivae normal.      Pupils: Pupils are equal, round, and reactive to light. Cardiovascular:      Rate and Rhythm: Normal rate and regular rhythm.       Heart sounds: Normal heart sounds. No murmur heard. No friction rub. No gallop. Pulmonary:      Effort: Pulmonary effort is normal.      Breath sounds: No stridor. Wheezing (Faint) present. No rhonchi or rales. Musculoskeletal:         General: Normal range of motion. Cervical back: Neck supple. Skin:     General: Skin is warm and dry. Capillary Refill: Capillary refill takes less than 2 seconds. Neurological:      Mental Status: He is alert. Chest x-ray: No evidence of acute cardiopulmonary etiology. Radiology interpretation pending.

## 2023-08-30 DIAGNOSIS — I10 ESSENTIAL HYPERTENSION: ICD-10-CM

## 2023-08-31 RX ORDER — LISINOPRIL 5 MG/1
5 TABLET ORAL DAILY
Qty: 90 TABLET | Refills: 1 | Status: SHIPPED | OUTPATIENT
Start: 2023-08-31

## 2023-09-28 ENCOUNTER — TELEPHONE (OUTPATIENT)
Dept: FAMILY MEDICINE CLINIC | Facility: CLINIC | Age: 63
End: 2023-09-28

## 2023-09-28 DIAGNOSIS — J40 SINOBRONCHITIS: ICD-10-CM

## 2023-09-28 DIAGNOSIS — J32.9 SINOBRONCHITIS: ICD-10-CM

## 2023-09-28 NOTE — TELEPHONE ENCOUNTER
Patient seen this day at urgent care. Albuterol called into pharmacy by a Dr. Bobbi Travis. My name is Ethelyn Ormond [id number]. I need my Albuterol prescriptions refilled and they said they have  at [facility]. I'm having a real hard time breathing today because of the, I don't know why. So if you could give me a call back or just have them refilled the albuterol for the inhaler, the albuterol for the nebulizer, and my phone number is [phone]. That's Rocio Heading [address]30. Would also like to make an appointment because my allergies seems to be getting worse. So if you give me a call back [phone].  Thank you very much

## 2023-09-29 ENCOUNTER — OFFICE VISIT (OUTPATIENT)
Dept: URGENT CARE | Facility: CLINIC | Age: 63
End: 2023-09-29
Payer: COMMERCIAL

## 2023-09-29 VITALS
SYSTOLIC BLOOD PRESSURE: 144 MMHG | DIASTOLIC BLOOD PRESSURE: 77 MMHG | WEIGHT: 215.8 LBS | TEMPERATURE: 97.7 F | RESPIRATION RATE: 21 BRPM | BODY MASS INDEX: 30.21 KG/M2 | HEART RATE: 81 BPM | HEIGHT: 71 IN | OXYGEN SATURATION: 95 %

## 2023-09-29 DIAGNOSIS — J32.9 SINOBRONCHITIS: ICD-10-CM

## 2023-09-29 DIAGNOSIS — J40 SINOBRONCHITIS: ICD-10-CM

## 2023-09-29 DIAGNOSIS — J40 SINOBRONCHITIS: Primary | ICD-10-CM

## 2023-09-29 DIAGNOSIS — J32.9 SINOBRONCHITIS: Primary | ICD-10-CM

## 2023-09-29 PROCEDURE — 99213 OFFICE O/P EST LOW 20 MIN: CPT

## 2023-09-29 PROCEDURE — S9088 SERVICES PROVIDED IN URGENT: HCPCS

## 2023-09-29 RX ORDER — PREDNISONE 10 MG/1
TABLET ORAL
Qty: 21 TABLET | Refills: 0 | Status: SHIPPED | OUTPATIENT
Start: 2023-09-29

## 2023-09-29 RX ORDER — ALBUTEROL SULFATE 90 UG/1
2 AEROSOL, METERED RESPIRATORY (INHALATION) EVERY 4 HOURS PRN
Qty: 18 G | Refills: 0 | Status: SHIPPED | OUTPATIENT
Start: 2023-09-29 | End: 2023-10-03 | Stop reason: SDUPTHER

## 2023-09-29 RX ORDER — AZITHROMYCIN 250 MG/1
TABLET, FILM COATED ORAL
Qty: 6 TABLET | Refills: 0 | Status: SHIPPED | OUTPATIENT
Start: 2023-09-29 | End: 2023-10-03

## 2023-09-29 RX ORDER — ALBUTEROL SULFATE 2.5 MG/3ML
2.5 SOLUTION RESPIRATORY (INHALATION) EVERY 6 HOURS PRN
Qty: 75 ML | Refills: 0 | Status: SHIPPED | OUTPATIENT
Start: 2023-09-29 | End: 2023-10-03 | Stop reason: SDUPTHER

## 2023-09-29 RX ORDER — IPRATROPIUM BROMIDE AND ALBUTEROL SULFATE 2.5; .5 MG/3ML; MG/3ML
3 SOLUTION RESPIRATORY (INHALATION) ONCE
Status: COMPLETED | OUTPATIENT
Start: 2023-09-29 | End: 2023-09-29

## 2023-09-29 RX ADMIN — IPRATROPIUM BROMIDE AND ALBUTEROL SULFATE 3 ML: 2.5; .5 SOLUTION RESPIRATORY (INHALATION) at 10:40

## 2023-09-29 NOTE — PROGRESS NOTES
[facility]        NAME: Saurabh Payne is a 58 y.o. male  : 1960    MRN: [id number]  DATE: 2023  TIME: 10:47 AM    Assessment and Plan   Sinobronchitis [J32.9, J40]  1. Sinobronchitis  Ambulatory Referral to Allergy    azithromycin (ZITHROMAX) 250 mg tablet    predniSONE 10 mg tablet    ipratropium-albuterol (DUO-NEB) 0.5-2.5 mg/3 mL inhalation solution 3 mL        Discussed problem with patient. No red flag symptoms on PE. Suspicious of sinobronchitis with flareup of asthma. Prescribing Z-Jose Ramon for this issue as well as course of prednisone. DuoNeb administered today with moderate improvement. Placed referral for allergist.  Continue previously prescribed medications. Report to the ER symptoms worsen. Patient Instructions       Follow up with PCP in 3-5 days. Proceed to  ER if symptoms worsen. Chief Complaint     Chief Complaint   Patient presents with   • Facial Pain     Patient states he has having facial pain, chest congestion for the last 4 day. Patient is coughing and wheezing. History of Present Illness       Patient states he has having facial pain, chest congestion for the last 4 day. States he still having these recurrent problems and ENT referral was placed, however was informed his insurance would not cover the ENT referral.  Denies any fevers or chills and is eating and drinking normally. States he was allergy tested and is allergic to dogs and has 3 dogs in the house. Reports congestion, runny nose, sinus pressure and pain. Reports shortness of breath and wheezing but denies any lethargic. Denies any pleuritic chest pain or nausea. Has been using albuterol inhaler with moderate relief. Review of Systems   Review of Systems   Constitutional: Negative for appetite change, chills, fatigue and fever (no tylenol or motrin). HENT: Positive for congestion, postnasal drip, rhinorrhea, sinus pain and sneezing.  Negative for ear pain, sinus pressure and sore throat. Respiratory: Positive for cough, chest tightness, shortness of breath and wheezing. Negative for stridor. Cardiovascular: Negative for chest pain and palpitations. Gastrointestinal: Negative for abdominal pain, constipation, diarrhea, nausea and vomiting. Current Medications       Current Outpatient Medications:   •  albuterol (2.5 mg/3 mL) 0.083 % nebulizer solution, Take 3 mL (2.5 mg total) by nebulization every 6 (six) hours as needed for wheezing or shortness of breath, Disp: 75 mL, Rfl: 0  •  albuterol (2.5 mg/3 mL) 0.083 % nebulizer solution, Take 3 mL (2.5 mg total) by nebulization every 6 (six) hours as needed for wheezing or shortness of breath, Disp: 75 mL, Rfl: 0  •  albuterol (Proventil HFA) 90 mcg/act inhaler, Inhale 2 puffs every 4 (four) hours as needed for wheezing or shortness of breath, Disp: 18 g, Rfl: 0  •  albuterol (Proventil HFA) 90 mcg/act inhaler, Inhale 2 puffs every 4 (four) hours as needed for wheezing or shortness of breath, Disp: 18 g, Rfl: 0  •  azithromycin (ZITHROMAX) 250 mg tablet, Take 2 tablets today then 1 tablet daily x 4 days, Disp: 6 tablet, Rfl: 0  •  lisinopril (ZESTRIL) 5 mg tablet, take 1 tablet by mouth once daily, Disp: 90 tablet, Rfl: 1  •  montelukast (SINGULAIR) 10 mg tablet, Take 1 tablet (10 mg total) by mouth daily at bedtime, Disp: 90 tablet, Rfl: 2  •  predniSONE 10 mg tablet, Take 6 pills on day 1, take 5 pills on day 2, take 4 pills on day 3, take 3 pills on day 4, take 2 pills on day 5, take 1 pill a day 6., Disp: 21 tablet, Rfl: 0  •  selenium sulfide (SELSUN) 2.5 % shampoo, Apply topically 2 (two) times a week, Disp: 118 mL, Rfl: 1  •  tadalafil (Cialis) 5 MG tablet, Take 1 tablet (5 mg total) by mouth daily as needed for erectile dysfunction, Disp: 10 tablet, Rfl: 0  No current facility-administered medications for this visit.     Current Allergies     Allergies as of 09/29/2023 - Reviewed 09/29/2023   Allergen Reaction Noted   • Aspirin Anaphylaxis 10/06/2016   • Ibuprofen Other (See Comments) 10/09/2015   • Augmentin [amoxicillin-pot clavulanate] Rash 12/16/2020            The following portions of the patient's history were reviewed and updated as appropriate: allergies, current medications, past family history, past medical history, past social history, past surgical history and problem list.     Past Medical History:   Diagnosis Date   • Asthma    • Cataract    • Hypertension        Past Surgical History:   Procedure Laterality Date   • EYE SURGERY     • MT SEPTOPLASTY/SUBMUCOUS RESECJ W/WO CARTILAGE GRF N/A 7/1/2021    Procedure: SEPTOPLASTY;  Surgeon: Titus Adamson MD;  [facility];  Service: ENT   • MT STRTCTC CPTR ASSTD PX EXTRADURAL CRANIAL N/A 7/1/2021    Procedure: FUNCTIONAL ENDOSCOPIC SINUS SURGERY (FESS) IMAGED GUIDED, ALL SINUSES;  Surgeon: Titus Adamson MD;  [facility];  Service: ENT       Family History   Problem Relation Age of Onset   • No Known Problems Mother    • Heart attack Father          Medications have been verified. Objective   /77   Pulse 81   Temp 97.7 °F (36.5 °C) (Tympanic)   Resp 21   Ht 5' 11" (1.803 m)   Wt 97.9 kg (215 lb 12.8 oz)   SpO2 95%   BMI 30.10 kg/m²        Physical Exam     Physical Exam  Vitals and nursing note reviewed. Constitutional:       General: He is not in acute distress. Appearance: Normal appearance. He is normal weight. He is not ill-appearing, toxic-appearing or diaphoretic. HENT:      Head: Normocephalic. Right Ear: Tympanic membrane, ear canal and external ear normal. There is no impacted cerumen. Left Ear: Tympanic membrane, ear canal and external ear normal. There is no impacted cerumen. Nose: Congestion present. No rhinorrhea. Mouth/Throat:      Mouth: Mucous membranes are moist.      Pharynx: Oropharynx is clear. No oropharyngeal exudate or posterior oropharyngeal erythema.    Eyes: General:         Right eye: No discharge. Left eye: No discharge. Extraocular Movements: Extraocular movements intact. Conjunctiva/sclera: Conjunctivae normal.      Pupils: Pupils are equal, round, and reactive to light. Neck:      Vascular: No carotid bruit. Cardiovascular:      Rate and Rhythm: Normal rate and regular rhythm. Pulses: Normal pulses. Heart sounds: Normal heart sounds. No murmur heard. No friction rub. No gallop. Pulmonary:      Effort: Pulmonary effort is normal. No respiratory distress. Breath sounds: No stridor. Wheezing present. No rhonchi or rales. Chest:      Chest wall: No tenderness. Musculoskeletal:      Cervical back: Normal range of motion and neck supple. No rigidity or tenderness. Lymphadenopathy:      Cervical: No cervical adenopathy. Neurological:      Mental Status: He is alert.

## 2023-09-29 NOTE — TELEPHONE ENCOUNTER
No open appts in office today. Patient went to urgent care. Can this be sent yet? He is still asking for this as pharmacy doesn't;t have this.  Pt scheduled 10/10/23

## 2023-09-29 NOTE — TELEPHONE ENCOUNTER
Patients significant other called in stating patient is out of inhaler. Advised short supply will be sent. Further refills will come from the office .  Verbalized understanding

## 2023-10-03 RX ORDER — ALBUTEROL SULFATE 90 UG/1
2 AEROSOL, METERED RESPIRATORY (INHALATION) EVERY 4 HOURS PRN
Qty: 18 G | Refills: 0 | Status: SHIPPED | OUTPATIENT
Start: 2023-10-03

## 2023-10-03 RX ORDER — ALBUTEROL SULFATE 2.5 MG/3ML
2.5 SOLUTION RESPIRATORY (INHALATION) EVERY 6 HOURS PRN
Qty: 75 ML | Refills: 0 | Status: SHIPPED | OUTPATIENT
Start: 2023-10-03

## 2023-11-25 ENCOUNTER — OFFICE VISIT (OUTPATIENT)
Dept: URGENT CARE | Facility: CLINIC | Age: 63
End: 2023-11-25
Payer: COMMERCIAL

## 2023-11-25 VITALS
TEMPERATURE: 98.4 F | OXYGEN SATURATION: 96 % | HEIGHT: 71 IN | RESPIRATION RATE: 28 BRPM | BODY MASS INDEX: 28.14 KG/M2 | DIASTOLIC BLOOD PRESSURE: 76 MMHG | HEART RATE: 104 BPM | WEIGHT: 201 LBS | SYSTOLIC BLOOD PRESSURE: 146 MMHG

## 2023-11-25 DIAGNOSIS — R06.02 SHORTNESS OF BREATH: Primary | ICD-10-CM

## 2023-11-25 PROCEDURE — 96372 THER/PROPH/DIAG INJ SC/IM: CPT

## 2023-11-25 PROCEDURE — 99213 OFFICE O/P EST LOW 20 MIN: CPT

## 2023-11-25 PROCEDURE — 94640 AIRWAY INHALATION TREATMENT: CPT

## 2023-11-25 RX ORDER — ALBUTEROL SULFATE 90 UG/1
2 AEROSOL, METERED RESPIRATORY (INHALATION) EVERY 6 HOURS PRN
Qty: 8.5 G | Refills: 0 | Status: SHIPPED | OUTPATIENT
Start: 2023-11-25

## 2023-11-25 RX ORDER — CETIRIZINE HYDROCHLORIDE 5 MG/1
5 TABLET ORAL DAILY
COMMUNITY

## 2023-11-25 RX ORDER — DOXYCYCLINE 100 MG/1
100 TABLET ORAL 2 TIMES DAILY
Qty: 14 TABLET | Refills: 0 | Status: SHIPPED | OUTPATIENT
Start: 2023-11-25 | End: 2023-12-02

## 2023-11-25 RX ORDER — PREDNISONE 10 MG/1
TABLET ORAL
Qty: 21 TABLET | Refills: 0 | Status: SHIPPED | OUTPATIENT
Start: 2023-11-25

## 2023-11-25 RX ORDER — METHYLPREDNISOLONE SODIUM SUCCINATE 125 MG/2ML
125 INJECTION, POWDER, LYOPHILIZED, FOR SOLUTION INTRAMUSCULAR; INTRAVENOUS ONCE
Status: COMPLETED | OUTPATIENT
Start: 2023-11-25 | End: 2023-11-25

## 2023-11-25 RX ORDER — ALBUTEROL SULFATE 2.5 MG/3ML
2.5 SOLUTION RESPIRATORY (INHALATION) EVERY 6 HOURS PRN
Qty: 90 ML | Refills: 0 | Status: SHIPPED | OUTPATIENT
Start: 2023-11-25

## 2023-11-25 RX ORDER — IPRATROPIUM BROMIDE AND ALBUTEROL SULFATE 2.5; .5 MG/3ML; MG/3ML
3 SOLUTION RESPIRATORY (INHALATION) ONCE
Status: COMPLETED | OUTPATIENT
Start: 2023-11-25 | End: 2023-11-25

## 2023-11-25 RX ORDER — BENZONATATE 100 MG/1
100 CAPSULE ORAL 3 TIMES DAILY PRN
Qty: 20 CAPSULE | Refills: 0 | Status: SHIPPED | OUTPATIENT
Start: 2023-11-25

## 2023-11-25 RX ADMIN — METHYLPREDNISOLONE SODIUM SUCCINATE 125 MG: 125 INJECTION, POWDER, LYOPHILIZED, FOR SOLUTION INTRAMUSCULAR; INTRAVENOUS at 12:45

## 2023-11-25 RX ADMIN — IPRATROPIUM BROMIDE AND ALBUTEROL SULFATE 3 ML: 2.5; .5 SOLUTION RESPIRATORY (INHALATION) at 12:45

## 2023-11-25 NOTE — PROGRESS NOTES
Mini neb    Performed by: OSCAR Lima  Authorized by: OSCAR Lima    Number of treatments:  1  Treatment 1:   Pre-Procedure     Symptoms:  Wheezing and cough    Lung Sounds:  Diminished    Medication Administered:  Duoneb - Albuterol 2.5 mg/Atrovent 0.5 mg  Post-Procedure     Symptoms:  Cough and wheezing    Lung sounds:  Scattered wheezing      [facility]        NAME: Endy Wolff is a 58 y.o. male  : 1960    MRN: [id number]  DATE: 2023  TIME: 1:11 PM    Assessment and Plan   Shortness of breath [R06.02]  1. Shortness of breath  ipratropium-albuterol (DUO-NEB) 0.5-2.5 mg/3 mL inhalation solution 3 mL    methylPREDNISolone sodium succinate (Solu-MEDROL) injection 125 mg    doxycycline (ADOXA) 100 MG tablet    predniSONE 10 mg tablet    benzonatate (TESSALON PERLES) 100 mg capsule    albuterol (ProAir HFA) 90 mcg/act inhaler    albuterol (2.5 mg/3 mL) 0.083 % nebulizer solution            Patient Instructions   Take the antibiotics with food and full glass of water  Take the prednisone in the morning with food  Use the tessalon perls as needed for coughing  Continue to use your nebulizer and inhaler as needed for wheezing and shortness of breath. Follow up with PCP in 3-5 days. Proceed to  ER if symptoms worsen. Chief Complaint     Chief Complaint   Patient presents with   • Shortness of Breath     Coughing and SOB with exertion for about 1 week. Starting this morning SOB while resting, lungs feel tight, sinus drainage. History of Present Illness       Patient is a 63YOM presenting with cough, shortness of breath, congestion and chest tightness worsening over the last week. He states he does have a history of asthma and is a daily smoker. He denies any fever, chills or chest pain. He used his nebulizer at home PTA     Shortness of Breath  Associated symptoms include coughing, fatigue, a sore throat and wheezing.  Pertinent negatives include no chest pain or palpitations. Review of Systems   Review of Systems   Constitutional:  Positive for chills and fatigue. Negative for activity change, appetite change and fever. HENT:  Positive for congestion, postnasal drip and sore throat. Respiratory:  Positive for cough, chest tightness, shortness of breath and wheezing. Cardiovascular:  Negative for chest pain and palpitations. All other systems reviewed and are negative.         Current Medications       Current Outpatient Medications:   •  albuterol (2.5 mg/3 mL) 0.083 % nebulizer solution, Take 3 mL (2.5 mg total) by nebulization every 6 (six) hours as needed for wheezing or shortness of breath, Disp: 75 mL, Rfl: 0  •  albuterol (2.5 mg/3 mL) 0.083 % nebulizer solution, Take 3 mL (2.5 mg total) by nebulization every 6 (six) hours as needed for wheezing or shortness of breath, Disp: 90 mL, Rfl: 0  •  albuterol (ProAir HFA) 90 mcg/act inhaler, Inhale 2 puffs every 6 (six) hours as needed for wheezing, Disp: 8.5 g, Rfl: 0  •  albuterol (Proventil HFA) 90 mcg/act inhaler, Inhale 2 puffs every 4 (four) hours as needed for wheezing or shortness of breath, Disp: 18 g, Rfl: 0  •  benzonatate (TESSALON PERLES) 100 mg capsule, Take 1 capsule (100 mg total) by mouth 3 (three) times a day as needed for cough, Disp: 20 capsule, Rfl: 0  •  cetirizine (ZyrTEC) 5 MG tablet, Take 5 mg by mouth daily, Disp: , Rfl:   •  doxycycline (ADOXA) 100 MG tablet, Take 1 tablet (100 mg total) by mouth 2 (two) times a day for 7 days, Disp: 14 tablet, Rfl: 0  •  lisinopril (ZESTRIL) 5 mg tablet, take 1 tablet by mouth once daily, Disp: 90 tablet, Rfl: 1  •  montelukast (SINGULAIR) 10 mg tablet, Take 1 tablet (10 mg total) by mouth daily at bedtime, Disp: 90 tablet, Rfl: 2  •  predniSONE 10 mg tablet, Take 6 pills day 1, then 5 pills day 2, 4 pills day 3, 3 pills day 4, 2 pills day 5, 1 pill day 6,, Disp: 21 tablet, Rfl: 0  •  selenium sulfide (SELSUN) 2.5 % shampoo, Apply topically 2 (two) times a week, Disp: 118 mL, Rfl: 1  •  tadalafil (Cialis) 5 MG tablet, Take 1 tablet (5 mg total) by mouth daily as needed for erectile dysfunction, Disp: 10 tablet, Rfl: 0  •  predniSONE 10 mg tablet, Take 6 pills on day 1, take 5 pills on day 2, take 4 pills on day 3, take 3 pills on day 4, take 2 pills on day 5, take 1 pill a day 6. (Patient not taking: Reported on 11/25/2023), Disp: 21 tablet, Rfl: 0  No current facility-administered medications for this visit. Current Allergies     Allergies as of 11/25/2023 - Reviewed 11/25/2023   Allergen Reaction Noted   • Aspirin Anaphylaxis 10/06/2016   • Ibuprofen Other (See Comments) 10/09/2015   • Augmentin [amoxicillin-pot clavulanate] Rash 12/16/2020            The following portions of the patient's history were reviewed and updated as appropriate: allergies, current medications, past family history, past medical history, past social history, past surgical history and problem list.     Past Medical History:   Diagnosis Date   • Asthma    • Cataract    • Hypertension        Past Surgical History:   Procedure Laterality Date   • EYE SURGERY     • DC SEPTOPLASTY/SUBMUCOUS RESECJ W/WO CARTILAGE GRF N/A 7/1/2021    Procedure: SEPTOPLASTY;  Surgeon: Celso Barakat MD;  [facility];  Service: ENT   • DC STRTCTC CPTR ASSTD PX EXTRADURAL CRANIAL N/A 7/1/2021    Procedure: FUNCTIONAL ENDOSCOPIC SINUS SURGERY (FESS) IMAGED GUIDED, ALL SINUSES;  Surgeon: Celso Barakat MD;  [facility];  Service: ENT       Family History   Problem Relation Age of Onset   • No Known Problems Mother    • Heart attack Father          Medications have been verified. Objective   /76   Pulse 104   Temp 98.4 °F (36.9 °C)   Resp (!) 28   Ht 5' 11" (1.803 m)   Wt 91.2 kg (201 lb)   SpO2 96%   BMI 28.03 kg/m²        Physical Exam     Physical Exam  Vitals and nursing note reviewed.    Constitutional:       General: He is not in acute distress. Appearance: He is well-developed and normal weight. He is not ill-appearing or toxic-appearing. Cardiovascular:      Rate and Rhythm: Normal rate and regular rhythm. Pulmonary:      Breath sounds: Decreased breath sounds and wheezing present. Skin:     General: Skin is warm and dry. Capillary Refill: Capillary refill takes less than 2 seconds. Neurological:      General: No focal deficit present. Mental Status: He is alert and oriented to person, place, and time.

## 2023-11-25 NOTE — PATIENT INSTRUCTIONS
Take the antibiotics with food and full glass of water  Take the prednisone in the morning with food  Use the tessalon perls as needed for coughing  Continue to use your nebulizer and inhaler as needed for wheezing and shortness of breath.

## 2023-11-30 ENCOUNTER — HOSPITAL ENCOUNTER (INPATIENT)
Facility: HOSPITAL | Age: 63
LOS: 8 days | Discharge: NURSING, REHAB OR LONG-TERM CARE | DRG: 030 | End: 2023-12-08
Attending: PSYCHIATRY & NEUROLOGY | Admitting: ANESTHESIOLOGY
Payer: COMMERCIAL

## 2023-11-30 ENCOUNTER — APPOINTMENT (INPATIENT)
Dept: RADIOLOGY | Facility: HOSPITAL | Age: 63
DRG: 030 | End: 2023-11-30
Payer: COMMERCIAL

## 2023-11-30 ENCOUNTER — APPOINTMENT (INPATIENT)
Dept: GASTROENTEROLOGY | Facility: HOSPITAL | Age: 63
DRG: 030 | End: 2023-11-30
Payer: COMMERCIAL

## 2023-11-30 DIAGNOSIS — R06.02 SHORTNESS OF BREATH: ICD-10-CM

## 2023-11-30 DIAGNOSIS — I63.9 CVA (CEREBRAL VASCULAR ACCIDENT) (HCC): ICD-10-CM

## 2023-11-30 DIAGNOSIS — J45.909 ASTHMA: ICD-10-CM

## 2023-11-30 DIAGNOSIS — J96.01 ACUTE HYPOXIC RESPIRATORY FAILURE (HCC): ICD-10-CM

## 2023-11-30 DIAGNOSIS — I63.411 CEREBROVASCULAR ACCIDENT (CVA) DUE TO EMBOLISM OF RIGHT MIDDLE CEREBRAL ARTERY (HCC): ICD-10-CM

## 2023-11-30 DIAGNOSIS — I63.411 CEREBROVASCULAR ACCIDENT (CVA) DUE TO EMBOLISM OF RIGHT MIDDLE CEREBRAL ARTERY (HCC): Primary | ICD-10-CM

## 2023-11-30 DIAGNOSIS — I82.409 DVT OF LOWER EXTREMITY (DEEP VENOUS THROMBOSIS) (HCC): ICD-10-CM

## 2023-11-30 PROCEDURE — 70450 CT HEAD/BRAIN W/O DYE: CPT

## 2023-11-30 PROCEDURE — 03HY32Z INSERTION OF MONITORING DEVICE INTO UPPER ARTERY, PERCUTANEOUS APPROACH: ICD-10-PCS | Performed by: STUDENT IN AN ORGANIZED HEALTH CARE EDUCATION/TRAINING PROGRAM

## 2023-11-30 PROCEDURE — 94640 AIRWAY INHALATION TREATMENT: CPT

## 2023-11-30 PROCEDURE — 94002 VENT MGMT INPAT INIT DAY: CPT

## 2023-11-30 PROCEDURE — 71045 X-RAY EXAM CHEST 1 VIEW: CPT

## 2023-11-30 PROCEDURE — 03CG3ZZ EXTIRPATION OF MATTER FROM INTRACRANIAL ARTERY, PERCUTANEOUS APPROACH: ICD-10-PCS | Performed by: RADIOLOGY

## 2023-11-30 PROCEDURE — 99291 CRITICAL CARE FIRST HOUR: CPT | Performed by: ANESTHESIOLOGY

## 2023-11-30 PROCEDURE — 94760 N-INVAS EAR/PLS OXIMETRY 1: CPT

## 2023-11-30 PROCEDURE — 4A133B1 MONITORING OF ARTERIAL PRESSURE, PERIPHERAL, PERCUTANEOUS APPROACH: ICD-10-PCS | Performed by: STUDENT IN AN ORGANIZED HEALTH CARE EDUCATION/TRAINING PROGRAM

## 2023-11-30 PROCEDURE — 4A133J1 MONITORING OF ARTERIAL PULSE, PERIPHERAL, PERCUTANEOUS APPROACH: ICD-10-PCS | Performed by: STUDENT IN AN ORGANIZED HEALTH CARE EDUCATION/TRAINING PROGRAM

## 2023-11-30 PROCEDURE — 0BC48ZZ EXTIRPATION OF MATTER FROM RIGHT UPPER LOBE BRONCHUS, VIA NATURAL OR ARTIFICIAL OPENING ENDOSCOPIC: ICD-10-PCS | Performed by: ANESTHESIOLOGY

## 2023-11-30 PROCEDURE — 5A1935Z RESPIRATORY VENTILATION, LESS THAN 24 CONSECUTIVE HOURS: ICD-10-PCS | Performed by: ANESTHESIOLOGY

## 2023-11-30 PROCEDURE — 31622 DX BRONCHOSCOPE/WASH: CPT | Performed by: ANESTHESIOLOGY

## 2023-11-30 PROCEDURE — G1004 CDSM NDSC: HCPCS

## 2023-11-30 RX ORDER — VECURONIUM BROMIDE 1 MG/ML
10 INJECTION, POWDER, LYOPHILIZED, FOR SOLUTION INTRAVENOUS ONCE
Status: DISCONTINUED | OUTPATIENT
Start: 2023-11-30 | End: 2023-11-30

## 2023-11-30 RX ORDER — FENTANYL CITRATE 50 UG/ML
50 INJECTION, SOLUTION INTRAMUSCULAR; INTRAVENOUS
Status: DISCONTINUED | OUTPATIENT
Start: 2023-11-30 | End: 2023-12-01

## 2023-11-30 RX ORDER — PROPOFOL 10 MG/ML
5-50 INJECTION, EMULSION INTRAVENOUS
Status: DISCONTINUED | OUTPATIENT
Start: 2023-11-30 | End: 2023-12-01

## 2023-11-30 RX ORDER — NICARDIPINE HYDROCHLORIDE 2.5 MG/ML
INJECTION INTRAVENOUS
Status: COMPLETED
Start: 2023-11-30 | End: 2023-11-30

## 2023-11-30 RX ORDER — ATORVASTATIN CALCIUM 40 MG/1
40 TABLET, FILM COATED ORAL EVERY EVENING
Status: DISCONTINUED | OUTPATIENT
Start: 2023-11-30 | End: 2023-12-05

## 2023-11-30 RX ORDER — LABETALOL HYDROCHLORIDE 5 MG/ML
10 INJECTION, SOLUTION INTRAVENOUS EVERY 4 HOURS PRN
Status: DISCONTINUED | OUTPATIENT
Start: 2023-11-30 | End: 2023-12-03

## 2023-11-30 RX ORDER — SODIUM CHLORIDE 9 MG/ML
125 INJECTION, SOLUTION INTRAVENOUS CONTINUOUS
Status: DISCONTINUED | OUTPATIENT
Start: 2023-11-30 | End: 2023-12-01

## 2023-11-30 RX ORDER — FENTANYL CITRATE 50 UG/ML
100 INJECTION, SOLUTION INTRAMUSCULAR; INTRAVENOUS ONCE
Status: DISCONTINUED | OUTPATIENT
Start: 2023-11-30 | End: 2023-11-30

## 2023-11-30 RX ORDER — HYDRALAZINE HYDROCHLORIDE 20 MG/ML
10 INJECTION INTRAMUSCULAR; INTRAVENOUS EVERY 4 HOURS PRN
Status: DISCONTINUED | OUTPATIENT
Start: 2023-11-30 | End: 2023-12-03

## 2023-11-30 RX ORDER — IPRATROPIUM BROMIDE AND ALBUTEROL SULFATE 2.5; .5 MG/3ML; MG/3ML
SOLUTION RESPIRATORY (INHALATION)
Status: COMPLETED
Start: 2023-11-30 | End: 2023-11-30

## 2023-11-30 RX ORDER — CHLORHEXIDINE GLUCONATE ORAL RINSE 1.2 MG/ML
15 SOLUTION DENTAL EVERY 12 HOURS SCHEDULED
Status: DISCONTINUED | OUTPATIENT
Start: 2023-11-30 | End: 2023-12-08 | Stop reason: HOSPADM

## 2023-11-30 RX ORDER — PHENYLEPHRINE HYDROCHLORIDE 10 MG/ML
INJECTION INTRAVENOUS
Status: COMPLETED
Start: 2023-11-30 | End: 2023-11-30

## 2023-11-30 RX ORDER — LABETALOL HYDROCHLORIDE 5 MG/ML
INJECTION, SOLUTION INTRAVENOUS
Status: COMPLETED
Start: 2023-11-30 | End: 2023-11-30

## 2023-11-30 RX ADMIN — PROPOFOL 50 MCG/KG/MIN: 10 INJECTION, EMULSION INTRAVENOUS at 20:16

## 2023-11-30 RX ADMIN — NICARDIPINE HYDROCHLORIDE 25 MG: 2.5 INJECTION, SOLUTION INTRAVENOUS at 20:00

## 2023-11-30 RX ADMIN — CHLORHEXIDINE GLUCONATE 15 ML: 1.2 SOLUTION ORAL at 22:14

## 2023-11-30 RX ADMIN — NICARDIPINE HYDROCHLORIDE 15 MG/HR: 2.5 INJECTION, SOLUTION INTRAVENOUS at 20:00

## 2023-11-30 RX ADMIN — FENTANYL CITRATE 50 MCG: 50 INJECTION INTRAMUSCULAR; INTRAVENOUS at 22:04

## 2023-11-30 RX ADMIN — PROPOFOL 50 MCG/KG/MIN: 10 INJECTION, EMULSION INTRAVENOUS at 20:46

## 2023-11-30 RX ADMIN — IPRATROPIUM BROMIDE AND ALBUTEROL SULFATE 3 ML: .5; 3 SOLUTION RESPIRATORY (INHALATION) at 20:05

## 2023-11-30 RX ADMIN — FENTANYL CITRATE 50 MCG: 50 INJECTION INTRAMUSCULAR; INTRAVENOUS at 20:51

## 2023-11-30 RX ADMIN — PHENYLEPHRINE HYDROCHLORIDE 10 MG: 10 INJECTION INTRAVENOUS at 21:19

## 2023-11-30 RX ADMIN — LABETALOL HYDROCHLORIDE 10 MG: 5 INJECTION, SOLUTION INTRAVENOUS at 20:05

## 2023-11-30 RX ADMIN — Medication 10 MG: at 20:05

## 2023-11-30 RX ADMIN — SODIUM CHLORIDE 125 ML/HR: 0.9 INJECTION, SOLUTION INTRAVENOUS at 20:19

## 2023-11-30 RX ADMIN — PHENYLEPHRINE HYDROCHLORIDE 100 MCG/MIN: 10 INJECTION INTRAVENOUS at 21:00

## 2023-12-01 ENCOUNTER — APPOINTMENT (INPATIENT)
Dept: RADIOLOGY | Facility: HOSPITAL | Age: 63
DRG: 030 | End: 2023-12-01
Payer: COMMERCIAL

## 2023-12-01 ENCOUNTER — APPOINTMENT (INPATIENT)
Dept: NON INVASIVE DIAGNOSTICS | Facility: HOSPITAL | Age: 63
DRG: 030 | End: 2023-12-01
Payer: COMMERCIAL

## 2023-12-01 ENCOUNTER — APPOINTMENT (OUTPATIENT)
Dept: RADIOLOGY | Facility: HOSPITAL | Age: 63
DRG: 030 | End: 2023-12-01
Payer: COMMERCIAL

## 2023-12-01 PROBLEM — I10 HYPERTENSION: Status: ACTIVE | Noted: 2023-12-01

## 2023-12-01 LAB
ALBUMIN SERPL BCP-MCNC: 3.3 G/DL (ref 3.5–5)
ALBUMIN SERPL BCP-MCNC: 3.3 G/DL (ref 3.5–5)
ALP SERPL-CCNC: 43 U/L (ref 34–104)
ALP SERPL-CCNC: 43 U/L (ref 34–104)
ALT SERPL W P-5'-P-CCNC: 17 U/L (ref 7–52)
ALT SERPL W P-5'-P-CCNC: 17 U/L (ref 7–52)
AMPHETAMINES SERPL QL SCN: NEGATIVE
AMPHETAMINES SERPL QL SCN: NEGATIVE
ANION GAP SERPL CALCULATED.3IONS-SCNC: 6 MMOL/L
ANION GAP SERPL CALCULATED.3IONS-SCNC: 6 MMOL/L
AORTIC ROOT: 3.8 CM
AORTIC ROOT: 3.8 CM
APICAL FOUR CHAMBER EJECTION FRACTION: 61 %
APICAL FOUR CHAMBER EJECTION FRACTION: 61 %
ASCENDING AORTA: 3.7 CM
ASCENDING AORTA: 3.7 CM
AST SERPL W P-5'-P-CCNC: 15 U/L (ref 13–39)
AST SERPL W P-5'-P-CCNC: 15 U/L (ref 13–39)
BARBITURATES UR QL: NEGATIVE
BARBITURATES UR QL: NEGATIVE
BASE EXCESS BLDA CALC-SCNC: -0.6 MMOL/L
BASE EXCESS BLDA CALC-SCNC: -0.6 MMOL/L
BENZODIAZ UR QL: NEGATIVE
BENZODIAZ UR QL: NEGATIVE
BILIRUB SERPL-MCNC: 0.72 MG/DL (ref 0.2–1)
BILIRUB SERPL-MCNC: 0.72 MG/DL (ref 0.2–1)
BUN SERPL-MCNC: 21 MG/DL (ref 5–25)
BUN SERPL-MCNC: 21 MG/DL (ref 5–25)
CALCIUM ALBUM COR SERPL-MCNC: 7.8 MG/DL (ref 8.3–10.1)
CALCIUM ALBUM COR SERPL-MCNC: 7.8 MG/DL (ref 8.3–10.1)
CALCIUM SERPL-MCNC: 7.2 MG/DL (ref 8.4–10.2)
CALCIUM SERPL-MCNC: 7.2 MG/DL (ref 8.4–10.2)
CHLORIDE SERPL-SCNC: 107 MMOL/L (ref 96–108)
CHLORIDE SERPL-SCNC: 107 MMOL/L (ref 96–108)
CHOLEST SERPL-MCNC: 181 MG/DL
CHOLEST SERPL-MCNC: 181 MG/DL
CO2 SERPL-SCNC: 26 MMOL/L (ref 21–32)
CO2 SERPL-SCNC: 26 MMOL/L (ref 21–32)
COCAINE UR QL: NEGATIVE
COCAINE UR QL: NEGATIVE
CREAT SERPL-MCNC: 1.13 MG/DL (ref 0.6–1.3)
CREAT SERPL-MCNC: 1.13 MG/DL (ref 0.6–1.3)
E WAVE DECELERATION TIME: 191 MS
E WAVE DECELERATION TIME: 191 MS
E/A RATIO: 1.17
E/A RATIO: 1.17
ERYTHROCYTE [DISTWIDTH] IN BLOOD BY AUTOMATED COUNT: 13.2 % (ref 11.6–15.1)
ERYTHROCYTE [DISTWIDTH] IN BLOOD BY AUTOMATED COUNT: 13.2 % (ref 11.6–15.1)
EST. AVERAGE GLUCOSE BLD GHB EST-MCNC: 114 MG/DL
EST. AVERAGE GLUCOSE BLD GHB EST-MCNC: 114 MG/DL
ETHANOL SERPL-MCNC: <10 MG/DL
ETHANOL SERPL-MCNC: <10 MG/DL
FRACTIONAL SHORTENING: 36 (ref 28–44)
FRACTIONAL SHORTENING: 36 (ref 28–44)
GFR SERPL CREATININE-BSD FRML MDRD: 68 ML/MIN/1.73SQ M
GFR SERPL CREATININE-BSD FRML MDRD: 68 ML/MIN/1.73SQ M
GLUCOSE SERPL-MCNC: 83 MG/DL (ref 65–140)
GLUCOSE SERPL-MCNC: 83 MG/DL (ref 65–140)
GLUCOSE SERPL-MCNC: 91 MG/DL (ref 65–140)
GLUCOSE SERPL-MCNC: 91 MG/DL (ref 65–140)
GLUCOSE SERPL-MCNC: 97 MG/DL (ref 65–140)
GLUCOSE SERPL-MCNC: 97 MG/DL (ref 65–140)
HBA1C MFR BLD: 5.6 %
HBA1C MFR BLD: 5.6 %
HCO3 BLDA-SCNC: 24.2 MMOL/L (ref 22–28)
HCO3 BLDA-SCNC: 24.2 MMOL/L (ref 22–28)
HCT VFR BLD AUTO: 39.4 % (ref 36.5–49.3)
HCT VFR BLD AUTO: 39.4 % (ref 36.5–49.3)
HDLC SERPL-MCNC: 45 MG/DL
HDLC SERPL-MCNC: 45 MG/DL
HGB BLD-MCNC: 13 G/DL (ref 12–17)
HGB BLD-MCNC: 13 G/DL (ref 12–17)
INTERVENTRICULAR SEPTUM IN DIASTOLE (PARASTERNAL SHORT AXIS VIEW): 1.3 CM
INTERVENTRICULAR SEPTUM IN DIASTOLE (PARASTERNAL SHORT AXIS VIEW): 1.3 CM
INTERVENTRICULAR SEPTUM: 1.3 CM (ref 0.6–1.1)
INTERVENTRICULAR SEPTUM: 1.3 CM (ref 0.6–1.1)
LAAS-AP2: 21.9 CM2
LAAS-AP2: 21.9 CM2
LAAS-AP4: 21 CM2
LAAS-AP4: 21 CM2
LDLC SERPL CALC-MCNC: 98 MG/DL (ref 0–100)
LDLC SERPL CALC-MCNC: 98 MG/DL (ref 0–100)
LEFT ATRIUM SIZE: 3.5 CM
LEFT ATRIUM SIZE: 3.5 CM
LEFT ATRIUM VOLUME (MOD BIPLANE): 63 ML
LEFT ATRIUM VOLUME (MOD BIPLANE): 63 ML
LEFT INTERNAL DIMENSION IN SYSTOLE: 2.5 CM (ref 2.1–4)
LEFT INTERNAL DIMENSION IN SYSTOLE: 2.5 CM (ref 2.1–4)
LEFT VENTRICULAR INTERNAL DIMENSION IN DIASTOLE: 3.9 CM (ref 3.5–6)
LEFT VENTRICULAR INTERNAL DIMENSION IN DIASTOLE: 3.9 CM (ref 3.5–6)
LEFT VENTRICULAR POSTERIOR WALL IN END DIASTOLE: 1.2 CM
LEFT VENTRICULAR POSTERIOR WALL IN END DIASTOLE: 1.2 CM
LEFT VENTRICULAR STROKE VOLUME: 46 ML
LEFT VENTRICULAR STROKE VOLUME: 46 ML
LVSV (TEICH): 46 ML
LVSV (TEICH): 46 ML
MAGNESIUM SERPL-MCNC: 1.8 MG/DL (ref 1.9–2.7)
MAGNESIUM SERPL-MCNC: 1.8 MG/DL (ref 1.9–2.7)
MCH RBC QN AUTO: 30.2 PG (ref 26.8–34.3)
MCH RBC QN AUTO: 30.2 PG (ref 26.8–34.3)
MCHC RBC AUTO-ENTMCNC: 33 G/DL (ref 31.4–37.4)
MCHC RBC AUTO-ENTMCNC: 33 G/DL (ref 31.4–37.4)
MCV RBC AUTO: 91 FL (ref 82–98)
MCV RBC AUTO: 91 FL (ref 82–98)
METHADONE UR QL: NEGATIVE
METHADONE UR QL: NEGATIVE
MV E'TISSUE VEL-LAT: 15 CM/S
MV E'TISSUE VEL-LAT: 15 CM/S
MV E'TISSUE VEL-SEP: 8 CM/S
MV E'TISSUE VEL-SEP: 8 CM/S
MV PEAK A VEL: 0.66 M/S
MV PEAK A VEL: 0.66 M/S
MV PEAK E VEL: 77 CM/S
MV PEAK E VEL: 77 CM/S
MV STENOSIS PRESSURE HALF TIME: 55 MS
MV STENOSIS PRESSURE HALF TIME: 55 MS
MV VALVE AREA P 1/2 METHOD: 4
MV VALVE AREA P 1/2 METHOD: 4
O2 CT BLDA-SCNC: 18.6 ML/DL (ref 16–23)
O2 CT BLDA-SCNC: 18.6 ML/DL (ref 16–23)
OPIATES UR QL SCN: NEGATIVE
OPIATES UR QL SCN: NEGATIVE
OXYCODONE+OXYMORPHONE UR QL SCN: NEGATIVE
OXYCODONE+OXYMORPHONE UR QL SCN: NEGATIVE
OXYHGB MFR BLDA: 94.2 % (ref 94–97)
OXYHGB MFR BLDA: 94.2 % (ref 94–97)
PCO2 BLDA: 40.6 MM HG (ref 36–44)
PCO2 BLDA: 40.6 MM HG (ref 36–44)
PCP UR QL: NEGATIVE
PCP UR QL: NEGATIVE
PH BLDA: 7.39 [PH] (ref 7.35–7.45)
PH BLDA: 7.39 [PH] (ref 7.35–7.45)
PHOSPHATE SERPL-MCNC: 3.8 MG/DL (ref 2.3–4.1)
PHOSPHATE SERPL-MCNC: 3.8 MG/DL (ref 2.3–4.1)
PLATELET # BLD AUTO: 182 THOUSANDS/UL (ref 149–390)
PLATELET # BLD AUTO: 182 THOUSANDS/UL (ref 149–390)
PMV BLD AUTO: 8.7 FL (ref 8.9–12.7)
PMV BLD AUTO: 8.7 FL (ref 8.9–12.7)
PO2 BLDA: 77.4 MM HG (ref 75–129)
PO2 BLDA: 77.4 MM HG (ref 75–129)
POTASSIUM SERPL-SCNC: 3.6 MMOL/L (ref 3.5–5.3)
POTASSIUM SERPL-SCNC: 3.6 MMOL/L (ref 3.5–5.3)
PROT SERPL-MCNC: 5.3 G/DL (ref 6.4–8.4)
PROT SERPL-MCNC: 5.3 G/DL (ref 6.4–8.4)
RBC # BLD AUTO: 4.31 MILLION/UL (ref 3.88–5.62)
RBC # BLD AUTO: 4.31 MILLION/UL (ref 3.88–5.62)
RIGHT ATRIUM AREA SYSTOLE A4C: 15.4 CM2
RIGHT ATRIUM AREA SYSTOLE A4C: 15.4 CM2
RIGHT VENTRICLE ID DIMENSION: 3.6 CM
RIGHT VENTRICLE ID DIMENSION: 3.6 CM
SL CV LEFT ATRIUM LENGTH A2C: 5.7 CM
SL CV LEFT ATRIUM LENGTH A2C: 5.7 CM
SL CV LV EF: 65
SL CV LV EF: 65
SL CV PED ECHO LEFT VENTRICLE DIASTOLIC VOLUME (MOD BIPLANE) 2D: 67 ML
SL CV PED ECHO LEFT VENTRICLE DIASTOLIC VOLUME (MOD BIPLANE) 2D: 67 ML
SL CV PED ECHO LEFT VENTRICLE SYSTOLIC VOLUME (MOD BIPLANE) 2D: 22 ML
SL CV PED ECHO LEFT VENTRICLE SYSTOLIC VOLUME (MOD BIPLANE) 2D: 22 ML
SODIUM SERPL-SCNC: 139 MMOL/L (ref 135–147)
SODIUM SERPL-SCNC: 139 MMOL/L (ref 135–147)
SPECIMEN SOURCE: NORMAL
SPECIMEN SOURCE: NORMAL
THC UR QL: NEGATIVE
THC UR QL: NEGATIVE
TR MAX PG: 22 MMHG
TR MAX PG: 22 MMHG
TR PEAK VELOCITY: 2.4 M/S
TR PEAK VELOCITY: 2.4 M/S
TRICUSPID ANNULAR PLANE SYSTOLIC EXCURSION: 2 CM
TRICUSPID ANNULAR PLANE SYSTOLIC EXCURSION: 2 CM
TRICUSPID VALVE PEAK REGURGITATION VELOCITY: 2.35 M/S
TRICUSPID VALVE PEAK REGURGITATION VELOCITY: 2.35 M/S
TRIGL SERPL-MCNC: 192 MG/DL
TRIGL SERPL-MCNC: 192 MG/DL
TSH SERPL DL<=0.05 MIU/L-ACNC: 2.34 UIU/ML (ref 0.45–4.5)
TSH SERPL DL<=0.05 MIU/L-ACNC: 2.34 UIU/ML (ref 0.45–4.5)
WBC # BLD AUTO: 13.95 THOUSAND/UL (ref 4.31–10.16)
WBC # BLD AUTO: 13.95 THOUSAND/UL (ref 4.31–10.16)

## 2023-12-01 PROCEDURE — 99291 CRITICAL CARE FIRST HOUR: CPT | Performed by: PSYCHIATRY & NEUROLOGY

## 2023-12-01 PROCEDURE — 87070 CULTURE OTHR SPECIMN AEROBIC: CPT | Performed by: PSYCHIATRY & NEUROLOGY

## 2023-12-01 PROCEDURE — 82805 BLOOD GASES W/O2 SATURATION: CPT | Performed by: NURSE PRACTITIONER

## 2023-12-01 PROCEDURE — G1004 CDSM NDSC: HCPCS

## 2023-12-01 PROCEDURE — 82948 REAGENT STRIP/BLOOD GLUCOSE: CPT

## 2023-12-01 PROCEDURE — C9113 INJ PANTOPRAZOLE SODIUM, VIA: HCPCS | Performed by: NURSE PRACTITIONER

## 2023-12-01 PROCEDURE — NC001 PR NO CHARGE: Performed by: PHYSICIAN ASSISTANT

## 2023-12-01 PROCEDURE — 99233 SBSQ HOSP IP/OBS HIGH 50: CPT | Performed by: PSYCHIATRY & NEUROLOGY

## 2023-12-01 PROCEDURE — 93005 ELECTROCARDIOGRAM TRACING: CPT

## 2023-12-01 PROCEDURE — 80053 COMPREHEN METABOLIC PANEL: CPT | Performed by: NURSE PRACTITIONER

## 2023-12-01 PROCEDURE — 97167 OT EVAL HIGH COMPLEX 60 MIN: CPT

## 2023-12-01 PROCEDURE — 87040 BLOOD CULTURE FOR BACTERIA: CPT | Performed by: PSYCHIATRY & NEUROLOGY

## 2023-12-01 PROCEDURE — 84100 ASSAY OF PHOSPHORUS: CPT | Performed by: NURSE PRACTITIONER

## 2023-12-01 PROCEDURE — 94664 DEMO&/EVAL PT USE INHALER: CPT

## 2023-12-01 PROCEDURE — 80061 LIPID PANEL: CPT | Performed by: NURSE PRACTITIONER

## 2023-12-01 PROCEDURE — 87205 SMEAR GRAM STAIN: CPT | Performed by: PSYCHIATRY & NEUROLOGY

## 2023-12-01 PROCEDURE — 94760 N-INVAS EAR/PLS OXIMETRY 1: CPT

## 2023-12-01 PROCEDURE — 83036 HEMOGLOBIN GLYCOSYLATED A1C: CPT | Performed by: NURSE PRACTITIONER

## 2023-12-01 PROCEDURE — 82077 ASSAY SPEC XCP UR&BREATH IA: CPT | Performed by: PSYCHIATRY & NEUROLOGY

## 2023-12-01 PROCEDURE — 99255 IP/OBS CONSLTJ NEW/EST HI 80: CPT | Performed by: STUDENT IN AN ORGANIZED HEALTH CARE EDUCATION/TRAINING PROGRAM

## 2023-12-01 PROCEDURE — 93970 EXTREMITY STUDY: CPT

## 2023-12-01 PROCEDURE — 99232 SBSQ HOSP IP/OBS MODERATE 35: CPT | Performed by: RADIOLOGY

## 2023-12-01 PROCEDURE — 70551 MRI BRAIN STEM W/O DYE: CPT

## 2023-12-01 PROCEDURE — 92610 EVALUATE SWALLOWING FUNCTION: CPT

## 2023-12-01 PROCEDURE — 93306 TTE W/DOPPLER COMPLETE: CPT

## 2023-12-01 PROCEDURE — 99291 CRITICAL CARE FIRST HOUR: CPT | Performed by: ANESTHESIOLOGY

## 2023-12-01 PROCEDURE — 70450 CT HEAD/BRAIN W/O DYE: CPT

## 2023-12-01 PROCEDURE — 97163 PT EVAL HIGH COMPLEX 45 MIN: CPT

## 2023-12-01 PROCEDURE — 84443 ASSAY THYROID STIM HORMONE: CPT | Performed by: NURSE PRACTITIONER

## 2023-12-01 PROCEDURE — 93306 TTE W/DOPPLER COMPLETE: CPT | Performed by: INTERNAL MEDICINE

## 2023-12-01 PROCEDURE — 83735 ASSAY OF MAGNESIUM: CPT | Performed by: NURSE PRACTITIONER

## 2023-12-01 PROCEDURE — 80307 DRUG TEST PRSMV CHEM ANLYZR: CPT | Performed by: PSYCHIATRY & NEUROLOGY

## 2023-12-01 PROCEDURE — 85027 COMPLETE CBC AUTOMATED: CPT | Performed by: NURSE PRACTITIONER

## 2023-12-01 RX ORDER — POTASSIUM CHLORIDE 14.9 MG/ML
20 INJECTION INTRAVENOUS
Status: DISPENSED | OUTPATIENT
Start: 2023-12-01 | End: 2023-12-01

## 2023-12-01 RX ORDER — ALBUMIN, HUMAN INJ 5% 5 %
25 SOLUTION INTRAVENOUS ONCE
Status: COMPLETED | OUTPATIENT
Start: 2023-12-01 | End: 2023-12-01

## 2023-12-01 RX ORDER — CLOPIDOGREL BISULFATE 75 MG/1
75 TABLET ORAL DAILY
Status: DISCONTINUED | OUTPATIENT
Start: 2023-12-02 | End: 2023-12-01

## 2023-12-01 RX ORDER — ALBUTEROL SULFATE 90 UG/1
2 AEROSOL, METERED RESPIRATORY (INHALATION) EVERY 6 HOURS PRN
COMMUNITY
End: 2023-12-09

## 2023-12-01 RX ORDER — GABAPENTIN 100 MG/1
100 CAPSULE ORAL ONCE
Status: COMPLETED | OUTPATIENT
Start: 2023-12-01 | End: 2023-12-01

## 2023-12-01 RX ORDER — DEXMEDETOMIDINE HYDROCHLORIDE 4 UG/ML
.1-1.5 INJECTION, SOLUTION INTRAVENOUS
Status: DISCONTINUED | OUTPATIENT
Start: 2023-12-01 | End: 2023-12-01

## 2023-12-01 RX ORDER — GUAIFENESIN 100 MG/5ML
400 SOLUTION ORAL EVERY 6 HOURS
Status: DISPENSED | OUTPATIENT
Start: 2023-12-01 | End: 2023-12-04

## 2023-12-01 RX ORDER — HEPARIN SODIUM 5000 [USP'U]/ML
5000 INJECTION, SOLUTION INTRAVENOUS; SUBCUTANEOUS EVERY 8 HOURS SCHEDULED
Status: DISCONTINUED | OUTPATIENT
Start: 2023-12-02 | End: 2023-12-01

## 2023-12-01 RX ORDER — LISINOPRIL 10 MG/1
10 TABLET ORAL DAILY
COMMUNITY
End: 2023-12-09

## 2023-12-01 RX ORDER — LANOLIN ALCOHOL/MO/W.PET/CERES
6 CREAM (GRAM) TOPICAL
Status: DISCONTINUED | OUTPATIENT
Start: 2023-12-01 | End: 2023-12-03

## 2023-12-01 RX ORDER — ACETAMINOPHEN 160 MG/5ML
650 SUSPENSION ORAL EVERY 6 HOURS PRN
Status: DISCONTINUED | OUTPATIENT
Start: 2023-12-01 | End: 2023-12-06

## 2023-12-01 RX ORDER — SODIUM CHLORIDE 9 MG/ML
125 INJECTION, SOLUTION INTRAVENOUS CONTINUOUS
Status: DISPENSED | OUTPATIENT
Start: 2023-12-01 | End: 2023-12-02

## 2023-12-01 RX ORDER — PANTOPRAZOLE SODIUM 40 MG/10ML
40 INJECTION, POWDER, LYOPHILIZED, FOR SOLUTION INTRAVENOUS
Status: DISCONTINUED | OUTPATIENT
Start: 2023-12-01 | End: 2023-12-01

## 2023-12-01 RX ORDER — AZITHROMYCIN 250 MG/1
250 TABLET, FILM COATED ORAL EVERY 24 HOURS
Status: COMPLETED | OUTPATIENT
Start: 2023-12-02 | End: 2023-12-05

## 2023-12-01 RX ORDER — AZITHROMYCIN 500 MG/1
500 TABLET, FILM COATED ORAL EVERY 24 HOURS
Status: COMPLETED | OUTPATIENT
Start: 2023-12-01 | End: 2023-12-01

## 2023-12-01 RX ORDER — ONDANSETRON 2 MG/ML
4 INJECTION INTRAMUSCULAR; INTRAVENOUS ONCE
Status: COMPLETED | OUTPATIENT
Start: 2023-12-01 | End: 2023-12-01

## 2023-12-01 RX ORDER — OMEPRAZOLE 20 MG/1
20 CAPSULE, DELAYED RELEASE ORAL DAILY
COMMUNITY
End: 2023-12-09

## 2023-12-01 RX ORDER — MAGNESIUM SULFATE HEPTAHYDRATE 40 MG/ML
2 INJECTION, SOLUTION INTRAVENOUS ONCE
Status: COMPLETED | OUTPATIENT
Start: 2023-12-01 | End: 2023-12-01

## 2023-12-01 RX ORDER — ALBUTEROL SULFATE 2.5 MG/3ML
2.5 SOLUTION RESPIRATORY (INHALATION) EVERY 4 HOURS PRN
Status: DISCONTINUED | OUTPATIENT
Start: 2023-12-01 | End: 2023-12-02

## 2023-12-01 RX ADMIN — POTASSIUM CHLORIDE 20 MEQ: 14.9 INJECTION, SOLUTION INTRAVENOUS at 08:09

## 2023-12-01 RX ADMIN — DEXMEDETOMIDINE HYDROCHLORIDE 0.5 MCG/KG/HR: 4 INJECTION, SOLUTION INTRAVENOUS at 02:16

## 2023-12-01 RX ADMIN — SODIUM CHLORIDE 125 ML/HR: 0.9 INJECTION, SOLUTION INTRAVENOUS at 01:12

## 2023-12-01 RX ADMIN — FENTANYL CITRATE 50 MCG: 50 INJECTION INTRAMUSCULAR; INTRAVENOUS at 01:38

## 2023-12-01 RX ADMIN — GABAPENTIN 100 MG: 100 CAPSULE ORAL at 16:12

## 2023-12-01 RX ADMIN — PROPOFOL 50 MCG/KG/MIN: 10 INJECTION, EMULSION INTRAVENOUS at 00:14

## 2023-12-01 RX ADMIN — CHLORHEXIDINE GLUCONATE 15 ML: 1.2 SOLUTION ORAL at 08:09

## 2023-12-01 RX ADMIN — ATORVASTATIN CALCIUM 40 MG: 40 TABLET, FILM COATED ORAL at 18:15

## 2023-12-01 RX ADMIN — MAGNESIUM SULFATE HEPTAHYDRATE 2 G: 40 INJECTION, SOLUTION INTRAVENOUS at 08:06

## 2023-12-01 RX ADMIN — AZITHROMYCIN 500 MG: 500 TABLET, FILM COATED ORAL at 18:15

## 2023-12-01 RX ADMIN — CHLORHEXIDINE GLUCONATE 15 ML: 1.2 SOLUTION ORAL at 20:03

## 2023-12-01 RX ADMIN — GUAIFENESIN 400 MG: 200 SOLUTION ORAL at 12:01

## 2023-12-01 RX ADMIN — SODIUM CHLORIDE 125 ML/HR: 0.9 INJECTION, SOLUTION INTRAVENOUS at 08:30

## 2023-12-01 RX ADMIN — GUAIFENESIN 400 MG: 200 SOLUTION ORAL at 17:38

## 2023-12-01 RX ADMIN — ONDANSETRON 4 MG: 2 INJECTION INTRAMUSCULAR; INTRAVENOUS at 12:02

## 2023-12-01 RX ADMIN — MELATONIN TAB 3 MG 6 MG: 3 TAB at 20:03

## 2023-12-01 RX ADMIN — ACETAMINOPHEN 650 MG: 650 SUSPENSION ORAL at 12:01

## 2023-12-01 RX ADMIN — GUAIFENESIN 400 MG: 200 SOLUTION ORAL at 23:34

## 2023-12-01 RX ADMIN — ACETAMINOPHEN 650 MG: 650 SUSPENSION ORAL at 23:34

## 2023-12-01 RX ADMIN — PANTOPRAZOLE SODIUM 40 MG: 40 INJECTION, POWDER, FOR SOLUTION INTRAVENOUS at 08:09

## 2023-12-01 RX ADMIN — ALBUMIN (HUMAN) 25 G: 12.5 INJECTION, SOLUTION INTRAVENOUS at 11:35

## 2023-12-01 NOTE — ASSESSMENT & PLAN NOTE
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 GCS 15.     Imaging:  CT head w/o, 11/30/23: Reidentified subtle areas of loss of gray-white differentiation involving the right MCA territory. However, enhancement of the right middle cerebral artery M1 segment is now visible, from prior intravenous contrast administration for a CTA head and neck performed few hours prior    Plan:  Continue to monitor neurological exam  STAT CTH / CTA for decline in GCS greater than 2 points in 1 hour  Neurology following for stroke management  SBP < 140  Post TNK CTH  pending  MRI brain w/o pending  AC per neurology recommendations  Medical management per primary team  PT/OT/PMR evaluations  DVT ppx: SCDs    Neurosurgery will sign off. Follow up outpatient with stroke neurology. Please call with questions or concerns.

## 2023-12-01 NOTE — SPEECH THERAPY NOTE
Speech-Language Pathology Bedside Swallow Evaluation    Patient Name: Trenton LEDESMA Date: 12/1/2023     Problem List  Principal Problem:    Cerebrovascular accident (CVA) due to embolism of right middle cerebral artery [facility])      Past Medical History  No past medical history on file. Past Surgical History  No past surgical history on file. Summary  Pt presented with s/s suggestive of mild oropharyngeal dysphagia. Symptoms or concerns included  slow and disorganized mastication, bolus formation, and SP transfer, as well as  suspected mild pharyngeal swallow delay. No overt s/s aspiration occurred with the trials offered today. Pt is somewhat impulsive with rate of intake, and would benefit from supervision/assistance with meals. Discussed diet textures with pt and family. All in agreement for dental soft diet and thin liquids to start. Risk/s for Aspiration: mild    Recommended Diet: soft/level 3 diet and thin liquids   Recommended Form of Meds: whole with puree   Aspiration precautions and swallowing strategies: upright posture, only feed when fully alert, slow rate of feeding, small bites/sips, and alternating bites and sips  Other Recommendations: Continue frequent oral care      Current Medical Status per H&P 11/30/23  Trenton Pablo is a 58 y.o. who presents initially to the [address] [place] [address] with complaints of left-sided weakness and right gaze preference. He has a past medical history of hypertension, moderate persistent asthma, and ongoing tobacco abuse. His initial NIHSS was 11 and he receuved TNK at 1711. His imaging was concerning for a R ICA to MCA occlusion and he was transferred to the [address] [place] [address] for neuro interventional radiology evaluation. He underwent thrombectomy. He was intubated for the procedure due to agitation. Post-procedure the patient was significantly hypertensive and hypoxic and was referred to the critical care unit for admission.      Special Studies:  CXR 11/30/23 IMPRESSION:  Near complete resolution of right-sided atelectatic change. Small right pleural effusion. Indeterminate hazy airspace opacity in the left parahilar midlung field region. CT head 11/30/23 IMPRESSION:  Reidentified subtle areas of loss of gray-white differentiation involving the right MCA territory. However, enhancement of the right middle cerebral artery M1 segment is now visible, from prior intravenous contrast administration for a CTA head and neck   performed few hours prior. An MRI brain could be performed for further evaluation. Swallow Information   Current Risks for Dysphagia & Aspiration: CVA and impulsivity  Current Diet: NPO   Baseline Diet: regular diet and thin liquids      Baseline Assessment   Behavior/Cognition: alert and decreased attention  Speech/Language Status: able to participate in conversation and able to follow commands  Patient Positioning: upright in chair  Pain Status/Interventions/Response to Interventions: No report of or nonverbal indications of pain. Swallow Mechanism Exam  Facial:  mild L weakness  Labial: decreased coordination  Lingual: decreased coordination  Velum: symmetrical  Mandible: adequate ROM  Dentition: adequate  Vocal quality:clear/adequate   Volitional Cough: weak   Respiratory Status: on 5L O2     Consistencies Assessed and Performance   Consistencies Administered: ice chips, thin liquids, puree, and hard solids    Oral Stage: mild, decreased mastication, decreased bolus formation, and oral residue with solids     Pharyngeal Stage: minimal and suspected pharyngeal swallow delay. No coughing, throat clearing, change in vocal quality or respiratory status noted today.      Esophageal Concerns: none reported      Summary and Recommendations (see above)    Results Reviewed with: patient, RN, MD, and family     Treatment Recommended: yes     Frequency of treatment: 2-3x/week      Dysphagia LTG  -Patient will demonstrate safe and effective oral intake (without overt s/s significant oral/pharyngeal dysphagia including s/s penetration or aspiration) for the highest appropriate diet level. Short Term Goals:  -Pt will tolerate Dysphagia 3/advanced (dental soft) diet and thin liquid with no significant s/s oral or pharyngeal dysphagia across 1-3 diagnostic sessions.    -Patient will tolerate trials of upgraded food texture with no significant s/s of oral or pharyngeal dysphagia including aspiration across 1-3 diagnostic sessions.

## 2023-12-01 NOTE — PROGRESS NOTES
[address] [facility]  Interval Progress Note: Critical Care  Name: Randal Hough  MRN: [id number]  Unit/Bed#: ICU 03 I Date of Admission: 11/30/2023   Date of Service: 12/1/2023 I Hospital Day: 1    Interval Events:        Patient much more awake on 40 of propofol, passed spontaneous breathing trial. Decision made to trial liberation from mechanical ventilation. Patient impulsive and difficult to verbally re-direct, will start Precedex overnight to enforce sleep/wake     Pertinent New Data:   blood pressure, pulse, temperature, respirations, and pulse oximetry    Physical Exam  Eyes:      Pupils: Pupils are equal, round, and reactive to light. HENT:      Head: Normocephalic and atraumatic. Cardiovascular:      Rate and Rhythm: Normal rate and regular rhythm. Pulses: Normal pulses. Abdominal: General: There is no distension. Palpations: Abdomen is soft. Constitutional:       Appearance: He is ill-appearing. Pulmonary:      Effort: Pulmonary effort is normal.      Breath sounds: Rhonchi present. Comments: Moderate amount of white secretions inluine suction  Neurological:      GCS: GCS eye subscore is 3. GCS verbal subscore is 1. GCS motor subscore is 6. Comments: Responding to request with right extremities  Cannot elicit motor activity on left  Exam performed on propofol   Genitourinary/Anorectal:     Comments: Morris in place with yellow urine        I have personally reviewed pertinent lab results. chest xrayI have personally reviewed pertinent reports. and I have personally reviewed pertinent films in PACS      Assessment and Plan  Diagnosis: Acute hypoxic respiratory failure  Plan:  Will liberate from mechanical ventilation to nasal cannula, start Precedex overnight for sleep/wake, order PT/OT/ST, airway clearance protocol    Billing Level:  During this visit, Critical care services were medically necessary as this patient had a high probability of imminent or life-threatening deterioration due to acute encephalopathy, acute respiratory failure, and CVA , required my direct attention, intervention, and personal management to implement the following: CXR interpretation , vent management, bedside management, direct patient care, and documentation of findings. I have personally provided 30 minutes on 12/01/23 of critical care time, exclusive of procedures, teaching, family meetings, and any prior time recorded by providers other than myself.     SIGNATURE: OSCAR Diaz

## 2023-12-01 NOTE — ASSESSMENT & PLAN NOTE
Elie Hooper is a 58 y.o. male w/ PMH HTN, smoking who p/w left-sided weakness and right gaze preference concerning for acute ischemic stroke and found to have extensive R ICA occlusion extending up through M1 and concerning for embolism vs dissection by imaging appearance. Given LA dilation on TTE, consider Afib. Would consider TAO for extra evaluation possible thrombus not observed on TTE  Presenting sx: left-sided weakness and right gaze preference; NIHSS: 11 (LOC, gaze, VF, facial, LUE, LLE, dysarthria, extinction); /110 on arrival; TNK administered 1712 (LKN 15:35, SA 16:13, delay for BP); Endovascular intervention: R MCA/ICA thrombectomy, TICI 3 w/ Dr. Sherrie Knight  Workup reviewed:  CTH/CTA: extensive R ICA occlusion from bifurcation through M1  Labs: HgbA1c 5.6, LDL 98  CTH (24 hrs): Acute right MCA territory infarct, with mild regional mass effect and without midline shift. Minimal hyperdensity noted in the R caudate consistent with hemorrhage vs contrast staining. Acute/Subacute sinusitits  MRI:t MRI: Evolving acute to subacute right MCA distribution infarction with focal areas of hemorrhagic transformation within the gangliocapsular region. Stable mass effect on the right frontal horn compared to CT head performed on same day. CT CAP w contrast, 12/2: Patchy groundglass consolidations throughout R lung (aspiration vs atypical/viral infection). No e/o malignancy. Mild wall thickening of urinary bladder may be 2/2 chronic outlet obstruction. TTE: LVEF 65%, LA dilation, mild MV annular calcification  RoPE score 3 - 0% likelihood stroke caused by PFO    Impression: 58year old male with R ICA occlusion from bifurcation through M1 s/p TNK and TICI 3 revascularization found to have acute/subacute R MCA distribution infarction with focal areas of hemorrhagic transformation on MRI imaging. Also found to have nonocclusive L Gastrocnemius Vein DVT.  Stroke etiology unclear at this time and will require further work up to r/o cardioembolic source or underlining hypercoagulability. Plan:  Continue stroke pathway with telemetry monitoring  Frequent Neuro checks, obtain stat CTH if any acute changes  BP goal < 160 to avoid further hemorrhagic conversion  Rule out hypercoag state - thrombosis panel pending, noncontributory homocysteine and CT CAP r/o underlying malignancy  TAO 12/5 - NPO @ midnight  Secondary stroke prevention:  AP/AC: Plavix 75 mg QD as history of allergy to ASA with anaphylaxis. Closely monitor for any signs of allergic reaction.    Statin: Atorvastatin 40 mg   Euglycemia with SSI if indicated  PT/OT/PMR/ST w/ swallow eval  DVT prophylaxis: Heparin SQ and SCDs  Dispo: [facility] evaluation - pt preference for [facility]

## 2023-12-01 NOTE — PHYSICAL THERAPY NOTE
Physical Therapy Evaluation     Patient's Name: Maurice Aviles    Admitting Diagnosis  Stroke (cerebrum) [facility]) [I63.9]    Problem List  Patient Active Problem List   Diagnosis    Stroke due to R ICA to MCA occlusion, s/p TNK and thrombectomy    Hypertension       Past Medical History  No past medical history on file. Past Surgical History  No past surgical history on file. 12/01/23 0850   PT Last Visit   PT Visit Date 12/01/23   Note Type   Note type Evaluation   Pain Assessment   Pain Assessment Tool [facility] Pain Intervention(s) Repositioned; Ambulation/increased activity   Pain Rating: FLACC (Rest) - Face 0   Pain Rating: FLACC (Rest) - Legs 0   Pain Rating: FLACC (Rest) - Activity 0   Pain Rating: FLACC (Rest) - Cry 0   Pain Rating: FLACC (Rest) - Consolability 0   Score: FLACC (Rest) 0   Pain Rating: FLACC (Activity) - Face 0   Pain Rating: FLACC (Activity) - Legs 0   Pain Rating: FLACC (Activity) - Activity 0   Pain Rating: FLACC (Activity) - Cry 1   Pain Rating: FLACC (Activity) - Consolability 0   Score: FLACC (Activity) 1   Restrictions/Precautions   Weight Bearing Precautions Per Order No   Other Precautions Pain; Fall Risk;Multiple lines; Bed Alarm; Chair Alarm; Impulsive;Telemetry;O2;Cognitive   Home Living   Type of [address] [place] [address] One level   Bathroom Accessibility Accessible   Prior Function   Level of [place] Independent with functional mobility   Lives With Significant other   IADLs Independent with driving   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)   General   Family/Caregiver Present No   Cognition   Orientation Level Oriented to person;Oriented to situation;Oriented to place   Subjective   Subjective Pt agreeable to PT session   RLE Assessment   RLE Assessment   (grossly at least 3/5 although required increased time to motor plan)   LLE Assessment   LLE Assessment   (grossly at least 3-/5 with increased time)   Coordination   Movements are Fluid and Coordinated 0   Coordination and Movement Description slow and guarded with fatigue   Bed Mobility   Supine to Sit 2  Maximal assistance   Additional items Assist x 2;HOB elevated   Sit to Supine Unable to assess   Transfers   Sit to Stand 3  Moderate assistance   Additional items Assist x 2; Increased time required   Stand to Sit 3  Moderate assistance   Additional items Assist x 2; Increased time required   Ambulation/Elevation   Gait pattern Excessively slow; Inconsistent marshall; Forward Flexion;Decreased foot clearance;R Foot drag;L Foot drag   Gait Assistance 2  Maximal assist   Additional items Assist x 2   Assistive Device Other (Comment)  (HHA)   Distance 3'   Balance   Static Sitting Poor +   Dynamic Sitting Poor   Static Standing Zero   Ambulatory Zero   Endurance Deficit   Endurance Deficit Yes   Endurance Deficit Description limited by fatigue, weakness, balance, and coordination compared to baseline mobility   Activity Tolerance   Activity Tolerance Patient limited by fatigue;Patient limited by pain   Medical Staff Made Aware OT for D/C planning   Nurse Made Aware yes, nsg gave clearance to work with pt   Assessment   Prognosis Guarded   Problem List Decreased strength;Decreased range of motion;Decreased endurance; Impaired balance;Decreased mobility; Decreased coordination;Decreased cognition; Impaired judgement;Decreased safety awareness;Pain   Assessment Pt is 58 y.o. male seen for PT evaluation s/p admit to [address] [place] [address] on 11/30/2023 w/ Cerebrovascular accident (CVA) due to embolism of right middle cerebral artery ([address] PT consulted to assess pt's functional mobility and d/c needs. Order placed for PT eval and tx, w/ up w/ A order. Comorbidities affecting pt's physical performance at time of assessment include:  has no past medical history on file. PTA, pt was requiring A for mobility, ambulates unrestricted distances and all terrain, and works full time.  Personal factors affecting pt at time of IE include: inability to ambulate household distances, decreased cognition, limited home support, decreased initiation and engagement, inability to perform current job functions, unable to perform physical activity, limited insight into impairments, inability to perform IADLs, and inability to perform ADLs. Please find objective findings from PT assessment regarding body systems outlined above with impairments and limitations including weakness, impaired balance, decreased endurance, impaired coordination, gait deviations, pain, decreased activity tolerance, decreased functional mobility tolerance, decreased safety awareness, impaired judgement, fall risk, and decreased cognition. Pt required LE management and A to upright trunk during bed mobility with noted L sided weakness. Required increased time to follow all single step instruction. Noted ability to accept weight on LLE without buckling although required A to weight shift and advance B/L LE during WB activity. The following objective measures performed on IE also reveal limitations: The patient's AM-PAC Basic Mobility Inpatient Short Form Raw Score is 8. A standardized score less than 42.9 suggests the patient may benefit from discharge to post-acute rehabilitation services. Please also refer to the recommendation of the Physical Therapist for safe discharge planning. Pt's clinical presentation is currently unstable/unpredictable seen in pt's presentation of critical care monitoring. Pt to benefit from continued PT tx to address deficits as defined above and maximize level of functional independent mobility and consistency. From PT/mobility standpoint, recommendation at time of d/c would be post acute rehabilitation services pending progress in order to facilitate return to PLOF. Barriers to Discharge Decreased caregiver support; Inaccessible home environment   Goals   Patient Goals To get stronger and return home   STG Expiration Date 12/13/23   Short Term Goal #1 1. Complete bed mobility and transfers I to decrease need for caregiver in home. 2. Ambulate 300' I to complete household and community mobility without A. 3. Improve dynamic balance to good to decrease need for UE support during ambulation. 4. Be educated & demonstate 12 steps to be able to enter home without A. Plan   Treatment/Interventions OT; Spoke to case management;Spoke to nursing;Gait training;Bed mobility; Patient/family training; Endurance training;LE strengthening/ROM; Functional transfer training   PT Frequency 3-5x/wk   Discharge Recommendation   Rehab Resource Intensity Level, PT I (Maximum Resource Intensity)   AM-PAC Basic Mobility Inpatient   Turning in Flat Bed Without Bedrails 2   Lying on Back to Sitting on Edge of Flat Bed Without Bedrails 2   Moving Bed to Chair 1   Standing Up From Chair Using Arms 1   Walk in Room 1   Climb 3-5 Stairs With Railing 1   Basic Mobility Inpatient Raw Score 8   Turning Head Towards Sound 3   Follow Simple Instructions 3   Low Function Basic Mobility Raw Score  14   Low Function Basic Mobility Standardized Score  22.01   Highest Level Of Mobility   JH-HLM Goal 3: Sit at edge of bed   JH-HLM Achieved 4: Move to chair/commode         Wendy Toure, PT

## 2023-12-01 NOTE — PROGRESS NOTES
[address] [facility]  Progress Note  Name: Lindsay Tony I  MRN: [id number]  Unit/Bed#: ICU 03 I Date of Admission: 11/30/2023   Date of Service: 12/1/2023 I Hospital Day: 1    Assessment/Plan   * Cerebrovascular accident (CVA) due to embolism of right middle cerebral artery ([address])  Assessment & Plan  PPD 1 R MCA thrombectomy for R ICA to M1 occlusion, TICI 3 ([place], 11/30/23)  NIHSS 11 on presentation, s/p TNK  P/w left sided weakness and right gaze preference  Currently, patient with dysarthria, left facial droop, left hemiparesis and neglect. GCS 15.     Imaging:  CT head w/o, 11/30/23: Reidentified subtle areas of loss of gray-white differentiation involving the right MCA territory. However, enhancement of the right middle cerebral artery M1 segment is now visible, from prior intravenous contrast administration for a CTA head and neck performed few hours prior    Plan:  Continue to monitor neurological exam  STAT CTH / CTA for decline in GCS greater than 2 points in 1 hour  Neurology following for stroke management  SBP < 140  Post TNK CTH  pending  MRI brain w/o pending  AC per neurology recommendations  Medical management per primary team  PT/OT/PMR evaluations  DVT ppx: SCDs    Neurosurgery will sign off. Follow up outpatient with stroke neurology. Please call with questions or concerns. Subjective/Objective   Chief Complaint: none    Subjective: Patient extubated overnight; s/p bronch for secretions. BP well controlled. Currently FC, has no complaints. Objective: Patient laying in bed, sleepy but easily arousable. VSS. Invasive Devices       Peripheral Intravenous Line  Duration             Peripheral IV 11/30/23 Dorsal (posterior); Right Hand <1 day    Peripheral IV 11/30/23 Left Antecubital <1 day              Arterial Line  Duration             Arterial Line 11/30/23 Left Radial <1 day              Drain  Duration             Urethral Catheter Non-latex; Temperature probe 16 Fr. <1 day                    Vitals: Blood pressure (!) 82/56, pulse 62, temperature 98.1 °F (36.7 °C), temperature source Oral, resp. rate (!) 27, SpO2 92 %. ,There is no height or weight on file to calculate BMI. Hemodynamic Monitoring: MAP: Arterial Line MAP (mmHg): 84 mmHg    General appearance: drowsy, appears stated age, cooperative and no distress  Head: Normocephalic, without obvious abnormality, atraumatic  Eyes: EOMI, PERRL. Able to cross midline to left one time, not reproducible   Neck: supple, symmetrical, trachea midline   Lungs: non labored breathing  Heart: regular heart rate  Neurologic:   Mental status: drowsy, oriented x4, thought content appropriate, dysarthric but able to repeat sentence, able to add 2+2+2  Cranial nerves: grossly intact (Cranial nerves II-XII) except left facial droop  Sensory: left hemineglect, DST intact right side  Motor: 5/5 right side, left hemiparesis. L DF/PF with PS  Right groin site CDI, no hematoma  2+ pedal pulses bilaterally    Lab Results: I have personally reviewed pertinent results.       Results from last 7 days   Lab Units 12/01/23  0605 11/30/23  1653   WBC Thousand/uL 13.95* 9.45   HEMOGLOBIN g/dL 13.0 14.4   HEMATOCRIT % 39.4 42.9   PLATELETS Thousands/uL 182 204     Results from last 7 days   Lab Units 12/01/23  0605 11/30/23  1653   POTASSIUM mmol/L 3.6 4.6   CHLORIDE mmol/L 107 101   CO2 mmol/L 26 26   BUN mg/dL 21 26*   CREATININE mg/dL 1.13 1.14   CALCIUM mg/dL 7.2* 8.5   ALK PHOS U/L 43  --    ALT U/L 17  --    AST U/L 15  --      Results from last 7 days   Lab Units 12/01/23  0605   MAGNESIUM mg/dL 1.8*     Results from last 7 days   Lab Units 12/01/23  0605   PHOSPHORUS mg/dL 3.8     Results from last 7 days   Lab Units 11/30/23  1653   INR  0.99   PTT seconds 22*     No results found for: "TROPONINT"  ABG:  Lab Results   Component Value Date    PHART 7.394 12/01/2023    FFT5WKQ 40.6 12/01/2023    PO2ART 77.4 12/01/2023    IFV7SDJ 24.2 12/01/2023    BEART -0.6 12/01/2023    SOURCE Line, Arterial 12/01/2023       Imaging Studies: I have personally reviewed pertinent reports. and I have personally reviewed pertinent films in PACS    CT head wo contrast    Result Date: 11/30/2023  Narrative: CT BRAIN - WITHOUT CONTRAST INDICATION:   Stroke, follow up stroke. Status post IR neuro intervention. COMPARISON: CT brain dated November 30, 2023 at 4:30 p.m. CTA head and neck dated November 30, 2023 at 4:37 p.m. TECHNIQUE:  CT examination of the brain was performed. Multiplanar 2D reformatted images were created from the source data. Radiation dose length product (DLP) for this visit:  929.32 mGy-cm . This examination, like all CT scans performed in the [facility], was performed utilizing techniques to minimize radiation dose exposure, including the use of iterative  reconstruction and automated exposure control. IMAGE QUALITY:  Diagnostic. FINDINGS: PARENCHYMA:  No intracranial mass, mass effect or midline shift. No acute parenchymal hemorrhage. Subtle areas of loss of gray-white differentiation,, including the insular ribbon are again noted. There is enhancement of the intracranial vessels from prior CTA head and neck. Flow is noted within the right MCA M1 segment. VENTRICLES AND EXTRA-AXIAL SPACES:  Normal for the patient's age. VISUALIZED ORBITS: Reidentified hyperdense right globe. Unremarkable left globe. PARANASAL SINUSES: Moderate to marked mucosal thickening of the visualized the paranasal sinuses. CALVARIUM AND EXTRACRANIAL SOFT TISSUES:  Normal.     Impression: Reidentified subtle areas of loss of gray-white differentiation involving the right MCA territory. However, enhancement of the right middle cerebral artery M1 segment is now visible, from prior intravenous contrast administration for a CTA head and neck performed few hours prior. An MRI brain could be performed for further evaluation. Workstation performed: [id number]     CT stroke alert brain    Result Date: 11/30/2023  Narrative: CT BRAIN - STROKE ALERT PROTOCOL INDICATION:   Stroke Alert. COMPARISON:  None. TECHNIQUE:  CT examination of the brain was performed. In addition to axial images, coronal reformatted images were created and submitted for interpretation. Radiation dose length product (DLP) for this visit:  896 mGy-cm . This examination, like all CT scans performed in the [facility], was performed utilizing techniques to minimize radiation dose exposure, including the use of iterative reconstruction and automated exposure control. IMAGE QUALITY:  Diagnostic. FINDINGS: PARENCHYMA:  No intracranial mass, mass effect or midline shift. Loss of the right insular ribbon sign and right basal ganglia low-attenuation suggesting right MCA distribution ischemia. VENTRICLES AND EXTRA-AXIAL SPACES:  Normal for the patient's age. VISUALIZED ORBITS: Hyperdense right orbital globe. PARANASAL SINUSES: Normal visualized paranasal sinuses. CALVARIUM AND EXTRACRANIAL SOFT TISSUES:   Normal.     Impression: No acute intracranial hemorrhage. Right MCA distribution ischemia suspected. I personally discussed this study with Dr Pio Askew on 11/30/2023 4:46 PM. Workstation performed: [id number]     EKG, Pathology, and Other Studies: I have personally reviewed pertinent reports.       VTE Pharmacologic Prophylaxis: Reason for no pharmacologic prophylaxis s/p TNK    VTE Mechanical Prophylaxis: sequential compression device

## 2023-12-01 NOTE — PROGRESS NOTES
Critical Care Interval Transfer Note:    Please refer to progress note from earlier today for full details. Barriers to discharge:   R ICA through M1 occlusion s/p TNK, thrombectomy - MRI brain pending. Needs post acute rehab  Pneumonia - Continue azithromycin      Consults: IP CONSULT TO PHYSICAL MEDICINE REHAB  IP CONSULT TO CASE MANAGEMENT  IP CONSULT TO NEUROLOGY    Recommended to review admission imaging for incidental findings and document in discharge navigator: Chart reviewed, no known incidental findings noted at this time. Discharge Plan: Anticipate discharge in 48-72 hrs to rehab facility. Morris Plan: Morris to be removed. Order has been placed            Patient seen and evaluated by Critical Care today and deemed to be appropriate for transfer to Med Surg. Spoke to Cristina Murillo from [facility] to accept transfer. Critical care can be contacted via [employer] with any questions or concerns.     Vikki Hull PA-C

## 2023-12-01 NOTE — PLAN OF CARE
Problem: OCCUPATIONAL THERAPY ADULT  Goal: Performs self-care activities at highest level of function for planned discharge setting. See evaluation for individualized goals. Description: Treatment Interventions: ADL retraining, Visual perceptual retraining, Functional transfer training, UE strengthening/ROM, Endurance training, Cognitive reorientation, Patient/family training, Equipment evaluation/education, Neuromuscular reeducation, Fine motor coordination activities, Compensatory technique education, Continued evaluation, Energy conservation, Activityengagement          See flowsheet documentation for full assessment, interventions and recommendations. Note: Limitation: Decreased ADL status, Decreased UE ROM, Decreased UE strength, Decreased Safe judgement during ADL, Decreased cognition, Decreased endurance, Visual deficit, Decreased fine motor control, Decreased self-care trans, Decreased high-level ADLs  Prognosis: Fair  Assessment: Pt is a 59 yo male who presented to [address] [place] [address] with left weakness and right gaze preference in which imaging revealed right ICA to MCA occulsion. Pt transferred to [facility] for neuro IR eval in which pt s/p thrombectomy. Pt  has no past medical history on file. Pt with active OT orders in which OT consulted to assess pt's functional status and occupational performance to determine safe d/c needs. Pt seen with PT to increase safety, decrease fall risk, and maximize functional/occupational performance 2* medical complexity which is a regression from pt's functional baseline. Pt lives with his girlfriend in a 1 SH with 1 BENNETT. PTA, pt was independent in ADLs/IADLs and reports using no DME for functional mobility. (+) driving. Currently, pt peforming bed mobility w/ Max A x2, functional transfers w/ Mod A x2, functional mobility w/ Max A x2, UB ADLs w/ Mod A, and LB ADLs w/ Max A.  Pt demonstrates the following limitations/impairments which impact the pt's ability to engage in valued occupations: cognition, visual deficit, balance, endurance/activity tolerance, standing tolerance, functional reach, postural/trunk control, strength, safety awareness, and pain. From an OT standpoint, recommend discharge to post-acute rehab once medically stable. The patient's raw score on the -PAC Daily Activity Inpatient Short Form is 13. A raw score of less than 19 suggests the patient may benefit from discharge to post-acute rehabilitation services. Please refer to the recommendation of the Occupational Therapist for safe discharge planning. Pt would benefit from skilled OT services 3-5x/wk to address immediate acute care needs and underlying performance skills to promote safety, decrease fall risk, and enhance occupational performance to return to OF. Goals to be met within the next 10-14 days.      Rehab Resource Intensity Level, OT: I (Maximum Resource Intensity)

## 2023-12-01 NOTE — H&P
[address] [facility]  H&P: Critical Care  Name: Caty Dunham 58 y.o. male I MRN: [id number]  Unit/Bed#: ICU 03 I Date of Admission: 11/30/2023   Date of Service: 11/30/2023 I Hospital Day: 0      Assessment/Plan   Neuro:   Diagnosis: R MCA CVA s/p TNK and thrombectomy, acute encephalopathy  Plan: Continue propofol/PRN fentanyl for ventilatory comfort, place on stroke pathway, frequent neuro checks, MRI when able, repeat CT head in 24 hours, neurology consult completed at outside hospital, neurosurgery consult pending, PMR consult pending   CV:   Diagnosis: Hypertension  Plan: Continue Cardene for now with PRN hydralazine/labetalol for goal SBP<140, ECHO pending  Pulm:  Diagnosis: Acute hypoxic respiratory failure, moderate persistent asthma, detectable carbon monoxide level  Plan: Continue mechanical ventilation for now, attempt to reduced PEEP/Fio2, respiratory protocol, does not appear to be in acute exacerbation, resume home Singulair when enteral access established  GI:   Plan: Begin IV PPI while intubated/NPO, if unable to liberate will need to obtain enteral access, begin bowel regimen when able  :   Diagnosis: Hematuria  Plan: Follow urine color closely post TNK, close intake and output, trend serum creatinine  F/E/N:    Plan: Gentle IVF for now, speech when appropriate, replete electrolytes as necessary  Heme/Onc:   Plan: Begin chemical DVT prophylaxis when appropriate  Endo:   Plan: Check TSH and A1c, q6 finger sticks while NPO  ID:   Plan: Follow temperature and white count  MSK/Skin:   Plan: PT/OT when appropriate    Disposition: Critical care       History of Present Illness     HPI: Caty Dunham is a 58 y.o. who presents initially to the [address] [place] [address] with complaints of left-sided weakness and right gaze preference. He has a past medical history of hypertension, moderate persistent asthma, and ongoing tobacco abuse. His initial NIHSS was 11 and he receuved TNK at 1711. His imaging was concerning for a R ICA to MCA occlusion and he was transferred to the [facility] for neuro interventional radiology evaluation. He underwent thrombectomy. He was intubated for the procedure due to agitation. Post-procedure the patient was significantly hypertensive and hypoxic and was referred to the critical care unit for admission. History obtained from chart review and unobtainable from patient due to mental status. Review of Systems   Unable to perform ROS: Intubated      Historical Information   No past medical history on file. No past surgical history on file. No current outpatient medications Allergies   Allergen Reactions    Aspirin Anaphylaxis    Ibuprofen Anaphylaxis        No family history on file. Objective                            Vitals I/O      Most Recent Min/Max in 24hrs   Temp   No data recorded   Pulse 82 Pulse  Min: 80  Max: 114   Resp (!) 35 Resp  Min: 13  Max: 50   BP (!) 192/91 BP  Min: 167/59  Max: 195/98   O2 Sat 92 % SpO2  Min: 82 %  Max: 95 %      Intake/Output Summary (Last 24 hours) at 11/30/2023 2049  Last data filed at 11/30/2023 1910  Gross per 24 hour   Intake 1000 ml   Output --   Net 1000 ml       Diet NPO    Invasive Monitoring           Physical Exam   Physical Exam  Eyes:      Pupils: Pupils are equal, round, and reactive to light. Skin:     General: Skin is warm and dry. HENT:      Head: Normocephalic and atraumatic. Mouth/Throat:      Mouth: Mucous membranes are dry. Cardiovascular:      Rate and Rhythm: Normal rate and regular rhythm. Pulses: Normal pulses. Musculoskeletal:         General: No swelling or signs of injury. Abdominal: General: There is no distension. Palpations: Abdomen is soft. Tenderness: There is no abdominal tenderness. Constitutional:       General: He is in acute distress. Appearance: He is ill-appearing. Interventions: He is sedated, intubated and restrained.    Pulmonary: Effort: Respiratory distress present. He is intubated. Breath sounds: Examination of the right-middle field reveals decreased breath sounds. Examination of the right-lower field reveals decreased breath sounds. Examination of the left-lower field reveals decreased breath sounds. Decreased breath sounds present. Comments: Small amount of clear secretions inline suction  Neurological:      GCS: GCS eye subscore is 1. GCS verbal subscore is 1. GCS motor subscore is 6. Comments: Patient responded to request with right extremities  Some flexion of left extremities witnessed, not consistent  Exam performed on propofol   Genitourinary/Anorectal:     Comments: Morris in place with blood tinged urine           Diagnostic Studies      EKG: Sinus rhythm  Imaging:  I have personally reviewed pertinent reports.    and I have personally reviewed pertinent films in PACS     Medications:  Scheduled PRN   atorvastatin, 40 mg, QPM  chlorhexidine, 15 mL, Q12H [facility]  phenylephrine HCl, ,       fentanyl citrate (PF), 50 mcg, Q1H PRN  hydrALAZINE, 10 mg, Q4H PRN  labetalol, 10 mg, Q4H PRN  phenylephrine HCl, ,        Continuous    niCARdipine, 5-15 mg/hr, Last Rate: Stopped (11/30/23 2030)  propofol, 5-50 mcg/kg/min, Last Rate: 50 mcg/kg/min (11/30/23 2046)  sodium chloride, 125 mL/hr, Last Rate: 125 mL/hr (11/30/23 2019)         Labs:    CBC    Recent Labs     11/30/23  1653   WBC 9.45   HGB 14.4   HCT 42.9        BMP    Recent Labs     11/30/23  1653   SODIUM 134*   K 4.6      CO2 26   AGAP 7   BUN 26*   CREATININE 1.14   CALCIUM 8.5       Coags    Recent Labs     11/30/23  1653   INR 0.99   PTT 22*        Additional Electrolytes  No recent results       Blood Gas    No recent results  Recent Labs     11/30/23  1653   PHVEN 7.402*   HAH7THK 44.0   PO2VEN 61.9*   HAE4GWK 26.8   BEVEN 1.6   A7KBJRH 90.1*    LFTs  No recent results    Infectious  No recent results  Glucose  Recent Labs     11/30/23  1653   GLUC 96 Critical Care Time Delivered : Upon my evaluation, this patient had a high probability of imminent or life-threatening deterioration due to worsening respiratory failure, which required my direct attention, intervention, and personal management. I have personally provided 30 minutes of critical care time, exclusive of procedures, teaching, family meetings, and any prior time recorded by providers other than myself.      OSCAR Monteiro

## 2023-12-01 NOTE — CONSULTS
PHYSICAL MEDICINE AND REHABILITATION CONSULT NOTE  Georgi Georges 58 y.o. male MRN: [id number]  Unit/Bed#: ICU 03 Encounter: [id number]    Requested by (Physician/Service): Surjit Mendez MD  Reason for Consultation:  Assessment of rehabilitation needs    Assessment:  Rehabilitation Diagnosis:   Right MCA CVA  Right ICA to M1 occlusion s/p thrombectomy   Impaired mobility and self care  Impaired cognition     Recommendations:  Rehabilitation Plan:  Rehab:  PT/OT evaluations are pending however given deficits anticipate noreen he will require inpatient rehabilitation, level TBD. Will continue to follow patient's clinical course and make formal recommendations based on patient's functional and medical progression. Medical Co-morbidities Plan:  Acute hypoxic respiratory failure s/p intubation now extubated   Hypotension/hypertension   Hematuria   Tobacco use  Moderate persistent asthma   DVT ppx: SCD    Thank you for this consultation. Do not hesitate to contact service with further questions. Felice Koyanagi, CRNP  PM&R    I have spent a total time of 30 minutes on 12/01/23 in caring for this patient including Patient and family education, Counseling / Coordination of care, Documenting in the medical record, Reviewing / ordering tests, medicine, procedures  , Obtaining or reviewing history  , and Communicating with other healthcare professionals . History of Present Illness:  Georgi Georges is a 58 y.o. male with a PMH of HTN, tobacco use and moderate persistent asthma who presented to the [address] [place] [address] on 11/30/2023 with acute onset of left sided weakness and right gaze preference while working in his garage. Imaging was concerning for right ICA to MCA occlusion and he was transferred to [facility]. He was intubated due to agitation and underwent thrombectomy. He was started on Precedex overnight. MRI is pending and he is now extubated.  PM&R are consulted for rehabilitation recommendations. The patient was seen in the ICU. He was OOB to the chair. He is lethargic but does attempt to answer questions when asked and follow some simple commands. He appears to have left hemiparesis and dysarthria. Review of Systems: 10 point ROS unobtainable due to mental status. Function:  Prior level of function and living situation:  Will need to clarify, patient was working on a tractor in  his garage at the time of onset and is presumed to be independent prior to presentation. Current level of function:  Physical Therapy: Pending   Occupational Therapy: Pending   Speech Therapy: Pending       Physical Exam:  BP (!) 82/56   Pulse 62   Temp 97.8 °F (36.6 °C) (Axillary)   Resp (!) 52   SpO2 (!) 86%        Intake/Output Summary (Last 24 hours) at 12/1/2023 0848  Last data filed at 12/1/2023 0800  Gross per 24 hour   Intake 2859.78 ml   Output 2005 ml   Net 854.78 ml       There is no height or weight on file to calculate BMI. Physical Exam  Constitutional:       General: He is not in acute distress. Appearance: He is not toxic-appearing. HENT:      Head: Atraumatic. Comments: Left facial droop      Right Ear: External ear normal.      Left Ear: External ear normal.      Nose: Nose normal.      Mouth/Throat:      Mouth: Mucous membranes are moist.      Pharynx: Oropharynx is clear. Eyes:      Comments: Right gaze preference    Cardiovascular:      Pulses: Normal pulses. Pulmonary:      Effort: Pulmonary effort is normal. No respiratory distress. Abdominal:      General: There is no distension. Musculoskeletal:      Comments: RUE/RLE: 5/5 throughout   LUE: SAB 1/5, EF 2/5, EE 2/5, FF 1/5  LLE: DF/PF 3/5   Skin:     General: Skin is warm and dry. Neurological:      Mental Status: He is lethargic and disoriented. Comments: Left hemiparesis, dysarthria, aphasia    Psychiatric:         Cognition and Memory: Cognition is impaired. Memory is impaired. Social History:    Social History     Socioeconomic History    Marital status: Single     Spouse name: Not on file    Number of children: Not on file    Years of education: Not on file    Highest education level: Not on file   Occupational History    Not on file   Tobacco Use    Smoking status: Not on file    Smokeless tobacco: Not on file   Substance and Sexual Activity    Alcohol use: Not on file    Drug use: Not on file    Sexual activity: Not on file   Other Topics Concern    Not on file   Social History Narrative    Not on file     Social Determinants of Health     Financial Resource Strain: Not on file   Food Insecurity: Not on file   Transportation Needs: Not on file   Physical Activity: Not on file   Stress: Not on file   Social Connections: Not on file   Intimate Partner Violence: Not on file   Housing Stability: Not on file        Family History:    No family history on file.       Medications:     Current Facility-Administered Medications:     albuterol inhalation solution 2.5 mg, 2.5 mg, Nebulization, Q4H PRN, Roberta Hall MD    atorvastatin (LIPITOR) tablet 40 mg, 40 mg, Per NG Tube, QPM, OSCAR Parisi    chlorhexidine (PERIDEX) 0.12 % oral rinse 15 mL, 15 mL, Mouth/Throat, Q12H [facility], OSCAR Parisi, 15 mL at 12/01/23 0809    hydrALAZINE (APRESOLINE) injection 10 mg, 10 mg, Intravenous, Q4H PRN, Karlenezaruth Goldsmithela, CRNP    labetalol (NORMODYNE) injection 10 mg, 10 mg, Intravenous, Q4H PRN, Rozaruth Laurel, CRNP, 10 mg at 11/30/23 2005    magnesium sulfate 2 g/50 mL IVPB (premix) 2 g, 2 g, Intravenous, Once, Dilcia Choudhary MD, Last Rate: 25 mL/hr at 12/01/23 0806, 2 g at 12/01/23 0806    melatonin tablet 6 mg, 6 mg, Oral, HS, Roberta Hall MD    niCARdipine (CARDENE) 25 mg (STANDARD CONCENTRATION) in sodium chloride 0.9% 250 mL, 5-15 mg/hr, Intravenous, Titrated, WOLF BlankenshipNP, Stopped at 11/30/23 2030    pantoprazole (PROTONIX) injection 40 mg, 40 mg, Intravenous, Q24H [facility], OSCAR Gordon, 40 mg at 12/01/23 0809    phenylephrine (CYNDI-SYNEPHRINE) 50 mg (STANDARD CONCENTRATION) in sodium chloride 0.9% 250 mL,  mcg/min, Intravenous, Titrated, OSCAR Parisi, Last Rate: 7.5 mL/hr at 12/01/23 0730, 25 mcg/min at 12/01/23 0730    potassium chloride 20 mEq IVPB (premix), 20 mEq, Intravenous, Q2H, Goldie Sullivan MD, Last Rate: 50 mL/hr at 12/01/23 0809, 20 mEq at 12/01/23 0809    sodium chloride 0.9 % infusion, 125 mL/hr, Intravenous, Continuous, OSCAR Gordon, Last Rate: 125 mL/hr at 12/01/23 0830, 125 mL/hr at 12/01/23 0830    Past Medical History:     No past medical history on file. Past Surgical History:     No past surgical history on file. Allergies: Allergies   Allergen Reactions    Aspirin Anaphylaxis    Ibuprofen Anaphylaxis           LABORATORY RESULTS:      Lab Results   Component Value Date    HGB 13.0 12/01/2023    HCT 39.4 12/01/2023    WBC 13.95 (H) 12/01/2023     Lab Results   Component Value Date    BUN 21 12/01/2023    K 3.6 12/01/2023     12/01/2023    CREATININE 1.13 12/01/2023     Lab Results   Component Value Date    PROTIME 13.4 11/30/2023    INR 0.99 11/30/2023        DIAGNOSTIC STUDIES: Reviewed  CT head wo contrast    Result Date: 11/30/2023  Impression: Reidentified subtle areas of loss of gray-white differentiation involving the right MCA territory. However, enhancement of the right middle cerebral artery M1 segment is now visible, from prior intravenous contrast administration for a CTA head and neck performed few hours prior. An MRI brain could be performed for further evaluation. Workstation performed: [id number]     CT stroke alert brain    Result Date: 11/30/2023  Impression: No acute intracranial hemorrhage. Right MCA distribution ischemia suspected.  I personally discussed this study with Dr Ida Rubio on 11/30/2023 4:46 PM. Workstation performed: [id number]

## 2023-12-01 NOTE — PROGRESS NOTES
NEUROLOGY RESIDENCY PROGRESS NOTE     Name: Miroslava Duran   Age & Sex: 58 y.o. male   MRN: [id number]  Unit/Bed#: ICU 03   Encounter: [id number]    ASSESSMENT & PLAN     *Acute ischemic stroke 2/2 R ICA through R M1 occlusion, s/p TNK/thrombectomy, embolism vs dissection   Assessment & Plan  Miroslava Duran is a 58 y.o. male w/ PMH HTN, smoking who p/w left-sided weakness and right gaze preference concerning for acute ischemic stroke and found to have extensive R ICA occlusion extending up through M1 and concerning for embolism vs dissection by imaging appearance. Given LA dilation on TTE, consider Afib. Would consider TAO for extra evaluation possible thrombus not observed on TTE  Presenting sx: left-sided weakness and right gaze preference; NIHSS: 11 (LOC, gaze, VF, facial, LUE, LLE, dysarthria, extinction); /110 on arrival; TNK administered 1712 (LKN 15:35, SA 16:13, delay for BP);  Endovascular intervention: R MCA/ICA thrombectomy, TICI 3 w/ Dr. Juanito Hall  Workup reviewed:  CTH/CTA: extensive R ICA occlusion from bifurcation through M1  Labs: HgbA1c 5.6, LDL 98  CTH (24 hrs): pending  MRI: pending  TTE: LVEF 65%, LA dilation, mild MV annular calcification  RoPE score 3 0% likelihood stroke caused by PFO    Plan:  Admitted on stroke s/p revascularization pathway under CC - given stability, rapid extubation and limited additional medical problems, reasonable to come to neurology primary service  BP goal < 160, neosynephrine to avoid hypotension; labetalol prn  Repeat CTH 24 hrs s/p revascularization/intervention to r/o hemorrhagic conversion  Secondary stroke prevention: AP/AC and high intensity statin  ASA 81 mg  - would hold until 24 hrs out from TNK, f/u stability of post-TNK CTH  Atorvastatin 40 mg   F/u MRI  Continue monitoring on telemetry, frequent neuro checks, stat repeat CTH for acute change  Replete lytes to goal K 4, Mg 2, goal euglycemia gluc < 180 (SSI or drip as needed) and normothermia  PT/OT/PMR/ST w/ swallow eval  Nsg signing off  Case and plan d/w attending. See attestation additional/updated recommendations    Leeroy Hoff will need f/u in about 6 weeks with neurovascular attending/resident/AP. Outpatient testing TBD    Pending for d/c: MRI, PT/OT, possible TAO    SUBJECTIVE     Patient was seen and examined. Stroke w/ endovascular alert overnight. TICI revascularization. Patient w/ significant left field inattention on early exams    ROS negative unless otherwise noted    Inpatient consult to Neurology  Consult performed by: Alfie Arguello MD  Consult ordered by: Antony Tello, [address] [facility] [address] Webb Medal , duplicate order    OBJECTIVE     Patient ID: Leeroy Hoff is a 58 y.o. male. Vitals:    23 0515 23 0600 23 0700 23 0800   BP:       Pulse: 68 62 62 62   Resp: 12 (!) 24 (!) 27 (!) 52   Temp:    97.8 °F (36.6 °C)   TempSrc:    Axillary   SpO2: 93% 91% 92% (!) 86%        Temperature: Temp (24hrs), Av.3 °F (36.8 °C), Min:97.8 °F (36.6 °C), Max:98.7 °F (37.1 °C)   Temperature: 97.8 °F (36.6 °C)    Physical Exam Vitals reviewed. Constitutional:    Not in acute distress. Normal appearance. Not ill-appearing, toxic-appearing or diaphoretic. HENT:   Normocephalic and atraumatic. External ear normal b/l. Nose normal. No congestion or rhinorrhea. Mucous membranes are moist.  Oropharynx is clear. Eyes:    No scleral icterus. No discharge b/l. Conjunctivae normal.   Pulmonary:  Pulmonary effort is normal. No respiratory distress. GI: abdomen not distended  Musculoskeletal: no gross deformities  Skin:   Skin is not pale. Psychiatric:    unable to reliably ascertain. Not hallucinating. Neurologic Exam   Mental Status drowsy but easily arousable and oriented to person, place, and time. Attention is normal. Speech is fluent w/o dysarthria   Cranial Nerves Visual fields full to confrontation.  PERRL, brisk reactivity, intact consensual response b/l, EOMI. Facial sensation symmetric. Facial expression weak on left, significant droop persists. Hearing roughly symmetric. No dysarthria, tongue symmetric w/o atrophy or fasciculations  Motor Exam Muscle bulk and tone grossly normal; Pronator drift absent RUE. Strength intact and symmetric RUE/RLE. Weakness and inattention LUE/LLE but following commands briskly  Sensory Exam Light touch intact and symmetric BUE/BLE  Gait, Coordination, and Reflexes FTN normal b/l; no significant tremor observed. Reflexes: slightly brisker left side  Plantar response downgoing R, equivocal L. LABORATORY DATA     Labs: I have personally reviewed pertinent reports. Results from last 7 days   Lab Units 12/01/23  0605 11/30/23  1653   WBC Thousand/uL 13.95* 9.45   HEMOGLOBIN g/dL 13.0 14.4   HEMATOCRIT % 39.4 42.9   PLATELETS Thousands/uL 182 204      Results from last 7 days   Lab Units 12/01/23  0605 11/30/23  1653   POTASSIUM mmol/L 3.6 4.6   CHLORIDE mmol/L 107 101   CO2 mmol/L 26 26   BUN mg/dL 21 26*   CREATININE mg/dL 1.13 1.14   CALCIUM mg/dL 7.2* 8.5   ALK PHOS U/L 43  --    ALT U/L 17  --    AST U/L 15  --      Results from last 7 days   Lab Units 12/01/23  0605   MAGNESIUM mg/dL 1.8*     Results from last 7 days   Lab Units 12/01/23  0605   PHOSPHORUS mg/dL 3.8      Results from last 7 days   Lab Units 11/30/23  1653   INR  0.99   PTT seconds 22*     Lab Results   Component Value Date    HGBA1C 5.6 12/01/2023    LDLCALC 98 12/01/2023             ABG:  Lab Results   Component Value Date    PHART 7.394 12/01/2023    VPX2OUF 40.6 12/01/2023    PO2ART 77.4 12/01/2023    WYH8YSZ 24.2 12/01/2023    BEART -0.6 12/01/2023    SOURCE Line, Arterial 12/01/2023       IMAGING & DIAGNOSTIC TESTING     Radiology Results: I have personally reviewed pertinent reports.    and I have personally reviewed pertinent films in PACS  CT head wo contrast   Final Result by Mathieu Preston MD (11/30 2052)      Reidentified subtle areas of loss of gray-white differentiation involving the right MCA territory. However, enhancement of the right middle cerebral artery M1 segment is now visible, from prior intravenous contrast administration for a CTA head and neck    performed few hours prior. An MRI brain could be performed for further evaluation. Workstation performed: [id number]         XR chest portable ICU    (Results Pending)   XR chest portable ICU    (Results Pending)   CT head wo contrast    (Results Pending)   MRI brain wo contrast    (Results Pending)       Other Diagnostic Testing: I have personally reviewed pertinent reports.       ACTIVE MEDICATIONS     Current Facility-Administered Medications   Medication Dose Route Frequency    albuterol inhalation solution 2.5 mg  2.5 mg Nebulization Q4H PRN    atorvastatin (LIPITOR) tablet 40 mg  40 mg Per NG Tube QPM    chlorhexidine (PERIDEX) 0.12 % oral rinse 15 mL  15 mL Mouth/Throat Q12H LUIZ    hydrALAZINE (APRESOLINE) injection 10 mg  10 mg Intravenous Q4H PRN    labetalol (NORMODYNE) injection 10 mg  10 mg Intravenous Q4H PRN    magnesium sulfate 2 g/50 mL IVPB (premix) 2 g  2 g Intravenous Once    melatonin tablet 6 mg  6 mg Oral HS    niCARdipine (CARDENE) 25 mg (STANDARD CONCENTRATION) in sodium chloride 0.9% 250 mL  5-15 mg/hr Intravenous Titrated    pantoprazole (PROTONIX) injection 40 mg  40 mg Intravenous Q24H LUIZ    phenylephrine (CYNDI-SYNEPHRINE) 50 mg (STANDARD CONCENTRATION) in sodium chloride 0.9% 250 mL   mcg/min Intravenous Titrated    potassium chloride 20 mEq IVPB (premix)  20 mEq Intravenous Q2H    sodium chloride 0.9 % infusion  125 mL/hr Intravenous Continuous       VTE Pharmacologic Prophylaxis: Pharmacologic VTE Prophylaxis contraindicated due to TNK  VTE Mechanical Prophylaxis: sequential compression device    ~~~~~~~~~~~~~~~~~~~  Latonia aFirchild MD  Neurology Resident, PGY-4  [facility] [facility]

## 2023-12-01 NOTE — OCCUPATIONAL THERAPY NOTE
Occupational Therapy Evaluation     Patient Name: Mirza Kirby  HLOTL'H Date: 12/1/2023  Problem List  Principal Problem:    Cerebrovascular accident (CVA) due to embolism of right middle cerebral artery [facility])    Past Medical History  No past medical history on file. Past Surgical History  No past surgical history on file. 12/01/23 0829   OT Last Visit   OT Visit Date 12/01/23   Note Type   Note type Evaluation   Additional Comments Pt seen w/ PT to increase safety, decrease fall risk, and maximize functional/occupational performance 2* medical complexity which is a regression from pt's functional baseline. Pain Assessment   Pain Assessment Tool [facility] Pain Intervention(s) Repositioned; Ambulation/increased activity; Emotional support   Pain Rating: FLACC (Rest) - Face 0   Pain Rating: FLACC (Rest) - Legs 0   Pain Rating: FLACC (Rest) - Activity 0   Pain Rating: FLACC (Rest) - Cry 0   Pain Rating: FLACC (Rest) - Consolability 0   Score: FLACC (Rest) 0   Pain Rating: FLACC (Activity) - Face 0   Pain Rating: FLACC (Activity) - Legs 0   Pain Rating: FLACC (Activity) - Activity 0   Pain Rating: FLACC (Activity) - Cry 1   Pain Rating: FLACC (Activity) - Consolability 0   Score: FLACC (Activity) 1   Restrictions/Precautions   Weight Bearing Precautions Per Order No   Other Precautions Cognitive; Chair Alarm; Bed Alarm;Multiple lines;Telemetry; Fall Risk;Pain;Visual impairment   Home Living   Type of [address] [place] [address] One level   Bathroom Accessibility Accessible   Additional Comments Pt reports living with his girlfriend in a 1 story home with 1 BENNETT; will continue to assess as pt presenting as poor historian; no DME PTA   Prior Function   Level of [place] Independent with ADLs; Independent with functional mobility; Independent with IADLS   Lives With Significant other   IADLs Independent with driving; Independent with meal prep; Independent with medication management   Vocational Full time employment   Lifestyle   Autonomy PTA, pt was independent in ADLs/IADLs and functional mobility w/ no DME; (+) driving   Reciprocal Relationships Lives with his girlfriend   Service to Others Reports working for [address] Enjoys spending time with his dog   Subjective   Subjective "My dog can help me out."   ADL   Where Assessed Edge of bed   Eating Assistance 3  Moderate Assistance   Grooming Assistance 3  Moderate Assistance   UB Bathing Assistance 3  Moderate Assistance   LB Bathing Assistance 2  Maximal Assistance   UB Dressing Assistance 3  Moderate Assistance   LB Dressing Assistance 2  Maximal [facility] [address]  2  Maximal Assistance   Functional Assistance 2  Maximal Assistance   Bed Mobility   Supine to Sit 2  Maximal assistance   Additional items Assist x 2;HOB elevated; Bedrails; Increased time required;Verbal cues;LE management   Sit to Supine Unable to assess   Additional Comments Sat EOB with varying Min-Mod A for static sitting balance; @ end of session, pt left sitting upright in recliner with all functional needs in reach   Transfers   Sit to Stand 3  Moderate assistance   Additional items Assist x 2; Increased time required;Verbal cues   Stand to Sit 3  Moderate assistance   Additional items Assist x 2; Increased time required;Verbal cues   Additional Comments w/ b/l HHA and knee blocking   Functional Mobility   Functional Mobility 2  Maximal assistance   Additional Comments Pt completed few small steps from EOB <> drop arm recliner w/ Max A x2 w/ b/l HHA and verbal cues for proper technique/safety   Additional items Hand hold assistance   Balance   Static Sitting Poor +   Dynamic Sitting Poor   Static Standing Poor   Dynamic Standing Poor -   Ambulatory Poor -   Activity Tolerance   Activity Tolerance Treatment limited secondary to medical complications (Comment); Patient limited by pain; Patient limited by fatigue   Medical Staff [facility], DPT   Nurse Made Aware RN cleared   RUE Assessment   RUE Assessment   (Decreased command following to assess MMT however decreased shoulder flexion noted during functional activity; will continue to assess)   LUE Assessment   LUE Assessment X   (No AROM noted @ this time; will continue to assess)   Hand Function   Gross Motor Coordination Impaired   Fine Motor Coordination Impaired   Vision - Complex Assessment   Additional Comments Pt w/ eyes closed majority of the session, requiring verbal cues to open eyes; will continue to formally assess   Perception   Inattention/Neglect Cues to attend to left side of body;Cues to maintain midline in sitting   Cognition   Overall Cognitive Status Impaired   Arousal/Participation Responsive;Arousable; Cooperative;Lethargic   Attention Difficulty attending to directions   Orientation Level Oriented to person;Oriented to place;Oriented to situation   Memory Decreased recall of precautions;Decreased recall of recent events;Decreased short term memory;Decreased long term memory   Following Commands Follows one step commands with increased time or repetition   Comments Pt cooperative; requires verbal cues to attend to LUE during bed mobility; eyes closed majority of the session; decreased safety awareness/insight; able to follow simple1 step commands with increased time   Assessment   Limitation Decreased ADL status; Decreased UE ROM; Decreased UE strength;Decreased Safe judgement during ADL;Decreased cognition;Decreased endurance;Visual deficit; Decreased fine motor control;Decreased self-care trans;Decreased high-level ADLs   Prognosis Fair   Assessment Pt is a 57 yo male who presented to [address] [place] [address] with left weakness and right gaze preference in which imaging revealed right ICA to MCA occulsion. Pt transferred to [facility] for neuro IR eval in which pt s/p thrombectomy. Pt  has no past medical history on file.  Pt with active OT orders in which OT consulted to assess pt's functional status and occupational performance to determine safe d/c needs. Pt seen with PT to increase safety, decrease fall risk, and maximize functional/occupational performance 2* medical complexity which is a regression from pt's functional baseline. Pt lives with his girlfriend in a 1 SH with 1 BENNETT. PTA, pt was independent in ADLs/IADLs and reports using no DME for functional mobility. (+) driving. Currently, pt peforming bed mobility w/ Max A x2, functional transfers w/ Mod A x2, functional mobility w/ Max A x2, UB ADLs w/ Mod A, and LB ADLs w/ Max A. Pt demonstrates the following limitations/impairments which impact the pt's ability to engage in valued occupations: cognition, visual deficit, balance, endurance/activity tolerance, standing tolerance, functional reach, postural/trunk control, strength, safety awareness, and pain. From an OT standpoint, recommend discharge to post-acute rehab once medically stable. The patient's raw score on the -PAC Daily Activity Inpatient Short Form is 13. A raw score of less than 19 suggests the patient may benefit from discharge to post-acute rehabilitation services. Please refer to the recommendation of the Occupational Therapist for safe discharge planning. Pt would benefit from skilled OT services 3-5x/wk to address immediate acute care needs and underlying performance skills to promote safety, decrease fall risk, and enhance occupational performance to return to OF. Goals to be met within the next 10-14 days. Goals   Patient Goals To go home   LTG Time Frame 10-14   Long Term Goal #1 See OT goals listed below   Plan   Treatment Interventions ADL retraining;Visual perceptual retraining;Functional transfer training;UE strengthening/ROM; Endurance training;Cognitive reorientation;Patient/family training;Equipment evaluation/education; Neuromuscular reeducation; Fine motor coordination activities; Compensatory technique education;Continued evaluation; Energy conservation; Activityengagement   Goal Expiration Date 12/15/23   OT Frequency 3-5x/wk   Discharge Recommendation   Rehab Resource Intensity Level, OT I (Maximum Resource Intensity)   AM-PAC Daily Activity Inpatient   Lower Body Dressing 2   Bathing 2   Toileting 2   Upper Body Dressing 2   Grooming 2   Eating 3   Daily Activity Raw Score 13   Daily Activity Standardized Score (Calc for Raw Score >=11) 32.03   AM-PAC Applied Cognition Inpatient   Following a Speech/Presentation 1   Understanding Ordinary Conversation 2   Taking Medications 1   Remembering Where Things Are Placed or Put Away 1   Remembering List of 4-5 Errands 1   Taking Care of Complicated Tasks 1   Applied Cognition Raw Score 7   Applied Cognition Standardized Score 15.17   End of Consult   Education Provided Yes   Patient Position at End of Consult Bedside chair;Bed/Chair alarm activated; All needs within reach   Nurse Communication Nurse aware of consult     OT GOALS:    Pt will improve functional mobility during ADL/IADL/leisure tasks with Min A using AE/DME prn. Pt will improve activity tolerance/functional endurance during ADL/IADL/leisure tasks for at least 20 minutes to improve occupational performance and engagement in valued occupations using AE/DME prn. Pt will be attentive 100% of the time during ongoing visual screens to rule out any further visual impairments or changes. Pt will engage in ongoing functional/formal cognitive assessments to assist with safe d/c planning and increase safety during functional tasks. Pt will be A/Ox4 100% of the time and follow at least 2 step commands during functional activities with no verbal cues to increase attention, safety, and engagement in ADL/IADL/leisure tasks. Pt will improve dynamic standing balance for at least 15 minutes with Min A during functional tasks to decrease fall risk and improve independence and engagement in ADL/IADL/leisure activities.     Pt will complete functional transfers on and off all surfaces used in daily routines with Min A for safety to maximize functional/occupational performance. Pt will complete all bed mobility tasks with Min A to serve as a prerequisite for EOB/OOB ADL/IADL/leisure tasks, optimize positioning/comfort, and increase functional independence. Pt will independently demonstrate good carryover of safety precautions and education/training during ADL/IADL/leisure tasks with energy conservation techniques s/p skilled instruction without verbal cues. Pt will increase UE/LE MMT strength by 1/2 grade to improve bilateral coordination in ADL/IADL/leisure tasks with S. Pt will complete UB ADL tasks with Mod I using AE/DME prn to increase functional independence in ADL/IADL/leisure tasks. Pt will complete LB ADL tasks with Min A using AE/DME prn to increase functional independence in ADL/IADL/leisure tasks. Pt will complete toileting tasks with Min A and good hygiene/thoroughness using AE/DME prn to increase functional independence. Pt will independently identify and utilize 2-3 positive coping strategies to enhance overall wellbeing and engagement in valued occupations.       Calli Mi MS, OTR/L

## 2023-12-01 NOTE — PROGRESS NOTES
[address]  Progress Note: Critical Care  Name: Sharyle Emery 58 y.o. male I MRN: [id number]  Unit/Bed#: ICU 03 I Date of Admission: 11/30/2023   Date of Service: 12/1/2023 I Hospital Day: 1    Assessment/Plan   Neuro:   Diagnosis: R MCA CVA s/p TNK and thrombectomy, acute encephalopathy  Plan: Continue propofol/PRN fentanyl for ventilatory comfort- gradual decreases as ventilator needs improve, continue stroke pathway- needs MRI and 24 hour post TNK CT, frequent neuro checks, appreciate neurology/neurosurgery recommendations, PMR consult pending  CV:   Diagnosis: Hypotension/hypertension  Plan: Goal systolic pressure between 100-140, Cardene/phenylephrine available pending patient's agitation, PRN hydralazine/labetalol avaiable, ECHO pending, begin antiplatelet agents when appropriate with neurosurgery, begin statin when enteral access established  Pulm:  Diagnosis: Acute hypoxic respiratory failure, moderate persistent asthma, detectable carbon monoxide level  Plan: Continue mechanical ventilation for now, attempt to decrease PEEP as able, respiratory protocol, resume home Singulair when able, consider scheduled nebulizers  GI:   Plan: Continue IV PPI, if unable to liberate, would establish enteral access, begin bowel regimen when able  :   Diagnosis: Hematuria- improving  Plan: Follow urine output closely, would maintain herrera catheter until 24 hours post TNK  F/E/N:   Plan: Continue gentle IVF for now, if unable to liberate would establish enteral access, speech consult when appropriate  Heme/Onc:   Plan: Begin chemical DVT prophylaxis when appropriate  Endo:   Plan: Check TSH and A1c, follow blood sugar q6 while NPO  ID:   Plan: Follow temperature and white count  MSK/Skin:   Plan: Frequent turning and repositioning, PT/OT when approrpriate    Disposition: Critical care    ICU Core Measures     Vented Patient  VAP Bundle  VAP bundle ordered     A: Assess, Prevent, and Manage Pain Has pain been assessed? Yes  Need for changes to pain regimen? No   B: Both Spontaneous Awakening Trials (SATs) and Spontaneous Breathing Trials (SBTs) Plan to perform spontaneous awakening trial today? Yes   Plan to perform spontaneous breathing trial today? Yes   Obvious barriers to extubation? No   C: Choice of Sedation RASS Goal: N/A patient not on sedation  Need for changes to sedation or analgesia regimen? NA   D: Delirium CAM-ICU: Unable to perform secondary to Acute cognitive dysfunction   E: Early Mobility  Plan for early mobility? Yes   F: Family Engagement Plan for family engagement today? Yes       Review of Invasive Devices: Arturo Plan: Continue for accurate I/O monitoring for 48 hours    Cele Plan: Keep arterial line for hemodynamic monitoring and frequent ABGs    Prophylaxis:  VTE Contraindicated secondary to: recent TNK administration   Stress Ulcer  not ordered        Significant 24hr Events     24hr events: Patient required bronchoscopy shortly after arrival to the critical care unit with removal of large mucous plug from upper right lobe, slowly weaning sedation and decreasing vent settings, prior to sedation for bronchoscopy was responding to request with right extremities     Subjective     Review of Systems   Unable to perform ROS: Intubated        Objective                            Vitals I/O      Most Recent Min/Max in 24hrs   Temp 98.5 °F (36.9 °C) Temp  Min: 98.5 °F (36.9 °C)  Max: 98.5 °F (36.9 °C)   Pulse 70 Pulse  Min: 68  Max: 114   Resp 15 Resp  Min: 13  Max: 50   BP (!) 82/56 BP  Min: 72/51  Max: 195/98   O2 Sat 96 % SpO2  Min: 82 %  Max: 98 %      Intake/Output Summary (Last 24 hours) at 12/1/2023 0030  Last data filed at 11/30/2023 2200  Gross per 24 hour   Intake 1494.24 ml   Output 1230 ml   Net 264.24 ml       Diet NPO    Invasive Monitoring           Physical Exam   Physical Exam  Eyes:      Pupils: Pupils are equal, round, and reactive to light.    Skin: General: Skin is warm and dry. HENT:      Head: Normocephalic and atraumatic. Mouth/Throat:      Mouth: Mucous membranes are moist.   Cardiovascular:      Rate and Rhythm: Normal rate and regular rhythm. Pulses: Normal pulses. Musculoskeletal:         General: No signs of injury. Comments: Right femoral access site with dressing present, no significant drainage, no hematoma  Right DP 2+   Abdominal: General: There is no distension. Palpations: Abdomen is soft. Constitutional:       General: He is not in acute distress. Appearance: He is well-developed and well-nourished. Interventions: He is sedated, intubated and restrained. Pulmonary:      Effort: Pulmonary effort is normal. He is intubated. Breath sounds: Normal breath sounds. Comments: Small amount of white secretions inline suction  Neurological:      GCS: GCS eye subscore is 1. GCS verbal subscore is 1. GCS motor subscore is 4. Comments: Withdrawal to noxious stimuli in right extremities  Could not elicit motor function in left extremities  Exam performed on propofol   Genitourinary/Anorectal:     Comments: Morris in place with yellow urine  Morris present. Diagnostic Studies      EKG: Sinus rhythm  Imaging:  I have personally reviewed pertinent reports.    and I have personally reviewed pertinent films in PACS     Medications:  Scheduled PRN   atorvastatin, 40 mg, QPM  chlorhexidine, 15 mL, Q12H [address]      fentanyl citrate (PF), 50 mcg, Q1H PRN  hydrALAZINE, 10 mg, Q4H PRN  labetalol, 10 mg, Q4H PRN       Continuous    niCARdipine, 5-15 mg/hr, Last Rate: Stopped (11/30/23 2030)  phenylephine,  mcg/min, Last Rate: 90 mcg/min (11/30/23 2156)  propofol, 5-50 mcg/kg/min, Last Rate: 45 mcg/kg/min (12/01/23 0021)  sodium chloride, 125 mL/hr, Last Rate: 125 mL/hr (11/30/23 2019)         Labs:    CBC    Recent Labs     11/30/23  1653   WBC 9.45   HGB 14.4   HCT 42.9        BMP    Recent Labs 11/30/23  1653   SODIUM 134*   K 4.6      CO2 26   AGAP 7   BUN 26*   CREATININE 1.14   CALCIUM 8.5       Coags    Recent Labs     11/30/23  1653   INR 0.99   PTT 22*        Additional Electrolytes  No recent results       Blood Gas    No recent results  Recent Labs     11/30/23  1653   PHVEN 7.402*   RSK0AGS 44.0   PO2VEN 61.9*   [id number] 26.8   BEVEN 1.6   [id number] 90.1*    LFTs  No recent results    Infectious  No recent results  Glucose  Recent Labs     11/30/23  1653   GLUC 96               Critical Care Time Delivered : Upon my evaluation, this patient had a high probability of imminent or life-threatening deterioration due to CVA, stroke, respiratory failure, which required my direct attention, intervention, and personal management. I have personally provided 30 minutes of critical care time, exclusive of procedures, teaching, family meetings, and any prior time recorded by providers other than myself.      OSCAR Lewis

## 2023-12-01 NOTE — RESPIRATORY THERAPY NOTE
RT Protocol Note  Christine Montero 58 y.o. male MRN: [id number]  Unit/Bed#: ICU 03 Encounter: [id number]    Assessment    Principal Problem:    Cerebrovascular accident (CVA) due to embolism of right middle cerebral artery [facility])      Home Pulmonary Medications:  N/A       No past medical history on file. Social History     Socioeconomic History    Marital status: Single     Spouse name: Not on file    Number of children: Not on file    Years of education: Not on file    Highest education level: Not on file   Occupational History    Not on file   Tobacco Use    Smoking status: Not on file    Smokeless tobacco: Not on file   Substance and Sexual Activity    Alcohol use: Not on file    Drug use: Not on file    Sexual activity: Not on file   Other Topics Concern    Not on file   Social History Narrative    Not on file     Social Determinants of Health     Financial Resource Strain: Not on file   Food Insecurity: Not on file   Transportation Needs: Not on file   Physical Activity: Not on file   Stress: Not on file   Social Connections: Not on file   Intimate Partner Violence: Not on file   Housing Stability: Not on file       Subjective         Objective    Physical Exam:   Assessment Type: Assess only  General Appearance: Sedated  Respiratory Pattern: Assisted  Chest Assessment: Chest expansion symmetrical  Bilateral Breath Sounds: Diminished, Clear  Cough: Productive  Suction: ET Tube  O2 Device: Ventilator    Vitals:  Blood pressure (!) 82/56, pulse 66, temperature 98.7 °F (37.1 °C), temperature source Oral, resp. rate 19, SpO2 95 %. Imaging and other studies: I have personally reviewed pertinent reports. and I have personally reviewed pertinent films in PACS    O2 Device: Ventilator     Plan    Respiratory Plan: Vent/NIV/HFNC        Resp Comments: Pt. found on CMV/PC mode and tolerating well at this time. Will continue to monitor pt per protocol.

## 2023-12-01 NOTE — PLAN OF CARE
Problem: PHYSICAL THERAPY ADULT  Goal: Performs mobility at highest level of function for planned discharge setting. See evaluation for individualized goals. Description: Treatment/Interventions: OT, Spoke to case management, Spoke to nursing, Gait training, Bed mobility, Patient/family training, Endurance training, LE strengthening/ROM, Functional transfer training          See flowsheet documentation for full assessment, interventions and recommendations. Note: Prognosis: Guarded  Problem List: Decreased strength, Decreased range of motion, Decreased endurance, Impaired balance, Decreased mobility, Decreased coordination, Decreased cognition, Impaired judgement, Decreased safety awareness, Pain  Assessment: Pt is 58 y.o. male seen for PT evaluation s/p admit to [address] [place] [address] on 11/30/2023 w/ Cerebrovascular accident (CVA) due to embolism of right middle cerebral artery ([address] PT consulted to assess pt's functional mobility and d/c needs. Order placed for PT eval and tx, w/ up w/ A order. Comorbidities affecting pt's physical performance at time of assessment include:  has no past medical history on file. PTA, pt was requiring A for mobility, ambulates unrestricted distances and all terrain, and works full time. Personal factors affecting pt at time of IE include: inability to ambulate household distances, decreased cognition, limited home support, decreased initiation and engagement, inability to perform current job functions, unable to perform physical activity, limited insight into impairments, inability to perform IADLs, and inability to perform ADLs.  Please find objective findings from PT assessment regarding body systems outlined above with impairments and limitations including weakness, impaired balance, decreased endurance, impaired coordination, gait deviations, pain, decreased activity tolerance, decreased functional mobility tolerance, decreased safety awareness, impaired judgement, fall risk, and decreased cognition. Pt required LE management and A to upright trunk during bed mobility with noted L sided weakness. Required increased time to follow all single step instruction. Noted ability to accept weight on LLE without buckling although required A to weight shift and advance B/L LE during WB activity. The following objective measures performed on IE also reveal limitations: The patient's AM-PAC Basic Mobility Inpatient Short Form Raw Score is 8. A standardized score less than 42.9 suggests the patient may benefit from discharge to post-acute rehabilitation services. Please also refer to the recommendation of the Physical Therapist for safe discharge planning. Pt's clinical presentation is currently unstable/unpredictable seen in pt's presentation of critical care monitoring. Pt to benefit from continued PT tx to address deficits as defined above and maximize level of functional independent mobility and consistency. From PT/mobility standpoint, recommendation at time of d/c would be post acute rehabilitation services pending progress in order to facilitate return to PLOF. Barriers to Discharge: Decreased caregiver support, Inaccessible home environment     Rehab Resource Intensity Level, PT: I (Maximum Resource Intensity)    See flowsheet documentation for full assessment.

## 2023-12-01 NOTE — RESPIRATORY THERAPY NOTE
RT Protocol Note  Margareth Bartlett 58 y.o. male MRN: [id number]  Unit/Bed#: ICU 03 Encounter: [id number]    Assessment    Principal Problem:    Cerebrovascular accident (CVA) due to embolism of right middle cerebral artery [facility])      Home Pulmonary Medications:  N/A       No past medical history on file. Social History     Socioeconomic History    Marital status: Single     Spouse name: Not on file    Number of children: Not on file    Years of education: Not on file    Highest education level: Not on file   Occupational History    Not on file   Tobacco Use    Smoking status: Not on file    Smokeless tobacco: Not on file   Substance and Sexual Activity    Alcohol use: Not on file    Drug use: Not on file    Sexual activity: Not on file   Other Topics Concern    Not on file   Social History Narrative    Not on file     Social Determinants of Health     Financial Resource Strain: Not on file   Food Insecurity: Not on file   Transportation Needs: Not on file   Physical Activity: Not on file   Stress: Not on file   Social Connections: Not on file   Intimate Partner Violence: Not on file   Housing Stability: Not on file       Subjective         Objective    Physical Exam:   Assessment Type: Assess only  General Appearance: Drowsy  Respiratory Pattern: Normal  Chest Assessment: Chest expansion symmetrical  Bilateral Breath Sounds: Diminished, Clear  Cough: Non-productive, Moist  Suction: ET Tube  O2 Device: nasal cannula    Vitals:  Blood pressure (!) 82/56, pulse 68, temperature 98.1 °F (36.7 °C), temperature source Oral, resp. rate 12, SpO2 93 %. Imaging and other studies: I have personally reviewed pertinent reports. O2 Device: nasal cannula     Plan    Respiratory Plan: Vent/NIV/HFNC  Airway Clearance Plan: Incentive Spirometer     Resp Comments: Pt. evaluated at bedside per Airway Clearance protocol. Pt's breath sounds are diminished but clear bilaterally but pt does have moist cough at this time. Pt. instructed on IS and demonstrated use of the device fairly well. Will continue IS W/A per Airway Clearance protocol.

## 2023-12-01 NOTE — UTILIZATION REVIEW
Initial Clinical Review    Pt initially presented to [address] [place] [address]. Pt was transferred by EMS to [address] [place] [address] for a higher level of care in neuro ICU. Admission: Date/Time/Statement:   Admission Orders (From admission, onward)       Ordered        11/30/23 1927  Inpatient Admission  Once                          Orders Placed This Encounter   Procedures    Inpatient Admission     Standing Status:   Standing     Number of Occurrences:   1     Order Specific Question:   Level of Care     Answer:   Critical Care [15]     Order Specific Question:   Estimated length of stay     Answer:   More than 2 Midnights     Order Specific Question:   Certification     Answer:   I certify that inpatient services are medically necessary for this patient for a duration of greater than two midnights. See H&P and MD Progress Notes for additional information about the patient's course of treatment. Initial Presentation: 58 y.o. male who presented as a transfer by EMS to [address] [place] [address] as a direct admission. Inpatient admission for evaluation and treatment of CVA. PMHx: HTN, asthma. Presented w/ L-sided weakness, R gaze preference. NIHSS 11 on initial presentation. Received TNK @ 1711. Imaging concerning for R ICA to MCA occlusion, transferred for neurosurgery eval. Thrombectomy performed, pt intubated s/t agitation. Post-procedure pt significantly HTN and hypoxic. On exam, dry mucous membranes, decreased breath sounds, intubated, GCS 8, inconsistent flexion on L extremities, R side responds to request, herrera w/ blood tinged urine. Plan: admit to neuro ICU, continue propofol gtt while intubated, neuro checks, MRI brain, repeat CTH 24hr post TNK, continue cardene gtt w/ goal SBP <140, echo, wean vent as able, IV PPI , NPO, IVF, Trend labs, replete electrolytes as needed; BG checks q6h, maintain a-line. Neurosurgery, Neurology consulted.     Neurology: TNK administered at [phone] (delayed due to BP above goal). Repeat CTH 24h post-TNK, hold AC/AP. Statin, MRI brain, echo, telemetry, check lipid panel, HgbA1c, TSH, neuro checks. BP goal permissive HTN w/ max 180/105. Neurosurgery: Pt w/ symptomatic R ICA/MCA occlusion. Plan: cerebral angiography w/ mechanical thrombectomy. He understands risks and has elected proceed. Date: 12/01/23   Day 2: Patient required bronchoscopy shortly after arrival to the critical care unit with removal of large mucous plug from upper right lobe. Overnight, trial liberation from mechanical ventilation. Patient impulsive and difficult to verbally re-direct, will start Precedex overnight to enforce sleep/wake. On exam, drowsy but easily arousable; L facial droop, pronator drift absent RUE; weakness in LLE/LUE, but follows commands. Plan: continue current meds, IVF, ricki gtt prn, dysphagia diet, telemetry, continuous pulse ox, DW, I&O, neuro checks, supportive care. Trend labs, replete electrolytes as needed.        Wt Readings from Last 1 Encounters:   11/30/23 101 kg (221 lb 12.5 oz)     Additional Vital Signs:   Date/Time Temp Pulse Resp BP MAP (mmHg) Arterial Line BP MAP SpO2   12/01/23 1200 98 °F (36.7 °C) 66 27 Abnormal  119/67 85 90/68 80 mmHg 97 %   12/01/23 1119 -- 68 31 Abnormal  115/73 87 82/74 78 mmHg 93 %   12/01/23 1100 -- 62 27 Abnormal  -- -- 94/66 78 mmHg 94 %   12/01/23 1000 -- 58 -- 82/56 Abnormal  -- -- -- --   12/01/23 0900 -- 58 25 Abnormal  -- -- 104/56 74 mmHg 92 %     Date/Time Temp Pulse Resp BP MAP (mmHg) Arterial Line BP MAP SpO2 Calculated FIO2 (%) - Nasal Cannula Nasal Cannula O2 Flow Rate (L/min) O2 Device   12/01/23 0800 97.8 °F (36.6 °C) 62 52 Abnormal  -- -- 108/54 70 mmHg 86 % Abnormal  -- -- --   12/01/23 0700 -- 62 27 Abnormal  -- -- 90/78 84 mmHg 92 % -- -- --   12/01/23 0600 -- 62 24 Abnormal  -- -- 130/62 82 mmHg 91 % -- -- --   12/01/23 0518 -- -- -- -- -- 118/58 74 mmHg -- -- -- --   12/01/23 0515 -- 68 12 -- -- 84/50 60 mmHg   Abnormal  93 % -- -- --   12/01/23 0500 -- 68 25 Abnormal  -- -- 136/66 86 mmHg 94 % -- -- --   12/01/23 0400 98.1 °F (36.7 °C) 66 25 Abnormal  -- -- 124/60 78 mmHg 94 % 40 5 L/min Nasal cannula   12/01/23 0315 -- 68 26 Abnormal  -- -- 138/68 90 mmHg 94 % -- -- --   12/01/23 0245 -- 72 20 -- -- 128/62 82 mmHg 91 % -- -- --   12/01/23 0215 -- 86 31 Abnormal  -- -- 130/64 86 mmHg 92 % -- -- --   12/01/23 0205 -- -- -- -- -- -- -- 93 % 40 5 L/min Nasal cannula   12/01/23 0201 -- -- -- -- -- 142/74 96 mmHg -- -- -- --   12/01/23 0145 -- 72 42 Abnormal  -- -- 106/60 76 mmHg 92 % -- -- --   12/01/23 0115 -- 66 19 -- -- 112/62 80 mmHg 95 % -- -- --   12/01/23 0045 -- 68 19 -- -- 120/64 84 mmHg 95 % -- -- --   12/01/23 0015 -- 68 15 -- -- 116/64 82 mmHg 96 % -- -- --   12/01/23 0000 98.7 °F (37.1 °C) 66 15 -- -- 110/62 80 mmHg 96 % -- -- --   11/30/23 2345 -- 68 15 -- -- 110/62 80 mmHg 97 % -- -- --   11/30/23 2315 -- 70 15 -- -- 106/62 78 mmHg 96 % -- -- --   11/30/23 2250 -- -- -- -- -- -- -- 97 % -- -- --   11/30/23 2245 -- 70 32 Abnormal  -- -- 106/64 80 mmHg 98 % -- -- --   11/30/23 2215 -- 74 36 Abnormal  -- -- 92/56 70 mmHg 98 % -- -- --   11/30/23 2156 -- 74 23 Abnormal  -- -- 132/70 90 mmHg 98 % -- -- --   11/30/23 2145 -- 78 23 Abnormal  -- -- 102/62 74 mmHg 98 % -- -- --   11/30/23 2126 -- 78 42 Abnormal  -- -- 72/46 56 mmHg   Abnormal  97 % -- -- --   11/30/23 2115 -- 86 41 Abnormal  82/56 Abnormal  62 Abnormal  170/78 110 mmHg 97 % -- -- --   11/30/23 2100 -- 68 22 81/54 Abnormal  60 Abnormal  76/44 56 mmHg   Abnormal  95 % -- -- --   11/30/23 2045 -- 76 34 Abnormal  -- -- 120/54 74 mmHg 87 % Abnormal  -- -- --   11/30/23 2040 -- 80 36 Abnormal  95/60 72 94/56 66 mmHg 87 % Abnormal  -- -- --   11/30/23 2031 -- 82 35 Abnormal  -- -- 92/50 66 mmHg 92 % -- -- --   11/30/23 2030 -- 84 41 Abnormal  72/51 Abnormal  56 Abnormal  78/44 54 mmHg   Abnormal  90 % -- -- --   11/30/23 2015 -- 84 46 Abnormal  108/63 80 128/64 84 mmHg 82 % Abnormal  -- -- --   11/30/23 2005 -- 106 Abnormal  39 Abnormal  192/91 Abnormal  122 182/74 102 mmHg 89 % Abnormal  -- -- --   11/30/23 2000 -- 112 Abnormal  40 Abnormal  192/91 Abnormal  122 194/80 110 mmHg 84 % Abnormal  -- -- --   11/30/23 1955 -- 102 29 Abnormal  -- -- 180/72 102 mmHg 87 % Abnormal  -- -- --   11/30/23 1950 -- 114 Abnormal  50 Abnormal  194/88 Abnormal  135 206/82 120 mmHg 90 % -- -- --   11/30/23 1946 -- -- -- -- -- -- -- 92 % -- -- --   11/30/23 1940 98.5 °F (36.9 °C) 102 26 Abnormal  180/84 Abnormal  118 -- -- 94 % -- -- Ventilator     Nahma Coma Scale  Date and Time Eye Opening Best Verbal Response Best Motor Response Bossman Coma Scale Score   12/01/23 0700 4 5 6 15   12/01/23 0600 4 5 6 15   12/01/23 0500 4 5 6 15   12/01/23 0400 4 5 6 15   12/01/23 0315 4 4 6 14   12/01/23 0245 4 4 6 14   12/01/23 0215 4 4 6 14   12/01/23 0145 3 1 6 10   12/01/23 0115 2 1 4 7   12/01/23 0045 2 1 4 7   12/01/23 0015 2 1 4 7   11/30/23 2345 3 1 6 10   11/30/23 2315 3 1 6 10   11/30/23 2245 3 1 6 10   11/30/23 2215 3 1 6 10   11/30/23 2145 3 1 6 10   11/30/23 2115 3 1 6 10   11/30/23 2100 3 1 6 10   11/30/23 2045 3 1 6 10   11/30/23 2030 3 1 6 10   11/30/23 2015 3 1 6 10   11/30/23 2000 3 1 6 10   11/30/23 1955 3 1 6 10   11/30/23 1940 3 1 6 10   11/30/23 1726 4 5 6 15   11/30/23 1715 4 5 6 15   11/30/23 1645 4 4 6 14   11/30/23 1638 4 5 6 15       Pertinent Labs/Diagnostic Test Results:   12/1 - Echo    Left Ventricle: Left ventricular cavity size is normal. Wall thickness is mildly increased. The left ventricular ejection fraction is 65%. Systolic function is normal. Wall motion is normal. Diastolic function is normal for age. Right Ventricle: Right ventricular cavity size is normal. Systolic function is normal.    Left Atrium: The atrium is mildly dilated. Mitral Valve: There is mild annular calcification.     No significant valvular disease    Technically difficult study. XR chest portable ICU   Final Result by Roney Smith MD (12/01 1042)         Near complete resolution of right-sided atelectatic change. Small right pleural effusion. Indeterminate hazy airspace opacity in the left parahilar midlung field region. Workstation performed: [id number]         XR chest portable ICU   Final Result by Roney Smith MD (12/01 1004)      Bandlike opacity in the right mid to upper lung field with relative lucency of the right lower lung field is consistent with at least segmental atelectasis. There is relative increased density of the right hilum. Recommend CT for further evaluation. Workstation performed: [id number]         CT head wo contrast   Final Result by Erinn Alston MD (11/30 2052)      Reidentified subtle areas of loss of gray-white differentiation involving the right MCA territory. However, enhancement of the right middle cerebral artery M1 segment is now visible, from prior intravenous contrast administration for a CTA head and neck    performed few hours prior. An MRI brain could be performed for further evaluation.                   Workstation performed: [id number]           Results from last 7 days   Lab Units 11/30/23  1653   SARS-COV-2  Negative     Results from last 7 days   Lab Units 12/01/23  0605 11/30/23  1653   WBC Thousand/uL 13.95* 9.45   HEMOGLOBIN g/dL 13.0 14.4   HEMATOCRIT % 39.4 42.9   PLATELETS Thousands/uL 182 204         Results from last 7 days   Lab Units 12/01/23  0605 11/30/23  1653   SODIUM mmol/L 139 134*   POTASSIUM mmol/L 3.6 4.6   CHLORIDE mmol/L 107 101   CO2 mmol/L 26 26   ANION GAP mmol/L 6 7   BUN mg/dL 21 26*   CREATININE mg/dL 1.13 1.14   EGFR ml/min/1.73sq m 68 67   CALCIUM mg/dL 7.2* 8.5   MAGNESIUM mg/dL 1.8*  --    PHOSPHORUS mg/dL 3.8  --      Results from last 7 days   Lab Units 12/01/23  0605   AST U/L 15   ALT U/L 17   ALK PHOS U/L 43   TOTAL PROTEIN g/dL 5.3*   ALBUMIN g/dL 3.3*   TOTAL BILIRUBIN mg/dL 0.72     Results from last 7 days   Lab Units 12/01/23  0605 12/01/23  0108 11/30/23  1727 11/30/23  1636   POC GLUCOSE mg/dl 91 83 95 95     Results from last 7 days   Lab Units 12/01/23  0605 11/30/23  1653   GLUCOSE RANDOM mg/dL 97 96         Results from last 7 days   Lab Units 12/01/23  0605   HEMOGLOBIN A1C % 5.6   EAG mg/dl 114     Results from last 7 days   Lab Units 12/01/23  0606   PH ART  7.394   PCO2 ART mm Hg 40.6   PO2 ART mm Hg 77.4   HCO3 ART mmol/L 24.2   BASE EXC ART mmol/L -0.6   O2 CONTENT ART mL/dL 18.6   O2 HGB, ARTERIAL % 94.2   ABG SOURCE  Line, Arterial     Results from last 7 days   Lab Units 11/30/23  1653   PH GARETT  7.402*   PCO2 GARETT mm Hg 44.0   PO2 GARETT mm Hg 61.9*   HCO3 GARETT mmol/L 26.8   BASE EXC GARETT mmol/L 1.6   O2 CONTENT GARETT ml/dL 19.9   O2 HGB, VENOUS % 90.1*             Results from last 7 days   Lab Units 11/30/23  1653   HS TNI 0HR ng/L 5         Results from last 7 days   Lab Units 11/30/23  1653   PROTIME seconds 13.4   INR  0.99   PTT seconds 22*     Results from last 7 days   Lab Units 12/01/23  0605   TSH 3RD GENERATON uIU/mL 2.337     Results from last 7 days   Lab Units 11/30/23  1653   INFLUENZA A PCR  Negative   INFLUENZA B PCR  Negative   RSV PCR  Negative       Present on Admission:   Cerebrovascular accident (CVA) due to embolism of right middle cerebral artery (HCC)      Admitting Diagnosis: Stroke (cerebrum) ([address]) [I63.9]  Age/Sex: 58 y.o. male  Admission Orders:  NPO. BG checks q6h. Telemetry. Continuous Pulse Ox.  DW. I&O. SCDs. Q1h neuro checks. Baseline NIH stroke scale on admission, reassess Q24H x 2 D. Nursing dysphagia assessment prior to staring diet.     Scheduled Medications:  atorvastatin, 40 mg, Per NG Tube, QPM  chlorhexidine, 15 mL, Mouth/Throat, Q12H LUIZ  magnesium sulfate, 2 g, Intravenous, Once  melatonin, 6 mg, Oral, HS  pantoprazole, 40 mg, Intravenous, Q24H LUIZ  potassium chloride, 20 mEq, Intravenous, Q2H    Continuous IV Infusions:  niCARdipine, 5-15 mg/hr, Intravenous, Titrated  phenylephine,  mcg/min, Intravenous, Titrated  sodium chloride 0.9%, 125 mL/hr, Intravenous, Continuous    PRN Meds:  albuterol, 2.5 mg, Nebulization, Q4H PRN  fentanyl citrate, 50 mcg, Intravenous; 11/30 x2, 12/1 x1  hydrALAZINE, 10 mg, Intravenous, Q4H PRN  labetalol, 10 mg, Intravenous, Q4H PRN; 11/30 x1    Discontinued Meds:  dexmedeTOMIDine (Precedex) 400 mcg in sodium chloride 0.9% 100 mL  Rate: 2.5-37.9 mL/hr Dose: 0.1-1.5 mcg/kg/hr  Weight Dosing Info: 101 kg  Freq: Titrated Route: IV  Last Dose: Stopped (12/01/23 0715)  Start: 12/01/23 0215 End: 12/01/23 0700  propofol (DIPRIVAN) 1000 mg in 100 mL infusion (premix)  Rate: 3-30.3 mL/hr Dose: 5-50 mcg/kg/min  Weight Dosing Info: 101 kg  Freq: Titrated Route: IV  Last Dose: Stopped (12/01/23 0205)  Start: 11/30/23 2015 End: 12/01/23 0214        IP CONSULT TO PHYSICAL MEDICINE REHAB  IP CONSULT TO CASE MANAGEMENT  IP CONSULT TO NEUROLOGY    Network Utilization Review Department  ATTENTION: Please call with any questions or concerns to [phone] and carefully listen to the prompts so that you are directed to the right person. All voicemails are confidential.   For Discharge needs, contact Care Management DC Support Team at [phone] opt. 2  Send all requests for admission clinical reviews, approved or denied determinations and any other requests to dedicated fax number below belonging to the campus where the patient is receiving treatment.  List of dedicated fax numbers for the Facilities:  [place] DENIALS (Administrative/Medical Necessity) [phone]   DISCHARGE SUPPORT TEAM (NETWORK) [id number] (Maternity/NICU/Pediatrics) [phone] [address] [phone]   [facility] [phone]   [address] [place], [place] [address]e N [phone] [address] [address] [facility] [address] [place] [address] [phone]

## 2023-12-01 NOTE — ASSESSMENT & PLAN NOTE
Regimen of lisinopril 10 mg QD has been held given hypotension previously reuiqring pressor support  Will continue to hold as patient currently not profoundly hypertensive and want to be cautious given his need for pressors previously

## 2023-12-01 NOTE — PLAN OF CARE
Problem: Prexisting or High Potential for Compromised Skin Integrity  Goal: Skin integrity is maintained or improved  Description: INTERVENTIONS:  - Identify patients at risk for skin breakdown  - Assess and monitor skin integrity  - Assess and monitor nutrition and hydration status  - Monitor labs   - Assess for incontinence   - Turn and reposition patient  - Assist with mobility/ambulation  - Relieve pressure over bony prominences  - Avoid friction and shearing  - Provide appropriate hygiene as needed including keeping skin clean and dry  - Evaluate need for skin moisturizer/barrier cream  - Collaborate with interdisciplinary team   - Patient/family teaching  - Consider wound care consult   Outcome: Progressing     Problem: PAIN - ADULT  Goal: Verbalizes/displays adequate comfort level or baseline comfort level  Description: Interventions:  - Encourage patient to monitor pain and request assistance  - Assess pain using appropriate pain scale  - Administer analgesics based on type and severity of pain and evaluate response  - Implement non-pharmacological measures as appropriate and evaluate response  - Consider cultural and social influences on pain and pain management  - Notify physician/advanced practitioner if interventions unsuccessful or patient reports new pain  Outcome: Progressing     Problem: PAIN - ADULT  Goal: Verbalizes/displays adequate comfort level or baseline comfort level  Description: Interventions:  - Encourage patient to monitor pain and request assistance  - Assess pain using appropriate pain scale  - Administer analgesics based on type and severity of pain and evaluate response  - Implement non-pharmacological measures as appropriate and evaluate response  - Consider cultural and social influences on pain and pain management  - Notify physician/advanced practitioner if interventions unsuccessful or patient reports new pain  Outcome: Progressing     Problem: INFECTION - ADULT  Goal: Absence or prevention of progression during hospitalization  Description: INTERVENTIONS:  - Assess and monitor for signs and symptoms of infection  - Monitor lab/diagnostic results  - Monitor all insertion sites, i.e. indwelling lines, tubes, and drains  - Monitor endotracheal if appropriate and nasal secretions for changes in amount and color  - [place] appropriate cooling/warming therapies per order  - Administer medications as ordered  - Instruct and encourage patient and family to use good hand hygiene technique  - Identify and instruct in appropriate isolation precautions for identified infection/condition  Outcome: Progressing  Goal: Absence of fever/infection during neutropenic period  Description: INTERVENTIONS:  - Monitor WBC    Outcome: Progressing     Problem: SAFETY ADULT  Goal: Patient will remain free of falls  Description: INTERVENTIONS:  - Educate patient/family on patient safety including physical limitations  - Instruct patient to call for assistance with activity   - Consult OT/PT to assist with strengthening/mobility   - Keep Call bell within reach  - Keep bed low and locked with side rails adjusted as appropriate  - Keep care items and personal belongings within reach  - Initiate and maintain comfort rounds  - Make Fall Risk Sign visible to staff  - Offer Toileting every  Hours, in advance of need  - Initiate/Maintain alarm  - Obtain necessary fall risk management equipment:   - Apply yellow socks and bracelet for high fall risk patients  - Consider moving patient to room near nurses station  Outcome: Progressing  Goal: Maintain or return to baseline ADL function  Description: INTERVENTIONS:  -  Assess patient's ability to carry out ADLs; assess patient's baseline for ADL function and identify physical deficits which impact ability to perform ADLs (bathing, care of mouth/teeth, toileting, grooming, dressing, etc.)  - Assess/evaluate cause of self-care deficits   - Assess range of motion  - Assess patient's mobility; develop plan if impaired  - Assess patient's need for assistive devices and provide as appropriate  - Encourage maximum independence but intervene and supervise when necessary  - Involve family in performance of ADLs  - Assess for home care needs following discharge   - Consider OT consult to assist with ADL evaluation and planning for discharge  - Provide patient education as appropriate  Outcome: Progressing  Goal: Maintains/Returns to pre admission functional level  Description: INTERVENTIONS:  - Perform AM-PAC 6 Click Basic Mobility/ Daily Activity assessment daily.  - Set and communicate daily mobility goal to care team and patient/family/caregiver. - Collaborate with rehabilitation services on mobility goals if consulted  - Perform Range of Motion  times a day. - Reposition patient every  hours.   - Dangle patient  times a day  - Stand patient  times a day  - Ambulate patient  times a day  - Out of bed to chair  times a day   - Out of bed for meals  times a day  - Out of bed for toileting  - Record patient progress and toleration of activity level   Outcome: Progressing

## 2023-12-01 NOTE — CASE MANAGEMENT
Case Management Assessment & Discharge Planning Note    Patient name Maurice Aviles  Location ICU 03/ICU 03 MRN [id number]  : 1960 Date 2023       Current Admission Date: 2023  Current Admission Diagnosis:Stroke due to R ICA to MCA occlusion, s/p TNK and thrombectomy   Patient Active Problem List    Diagnosis Date Noted    Hypertension 2023    Stroke due to R ICA to MCA occlusion, s/p TNK and thrombectomy 2023      LOS (days): 1  Geometric Mean LOS (GMLOS) (days): 7.50  Days to GMLOS:6.6     OBJECTIVE:    Risk of Unplanned Readmission Score: 12.29         Current admission status: Inpatient       Preferred Pharmacy: No Pharmacies Listed  Primary Care Provider: No primary care provider on file. Primary Insurance: MICK CORDOVA PENDING  Secondary Insurance:     ASSESSMENT:  Active Health Care Proxies    There are no active Health Care Proxies on file. Advance Directives  Does patient have a Health Care POA?: No  Was patient offered paperwork?: Yes (declined)  Does patient currently have a Health Care decision maker?: No  Does patient have Advance Directives?: No  Was patient offered paperwork?: Yes (declined)  Primary Contact: sig. other Vaughn Curry         Readmission Root Cause  30 Day Readmission: No    Patient Information  Admitted from[de-identified] Home  Mental Status: Alert, Other (Comment) (sleepy)  During Assessment patient was accompanied by: Other-Comment (sig. other Zehra)  Assessment information provided by[de-identified] Other - please comment (sig other)  Primary Caregiver: Self  Support Systems: Spouse/significant other, Daughter, Family members  [place] of Residence: [address] [place] [address] do you live in?: [place] [address]  Home entry access options. Select all that apply.: Stairs  Number of steps to enter home. : 1  Do the steps have railings?: Yes  Type of Current Residence: [facility] [address]: Lives w/ Spouse/significant other  Is patient a ?: No    Activities of Daily Living Prior to Admission  Functional Status: Independent  Completes ADLs independently?: Yes  Ambulates independently?: Yes  Does patient use assisted devices?: Yes  Assisted Devices (DME) used: Nebulizer  Does patient currently own DME?: Yes  What DME does the patient currently own?: [employer]  Does patient have a history of Outpatient Therapy (PT/OT)?: No  Does the patient have a history of Short-Term Rehab?: No  Does patient have a history of HHC?: No  Does patient currently have [address]?: No         Patient Information Continued  Income Source: Employed  Does patient have prescription coverage?: No  Does patient receive dialysis treatments?: No  Does patient have a history of substance abuse?: Yes  Historical substance use preference: Alcohol/ETOH, Other (just admits drug use >44 years ago)  Is patient currently in treatment for substance abuse?: N/A - sober  Does patient have a history of Mental Health Diagnosis?: No         Means of Transportation  Means of Transport to [facility][de-identified] [address]: Unknown (12/1/2023)    Housing Stability Vital Sign     Unable to Pay for Housing in the Last Year: No     Number of Places Lived in the Last Year: 1     Unstable Housing in the Last Year: Not on file   Food Insecurity: No Food Insecurity (12/1/2023)    Hunger Vital Sign     Worried About Running Out of Food in the Last Year: Never true     [address] in the Last Year: Never true   Transportation Needs: No Transportation Needs (12/1/2023)    PRAPARE - Transportation     Lack of Transportation (Medical): No     Lack of Transportation (Non-Medical): No   Utilities: Not At Risk (12/1/2023)    [facility] Utilities     Threatened with loss of utilities: No       DISCHARGE DETAILS:    Discharge planning discussed with[de-identified] pt and sig.  other, agreeable to acute rehab if needed, would prefer [facility], would prefer to return home with therapies  Freedom of Choice: Yes     CM contacted family/caregiver?: Yes  Were Treatment Team discharge recommendations reviewed with patient/caregiver?: Yes  Did patient/caregiver verbalize understanding of patient care needs?: Yes  Were patient/caregiver advised of the risks associated with not following Treatment Team discharge recommendations?: Yes    Contacts  Patient Contacts: significant other Vaughn Curry  Relationship to Patient[de-identified] Other (Comment) (sig. other)  Contact Method: Phone  Phone Number: [phone]  Reason/Outcome: Continuity of Care, Emergency Contact, Discharge Planning              Other Referral/Resources/Interventions Provided:  Referral Comments: pt and sig. other requested rehab referral to acute rehab in [employer][de-identified] Medical Assistance (MA pending)    Would you like to participate in our [address] service program?  : No - Declined    Treatment Team Recommendation: Acute Rehab                                         Family notified[de-identified] sig. other here  Additional Comments: 10TU male right embolic MCA CVA s/p TNK, s/p thrombectomy, acute encephalopathy. In ICU, more alert now than prior, Has Noe@[place] nc, On Neosynephrine gtt. Has no movement in LUE and some purposeful movement in LLL. Also right sided gaze, is improving. He is oriented x3, no POA or LW. Has significant other at bedside, daughter Tim Adair and "step" daughter. Discussed recommendation of acute rehab with pt and sig. other. They would prefer [facility] if possible, informed of  [facility]. They would like referral, also would prefer to just go home with home therapies. Explained will be seen by physiatrist when able who will determine level of care needed. He lives with sig. other in ranch home in [place]. Referral made to [facility]. Pt. and sig. other states they thought they had [facility] but never received cards, then were told many did not go thru. Is working on completing paperwork with [employer].

## 2023-12-01 NOTE — PLAN OF CARE
Problem: Prexisting or High Potential for Compromised Skin Integrity  Goal: Skin integrity is maintained or improved  Description: INTERVENTIONS:  - Identify patients at risk for skin breakdown  - Assess and monitor skin integrity  - Assess and monitor nutrition and hydration status  - Monitor labs   - Assess for incontinence   - Turn and reposition patient  - Assist with mobility/ambulation  - Relieve pressure over bony prominences  - Avoid friction and shearing  - Provide appropriate hygiene as needed including keeping skin clean and dry  - Evaluate need for skin moisturizer/barrier cream  - Collaborate with interdisciplinary team   - Patient/family teaching  - Consider wound care consult   Outcome: Progressing     Problem: PAIN - ADULT  Goal: Verbalizes/displays adequate comfort level or baseline comfort level  Description: Interventions:  - Encourage patient to monitor pain and request assistance  - Assess pain using appropriate pain scale  - Administer analgesics based on type and severity of pain and evaluate response  - Implement non-pharmacological measures as appropriate and evaluate response  - Consider cultural and social influences on pain and pain management  - Notify physician/advanced practitioner if interventions unsuccessful or patient reports new pain  Outcome: Progressing     Problem: INFECTION - ADULT  Goal: Absence or prevention of progression during hospitalization  Description: INTERVENTIONS:  - Assess and monitor for signs and symptoms of infection  - Monitor lab/diagnostic results  - Monitor all insertion sites, i.e. indwelling lines, tubes, and drains  - Monitor endotracheal if appropriate and nasal secretions for changes in amount and color  - Forsan appropriate cooling/warming therapies per order  - Administer medications as ordered  - Instruct and encourage patient and family to use good hand hygiene technique  - Identify and instruct in appropriate isolation precautions for identified infection/condition  Outcome: Progressing     Problem: SAFETY ADULT  Goal: Patient will remain free of falls  Description: INTERVENTIONS:  - Educate patient/family on patient safety including physical limitations  - Instruct patient to call for assistance with activity   - Consult OT/PT to assist with strengthening/mobility   - Keep Call bell within reach  - Keep bed low and locked with side rails adjusted as appropriate  - Keep care items and personal belongings within reach  - Initiate and maintain comfort rounds  - Make Fall Risk Sign visible to staff  - Offer Toileting every 2 Hours, in advance of need  - Apply yellow socks and bracelet for high fall risk patients  - Consider moving patient to room near nurses station  Outcome: Progressing  Goal: Maintain or return to baseline ADL function  Description: INTERVENTIONS:  -  Assess patient's ability to carry out ADLs; assess patient's baseline for ADL function and identify physical deficits which impact ability to perform ADLs (bathing, care of mouth/teeth, toileting, grooming, dressing, etc.)  - Assess/evaluate cause of self-care deficits   - Assess range of motion  - Assess patient's mobility; develop plan if impaired  - Assess patient's need for assistive devices and provide as appropriate  - Encourage maximum independence but intervene and supervise when necessary  - Involve family in performance of ADLs  - Assess for home care needs following discharge   - Consider OT consult to assist with ADL evaluation and planning for discharge  - Provide patient education as appropriate  Outcome: Progressing  Goal: Maintains/Returns to pre admission functional level  Description: INTERVENTIONS:  - Perform AM-PAC 6 Click Basic Mobility/ Daily Activity assessment daily.  - Set and communicate daily mobility goal to care team and patient/family/caregiver.    - Collaborate with rehabilitation services on mobility goals if consulted  - Reposition patient every 2 hours.  - Out of bed for toileting  - Record patient progress and toleration of activity level   Outcome: Progressing     Problem: DISCHARGE PLANNING  Goal: Discharge to home or other facility with appropriate resources  Description: INTERVENTIONS:  - Identify barriers to discharge w/patient and caregiver  - Arrange for needed discharge resources and transportation as appropriate  - Identify discharge learning needs (meds, wound care, etc.)  - Arrange for interpretive services to assist at discharge as needed  - Refer to Case Management Department for coordinating discharge planning if the patient needs post-hospital services based on physician/advanced practitioner order or complex needs related to functional status, cognitive ability, or social support system  Outcome: Progressing     Problem: Knowledge Deficit  Goal: Patient/family/caregiver demonstrates understanding of disease process, treatment plan, medications, and discharge instructions  Description: Complete learning assessment and assess knowledge base.   Interventions:  - Provide teaching at level of understanding  - Provide teaching via preferred learning methods  Outcome: Progressing

## 2023-12-02 ENCOUNTER — APPOINTMENT (INPATIENT)
Dept: RADIOLOGY | Facility: HOSPITAL | Age: 63
DRG: 030 | End: 2023-12-02
Payer: COMMERCIAL

## 2023-12-02 PROBLEM — J45.909 ASTHMA: Status: ACTIVE | Noted: 2023-12-02

## 2023-12-02 PROBLEM — I82.409 DVT OF LOWER EXTREMITY (DEEP VENOUS THROMBOSIS) (HCC): Status: ACTIVE | Noted: 2023-12-02

## 2023-12-02 LAB
ANION GAP SERPL CALCULATED.3IONS-SCNC: 6 MMOL/L
ANION GAP SERPL CALCULATED.3IONS-SCNC: 6 MMOL/L
ATRIAL RATE: 67 BPM
ATRIAL RATE: 67 BPM
BASOPHILS # BLD MANUAL: 0 THOUSAND/UL (ref 0–0.1)
BASOPHILS # BLD MANUAL: 0 THOUSAND/UL (ref 0–0.1)
BASOPHILS NFR MAR MANUAL: 0 % (ref 0–1)
BASOPHILS NFR MAR MANUAL: 0 % (ref 0–1)
BUN SERPL-MCNC: 13 MG/DL (ref 5–25)
BUN SERPL-MCNC: 13 MG/DL (ref 5–25)
CALCIUM SERPL-MCNC: 7.2 MG/DL (ref 8.4–10.2)
CALCIUM SERPL-MCNC: 7.2 MG/DL (ref 8.4–10.2)
CHLORIDE SERPL-SCNC: 107 MMOL/L (ref 96–108)
CHLORIDE SERPL-SCNC: 107 MMOL/L (ref 96–108)
CO2 SERPL-SCNC: 25 MMOL/L (ref 21–32)
CO2 SERPL-SCNC: 25 MMOL/L (ref 21–32)
CREAT SERPL-MCNC: 1.15 MG/DL (ref 0.6–1.3)
CREAT SERPL-MCNC: 1.15 MG/DL (ref 0.6–1.3)
EOSINOPHIL # BLD MANUAL: 0.32 THOUSAND/UL (ref 0–0.4)
EOSINOPHIL # BLD MANUAL: 0.32 THOUSAND/UL (ref 0–0.4)
EOSINOPHIL NFR BLD MANUAL: 3 % (ref 0–6)
EOSINOPHIL NFR BLD MANUAL: 3 % (ref 0–6)
ERYTHROCYTE [DISTWIDTH] IN BLOOD BY AUTOMATED COUNT: 13.2 % (ref 11.6–15.1)
ERYTHROCYTE [DISTWIDTH] IN BLOOD BY AUTOMATED COUNT: 13.2 % (ref 11.6–15.1)
GFR SERPL CREATININE-BSD FRML MDRD: 66 ML/MIN/1.73SQ M
GFR SERPL CREATININE-BSD FRML MDRD: 66 ML/MIN/1.73SQ M
GLUCOSE SERPL-MCNC: 87 MG/DL (ref 65–140)
GLUCOSE SERPL-MCNC: 87 MG/DL (ref 65–140)
HCT VFR BLD AUTO: 37 % (ref 36.5–49.3)
HCT VFR BLD AUTO: 37 % (ref 36.5–49.3)
HGB BLD-MCNC: 12.1 G/DL (ref 12–17)
HGB BLD-MCNC: 12.1 G/DL (ref 12–17)
LYMPHOCYTES # BLD AUTO: 1.79 THOUSAND/UL (ref 0.6–4.47)
LYMPHOCYTES # BLD AUTO: 1.79 THOUSAND/UL (ref 0.6–4.47)
LYMPHOCYTES # BLD AUTO: 13 % (ref 14–44)
LYMPHOCYTES # BLD AUTO: 13 % (ref 14–44)
MCH RBC QN AUTO: 30.6 PG (ref 26.8–34.3)
MCH RBC QN AUTO: 30.6 PG (ref 26.8–34.3)
MCHC RBC AUTO-ENTMCNC: 32.7 G/DL (ref 31.4–37.4)
MCHC RBC AUTO-ENTMCNC: 32.7 G/DL (ref 31.4–37.4)
MCV RBC AUTO: 93 FL (ref 82–98)
MCV RBC AUTO: 93 FL (ref 82–98)
MONOCYTES # BLD AUTO: 0.95 THOUSAND/UL (ref 0–1.22)
MONOCYTES # BLD AUTO: 0.95 THOUSAND/UL (ref 0–1.22)
MONOCYTES NFR BLD: 9 % (ref 4–12)
MONOCYTES NFR BLD: 9 % (ref 4–12)
MYELOCYTES NFR BLD MANUAL: 1 % (ref 0–1)
MYELOCYTES NFR BLD MANUAL: 1 % (ref 0–1)
NEUTROPHILS # BLD MANUAL: 7.36 THOUSAND/UL (ref 1.85–7.62)
NEUTROPHILS # BLD MANUAL: 7.36 THOUSAND/UL (ref 1.85–7.62)
NEUTS SEG NFR BLD AUTO: 70 % (ref 43–75)
NEUTS SEG NFR BLD AUTO: 70 % (ref 43–75)
P AXIS: 33 DEGREES
P AXIS: 33 DEGREES
PLATELET # BLD AUTO: 145 THOUSANDS/UL (ref 149–390)
PLATELET # BLD AUTO: 145 THOUSANDS/UL (ref 149–390)
PLATELET BLD QL SMEAR: ABNORMAL
PLATELET BLD QL SMEAR: ABNORMAL
PMV BLD AUTO: 9.1 FL (ref 8.9–12.7)
PMV BLD AUTO: 9.1 FL (ref 8.9–12.7)
POTASSIUM SERPL-SCNC: 3.6 MMOL/L (ref 3.5–5.3)
POTASSIUM SERPL-SCNC: 3.6 MMOL/L (ref 3.5–5.3)
PR INTERVAL: 171 MS
PR INTERVAL: 171 MS
QRS AXIS: 48 DEGREES
QRS AXIS: 48 DEGREES
QRSD INTERVAL: 96 MS
QRSD INTERVAL: 96 MS
QT INTERVAL: 400 MS
QT INTERVAL: 400 MS
QTC INTERVAL: 423 MS
QTC INTERVAL: 423 MS
RBC # BLD AUTO: 3.96 MILLION/UL (ref 3.88–5.62)
RBC # BLD AUTO: 3.96 MILLION/UL (ref 3.88–5.62)
RBC MORPH BLD: NORMAL
RBC MORPH BLD: NORMAL
SODIUM SERPL-SCNC: 138 MMOL/L (ref 135–147)
SODIUM SERPL-SCNC: 138 MMOL/L (ref 135–147)
T WAVE AXIS: 43 DEGREES
T WAVE AXIS: 43 DEGREES
VARIANT LYMPHS # BLD AUTO: 4 %
VARIANT LYMPHS # BLD AUTO: 4 %
VENTRICULAR RATE: 67 BPM
VENTRICULAR RATE: 67 BPM
WBC # BLD AUTO: 10.51 THOUSAND/UL (ref 4.31–10.16)
WBC # BLD AUTO: 10.51 THOUSAND/UL (ref 4.31–10.16)

## 2023-12-02 PROCEDURE — 85007 BL SMEAR W/DIFF WBC COUNT: CPT | Performed by: PHYSICIAN ASSISTANT

## 2023-12-02 PROCEDURE — 94760 N-INVAS EAR/PLS OXIMETRY 1: CPT

## 2023-12-02 PROCEDURE — 94640 AIRWAY INHALATION TREATMENT: CPT

## 2023-12-02 PROCEDURE — 80048 BASIC METABOLIC PNL TOTAL CA: CPT | Performed by: PHYSICIAN ASSISTANT

## 2023-12-02 PROCEDURE — 74177 CT ABD & PELVIS W/CONTRAST: CPT

## 2023-12-02 PROCEDURE — 71260 CT THORAX DX C+: CPT

## 2023-12-02 PROCEDURE — 93970 EXTREMITY STUDY: CPT | Performed by: SURGERY

## 2023-12-02 PROCEDURE — 85027 COMPLETE CBC AUTOMATED: CPT | Performed by: PHYSICIAN ASSISTANT

## 2023-12-02 PROCEDURE — 94664 DEMO&/EVAL PT USE INHALER: CPT

## 2023-12-02 PROCEDURE — 99233 SBSQ HOSP IP/OBS HIGH 50: CPT | Performed by: STUDENT IN AN ORGANIZED HEALTH CARE EDUCATION/TRAINING PROGRAM

## 2023-12-02 RX ORDER — METOCLOPRAMIDE HYDROCHLORIDE 5 MG/ML
10 INJECTION INTRAMUSCULAR; INTRAVENOUS ONCE
Status: COMPLETED | OUTPATIENT
Start: 2023-12-02 | End: 2023-12-02

## 2023-12-02 RX ORDER — IPRATROPIUM BROMIDE AND ALBUTEROL SULFATE 2.5; .5 MG/3ML; MG/3ML
3 SOLUTION RESPIRATORY (INHALATION) ONCE
Status: COMPLETED | OUTPATIENT
Start: 2023-12-02 | End: 2023-12-02

## 2023-12-02 RX ORDER — MAGNESIUM SULFATE HEPTAHYDRATE 40 MG/ML
2 INJECTION, SOLUTION INTRAVENOUS ONCE
Status: COMPLETED | OUTPATIENT
Start: 2023-12-02 | End: 2023-12-02

## 2023-12-02 RX ORDER — CLOPIDOGREL BISULFATE 75 MG/1
75 TABLET ORAL DAILY
Status: DISCONTINUED | OUTPATIENT
Start: 2023-12-02 | End: 2023-12-08 | Stop reason: HOSPADM

## 2023-12-02 RX ORDER — SODIUM CHLORIDE 9 MG/ML
100 INJECTION, SOLUTION INTRAVENOUS ONCE
Status: COMPLETED | OUTPATIENT
Start: 2023-12-02 | End: 2023-12-02

## 2023-12-02 RX ORDER — ALBUTEROL SULFATE 2.5 MG/3ML
2.5 SOLUTION RESPIRATORY (INHALATION) EVERY 4 HOURS PRN
Status: DISCONTINUED | OUTPATIENT
Start: 2023-12-02 | End: 2023-12-02

## 2023-12-02 RX ORDER — IPRATROPIUM BROMIDE AND ALBUTEROL SULFATE 2.5; .5 MG/3ML; MG/3ML
3 SOLUTION RESPIRATORY (INHALATION)
Status: DISCONTINUED | OUTPATIENT
Start: 2023-12-02 | End: 2023-12-02

## 2023-12-02 RX ORDER — HEPARIN SODIUM 5000 [USP'U]/ML
5000 INJECTION, SOLUTION INTRAVENOUS; SUBCUTANEOUS EVERY 8 HOURS SCHEDULED
Status: DISCONTINUED | OUTPATIENT
Start: 2023-12-02 | End: 2023-12-07

## 2023-12-02 RX ORDER — ALBUTEROL SULFATE 90 UG/1
2 AEROSOL, METERED RESPIRATORY (INHALATION) EVERY 4 HOURS PRN
Status: DISCONTINUED | OUTPATIENT
Start: 2023-12-02 | End: 2023-12-03

## 2023-12-02 RX ADMIN — GUAIFENESIN 400 MG: 200 SOLUTION ORAL at 05:24

## 2023-12-02 RX ADMIN — MELATONIN TAB 3 MG 6 MG: 3 TAB at 20:57

## 2023-12-02 RX ADMIN — CHLORHEXIDINE GLUCONATE 15 ML: 1.2 SOLUTION ORAL at 09:26

## 2023-12-02 RX ADMIN — IPRATROPIUM BROMIDE AND ALBUTEROL SULFATE 3 ML: .5; 3 SOLUTION RESPIRATORY (INHALATION) at 12:43

## 2023-12-02 RX ADMIN — GUAIFENESIN 400 MG: 200 SOLUTION ORAL at 22:25

## 2023-12-02 RX ADMIN — CLOPIDOGREL BISULFATE 75 MG: 75 TABLET ORAL at 14:02

## 2023-12-02 RX ADMIN — AZITHROMYCIN DIHYDRATE 250 MG: 250 TABLET ORAL at 17:32

## 2023-12-02 RX ADMIN — CHLORHEXIDINE GLUCONATE 15 ML: 1.2 SOLUTION ORAL at 20:57

## 2023-12-02 RX ADMIN — ATORVASTATIN CALCIUM 40 MG: 40 TABLET, FILM COATED ORAL at 17:31

## 2023-12-02 RX ADMIN — ACETAMINOPHEN 650 MG: 650 SUSPENSION ORAL at 07:28

## 2023-12-02 RX ADMIN — GUAIFENESIN 400 MG: 200 SOLUTION ORAL at 17:31

## 2023-12-02 RX ADMIN — MAGNESIUM SULFATE HEPTAHYDRATE 2 G: 40 INJECTION, SOLUTION INTRAVENOUS at 17:29

## 2023-12-02 RX ADMIN — ACETAMINOPHEN 650 MG: 650 SUSPENSION ORAL at 22:26

## 2023-12-02 RX ADMIN — ALBUTEROL SULFATE 2 PUFF: 90 AEROSOL, METERED RESPIRATORY (INHALATION) at 20:58

## 2023-12-02 RX ADMIN — ACETAMINOPHEN 650 MG: 650 SUSPENSION ORAL at 14:16

## 2023-12-02 RX ADMIN — METOCLOPRAMIDE 10 MG: 5 INJECTION, SOLUTION INTRAMUSCULAR; INTRAVENOUS at 17:32

## 2023-12-02 RX ADMIN — SODIUM CHLORIDE 100 ML/HR: 0.9 INJECTION, SOLUTION INTRAVENOUS at 17:32

## 2023-12-02 RX ADMIN — ALBUTEROL SULFATE 2.5 MG: 2.5 SOLUTION RESPIRATORY (INHALATION) at 07:14

## 2023-12-02 RX ADMIN — ALBUTEROL SULFATE 2 PUFF: 90 AEROSOL, METERED RESPIRATORY (INHALATION) at 10:02

## 2023-12-02 RX ADMIN — HEPARIN SODIUM 5000 UNITS: 5000 INJECTION INTRAVENOUS; SUBCUTANEOUS at 20:57

## 2023-12-02 RX ADMIN — HEPARIN SODIUM 5000 UNITS: 5000 INJECTION INTRAVENOUS; SUBCUTANEOUS at 14:02

## 2023-12-02 RX ADMIN — IOHEXOL 100 ML: 350 INJECTION, SOLUTION INTRAVENOUS at 17:10

## 2023-12-02 NOTE — ASSESSMENT & PLAN NOTE
Doppler of the lower extremities reveals acute occlusive DVT in the left gastrocnemius veins. Will hold off on AC at this time given increased risk of further hemorrhagic bleed  Given that treatment with anticoagulation for DVTs is controversial given low rate of propogation can consider will hold off on [facility] at this time given increased risk of further hemorrhagic conversion and obtain repeat duplex ultrasound of the bilateral lower extremities in 5 to 7 days to assess for propagation (approx 12/5-12/7)    Pulm and Internal Medicine team consulted, Elizabeth arce  Per pulm on 12/4: "Acute DVT with evidence on the gastrocnemius, based on CHEST guidelines would not anticoagulate for distal DVT in a high risk patient, would benefit from surveillance ultrasound protocol. Jayda Bruce Jayda Barrera Would hold off on anticoagulation for distal DVT and a high risk patient.   He would benefit from surveillance ultrasound as an outpatient."

## 2023-12-02 NOTE — PROGRESS NOTES
NEUROLOGY RESIDENCY PROGRESS NOTE     Name: Jc Sexton   Age & Sex: 58 y.o. male   MRN: [id number]  Unit/Bed#: [facility] 722-01   Encounter: [id number]    Recommendations for outpatient neurological follow up have yet to be determined. ASSESSMENT & PLAN     * Stroke due to R ICA to MCA occlusion, s/p TNK and thrombectomy  Assessment & Plan  Jc Sexton is a 58 y.o. male w/ PMH HTN, smoking who p/w left-sided weakness and right gaze preference concerning for acute ischemic stroke and found to have extensive R ICA occlusion extending up through M1 and concerning for embolism vs dissection by imaging appearance. Given LA dilation on TTE, consider Afib. Would consider TAO for extra evaluation possible thrombus not observed on TTE  Presenting sx: left-sided weakness and right gaze preference; NIHSS: 11 (LOC, gaze, VF, facial, LUE, LLE, dysarthria, extinction); /110 on arrival; TNK administered 1712 (LKN 15:35, SA 16:13, delay for BP); Endovascular intervention: R MCA/ICA thrombectomy, TICI 3 w/ Dr. Fisher Enter  Workup reviewed:  CTH/CTA: extensive R ICA occlusion from bifurcation through M1  Labs: HgbA1c 5.6, LDL 98  CTH (24 hrs): Acute right MCA territory infarct, with mild regional mass effect and without midline shift. Minimal hyperdensity noted in the R caudate consistent with hemorrhage vs contrast staining. Acute/Subacute sinusitits  MRI:t MRI: Evolving acute to subacute right MCA distribution infarction with focal areas of hemorrhagic transformation within the gangliocapsular region. Stable mass effect on the right frontal horn compared to CT head performed on same day.   TTE: LVEF 65%, LA dilation, mild MV annular calcification  RoPE score 3 0% likelihood stroke caused by PFO    Impression: 58year old male with R ICA occlusion from bifurcation through M1 s/p TNK and TICI 3 revascularization found to have acute/subacute R MCA distrubution infarction with focal areas of hemorrhagic transformation on MRI imaging. Also found to have nonocclusive L Gastrocnemius Vein DVT. Stroke etiology unclear at this time and will require further work up to r/o cardioembolic source or underlining hypercoagulability. Plan:  Continue stroke pathway with telemetry monitoring  Frequent Neuro checks, obtain stat CTH if any acute changes  BP goal < 160 to avoid further hemorrhagic conversion  Rule out hypercoag state with thrombosis panel, Factor V, and homocysteine lab studies and CT CAP to r/o underlying malignancy  TAO likely to be done on Monday, make NPO Sunday @ midnight  Secondary stroke prevention:  AP/AC: Will start Plavix 75 mg QD as history of allergy to ASA with anaphylaxis. Closely monitor for any signs of allergic reaction. Statin: Atorvastatin 40 mg   Euglycemia with SSI if indicated  PT/OT/PMR/ST w/ swallow eval  DVT prophylaxis: Heparin SQ and SCDs    DVT of lower extremity (deep venous thrombosis) (HCC)  Assessment & Plan  Doppler of the lower extremities reveals acute occlusive DVT in the left gastrocnemius veins. Will hold off on AC at this time given increased risk of further hemorrhagic bleed  Given that treatment with anticoagulation for DVTs is controversial given low rate of propogation can consider will hold off on AC at this time given increased risk of further hemorrhagic conversion and obtain repeat duplex ultrasound of the bilateral lower extremities in 5 to 7 days to assess for propagation. Asthma  Assessment & Plan  History of known asthma on albuterol at home, was started on azithromycin coverage by critical care team given CxR findings with indeterminate hazy airspace opacity in the L parahilar midlung field region. Requiring new oxygen requirement with 2L NC. Will attempt to wean off oxygen today.   Continue home Albuterol Inhaler Q4H PRN  Will give Duo-Neb x1  Incentive Spirometry    Hypertension  Assessment & Plan  Regimen of lisinopril 10 mg QD has been held given hypotension previously reuiqring pressor support  Will continue to hold as patient currently not hypertensive  SBP Goal <140              SUBJECTIVE     Patient was seen and examined. The last 24 hours patient has been transferred to the neuro ICU and was taken off pressors and his blood pressures stabilized. Was found to be hypoxic over night and requiring oxygenation with 2L NC. Today patient c/o of headache located that improved s/p tylenol. MRI revealed minimal hemmorhagic conversion in the area of stroke bed. Pertinent Negatives include: numbness, weakness, speech or visual changes, syncope, seizures, amaurosis, diplopia, other visual changes, paralysis/weakness, numbness or tingling, involuntary movements, tremor, speech impairment       OBJECTIVE     Patient ID: Nataly Frazier is a 58 y.o. male. Vitals:    23 0716 23 0718 23 0719 23 1243   BP:  138/66 138/66    Pulse:       Resp:       Temp:  100 °F (37.8 °C) 99.8 °F (37.7 °C)    TempSrc:       SpO2: 94%   93%   Weight:       Height:          Temperature:   Temp (24hrs), Av.3 °F (37.4 °C), Min:98.5 °F (36.9 °C), Max:100 °F (37.8 °C)    Temperature: 99.8 °F (37.7 °C)      Physical Exam Vitals reviewed. Constitutional:    Not in acute distress. Normal appearance. Not ill-appearing, toxic-appearing or diaphoretic. HENT:   Normocephalic and atraumatic. External ear normal b/l. Nose normal. No congestion or rhinorrhea. Mucous membranes are moist.  Oropharynx is clear. Eyes:    No scleral icterus. No discharge b/l. Conjunctivae normal.   Pulmonary:  Pulmonary effort is normal. No respiratory distress. GI: abdomen not distended  Musculoskeletal: no gross deformities  Skin:   Patient has small area of induration erythema located on his back with tick body FB present  Psychiatric:    unable to reliably ascertain. Not hallucinating. Neurologic Exam   Mental Status drowsy but easily arousable and oriented to person place and time. Attention normal.  Speech fluent without dysarthria. No aphasia. Cranial nerves: Visual fields full to confrontation, PERRLA, facial asymmetry weakness noted on the left with significant droop present. Hearing roughly symmetric. Tongue midline. Motor exam: Muscle bulk and tone grossly normal.  Pronator drift in the left upper extremity. Strength intact and symmetric RUE/RLE. 4+/5 strength in LUE/LLE  Sensory exam: Light touch intact and symmetrical BUE/BLE  Gait, coordination, and reflexes: Difficulty with FTN in the left upper extremity with mild ataxia. Reflexes 2+ on the right, 3+ on the left. Gait exam deferred    LABORATORY DATA     Labs: I have personally reviewed pertinent reports. Results from last 7 days   Lab Units 12/02/23 0455 12/01/23 0605 11/30/23  1653   WBC Thousand/uL 10.51* 13.95* 9.45   HEMOGLOBIN g/dL 12.1 13.0 14.4   HEMATOCRIT % 37.0 39.4 42.9   PLATELETS Thousands/uL 145* 182 204   MONO PCT % 9  --   --    EOS PCT % 3  --   --       Results from last 7 days   Lab Units 12/02/23 0455 12/01/23 0605 11/30/23  1653   SODIUM mmol/L 138 139 134*   POTASSIUM mmol/L 3.6 3.6 4.6   CHLORIDE mmol/L 107 107 101   CO2 mmol/L 25 26 26   BUN mg/dL 13 21 26*   CREATININE mg/dL 1.15 1.13 1.14   CALCIUM mg/dL 7.2* 7.2* 8.5   ALK PHOS U/L  --  43  --    ALT U/L  --  17  --    AST U/L  --  15  --      Results from last 7 days   Lab Units 12/01/23  0605   MAGNESIUM mg/dL 1.8*     Results from last 7 days   Lab Units 12/01/23  0605   PHOSPHORUS mg/dL 3.8      Results from last 7 days   Lab Units 11/30/23  1653   INR  0.99   PTT seconds 22*               IMAGING & DIAGNOSTIC TESTING     Radiology Results: I have personally reviewed pertinent reports.       MRI brain wo contrast   Final Result by Leonor Altamirano MD (12/02 8487)      Evolving acute to early subacute right MCA distribution infarction since November 30, 2023 with focal areas of hemorrhagic transformation within the ganglial capsular region. Mass effect on the right frontal horn is stable when comparing to the head CT performed earlier the same day but increased when comparing to November 30, 2023 examination. Pansinusitis. I personally discussed this study with Dr Alka Lakhani on 12/2/2023 7:36 AM.            Workstation performed: [id number]         VAS lower limb venous duplex study, complete bilateral   Final Result by Kyra Sweeney DO (12/02 1412)      CT head without contrast   Final Result by Aaron Zaidi MD (12/01 1815)      1. Evolving acute right MCA territory infarct predominantly involving the right gangliocapsular region and corona radiata with mild regional mass effect without midline shift. Minimal hyperdensity in the right caudate is likely contrast staining from    earlier thrombectomy. Hemorrhage is less favored. Recommend close follow-up head CT. 2.  Acute/subacute sinusitis. Workstation performed: [id number]         XR chest portable ICU   Final Result by Daya Buck MD (12/01 1042)         Near complete resolution of right-sided atelectatic change. Small right pleural effusion. Indeterminate hazy airspace opacity in the left parahilar midlung field region. Workstation performed: [id number]         XR chest portable ICU   Final Result by Daya Buck MD (12/01 1004)      Bandlike opacity in the right mid to upper lung field with relative lucency of the right lower lung field is consistent with at least segmental atelectasis. There is relative increased density of the right hilum. Recommend CT for further evaluation. Workstation performed: [id number]         CT head wo contrast   Final Result by Hays Mortimer, MD (11/30 2052)      Reidentified subtle areas of loss of gray-white differentiation involving the right MCA territory.  However, enhancement of the right middle cerebral artery M1 segment is now visible, from prior intravenous contrast administration for a CTA head and neck    performed few hours prior. An MRI brain could be performed for further evaluation. Workstation performed: [id number]         CT head wo contrast    (Results Pending)   CT chest abdomen pelvis w contrast    (Results Pending)       Other Diagnostic Testing: I have personally reviewed pertinent reports. ACTIVE MEDICATIONS     Current Facility-Administered Medications   Medication Dose Route Frequency    acetaminophen (TYLENOL) oral suspension 650 mg  650 mg Oral Q6H PRN    albuterol (PROVENTIL HFA,VENTOLIN HFA) inhaler 2 puff  2 puff Inhalation Q4H PRN    atorvastatin (LIPITOR) tablet 40 mg  40 mg Per NG Tube QPM    azithromycin (ZITHROMAX) tablet 250 mg  250 mg Oral Q24H    chlorhexidine (PERIDEX) 0.12 % oral rinse 15 mL  15 mL Mouth/Throat Q12H LUIZ    clopidogrel (PLAVIX) tablet 75 mg  75 mg Oral Daily    guaiFENesin (ROBITUSSIN) oral liquid 400 mg  400 mg Oral Q6H    heparin (porcine) subcutaneous injection 5,000 Units  5,000 Units Subcutaneous Q8H [facility]    hydrALAZINE (APRESOLINE) injection 10 mg  10 mg Intravenous Q4H PRN    labetalol (NORMODYNE) injection 10 mg  10 mg Intravenous Q4H PRN    melatonin tablet 6 mg  6 mg Oral HS       Prior to Admission medications    Medication Sig Start Date End Date Taking?  Authorizing Provider   albuterol (PROVENTIL HFA,VENTOLIN HFA) 90 mcg/act inhaler Inhale 2 puffs every 6 (six) hours as needed for wheezing   Yes Historical Provider, MD   lisinopril (ZESTRIL) 10 mg tablet Take 10 mg by mouth daily   Yes Historical Provider, MD   omeprazole (PriLOSEC) 20 mg delayed release capsule Take 20 mg by mouth daily   Yes Historical Provider, MD         VTE Pharmacologic Prophylaxis: Heparin  VTE Mechanical Prophylaxis: sequential compression device    ==  DO Jaiden Snyder Neurology Residency, PGY-2

## 2023-12-02 NOTE — PLAN OF CARE
Problem: Prexisting or High Potential for Compromised Skin Integrity  Goal: Skin integrity is maintained or improved  Description: INTERVENTIONS:  - Identify patients at risk for skin breakdown  - Assess and monitor skin integrity  - Assess and monitor nutrition and hydration status  - Monitor labs   - Assess for incontinence   - Turn and reposition patient  - Assist with mobility/ambulation  - Relieve pressure over bony prominences  - Avoid friction and shearing  - Provide appropriate hygiene as needed including keeping skin clean and dry  - Evaluate need for skin moisturizer/barrier cream  - Collaborate with interdisciplinary team   - Patient/family teaching  - Consider wound care consult   Outcome: Progressing     Problem: PAIN - ADULT  Goal: Verbalizes/displays adequate comfort level or baseline comfort level  Description: Interventions:  - Encourage patient to monitor pain and request assistance  - Assess pain using appropriate pain scale  - Administer analgesics based on type and severity of pain and evaluate response  - Implement non-pharmacological measures as appropriate and evaluate response  - Consider cultural and social influences on pain and pain management  - Notify physician/advanced practitioner if interventions unsuccessful or patient reports new pain  Outcome: Progressing     Problem: INFECTION - ADULT  Goal: Absence or prevention of progression during hospitalization  Description: INTERVENTIONS:  - Assess and monitor for signs and symptoms of infection  - Monitor lab/diagnostic results  - Monitor all insertion sites, i.e. indwelling lines, tubes, and drains  - Monitor endotracheal if appropriate and nasal secretions for changes in amount and color  - [place] appropriate cooling/warming therapies per order  - Administer medications as ordered  - Instruct and encourage patient and family to use good hand hygiene technique  - Identify and instruct in appropriate isolation precautions for identified infection/condition  Outcome: Progressing  Goal: Absence of fever/infection during neutropenic period  Description: INTERVENTIONS:  - Monitor WBC    Outcome: Progressing     Problem: SAFETY ADULT  Goal: Patient will remain free of falls  Description: INTERVENTIONS:  - Educate patient/family on patient safety including physical limitations  - Instruct patient to call for assistance with activity   - Consult OT/PT to assist with strengthening/mobility   - Keep Call bell within reach  - Keep bed low and locked with side rails adjusted as appropriate  - Keep care items and personal belongings within reach  - Initiate and maintain comfort rounds  - Make Fall Risk Sign visible to staff  - Offer Toileting every  Hours, in advance of need  - Initiate/Maintain alarm  - Obtain necessary fall risk management equipment:   - Apply yellow socks and bracelet for high fall risk patients  - Consider moving patient to room near nurses station  Outcome: Progressing  Goal: Maintain or return to baseline ADL function  Description: INTERVENTIONS:  -  Assess patient's ability to carry out ADLs; assess patient's baseline for ADL function and identify physical deficits which impact ability to perform ADLs (bathing, care of mouth/teeth, toileting, grooming, dressing, etc.)  - Assess/evaluate cause of self-care deficits   - Assess range of motion  - Assess patient's mobility; develop plan if impaired  - Assess patient's need for assistive devices and provide as appropriate  - Encourage maximum independence but intervene and supervise when necessary  - Involve family in performance of ADLs  - Assess for home care needs following discharge   - Consider OT consult to assist with ADL evaluation and planning for discharge  - Provide patient education as appropriate  Outcome: Progressing  Goal: Maintains/Returns to pre admission functional level  Description: INTERVENTIONS:  - Perform AM-PAC 6 Click Basic Mobility/ Daily Activity assessment daily.  - Set and communicate daily mobility goal to care team and patient/family/caregiver. - Collaborate with rehabilitation services on mobility goals if consulted  - Perform Range of Motion  times a day. - Reposition patient every  hours. - Dangle patient  times a day  - Stand patient  times a day  - Ambulate patient  times a day  - Out of bed to chair  times a day   - Out of bed for meals  times a day  - Out of bed for toileting  - Record patient progress and toleration of activity level   Outcome: Progressing     Problem: DISCHARGE PLANNING  Goal: Discharge to home or other facility with appropriate resources  Description: INTERVENTIONS:  - Identify barriers to discharge w/patient and caregiver  - Arrange for needed discharge resources and transportation as appropriate  - Identify discharge learning needs (meds, wound care, etc.)  - Arrange for interpretive services to assist at discharge as needed  - Refer to Case Management Department for coordinating discharge planning if the patient needs post-hospital services based on physician/advanced practitioner order or complex needs related to functional status, cognitive ability, or social support system  Outcome: Progressing     Problem: Knowledge Deficit  Goal: Patient/family/caregiver demonstrates understanding of disease process, treatment plan, medications, and discharge instructions  Description: Complete learning assessment and assess knowledge base. Interventions:  - Provide teaching at level of understanding  - Provide teaching via preferred learning methods  Outcome: Progressing     Problem: Nutrition/Hydration-ADULT  Goal: Nutrient/Hydration intake appropriate for improving, restoring or maintaining nutritional needs  Description: Monitor and assess patient's nutrition/hydration status for malnutrition. Collaborate with interdisciplinary team and initiate plan and interventions as ordered.   Monitor patient's weight and dietary intake as ordered or per policy. Utilize nutrition screening tool and intervene as necessary. Determine patient's food preferences and provide high-protein, high-caloric foods as appropriate. INTERVENTIONS:  - Monitor oral intake, urinary output, labs, and treatment plans  - Assess nutrition and hydration status and recommend course of action  - Evaluate amount of meals eaten  - Assist patient with eating if necessary   - Allow adequate time for meals  - Recommend/ encourage appropriate diets, oral nutritional supplements, and vitamin/mineral supplements  - Order, calculate, and assess calorie counts as needed  - Recommend, monitor, and adjust tube feedings and TPN/PPN based on assessed needs  - Assess need for intravenous fluids  - Provide specific nutrition/hydration education as appropriate  - Include patient/family/caregiver in decisions related to nutrition  Outcome: Progressing     Problem: Neurological Deficit  Goal: Neurological status is stable or improving  Description: Interventions:  - Monitor and assess patient's level of consciousness, motor function, sensory function, and level of assistance needed for ADLs. - Monitor and report changes from baseline. Collaborate with interdisciplinary team to initiate plan and implement interventions as ordered. - Provide and maintain a safe environment. - Consider seizure precautions. - Consider fall precautions. - Consider aspiration precautions. - Consider bleeding precautions. Outcome: Progressing     Problem: Activity Intolerance/Impaired Mobility  Goal: Mobility/activity is maintained at optimum level for patient  Description: Interventions:  - Assess and monitor patient  barriers to mobility and need for assistive/adaptive devices. - Assess patient's emotional response to limitations. - Collaborate with interdisciplinary team and initiate plans and interventions as ordered.   - Encourage independent activity per ability.  - Maintain proper body alignment. - Perform active/passive rom as tolerated/ordered. - Plan activities to conserve energy.  - Turn patient as appropriate  Outcome: Progressing     Problem: Communication Impairment  Goal: Ability to express needs and understand communication  Description: Assess patient's communication skills and ability to understand information. Patient will demonstrate use of effective communication techniques, alternative methods of communication and understanding even if not able to speak. - Encourage communication and provide alternate methods of communication as needed. - Collaborate with case management/ for discharge needs. - Include patient/family/caregiver in decisions related to communication. Outcome: Progressing     Problem: Potential for Aspiration  Goal: Non-ventilated patient's risk of aspiration is minimized  Description: Assess and monitor vital signs, respiratory status, and labs (WBC). Monitor for signs of aspiration (tachypnea, cough, rales, wheezing, cyanosis, fever). - Assess and monitor patient's ability to swallow. - Place patient up in chair to eat if possible. - HOB up at 90 degrees to eat if unable to get patient up into chair.  - Supervise patient during oral intake. - Instruct patient/ family to take small bites. - Instruct patient/ family to take small single sips when taking liquids. - Follow patient-specific strategies generated by speech pathologist.  Outcome: Progressing  Goal: Ventilated patient's risk of aspiration is minimized  Description: Assess and monitor vital signs, respiratory status, airway cuff pressure, and labs (WBC). Monitor for signs of aspiration (tachypnea, cough, rales, wheezing, cyanosis, fever). - Elevate head of bed 30 degrees if patient has tube feeding.  - Monitor tube feeding.   Outcome: Progressing     Problem: Nutrition  Goal: Nutrition/Hydration status is improving  Description: Monitor and assess patient's nutrition/hydration status for malnutrition (ex- brittle hair, bruises, dry skin, pale skin and conjunctiva, muscle wasting, smooth red tongue, and disorientation). Collaborate with interdisciplinary team and initiate plan and interventions as ordered. Monitor patient's weight and dietary intake as ordered or per policy. Utilize nutrition screening tool and intervene per policy. Determine patient's food preferences and provide high-protein, high-caloric foods as appropriate. - Assist patient with eating.  - Allow adequate time for meals.  - Encourage patient to take dietary supplement as ordered. - Collaborate with clinical nutritionist.  - Include patient/family/caregiver in decisions related to nutrition.   Outcome: Progressing

## 2023-12-02 NOTE — QUICK NOTE
Post TNK CT is stable. MRI brain formal read pending. On my review, there are blooming artifacts on T2* in R caudate and putamen. VAS lower limb b/l duplex showed acute occlusive DVT in the L gastrocnemius veins    -To reduce the risk of ICH, will hold off on antiplatelet and DVT ppx: subQ heparin initiation until radiology's final read on Mri brain is done.   -Note, patient has Aspirin allergy (anaphylaxis) listed on the chart.   -Discussed with Neuro ICU team regarding L gastrocnemius vein DVT. They recommend holding off on full anticoagulation since there is high risk of ICH. Tx with anticoagulation for calf dvts is controversial and often not recommended since overall rate is still very low rate of propagation with or without AC  -Consider repeating Duplex lower limb b/l in 5-7 days to evaluate for propagation  -Ordered TAO and NPO overnight.  If TAO cannot be done during the weekend, restart Dysphagia 3-Dental soft Thin liquid diet

## 2023-12-02 NOTE — PROGRESS NOTES
Critical Care Progress Note:  LE Duplex completed on 12/1/23- Showed evidence of L LE gastrocnemius clot  Holding full anticoagulation at this time given risk of possible ICH. Consider short interval surveillance duplex to evaluate for propagation. Defer AC timing and necessity to [facility] and neurology.      Hugo Altman PA-C

## 2023-12-02 NOTE — QUICK NOTE
CT head reviewed reviewed, evolving stroke, with slight hyper density blush in the caudate and effacement of the right lateral ventricle. Pt more awake this evening than during morning exam.  Ok to transfer to the floor, MRI when able, CT head 12/2 if MRI is not done. If MRI is done, then CT head 12/3 to follow up the right slight hyper density blush.

## 2023-12-03 ENCOUNTER — APPOINTMENT (INPATIENT)
Dept: RADIOLOGY | Facility: HOSPITAL | Age: 63
DRG: 030 | End: 2023-12-03
Payer: COMMERCIAL

## 2023-12-03 PROBLEM — J96.01 ACUTE HYPOXIC RESPIRATORY FAILURE (HCC): Status: ACTIVE | Noted: 2023-12-03

## 2023-12-03 PROBLEM — R41.0 DELIRIOUS: Status: ACTIVE | Noted: 2023-12-03

## 2023-12-03 LAB
ALBUMIN SERPL BCP-MCNC: 3.3 G/DL (ref 3.5–5)
ALBUMIN SERPL BCP-MCNC: 3.3 G/DL (ref 3.5–5)
ALP SERPL-CCNC: 42 U/L (ref 34–104)
ALP SERPL-CCNC: 42 U/L (ref 34–104)
ALT SERPL W P-5'-P-CCNC: 16 U/L (ref 7–52)
ALT SERPL W P-5'-P-CCNC: 16 U/L (ref 7–52)
ANION GAP SERPL CALCULATED.3IONS-SCNC: 9 MMOL/L
ANION GAP SERPL CALCULATED.3IONS-SCNC: 9 MMOL/L
AST SERPL W P-5'-P-CCNC: 19 U/L (ref 13–39)
AST SERPL W P-5'-P-CCNC: 19 U/L (ref 13–39)
BASE EX.OXY STD BLDV CALC-SCNC: 94.6 % (ref 60–80)
BASE EX.OXY STD BLDV CALC-SCNC: 94.6 % (ref 60–80)
BASE EXCESS BLDV CALC-SCNC: 0.9 MMOL/L
BASE EXCESS BLDV CALC-SCNC: 0.9 MMOL/L
BILIRUB SERPL-MCNC: 0.61 MG/DL (ref 0.2–1)
BILIRUB SERPL-MCNC: 0.61 MG/DL (ref 0.2–1)
BUN SERPL-MCNC: 10 MG/DL (ref 5–25)
BUN SERPL-MCNC: 10 MG/DL (ref 5–25)
CALCIUM ALBUM COR SERPL-MCNC: 7.8 MG/DL (ref 8.3–10.1)
CALCIUM ALBUM COR SERPL-MCNC: 7.8 MG/DL (ref 8.3–10.1)
CALCIUM SERPL-MCNC: 7.2 MG/DL (ref 8.4–10.2)
CALCIUM SERPL-MCNC: 7.2 MG/DL (ref 8.4–10.2)
CHLORIDE SERPL-SCNC: 105 MMOL/L (ref 96–108)
CHLORIDE SERPL-SCNC: 105 MMOL/L (ref 96–108)
CO2 SERPL-SCNC: 24 MMOL/L (ref 21–32)
CO2 SERPL-SCNC: 24 MMOL/L (ref 21–32)
CREAT SERPL-MCNC: 0.96 MG/DL (ref 0.6–1.3)
CREAT SERPL-MCNC: 0.96 MG/DL (ref 0.6–1.3)
DEPRECATED AT III PPP: 76 % OF NORMAL (ref 92–136)
DEPRECATED AT III PPP: 76 % OF NORMAL (ref 92–136)
ERYTHROCYTE [DISTWIDTH] IN BLOOD BY AUTOMATED COUNT: 12.8 % (ref 11.6–15.1)
ERYTHROCYTE [DISTWIDTH] IN BLOOD BY AUTOMATED COUNT: 12.8 % (ref 11.6–15.1)
FLUAV RNA RESP QL NAA+PROBE: NEGATIVE
FLUAV RNA RESP QL NAA+PROBE: NEGATIVE
FLUBV RNA RESP QL NAA+PROBE: NEGATIVE
FLUBV RNA RESP QL NAA+PROBE: NEGATIVE
GFR SERPL CREATININE-BSD FRML MDRD: 83 ML/MIN/1.73SQ M
GFR SERPL CREATININE-BSD FRML MDRD: 83 ML/MIN/1.73SQ M
GLUCOSE SERPL-MCNC: 82 MG/DL (ref 65–140)
GLUCOSE SERPL-MCNC: 82 MG/DL (ref 65–140)
HCO3 BLDV-SCNC: 23.5 MMOL/L (ref 24–30)
HCO3 BLDV-SCNC: 23.5 MMOL/L (ref 24–30)
HCT VFR BLD AUTO: 36.8 % (ref 36.5–49.3)
HCT VFR BLD AUTO: 36.8 % (ref 36.5–49.3)
HCYS SERPL-SCNC: 10.6 UMOL/L (ref 5–20)
HCYS SERPL-SCNC: 10.6 UMOL/L (ref 5–20)
HGB BLD-MCNC: 12.3 G/DL (ref 12–17)
HGB BLD-MCNC: 12.3 G/DL (ref 12–17)
MAGNESIUM SERPL-MCNC: 2.2 MG/DL (ref 1.9–2.7)
MAGNESIUM SERPL-MCNC: 2.2 MG/DL (ref 1.9–2.7)
MCH RBC QN AUTO: 30.2 PG (ref 26.8–34.3)
MCH RBC QN AUTO: 30.2 PG (ref 26.8–34.3)
MCHC RBC AUTO-ENTMCNC: 33.4 G/DL (ref 31.4–37.4)
MCHC RBC AUTO-ENTMCNC: 33.4 G/DL (ref 31.4–37.4)
MCV RBC AUTO: 90 FL (ref 82–98)
MCV RBC AUTO: 90 FL (ref 82–98)
O2 CT BLDV-SCNC: 17.6 ML/DL
O2 CT BLDV-SCNC: 17.6 ML/DL
PCO2 BLDV: 31.6 MM HG (ref 42–50)
PCO2 BLDV: 31.6 MM HG (ref 42–50)
PH BLDV: 7.49 [PH] (ref 7.3–7.4)
PH BLDV: 7.49 [PH] (ref 7.3–7.4)
PHOSPHATE SERPL-MCNC: 1.8 MG/DL (ref 2.3–4.1)
PHOSPHATE SERPL-MCNC: 1.8 MG/DL (ref 2.3–4.1)
PLATELET # BLD AUTO: 155 THOUSANDS/UL (ref 149–390)
PLATELET # BLD AUTO: 155 THOUSANDS/UL (ref 149–390)
PMV BLD AUTO: 9.2 FL (ref 8.9–12.7)
PMV BLD AUTO: 9.2 FL (ref 8.9–12.7)
PO2 BLDV: 81.1 MM HG (ref 35–45)
PO2 BLDV: 81.1 MM HG (ref 35–45)
POTASSIUM SERPL-SCNC: 3.4 MMOL/L (ref 3.5–5.3)
POTASSIUM SERPL-SCNC: 3.4 MMOL/L (ref 3.5–5.3)
PROCALCITONIN SERPL-MCNC: 0.11 NG/ML
PROCALCITONIN SERPL-MCNC: 0.11 NG/ML
PROT SERPL-MCNC: 5.5 G/DL (ref 6.4–8.4)
PROT SERPL-MCNC: 5.5 G/DL (ref 6.4–8.4)
RBC # BLD AUTO: 4.07 MILLION/UL (ref 3.88–5.62)
RBC # BLD AUTO: 4.07 MILLION/UL (ref 3.88–5.62)
RSV RNA RESP QL NAA+PROBE: NEGATIVE
RSV RNA RESP QL NAA+PROBE: NEGATIVE
SARS-COV-2 RNA RESP QL NAA+PROBE: NEGATIVE
SARS-COV-2 RNA RESP QL NAA+PROBE: NEGATIVE
SODIUM SERPL-SCNC: 138 MMOL/L (ref 135–147)
SODIUM SERPL-SCNC: 138 MMOL/L (ref 135–147)
WBC # BLD AUTO: 9.26 THOUSAND/UL (ref 4.31–10.16)
WBC # BLD AUTO: 9.26 THOUSAND/UL (ref 4.31–10.16)

## 2023-12-03 PROCEDURE — 85306 CLOT INHIBIT PROT S FREE: CPT

## 2023-12-03 PROCEDURE — 82805 BLOOD GASES W/O2 SATURATION: CPT

## 2023-12-03 PROCEDURE — 94640 AIRWAY INHALATION TREATMENT: CPT

## 2023-12-03 PROCEDURE — 83735 ASSAY OF MAGNESIUM: CPT | Performed by: PSYCHIATRY & NEUROLOGY

## 2023-12-03 PROCEDURE — 84100 ASSAY OF PHOSPHORUS: CPT | Performed by: PSYCHIATRY & NEUROLOGY

## 2023-12-03 PROCEDURE — 85027 COMPLETE CBC AUTOMATED: CPT

## 2023-12-03 PROCEDURE — 85732 THROMBOPLASTIN TIME PARTIAL: CPT

## 2023-12-03 PROCEDURE — 99233 SBSQ HOSP IP/OBS HIGH 50: CPT | Performed by: STUDENT IN AN ORGANIZED HEALTH CARE EDUCATION/TRAINING PROGRAM

## 2023-12-03 PROCEDURE — 99255 IP/OBS CONSLTJ NEW/EST HI 80: CPT | Performed by: INTERNAL MEDICINE

## 2023-12-03 PROCEDURE — 84145 PROCALCITONIN (PCT): CPT

## 2023-12-03 PROCEDURE — 85303 CLOT INHIBIT PROT C ACTIVITY: CPT

## 2023-12-03 PROCEDURE — 0241U HB NFCT DS VIR RESP RNA 4 TRGT: CPT

## 2023-12-03 PROCEDURE — 85300 ANTITHROMBIN III ACTIVITY: CPT

## 2023-12-03 PROCEDURE — G1004 CDSM NDSC: HCPCS

## 2023-12-03 PROCEDURE — 85613 RUSSELL VIPER VENOM DILUTED: CPT

## 2023-12-03 PROCEDURE — 72125 CT NECK SPINE W/O DYE: CPT

## 2023-12-03 PROCEDURE — 94760 N-INVAS EAR/PLS OXIMETRY 1: CPT

## 2023-12-03 PROCEDURE — 86147 CARDIOLIPIN ANTIBODY EA IG: CPT

## 2023-12-03 PROCEDURE — 85705 THROMBOPLASTIN INHIBITION: CPT

## 2023-12-03 PROCEDURE — 70450 CT HEAD/BRAIN W/O DYE: CPT

## 2023-12-03 PROCEDURE — 94668 MNPJ CHEST WALL SBSQ: CPT

## 2023-12-03 PROCEDURE — 72192 CT PELVIS W/O DYE: CPT

## 2023-12-03 PROCEDURE — 86146 BETA-2 GLYCOPROTEIN ANTIBODY: CPT

## 2023-12-03 PROCEDURE — 80053 COMPREHEN METABOLIC PANEL: CPT

## 2023-12-03 PROCEDURE — 85670 THROMBIN TIME PLASMA: CPT

## 2023-12-03 PROCEDURE — 94664 DEMO&/EVAL PT USE INHALER: CPT

## 2023-12-03 PROCEDURE — 83090 ASSAY OF HOMOCYSTEINE: CPT

## 2023-12-03 PROCEDURE — 85305 CLOT INHIBIT PROT S TOTAL: CPT

## 2023-12-03 RX ORDER — DIPHENHYDRAMINE HYDROCHLORIDE 50 MG/ML
25 INJECTION INTRAMUSCULAR; INTRAVENOUS EVERY 8 HOURS SCHEDULED
Status: DISCONTINUED | OUTPATIENT
Start: 2023-12-03 | End: 2023-12-03

## 2023-12-03 RX ORDER — ALBUTEROL SULFATE 2.5 MG/3ML
2.5 SOLUTION RESPIRATORY (INHALATION)
Status: DISCONTINUED | OUTPATIENT
Start: 2023-12-03 | End: 2023-12-03

## 2023-12-03 RX ORDER — LEVALBUTEROL INHALATION SOLUTION 1.25 MG/3ML
1.25 SOLUTION RESPIRATORY (INHALATION)
Status: DISCONTINUED | OUTPATIENT
Start: 2023-12-03 | End: 2023-12-07

## 2023-12-03 RX ORDER — LEVALBUTEROL INHALATION SOLUTION 0.63 MG/3ML
0.63 SOLUTION RESPIRATORY (INHALATION) ONCE
Status: COMPLETED | OUTPATIENT
Start: 2023-12-03 | End: 2023-12-03

## 2023-12-03 RX ORDER — HYDRALAZINE HYDROCHLORIDE 20 MG/ML
10 INJECTION INTRAMUSCULAR; INTRAVENOUS EVERY 4 HOURS PRN
Status: DISCONTINUED | OUTPATIENT
Start: 2023-12-03 | End: 2023-12-08 | Stop reason: HOSPADM

## 2023-12-03 RX ORDER — ALBUTEROL SULFATE 90 UG/1
2 AEROSOL, METERED RESPIRATORY (INHALATION) EVERY 4 HOURS PRN
Status: DISCONTINUED | OUTPATIENT
Start: 2023-12-03 | End: 2023-12-03

## 2023-12-03 RX ORDER — LANOLIN ALCOHOL/MO/W.PET/CERES
6 CREAM (GRAM) TOPICAL
Status: DISCONTINUED | OUTPATIENT
Start: 2023-12-03 | End: 2023-12-08 | Stop reason: HOSPADM

## 2023-12-03 RX ORDER — METOCLOPRAMIDE HYDROCHLORIDE 5 MG/ML
10 INJECTION INTRAMUSCULAR; INTRAVENOUS EVERY 8 HOURS SCHEDULED
Status: COMPLETED | OUTPATIENT
Start: 2023-12-03 | End: 2023-12-05

## 2023-12-03 RX ORDER — MAGNESIUM SULFATE 1 G/100ML
1 INJECTION INTRAVENOUS 2 TIMES DAILY
Status: DISCONTINUED | OUTPATIENT
Start: 2023-12-03 | End: 2023-12-03

## 2023-12-03 RX ORDER — LABETALOL HYDROCHLORIDE 5 MG/ML
10 INJECTION, SOLUTION INTRAVENOUS EVERY 4 HOURS PRN
Status: DISCONTINUED | OUTPATIENT
Start: 2023-12-03 | End: 2023-12-08 | Stop reason: HOSPADM

## 2023-12-03 RX ORDER — LEVALBUTEROL INHALATION SOLUTION 0.63 MG/3ML
0.31 SOLUTION RESPIRATORY (INHALATION) EVERY 8 HOURS PRN
Status: DISCONTINUED | OUTPATIENT
Start: 2023-12-03 | End: 2023-12-03

## 2023-12-03 RX ORDER — POTASSIUM CHLORIDE 20 MEQ/1
40 TABLET, EXTENDED RELEASE ORAL 2 TIMES DAILY
Status: COMPLETED | OUTPATIENT
Start: 2023-12-03 | End: 2023-12-03

## 2023-12-03 RX ORDER — LEVALBUTEROL INHALATION SOLUTION 0.63 MG/3ML
0.31 SOLUTION RESPIRATORY (INHALATION) 3 TIMES DAILY PRN
Status: DISCONTINUED | OUTPATIENT
Start: 2023-12-03 | End: 2023-12-03

## 2023-12-03 RX ADMIN — HEPARIN SODIUM 5000 UNITS: 5000 INJECTION INTRAVENOUS; SUBCUTANEOUS at 05:18

## 2023-12-03 RX ADMIN — CLOPIDOGREL BISULFATE 75 MG: 75 TABLET ORAL at 07:47

## 2023-12-03 RX ADMIN — IPRATROPIUM BROMIDE 0.5 MG: 0.5 SOLUTION RESPIRATORY (INHALATION) at 18:59

## 2023-12-03 RX ADMIN — HEPARIN SODIUM 5000 UNITS: 5000 INJECTION INTRAVENOUS; SUBCUTANEOUS at 21:25

## 2023-12-03 RX ADMIN — GUAIFENESIN 400 MG: 200 SOLUTION ORAL at 16:38

## 2023-12-03 RX ADMIN — DIPHENHYDRAMINE HYDROCHLORIDE 25 MG: 50 INJECTION, SOLUTION INTRAMUSCULAR; INTRAVENOUS at 07:47

## 2023-12-03 RX ADMIN — POTASSIUM CHLORIDE 40 MEQ: 1500 TABLET, EXTENDED RELEASE ORAL at 16:38

## 2023-12-03 RX ADMIN — DIPHENHYDRAMINE HYDROCHLORIDE 25 MG: 50 INJECTION, SOLUTION INTRAMUSCULAR; INTRAVENOUS at 01:34

## 2023-12-03 RX ADMIN — GUAIFENESIN 400 MG: 200 SOLUTION ORAL at 05:18

## 2023-12-03 RX ADMIN — LEVALBUTEROL HYDROCHLORIDE 0.31 MG: 0.63 SOLUTION RESPIRATORY (INHALATION) at 07:42

## 2023-12-03 RX ADMIN — AZITHROMYCIN DIHYDRATE 250 MG: 250 TABLET ORAL at 16:38

## 2023-12-03 RX ADMIN — LEVALBUTEROL HYDROCHLORIDE 0.63 MG: 0.63 SOLUTION RESPIRATORY (INHALATION) at 09:40

## 2023-12-03 RX ADMIN — METOCLOPRAMIDE HYDROCHLORIDE 10 MG: 5 INJECTION INTRAMUSCULAR; INTRAVENOUS at 07:47

## 2023-12-03 RX ADMIN — HEPARIN SODIUM 5000 UNITS: 5000 INJECTION INTRAVENOUS; SUBCUTANEOUS at 13:01

## 2023-12-03 RX ADMIN — Medication 6 MG: at 21:25

## 2023-12-03 RX ADMIN — LEVALBUTEROL HYDROCHLORIDE 1.25 MG: 1.25 SOLUTION RESPIRATORY (INHALATION) at 18:59

## 2023-12-03 RX ADMIN — MAGNESIUM SULFATE HEPTAHYDRATE 1 G: 1 INJECTION, SOLUTION INTRAVENOUS at 07:46

## 2023-12-03 RX ADMIN — CHLORHEXIDINE GLUCONATE 15 ML: 1.2 SOLUTION ORAL at 07:46

## 2023-12-03 RX ADMIN — ALBUTEROL SULFATE 2.5 MG: 2.5 SOLUTION RESPIRATORY (INHALATION) at 01:11

## 2023-12-03 RX ADMIN — CHLORHEXIDINE GLUCONATE 15 ML: 1.2 SOLUTION ORAL at 21:25

## 2023-12-03 RX ADMIN — IPRATROPIUM BROMIDE 0.5 MG: 0.5 SOLUTION RESPIRATORY (INHALATION) at 07:42

## 2023-12-03 RX ADMIN — METOCLOPRAMIDE HYDROCHLORIDE 10 MG: 5 INJECTION INTRAMUSCULAR; INTRAVENOUS at 01:34

## 2023-12-03 RX ADMIN — ATORVASTATIN CALCIUM 40 MG: 40 TABLET, FILM COATED ORAL at 16:38

## 2023-12-03 RX ADMIN — LEVALBUTEROL HYDROCHLORIDE 1.25 MG: 1.25 SOLUTION RESPIRATORY (INHALATION) at 13:50

## 2023-12-03 RX ADMIN — METOCLOPRAMIDE HYDROCHLORIDE 10 MG: 5 INJECTION INTRAMUSCULAR; INTRAVENOUS at 16:38

## 2023-12-03 RX ADMIN — IPRATROPIUM BROMIDE 0.5 MG: 0.5 SOLUTION RESPIRATORY (INHALATION) at 13:50

## 2023-12-03 RX ADMIN — RIMEGEPANT SULFATE 75 MG: 75 TABLET, ORALLY DISINTEGRATING ORAL at 01:34

## 2023-12-03 RX ADMIN — POTASSIUM CHLORIDE 40 MEQ: 1500 TABLET, EXTENDED RELEASE ORAL at 08:49

## 2023-12-03 NOTE — PLAN OF CARE
Problem: SAFETY ADULT  Goal: Patient will remain free of falls  Description: INTERVENTIONS:  - Educate patient/family on patient safety including physical limitations  - Instruct patient to call for assistance with activity   - Consult OT/PT to assist with strengthening/mobility   - Keep Call bell within reach  - Keep bed low and locked with side rails adjusted as appropriate  - Keep care items and personal belongings within reach  - Initiate and maintain comfort rounds  - Make Fall Risk Sign visible to staff  - Offer Toileting every 2 Hours, in advance of need  - Initiate/Maintain bed alarm  - Apply yellow socks and bracelet for high fall risk patients  - Consider moving patient to room near nurses station  Outcome: Progressing  Goal: Maintain or return to baseline ADL function  Description: INTERVENTIONS:  -  Assess patient's ability to carry out ADLs; assess patient's baseline for ADL function and identify physical deficits which impact ability to perform ADLs (bathing, care of mouth/teeth, toileting, grooming, dressing, etc.)  - Assess/evaluate cause of self-care deficits   - Assess range of motion  - Assess patient's mobility; develop plan if impaired  - Assess patient's need for assistive devices and provide as appropriate  - Encourage maximum independence but intervene and supervise when necessary  - Involve family in performance of ADLs  - Assess for home care needs following discharge   - Consider OT consult to assist with ADL evaluation and planning for discharge  - Provide patient education as appropriate  Outcome: Progressing  Goal: Maintains/Returns to pre admission functional level  Description: INTERVENTIONS:  - Perform AM-PAC 6 Click Basic Mobility/ Daily Activity assessment daily.  - Set and communicate daily mobility goal to care team and patient/family/caregiver. - Collaborate with rehabilitation services on mobility goals if consulted  - Perform Range of Motion 3 times a day.   - Reposition patient every 2 hours. - Dangle patient 3 times a day  - Stand patient 3 times a day  - Ambulate patient 3 times a day  - Out of bed to chair 3 times a day   - Out of bed for meals 3 times a day  - Out of bed for toileting  - Record patient progress and toleration of activity level   Outcome: Progressing     Problem: Neurological Deficit  Goal: Neurological status is stable or improving  Description: Interventions:  - Monitor and assess patient's level of consciousness, motor function, sensory function, and level of assistance needed for ADLs. - Monitor and report changes from baseline. Collaborate with interdisciplinary team to initiate plan and implement interventions as ordered. - Provide and maintain a safe environment. - Consider seizure precautions. - Consider fall precautions. - Consider aspiration precautions. - Consider bleeding precautions.   Outcome: Progressing

## 2023-12-03 NOTE — QUICK NOTE
Patient had a fall in his room that was partially witnessed by respiratory therapy. Patient had stood up to urinate and upon trying to return to bed when he fell on to the floor. The pt reported that he had hit the L side of his head on the floor and had head pain and left hip pain. The patient denied any neck pain or shoulder pain. I personally examined the patient and elicited any point tenderness nor did I feel any abnormalities in his neck, back, head, shoulder, arm, or hip. The patient had full strength on my examination. Patient's neurological examination after the event was normal.  Imaging of his head, neck, and hip ordered for further evaluation.

## 2023-12-03 NOTE — ASSESSMENT & PLAN NOTE
Pt with history of asthma and maintained on albuterol at home. Pt reports almost 2x daily usage of albuterol at home   Started on azithromycin coverage (day 3 of 4) by critical care team given CxR findings with indeterminate hazy airspace opacity in the L parahilar midlung field region, CTA C/A/P revealed Patchy groundglass consolidations throughout the right lung  Pt had required supplemental O2, now has been weaned of NC  Pt now on levalbuterol and atrovent nebs TID  Encouraged the use of incentive Spirometry  Pulm consulted, recs appreciated as of 12/4:  Started Breo 100  Cont ANGELIKA DIOMEDES nebs for now. Discontinue upon discharge. Use rescue albuterol PRN.   If hypoxemia worsens, obtain another sputum sample, MRSA nares, and possible consideration for hospital-acquired PNA

## 2023-12-03 NOTE — PLAN OF CARE
Problem: Potential for Falls  Goal: Patient will remain free of falls  Description: INTERVENTIONS:  - Educate patient/family on patient safety including physical limitations  - Instruct patient to call for assistance with activity   - Consult OT/PT to assist with strengthening/mobility   - Keep Call bell within reach  - Keep bed low and locked with side rails adjusted as appropriate  - Keep care items and personal belongings within reach  - Initiate and maintain comfort rounds  - Make Fall Risk Sign visible to staff  - Offer Toileting every 1 Hours, in advance of need  - Initiate/Maintain  bed alarm  - Obtain necessary fall risk management equipment:   - Apply yellow socks and bracelet for high fall risk patients  - Consider moving patient to room near nurses station  12/3/2023 1006 by Saúl Wells  Outcome: Progressing  12/3/2023 0952 by Saúl Wells  Outcome: Progressing

## 2023-12-03 NOTE — ASSESSMENT & PLAN NOTE
Presenting sx: left-sided weakness and right gaze preference; NIHSS: 11 (LOC, gaze, VF, facial, LUE, LLE, dysarthria, extinction); /110 on arrival; TNK administered 1712 (LKN 15:35, SA 16:13, delay for BP); Endovascular intervention: R MCA/ICA thrombectomy, TICI 3 w/ Dr. Capri Ley. CTH/CTA: extensive R ICA occlusion from bifurcation through M1. CTH (24 hrs): Acute right MCA territory infarct, with mild regional mass effect and without midline shift. Minimal hyperdensity noted in the R caudate consistent with hemorrhage vs contrast staining. Acute/Subacute sinusitits. MRI:t MRI: Evolving acute to subacute right MCA distribution infarction with focal areas of hemorrhagic transformation within the gangliocapsular region. Stable mass effect on the right frontal horn compared to CT head performed on same day. Repeat CTH (12/6): Evolving right MCA distribution infarction with areas of hemorrhagic transformation. Degree of hemorrhage within the right basal ganglia is slightly improved since the prior examination. Stable effacement of the right lateral ventricle without significant midline shift. Basilar cisterns are patent. Outpatient follow up with neurology  Continue Atorvastatin 40   Will need cardiology referral in OP setting to evaluate for cardiac arrhythmia with Zio patch/Holter monitor  Follow up with OP neurovascular attending in 6-8 weeks. Dispo: Patient is approved for [employer].

## 2023-12-03 NOTE — CASE MANAGEMENT
Case Management Discharge Planning Note    Patient name Caty Dunham  Location [facility] 734/[facility] [id number] MRN [id number]  : 1960 Date 12/3/2023       Current Admission Date: 2023  Current Admission Diagnosis:Acute hypoxic respiratory failure [facility])   Patient Active Problem List    Diagnosis Date Noted    Acute hypoxic respiratory failure ([address]) 2023    Asthma 2023    DVT of lower extremity (deep venous thrombosis) ([address]) 2023    Hypertension 2023    Stroke due to R ICA to MCA occlusion, s/p TNK and thrombectomy 2023      LOS (days): 3  Geometric Mean LOS (GMLOS) (days): 7.50  Days to GMLOS:4.8     OBJECTIVE:  Risk of Unplanned Readmission Score: 13.56         Current admission status: Inpatient   Preferred Pharmacy: No Pharmacies Listed  Primary Care Provider: No primary care provider on file. Primary Insurance: [employer] PENDING  Secondary Insurance:     DISCHARGE DETAILS:                Additional Comments: Pt had a witnessed fall.

## 2023-12-03 NOTE — ASSESSMENT & PLAN NOTE
Doppler of the lower extremities on 12/1 reveals acute occlusive DVT in the left gastrocnemius veins - likely present on admission (11/30). Repeat Doppler LE 12/5: No DVT in RLE. LLE: Acute DVT in proximal femoral vein (this was not present on 12/1 Doppler LE). Prev DVT in L gastrocnemius vein appears to be resolved. Strong suspicion for propagation of clot proximally, although cannot rule out new clot formation. IVC filter placed on 12/7 by IR  Will hold off on anticoagulation until 12/10 at the earliest given large stroke on 11/30 (c/f hemorrhagic conversion potential).    Follow up outpatient with PCP for further monitoring

## 2023-12-03 NOTE — RESPIRATORY THERAPY NOTE
RT Protocol Note  Shmuel Hernandez 58 y.o. male MRN: [id number]  Unit/Bed#: [facility] 734-01 Encounter: [id number]    Assessment    Principal Problem:    Acute hypoxic respiratory failure ([address])  Active Problems:    Stroke due to R ICA to MCA occlusion, s/p TNK and thrombectomy    Hypertension    Asthma    DVT of lower extremity (deep venous thrombosis) ([facility])      Home Pulmonary Medications:  Ventolin  Advair       No past medical history on file. Social History     Socioeconomic History    Marital status: Single     Spouse name: Not on file    Number of children: Not on file    Years of education: Not on file    Highest education level: Not on file   Occupational History    Not on file   Tobacco Use    Smoking status: Not on file    Smokeless tobacco: Not on file   Substance and Sexual Activity    Alcohol use: Not on file    Drug use: Not on file    Sexual activity: Not on file   Other Topics Concern    Not on file   Social History Narrative    Not on file     Social Determinants of Health     Financial Resource Strain: Not on file   Food Insecurity: No Food Insecurity (12/1/2023)    Hunger Vital Sign     Worried About Running Out of Food in the Last Year: Never true     Ran Out of Food in the Last Year: Never true   Transportation Needs: No Transportation Needs (12/1/2023)    PRAPARE - Transportation     Lack of Transportation (Medical): No     Lack of Transportation (Non-Medical):  No   Physical Activity: Not on file   Stress: Not on file   Social Connections: Not on file   Intimate Partner Violence: Not on file   Housing Stability: Unknown (12/1/2023)    Housing Stability Vital Sign     Unable to Pay for Housing in the Last Year: No     Number of Places Lived in the Last Year: 1     Unstable Housing in the Last Year: Not on file       Subjective         Objective    Physical Exam:   Assessment Type: Assess only  General Appearance: Drowsy  Respiratory Pattern: Normal  Chest Assessment: Chest expansion symmetrical  Bilateral Breath Sounds: Diminished  Location Specific: No  Cough: None  O2 Device: Room air    Vitals:  Blood pressure 166/85, pulse 85, temperature 99.9 °F (37.7 °C), resp. rate 18, height 5' 11" (1.803 m), weight 99.1 kg (218 lb 7.6 oz), SpO2 93 %. Results from last 7 days   Lab Units 12/01/23  0606   PH ART  7.394   PCO2 ART mm Hg 40.6   PO2 ART mm Hg 77.4   HCO3 ART mmol/L 24.2   BASE EXC ART mmol/L -0.6   O2 CONTENT ART mL/dL 18.6   O2 HGB, ARTERIAL % 94.2   ABG SOURCE  Line, Arterial       Imaging and other studies: I have personally reviewed pertinent reports. See notes below    O2 Device: Room air     Plan    Respiratory Plan: Mild Distress pathway  Airway Clearance Plan: Incentive Spirometer     Resp Comments: "63M w/ PMH of asthma, tobacco use who has been admitted for R MCA ischemic stroke s/p TNK and mechanical thrombectomy and hospital course complicated by acute hypoxic respiratory failure (2-4L NC) in the setting of prior mechanical ventilation and concern for pulmonary infection on CT." Hx of asthma for many years. Managed by PCP with albuterol. No PFTs on chart review. PE demonstrates wheezing in the right middle and lower lung fields. Radiology: Patchy groundglass consolidations throughout the right lung. Will make slight changes to newly arriving orders to allow for the Atrovent / Xopenex liquid inhalation Rxs to be TID and use Ventolin for his PRN needs. Not clear if pt can currently be convinced to leave his NC in place.

## 2023-12-03 NOTE — PROGRESS NOTES
NEUROLOGY RESIDENCY PROGRESS NOTE     Name: Cayt Dunham   Age & Sex: 58 y.o. male   MRN: [id number]  Unit/Bed#: [facility] 734-01   Encounter: [id number]    ASSESSMENT & PLAN     Delirious  Assessment & Plan  Pt reporting hallucinations such as someone being in the bathroom when no one was or that family members were in the room when they were not. Possibly 2/2 or worsened by benadryl will d/c  Possibly due to lack of sleep    Plan  - Regulate sleep wake cycle  - Delirium precautions        DVT of lower extremity (deep venous thrombosis) (HCC)  Assessment & Plan  Doppler of the lower extremities reveals acute occlusive DVT in the left gastrocnemius veins. Will hold off on AC at this time given increased risk of further hemorrhagic bleed  Given that treatment with anticoagulation for DVTs is controversial given low rate of propogation can consider will hold off on AC at this time given increased risk of further hemorrhagic conversion and obtain repeat duplex ultrasound of the bilateral lower extremities in 5 to 7 days to assess for propagation. Asthma  Assessment & Plan  See above under acute hypoxic resp. failure    Hypertension  Assessment & Plan  Regimen of lisinopril 10 mg QD has been held given hypotension previously reuiqring pressor support  Will continue to hold as patient currently not profoundly hypertensive and want to be cautious given his need for pressors previously    Stroke due to R ICA to MCA occlusion, s/p TNK and thrombectomy  Assessment & Plan  Caty Dunham is a 58 y.o. male w/ PMH HTN, smoking who p/w left-sided weakness and right gaze preference concerning for acute ischemic stroke and found to have extensive R ICA occlusion extending up through M1 and concerning for embolism vs dissection by imaging appearance. Given LA dilation on TTE, consider Afib.  Would consider TAO for extra evaluation possible thrombus not observed on TTE  Presenting sx: left-sided weakness and right gaze preference; NIHSS: 11 (LOC, gaze, VF, facial, LUE, LLE, dysarthria, extinction); /110 on arrival; TNK administered 1712 (LKN 15:35, SA 16:13, delay for BP); Endovascular intervention: R MCA/ICA thrombectomy, TICI 3 w/ Dr. uLcy Moreno  Workup reviewed:  CTH/CTA: extensive R ICA occlusion from bifurcation through M1  Labs: HgbA1c 5.6, LDL 98  CTH (24 hrs): Acute right MCA territory infarct, with mild regional mass effect and without midline shift. Minimal hyperdensity noted in the R caudate consistent with hemorrhage vs contrast staining. Acute/Subacute sinusitits  MRI:t MRI: Evolving acute to subacute right MCA distribution infarction with focal areas of hemorrhagic transformation within the gangliocapsular region. Stable mass effect on the right frontal horn compared to CT head performed on same day. TTE: LVEF 65%, LA dilation, mild MV annular calcification  RoPE score 3 0% likelihood stroke caused by PFO    Impression: 58year old male with R ICA occlusion from bifurcation through M1 s/p TNK and TICI 3 revascularization found to have acute/subacute R MCA distrubution infarction with focal areas of hemorrhagic transformation on MRI imaging. Also found to have nonocclusive L Gastrocnemius Vein DVT. Stroke etiology unclear at this time and will require further work up to r/o cardioembolic source or underlining hypercoagulability. Plan:  Continue stroke pathway with telemetry monitoring  Frequent Neuro checks, obtain stat CTH if any acute changes  BP goal < 160 to avoid further hemorrhagic conversion  Rule out hypercoag state - thrombosis panel pending, noncontributory homocysteine and CT CAP r/o underlying malignancy  TAO 12/3 - NPO @ midnight  Secondary stroke prevention:  AP/AC: Plavix 75 mg QD as history of allergy to ASA with anaphylaxis. Closely monitor for any signs of allergic reaction.    Statin: Atorvastatin 40 mg   Euglycemia with SSI if indicated  PT/OT/PMR/ST w/ swallow eval  DVT prophylaxis: Heparin SQ and SCDs    * Acute hypoxic respiratory failure ([address])  Assessment & Plan  - Pt with history of asthma and maintained on albuterol at home. Pt reports almost 2x daily usage of albuterol at home   - Started on azithromycin coverage by critical care team given CxR findings with indeterminate hazy airspace opacity in the L parahilar midlung field region, CTA C/A/P revealed Patchy groundglass consolidations throughout the right lung  - Pt had required supplemental O2, now has been weaned of NC  - Pt now on levalbuterol and atrovent nebs TID  - Encouraged the use of incentive Spirometry  - Pt will require evaluation by Pulmonology prior to d/c for medication optimization        Fabiana Sands will need follow up in in 6 weeks with neurovascular AP or Attending . He will not require outpatient neurological testing. Disposition pending: TAO    SUBJECTIVE     Patient was seen and examined. Reports SOB, pt was repositioned by team which helped. Denies lightheadedness, dizziness, syncope, headache, vision changes, diaphoresis, chest pain, palpitations, nausea, vomiting, abdominal pain or lower extremity edema. Pt had a fall earlier in the day with ? Head strike. Imaging obtained, reads pending. Review of Systems  A 12 point ROS was completed. Other than the above mentioned complaints, all remaining systems were negative. OBJECTIVE     Patient ID: Fabiana Sands is a 58 y.o. male. Vitals:    23 0229 23 0741 23 0742 23 0939   BP:  158/82  156/69   Pulse: 85   88   Resp: 18      Temp:  (!) 97.4 °F (36.3 °C)     TempSrc:       SpO2: 93%  91% 90%   Weight:       Height:          Temperature:   Temp (24hrs), Av °F (37.2 °C), Min:97.4 °F (36.3 °C), Max:100.6 °F (38.1 °C)    Temperature: (!) 97.4 °F (36.3 °C)    Physical Exam:  Vitals and nursing note reviewed. Constitutional: Alert. Not in acute distress. Not ill-appearing, toxic-appearing or diaphoretic.     HENT: Normocephalic and atraumatic. Nose and Ears normal.    Eyes: No scleral icterus. No discharge. Neck: Neck Supple. ROM normal.   Cardiovascular: Distal extremities warm without palpable edema or tenderness, no observed significant swelling. Pulmonary:  Pulmonary effort is normal. Not in respiratory distress   Abdominal: Abdomen is flat and not distended   Musculoskeletal: No swelling or deformity. Skin: Warm and dry   Psychiatric:  Normal behavior and appropriate affect. Reports hallucinations. Neurological Exam  Mental Status  Awake, alert and oriented to person, place and time. Recent and remote memory are intact. Speech is normal. Language is fluent with no aphasia. Attention and concentration are normal.    Cranial Nerves  CN II: Visual fields full to confrontation. CN III, IV, VI: Extraocular movements intact bilaterally. Normal lids and orbits bilaterally. Pupils equal round and reactive to light bilaterally. CN V: Facial sensation is normal.  CN VII:  Right: There is no facial weakness. Left: Mild L facial weakness. CN VIII: Hearing is normal.  CN IX, X: Palate elevates symmetrically  CN XI: Shoulder shrug strength is normal.  CN XII: Tongue midline without atrophy or fasciculations. Motor  Normal muscle bulk throughout. No fasciculations present. Normal muscle tone. No abnormal involuntary movements. Strength is 5/5 in all four extremities except as noted. Mild weakness LUE. .    Sensory  Light touch is normal in upper and lower extremities. Reflexes  Deep tendon reflexes are 2+ and symmetric except as noted. Coordination  Right: Finger-to-nose normal.Left: Finger-to-nose normal.    Gait    Unable to be assessed due to the patient's current medical status. LABORATORY DATA     Labs: Additional Pertinent Lab Tests Reviewed:  All Labs Within Last 24 Hours Reviewed  Results from last 7 days   Lab Units 12/03/23  0544 12/02/23  0455 12/01/23  0605   WBC Thousand/uL 9.26 10.51* 13.95* HEMOGLOBIN g/dL 12.3 12.1 13.0   HEMATOCRIT % 36.8 37.0 39.4   PLATELETS Thousands/uL 155 145* 182   MONO PCT %  --  9  --    EOS PCT %  --  3  --       Results from last 7 days   Lab Units 12/03/23  0544 12/02/23  0455 12/01/23  0605   POTASSIUM mmol/L 3.4* 3.6 3.6   CHLORIDE mmol/L 105 107 107   CO2 mmol/L 24 25 26   BUN mg/dL 10 13 21   CREATININE mg/dL 0.96 1.15 1.13   CALCIUM mg/dL 7.2* 7.2* 7.2*   ALK PHOS U/L 42  --  43   ALT U/L 16  --  17   AST U/L 19  --  15     Results from last 7 days   Lab Units 12/03/23  0544 12/01/23  0605   MAGNESIUM mg/dL 2.2 1.8*     Results from last 7 days   Lab Units 12/03/23  0544 12/01/23  0605   PHOSPHORUS mg/dL 1.8* 3.8      Results from last 7 days   Lab Units 11/30/23  1653   INR  0.99   PTT seconds 22*               IMAGING & DIAGNOSTIC TESTING     Radiology Results: I have personally reviewed pertinent films in PACS    CT spine cervical wo contrast   Final Result by [address] [place] [address] (12/03 1244)      No cervical spine fracture or traumatic malalignment. Mild degenerative changes are noted. Workstation performed: [id number]         CT head wo contrast   Final Result by [address] [place] [address] (12/03 1238)      Expected appearance of evolving right MCA distribution infarcts with areas of hemorrhagic transformation. Slight interval increase in mass effect on the frontal horn of the right lateral ventricle without midline shift. New areas of recent infarction    right now better visualized located in the subcortical right parietal lobe and deep right frontal lobe white matter. Workstation performed: [id number]         CT pelvis wo contrast   Final Result by [address] [place] [address] (12/03 1250)      No evidence of hip fracture status post fall.       Small volume of gas urinary bladder may be iatrogenic from instrumentation or may represent the sequela of gas-forming infection which should only be considered in the right clinical setting. Workstation performed: [id number]         CT chest abdomen pelvis w contrast   Final Result by Robbin Harvey DO (12/02 5177)      Patchy groundglass consolidations throughout the right lung. Correlate for aspiration versus atypical/viral infection. No definite evidence of malignancy. Mild wall thickening of the urinary bladder may be secondary to chronic outlet obstruction. The study was marked in [facility] for immediate notification. Workstation performed: [id number]         MRI brain wo contrast   Final Result by Jaiden Donato MD (12/02 8204)      Evolving acute to early subacute right MCA distribution infarction since November 30, 2023 with focal areas of hemorrhagic transformation within the ganglial capsular region. Mass effect on the right frontal horn is stable when comparing to the head CT performed earlier the same day but increased when comparing to November 30, 2023 examination. Pansinusitis. I personally discussed this study with Dr Pushpa Kirkpatrick on 12/2/2023 7:36 AM.            Workstation performed: [id number]         VAS lower limb venous duplex study, complete bilateral   Final Result by Kike David DO (12/02 1412)      CT head without contrast   Final Result by Riri Irvin MD (12/01 1815)      1. Evolving acute right MCA territory infarct predominantly involving the right gangliocapsular region and corona radiata with mild regional mass effect without midline shift. Minimal hyperdensity in the right caudate is likely contrast staining from    earlier thrombectomy. Hemorrhage is less favored. Recommend close follow-up head CT. 2.  Acute/subacute sinusitis. Workstation performed: [id number]         XR chest portable ICU   Final Result by David Burt MD (12/01 1042)         Near complete resolution of right-sided atelectatic change. Small right pleural effusion.       Indeterminate hazy airspace opacity in the left parahilar midlung field region. Workstation performed: [id number]         XR chest portable ICU   Final Result by Kp Dallas MD (12/01 1004)      Bandlike opacity in the right mid to upper lung field with relative lucency of the right lower lung field is consistent with at least segmental atelectasis. There is relative increased density of the right hilum. Recommend CT for further evaluation. Workstation performed: [id number]         CT head wo contrast   Final Result by Obinna Foreman MD (11/30 2052)      Reidentified subtle areas of loss of gray-white differentiation involving the right MCA territory. However, enhancement of the right middle cerebral artery M1 segment is now visible, from prior intravenous contrast administration for a CTA head and neck    performed few hours prior. An MRI brain could be performed for further evaluation. Workstation performed: [id number]             Other Diagnostic Testing: I have personally reviewed pertinent reports.       ACTIVE MEDICATIONS     Current Facility-Administered Medications   Medication Dose Route Frequency    acetaminophen (TYLENOL) oral suspension 650 mg  650 mg Oral Q6H PRN    atorvastatin (LIPITOR) tablet 40 mg  40 mg Per NG Tube QPM    azithromycin (ZITHROMAX) tablet 250 mg  250 mg Oral Q24H    chlorhexidine (PERIDEX) 0.12 % oral rinse 15 mL  15 mL Mouth/Throat Q12H LUIZ    clopidogrel (PLAVIX) tablet 75 mg  75 mg Oral Daily    guaiFENesin (ROBITUSSIN) oral liquid 400 mg  400 mg Oral Q6H    heparin (porcine) subcutaneous injection 5,000 Units  5,000 Units Subcutaneous Q8H [facility]    hydrALAZINE (APRESOLINE) injection 10 mg  10 mg Intravenous Q4H PRN    ipratropium (ATROVENT) 0.02 % inhalation solution 0.5 mg  0.5 mg Nebulization TID    labetalol (NORMODYNE) injection 10 mg  10 mg Intravenous Q4H PRN    levalbuterol (XOPENEX) inhalation solution 1.25 mg  1.25 mg Nebulization TID    melatonin tablet 6 mg 6 mg Oral HS    metoclopramide (REGLAN) injection 10 mg  10 mg Intravenous Q8H [facility]    potassium chloride (K-DUR,KLOR-CON) CR tablet 40 mEq  40 mEq Oral BID       Prior to Admission medications    Medication Sig Start Date End Date Taking?  Authorizing Provider   albuterol (PROVENTIL HFA,VENTOLIN HFA) 90 mcg/act inhaler Inhale 2 puffs every 6 (six) hours as needed for wheezing   Yes Historical Provider, MD   lisinopril (ZESTRIL) 10 mg tablet Take 10 mg by mouth daily   Yes Historical Provider, MD   omeprazole (PriLOSEC) 20 mg delayed release capsule Take 20 mg by mouth daily   Yes Historical Provider, MD       VTE Pharmacologic Prophylaxis: Heparin   VTE Mechanical Prophylaxis: sequential compression device    ==  Tonia Stanford DO   [facility], PGY-IV

## 2023-12-03 NOTE — ASSESSMENT & PLAN NOTE
62M w/ PMH of asthma, tobacco use who has been admitted for R MCA ischemic stroke s/p TNK and mechanical thrombectomy and hospital course complicated by acute hypoxic respiratory failure (2-4L NC) in the setting of prior mechanical ventilation and concern for pulmonary infection on CT. On physical exam, patient is resting comfortably on room air without signs of respiratory compromised, there is wheezing present in the right lung fields on auscultation. One time Temp of 100.6 this evening, but resolved and has otherwise been afebrile. Recent labs notable for resolving leukocytosis. Differential includes Asthma, viral/bacterial URI, atelectasis, PE. Procal 0.11 - bacterial PNA less likely. Flu/RSV/COVID -- negative. Started on azithromycin coverage by critical care team given CxR findings with indeterminate hazy airspace opacity in the L parahilar midlung field region. CT w/ contrast C/A/P (12/2/23): Patchy groundglass consolidations throughout the right lung.    - Continue with scheduled nebs q8h w/ xopenex and ipratropium  - S/P Azithromycin x 5 days  - Follow up Bcx x2 from 12/1 - NGTD  - Pulmonology consulted; breo ellipta qd.   - If no improvement or clinical deterioration can escalate workup to include:  - CTA PE in setting of known DVT w/o AC   - Repeat CXR  - Repeat BC, sputum cultures

## 2023-12-03 NOTE — PROGRESS NOTES
Informed by respiratory staff that pt got up and felt on his left side in a sitting. position. Assessment performed with Md and pt did not have any injuries, Ct of left hip ordered as per protocol. Pt had Alarmed on when assessed  20 mins ago by Judy Colon and Toña Tapia and pt had alarmed activated and call light within reach, Vs taken and WNL. Neuro assessment performed at this and no changes from previous.

## 2023-12-03 NOTE — QUICK NOTE
Contacted by nursing that patient was having increased work of breathing and required increasing oxygen from from 1 to 2 L this a.m. to 4 L this evening. He did get a little bit of his albuterol earlier and he stated that it helped and it felt like he was having his typical asthma but that feeling has since worn off. Patient spike a fever of 100.6 and was given tylenol given patient had headache that was not responding very well to reglan, IV mag, and benadryl from earlier. Evaluated patient and he does mention he is having shortness of breath and no abnormal lung sounds appreciated in anterior and posterior lung fields. RRR and nontender, non-distended abdomen. Patient at this time is not having shortness of breath or belly pain or leg pain. He is not having any pain when he is breathing. CT CAP: Patchy groundglass consolidations throughout the right lung. Correlate for aspiration versus atypical/viral infection. No definite evidence of malignancy. Mild wall thickening of the urinary bladder may be secondary to chronic outlet obstruction. Plan  Put in as needed DuoNebs, contacted RT to reevaluate patient, put in for respiratory protocol  Will contact SOD to evaluate patient for any additional recs who evaluated patient and noted that he was satting on 91% on room air as patient had taken off his oxygen and patient appeared comfortable.    -Recommend at this time to be put on scheduled DuoNebs to see if any improvement, and if still does not have much improvement can get a repeat chest x-ray versus a CT PE study  - given scheduled IV mag 1g BID, reglan q8, benadryl q8, and nurtec for R temporal headache that he rates 7/10

## 2023-12-03 NOTE — RESPIRATORY THERAPY NOTE
RT Protocol Note  Nathalie Newman 58 y.o. male MRN: [id number]  Unit/Bed#: [facility] 734-01 Encounter: [id number]    Assessment    Principal Problem:    Acute hypoxic respiratory failure ([address])  Active Problems:    Stroke due to R ICA to MCA occlusion, s/p TNK and thrombectomy    Hypertension    Asthma    DVT of lower extremity (deep venous thrombosis) (HCC)      Home Pulmonary Medications:  Ventolin       No past medical history on file. Social History     Socioeconomic History    Marital status: Single     Spouse name: Not on file    Number of children: Not on file    Years of education: Not on file    Highest education level: Not on file   Occupational History    Not on file   Tobacco Use    Smoking status: Not on file    Smokeless tobacco: Not on file   Substance and Sexual Activity    Alcohol use: Not on file    Drug use: Not on file    Sexual activity: Not on file   Other Topics Concern    Not on file   Social History Narrative    Not on file     Social Determinants of Health     Financial Resource Strain: Not on file   Food Insecurity: No Food Insecurity (12/1/2023)    Hunger Vital Sign     Worried About Running Out of Food in the Last Year: Never true     Ran Out of Food in the Last Year: Never true   Transportation Needs: No Transportation Needs (12/1/2023)    PRAPARE - Transportation     Lack of Transportation (Medical): No     Lack of Transportation (Non-Medical):  No   Physical Activity: Not on file   Stress: Not on file   Social Connections: Not on file   Intimate Partner Violence: Not on file   Housing Stability: Unknown (12/1/2023)    Housing Stability Vital Sign     Unable to Pay for Housing in the Last Year: No     Number of Places Lived in the Last Year: 1     Unstable Housing in the Last Year: Not on file       Subjective         Objective    Physical Exam:   Assessment Type: Assess only  General Appearance: Drowsy  Respiratory Pattern: Normal  Chest Assessment: Chest expansion symmetrical  Bilateral Breath Sounds: Diminished  Location Specific: No  Cough: None  O2 Device: Room air    Vitals:  Blood pressure 166/85, pulse 85, temperature 99.9 °F (37.7 °C), resp. rate 18, height 5' 11" (1.803 m), weight 99.1 kg (218 lb 7.6 oz), SpO2 93 %. Results from last 7 days   Lab Units 12/01/23  0606   PH ART  7.394   PCO2 ART mm Hg 40.6   PO2 ART mm Hg 77.4   HCO3 ART mmol/L 24.2   BASE EXC ART mmol/L -0.6   O2 CONTENT ART mL/dL 18.6   O2 HGB, ARTERIAL % 94.2   ABG SOURCE  Line, Arterial       Imaging and other studies: I have personally reviewed pertinent reports. See comments found below. O2 Device: Room air     Plan    Respiratory Plan: Mild Distress pathway  Airway Clearance Plan: Incentive Spirometer     Resp Comments: "63M w/ PMH of asthma, tobacco use who has been admitted for R MCA ischemic stroke s/p TNK and mechanical thrombectomy and hospital course complicated by acute hypoxic respiratory failure (2-4L NC) in the setting of prior mechanical ventilation and concern for pulmonary infection on CT." Hx of asthma for many years. Managed by PCP with albuterol. No PFTs on chart review. PE demonstrates wheezing in the right middle and lower lung fields. Hs of Acute Hypoxic Resp Failure:  Radiology: Patchy groundglass consolidations throughout the right lung. Will make slight changes to newly arriving orders to allow for the Atrovent / Xopenex liquid inhalation Rxs to be TID and use Ventolin for his PRN needs. Not clear if pt can currently be convinced to leave his NC in place.

## 2023-12-03 NOTE — ASSESSMENT & PLAN NOTE
Hx of HTN maintained on lisinopril 10 mg. BP controlled without medication while admitted    [address] [facility] 90 lisinopril

## 2023-12-03 NOTE — ASSESSMENT & PLAN NOTE
Hx of asthma for many years. Managed by PCP with albuterol. No PFTs on chart review.  PE demonstrates wheezing in the right middle and lower lung fields    Plan  -Outpatient pulmonology followup for PFT  -Derek Madrigal Qd added

## 2023-12-03 NOTE — QUICK NOTE
Informed by nursing that the patient had a controlled decent to the floor while going to CT. The pt was reportedly being helped to the CT scanner when he started to learn towards a staff member who then requested other staff members help in lowering him to the floor so he could sit. The patient did not have any head strike or trauma during the event. CT imaging was then obtained that had been ordered earlier.

## 2023-12-03 NOTE — CONSULTS
INTERNAL MEDICINE RESIDENCY ADMISSION H&P     Name: Jin David   Age & Sex: 58 y.o. male   MRN: [id number]  Unit/Bed#: [facility] 734-01   Encounter: [id number]  Primary Care Provider: No primary care provider on file. Code Status: Level 1 - Full Code    ASSESSMENT/PLAN     Principal Problem:    Acute hypoxic respiratory failure (HCC)  Active Problems:    Stroke due to R ICA to MCA occlusion, s/p TNK and thrombectomy    Hypertension    Asthma    DVT of lower extremity (deep venous thrombosis) (HCC)      * Acute hypoxic respiratory failure (HCC)  Assessment & Plan  63M w/ PMH of asthma, tobacco use who has been admitted for R MCA ischemic stroke s/p TNK and mechanical thrombectomy and hospital course complicated by acute hypoxic respiratory failure (2-4L NC) in the setting of prior mechanical ventilation and concern for pulmonary infection on CT. On physical exam, patient is resting comfortably on room air without signs of respiratory compromised, there is wheezing present in the right lung fields on auscultation. One time Temp of 100.6 this evening, but resolved and has otherwise been afebrile. Recent labs notable for resolving leukocytosis. Differential includes Asthma, viral/bacterial URI, atelectasis, PE    CT w/ contrast C/A/P (12/2/23): Patchy groundglass consolidations throughout the right lung. Correlate for aspiration versus atypical/viral infection.      Plan  - Will treat for asthma sx with scheduled nebs q8h w/ xopenex and ipratropium   - Obtain VBG   - Respiratory alkalosis present  - Follow up clinical response to nebs  - Will check procal w/reflex for AM r/o bacterial PNA  - Trend white count and fever curves to gauge probability/likelihood of viral PNA; if later in course determined to be likely viral can consider steroids  - Check Flu/RSV/COVID  - Follow up Bcx x2 from 12/1 - NGTD  - Respiratory protocol  - If no improvement or clinical deterioration can escalate workup to include  - CTA PE in setting of known DVT w/o AC   - Repeat CXR  - Repeat BC, sputum cultures    Stroke due to R ICA to MCA occlusion, s/p TNK and thrombectomy  Assessment & Plan  63M w/ PMH of HTN, tobacco use who presented to [address] for left sided weakness and right gaze preference found to have right ICA/MCA occlusion and is now s/p TNK and mechanical thrombectomy. Etiology of ischemic stroke unclear but undergoing workup for hypercoagulability as well as cardiac source. Plan:  - Management per neurology   - Stroke pathway with telemetry monitoring  - Frequent Neuro checks, obtain stat CTH if any acute changes  - BP goal < 160 to avoid further hemorrhagic conversion  - Rule out hypercoag state with thrombosis panel, Factor V, and homocysteine lab studies and CT CAP to r/o underlying malignancy  - TAO likely to be done on Monday, make NPO Sunday @ midnight  - Secondary stroke prevention:  - AP/AC: Will start Plavix 75 mg QD as history of allergy to ASA with anaphylaxis. Closely monitor for any signs of allergic reaction.   - Statin: Atorvastatin 40 mg   - Euglycemia with SSI if indicated  - PT/OT/PMR/ST w/ swallow eval  DVT prophylaxis: Heparin SQ and SCDs    DVT of lower extremity (deep venous thrombosis) ([address])  Assessment & Plan  Left gastrocnemius DVT noted on duplex US on 12/1. AC deferred 2/2 risk of intracerebral hemorrhage given above. DVT prophylaxis restarted. Plan  - Continue heparin for DVT prophylaxis  - Holding full AC given risk of intracerebral hemorrage  - F/u Duplex US to monitor for DVT resolution within 1 week    Asthma  Assessment & Plan  Hx of asthma for many years. Managed by PCP with albuterol. No PFTs on chart review.  PE demonstrates wheezing in the right middle and lower lung fields    Plan  - Xopenex nebs q8hr prn  - Ipratropium nebs q8hr prn    Hypertension  Assessment & Plan  Hx of HTN maintained on lisinopril 10 mg. BP controlled without medication while admitted    Plan  - SBP goal < 160 given above  - Recommend reinitiating pta lisinopril 10 mg once stable from neurologic standpoint        VTE Pharmacologic Prophylaxis: Reason for no pharmacologic prophylaxis ICH  VTE Mechanical Prophylaxis: sequential compression device and foot pump applied    CHIEF COMPLAINT   No chief complaint on file. HISTORY OF PRESENT ILLNESS     Mr. Trish Kehr is a 58year old male with a PMH of HTN, tobacco use, asthma who was admitted to [facility] for acute R ICA to M1 occlusion. Medicine team has been consulted for evaluation of acute hypoxic respiratory failure with consideration of the possibility of an underlying pulmonary embolism in the setting of a known DVT. Hospital summary: on 11/30 patient presented w/ left arm and leg weakness and was found to have right MCA stroke and was administered TNK at [facility] ED. Pt was then life-flighted [place] [facility] [place]. He subsequently underwent successful mechanical thrombectomy with TICI 3 reperfusion on 11/30. On arrival to ICU he was noted to be hypoxemic with concern for right sided mucous plugging on CXR and underwent emergent bronchoscopy with clearance. He was extubated on 12/1. He was found to have an acute DVT of the L gastrocnemius, however anticoagulation was deferred due to risk for intracerebral hemorrhage. CT C/A/P was performed which demonstrated right lung groundglass consolidations consistent with aspiration vs atypical/viral infection. Pt has been treated with 750 mg of azithromycin thus far and has received albuterol/nebulizer therapy but remains with new oxygen requirements. Patient reports intermittent chest tightness and shortness of breath over the course of today. Received his albuterol inhaler earlier today with limited benefit. Has had asthma for many years and states that it has been stable. Managed by his PCP with albuterol. He denies fevers, chills, chest pain, pleuritic chest pain, palpitations, n/v, abdominal pain, diarrhea. REVIEW OF SYSTEMS     Review of Systems   Constitutional:  Negative for chills and fever. HENT:  Negative for ear pain and sore throat. Eyes:  Negative for pain and visual disturbance. Respiratory:  Positive for chest tightness, shortness of breath and wheezing. Negative for cough. Cardiovascular:  Negative for chest pain and palpitations. Gastrointestinal:  Negative for abdominal pain and vomiting. Genitourinary:  Negative for dysuria and hematuria. Musculoskeletal:  Negative for arthralgias and back pain. Skin:  Negative for color change and rash. Neurological:  Positive for weakness. Negative for seizures and syncope. All other systems reviewed and are negative. OBJECTIVE     Vitals:    23 2057 23 2249 23 0112 23 0229   BP: 166/85      Pulse:  85  85   Resp:    18   Temp: (!) 100.6 °F (38.1 °C) 99.9 °F (37.7 °C)     TempSrc:       SpO2:  92% 93% 93%   Weight:       Height:          Temperature:   Temp (24hrs), Av.7 °F (37.6 °C), Min:98.1 °F (36.7 °C), Max:100.6 °F (38.1 °C)    Temperature: 99.9 °F (37.7 °C)  Intake & Output:  I/O          0701   0700  07 07    I.V. (mL/kg) 896.3 (9)     IV Piggyback 150     Total Intake(mL/kg) 1046.3 (10.6)     Urine (mL/kg/hr) 1325 (0.6)     Stool 0     Total Output 1325     Net -278.7           Unmeasured Urine Occurrence 1 x 2 x    Unmeasured Stool Occurrence 0 x           Weights:   IBW (Ideal Body Weight): 75.3 kg    Body mass index is 30.47 kg/m². Weight (last 2 days)       Date/Time Weight    23 0600 99.1 (218.48)    23 1000 100 (221)          Physical Exam  Vitals and nursing note reviewed. Constitutional:       General: He is not in acute distress. Appearance: He is well-developed. He is obese. HENT:      Head: Normocephalic and atraumatic. Eyes:      Conjunctiva/sclera: Conjunctivae normal.   Cardiovascular:      Rate and Rhythm: Normal rate and regular rhythm. Heart sounds: No murmur heard. Pulmonary:      Effort: Pulmonary effort is normal. No respiratory distress. Breath sounds: Normal breath sounds. Comments: Speaking in full sentences  No accessory muscle use  Lung sounds present in all fields  RML/RLL wheeze present  Abdominal:      Palpations: Abdomen is soft. Tenderness: There is no abdominal tenderness. Musculoskeletal:         General: No swelling or tenderness. Cervical back: Neck supple. Right lower leg: No edema. Left lower leg: No edema. Skin:     General: Skin is warm and dry. Capillary Refill: Capillary refill takes less than 2 seconds. Neurological:      Mental Status: He is alert and oriented to person, place, and time. Psychiatric:         Mood and Affect: Mood normal.       PAST MEDICAL HISTORY   No past medical history on file. PAST SURGICAL HISTORY     Past Surgical History:   Procedure Laterality Date    IR STROKE ALERT  11/30/2023     SOCIAL & FAMILY HISTORY     Social History     Substance and Sexual Activity   Alcohol Use Not on file       Social History     Substance and Sexual Activity   Drug Use Not on file     Social History     Tobacco Use   Smoking Status Not on file   Smokeless Tobacco Not on file     No family history on file. LABORATORY DATA     Labs: I have personally reviewed pertinent reports.     Results from last 7 days   Lab Units 12/02/23  0455 12/01/23  0605 11/30/23  1653   WBC Thousand/uL 10.51* 13.95* 9.45   HEMOGLOBIN g/dL 12.1 13.0 14.4   HEMATOCRIT % 37.0 39.4 42.9   PLATELETS Thousands/uL 145* 182 204   MONO PCT % 9  --   --    EOS PCT % 3  --   --       Results from last 7 days   Lab Units 12/02/23  0455 12/01/23  0605 11/30/23  1653   POTASSIUM mmol/L 3.6 3.6 4.6   CHLORIDE mmol/L 107 107 101   CO2 mmol/L 25 26 26   BUN mg/dL 13 21 26*   CREATININE mg/dL 1.15 1.13 1.14   CALCIUM mg/dL 7.2* 7.2* 8.5   ALK PHOS U/L  --  43  --    ALT U/L  --  17  --    AST U/L  --  15  -- Results from last 7 days   Lab Units 12/01/23  0605   MAGNESIUM mg/dL 1.8*     Results from last 7 days   Lab Units 12/01/23  0605   PHOSPHORUS mg/dL 3.8      Results from last 7 days   Lab Units 11/30/23  1653   INR  0.99   PTT seconds 22*             Micro:  Lab Results   Component Value Date    BLOODCX No Growth at 24 hrs. 12/01/2023    BLOODCX No Growth at 24 hrs. 12/01/2023    SPUTUMCULTUR Culture too young- will reincubate 12/01/2023     IMAGING & DIAGNOSTIC TESTS     Imaging: I have personally reviewed pertinent reports. CT chest abdomen pelvis w contrast    Result Date: 12/2/2023  Impression: Patchy groundglass consolidations throughout the right lung. Correlate for aspiration versus atypical/viral infection. No definite evidence of malignancy. Mild wall thickening of the urinary bladder may be secondary to chronic outlet obstruction. The study was marked in [facility] for immediate notification. Workstation performed: [id number]     MRI brain wo contrast    Result Date: 12/2/2023  Impression: Evolving acute to early subacute right MCA distribution infarction since November 30, 2023 with focal areas of hemorrhagic transformation within the ganglial capsular region. Mass effect on the right frontal horn is stable when comparing to the head CT performed earlier the same day but increased when comparing to November 30, 2023 examination. Pansinusitis. I personally discussed this study with Dr Katlin King on 12/2/2023 7:36 AM. Workstation performed: [id number]     Bronchoscopy    Result Date: 12/1/2023  Impression: RUL obstructive mucous plug RECOMMENDATION: Proceed with vent weaning as hypoxemia resolves and extubate if possible     CT head without contrast    Result Date: 12/1/2023  Impression: 1. Evolving acute right MCA territory infarct predominantly involving the right gangliocapsular region and corona radiata with mild regional mass effect without midline shift.  Minimal hyperdensity in the right caudate is likely contrast staining from earlier thrombectomy. Hemorrhage is less favored. Recommend close follow-up head CT. 2.  Acute/subacute sinusitis. Workstation performed: [id number]     IR stroke alert    Result Date: 12/1/2023  Impression: Right cervical ICA occlusion extending into the right middle cerebral artery M1 segment, TICI 0. Successful mechanical thrombectomy with TICI 3 reperfusion. Workstation performed: [id number]     XR chest portable ICU    Result Date: 12/1/2023  Impression: Near complete resolution of right-sided atelectatic change. Small right pleural effusion. Indeterminate hazy airspace opacity in the left parahilar midlung field region. Workstation performed: [id number]     XR chest portable ICU    Result Date: 12/1/2023  Impression: Bandlike opacity in the right mid to upper lung field with relative lucency of the right lower lung field is consistent with at least segmental atelectasis. There is relative increased density of the right hilum. Recommend CT for further evaluation. Workstation performed: [id number]     CT head wo contrast    Result Date: 11/30/2023  Impression: Reidentified subtle areas of loss of gray-white differentiation involving the right MCA territory. However, enhancement of the right middle cerebral artery M1 segment is now visible, from prior intravenous contrast administration for a CTA head and neck performed few hours prior. An MRI brain could be performed for further evaluation. Workstation performed: [id number]     CT stroke alert brain    Result Date: 11/30/2023  Impression: No acute intracranial hemorrhage. Right MCA distribution ischemia suspected. I personally discussed this study with Dr Unruly Sanchez on 11/30/2023 4:46 PM. Workstation performed: [id number]     EKG, Pathology, and Other Studies: I have personally reviewed pertinent reports.      ALLERGIES     Allergies   Allergen Reactions    Aspirin Anaphylaxis    Ibuprofen Anaphylaxis     MEDICATIONS PRIOR TO ARRIVAL     Prior to Admission medications    Medication Sig Start Date End Date Taking?  Authorizing Provider   albuterol (PROVENTIL HFA,VENTOLIN HFA) 90 mcg/act inhaler Inhale 2 puffs every 6 (six) hours as needed for wheezing   Yes Historical Provider, MD   lisinopril (ZESTRIL) 10 mg tablet Take 10 mg by mouth daily   Yes Historical Provider, MD   omeprazole (PriLOSEC) 20 mg delayed release capsule Take 20 mg by mouth daily   Yes Historical Provider, MD     MEDICATIONS ADMINISTERED IN LAST 24 HOURS     Medication Administration - last 24 hours from 12/02/2023 0239 to 12/03/2023 0239         Date/Time Order Dose Route Action Action by     12/02/2023 2057 EST chlorhexidine (PERIDEX) 0.12 % oral rinse 15 mL 15 mL Mouth/Throat Given Leelee Moreno, RN     12/02/2023 0926 EST chlorhexidine (PERIDEX) 0.12 % oral rinse 15 mL 15 mL Mouth/Throat Given Melissa Ruff, RN     12/02/2023 1731 EST atorvastatin (LIPITOR) tablet 40 mg 40 mg Per NG Tube Given Melissa Ruff, RN     12/02/2023 5209 EST albuterol inhalation solution 2.5 mg 2.5 mg Nebulization Given Gisselle Vega, RT     12/02/2023 2057 EST melatonin tablet 6 mg 6 mg Oral Given Leelee Moreno, RN     12/02/2023 2225 EST guaiFENesin (ROBITUSSIN) oral liquid 400 mg 400 mg Oral Given Leelee Moreno, RN     12/02/2023 1731 EST guaiFENesin (ROBITUSSIN) oral liquid 400 mg 400 mg Oral Given Melissa Ruff, RN     12/02/2023 1048 EST guaiFENesin (ROBITUSSIN) oral liquid 400 mg 400 mg Oral Not Given Melissa Ruff, RN     12/02/2023 0524 EST guaiFENesin (ROBITUSSIN) oral liquid 400 mg 400 mg Oral Given Saskia Calderon, RN     12/02/2023 2226 EST acetaminophen (TYLENOL) oral suspension 650 mg 650 mg Oral Given Leelee Moreno, RN     12/02/2023 1416 EST acetaminophen (TYLENOL) oral suspension 650 mg 650 mg Oral Given Melissa Ruff, RN     12/02/2023 8639 EST acetaminophen (TYLENOL) oral suspension 650 mg 650 mg Oral Given Saskia Calderon RN     12/02/2023 1732 EST azithromycin (Linda Nunes) tablet 250 mg 250 mg Oral Given Melissa Ruff, RN     12/02/2023 2058 EST albuterol (PROVENTIL HFA,VENTOLIN HFA) inhaler 2 puff 2 puff Inhalation Given Leelee Moreno, RN     12/02/2023 1002 EST albuterol (PROVENTIL HFA,VENTOLIN HFA) inhaler 2 puff 2 puff Inhalation Given Melissa Ruff, RN     12/02/2023 1243 EST ipratropium-albuterol (DUO-NEB) 0.5-2.5 mg/3 mL inhalation solution 3 mL 3 mL Nebulization Given Gisselle Pammer, RT     12/02/2023 1402 EST clopidogrel (PLAVIX) tablet 75 mg 75 mg Oral Given Melissa Ruff, RN     12/02/2023 2200 EST heparin (porcine) subcutaneous injection 5,000 Units -- Subcutaneous Canceled Entry Leelee Moreno, RN     12/02/2023 2057 EST heparin (porcine) subcutaneous injection 5,000 Units 5,000 Units Subcutaneous Given Leelee Moreno, RN     12/02/2023 1402 EST heparin (porcine) subcutaneous injection 5,000 Units 5,000 Units Subcutaneous Given Melissa Ruff, RN     12/02/2023 1732 EST metoclopramide (REGLAN) injection 10 mg 10 mg Intravenous Given Melissa Ruff, RN     12/02/2023 1729 EST magnesium sulfate 2 g/50 mL IVPB (premix) 2 g 2 g Intravenous [address] Melissa Ruff, [address]     12/02/2023 1732 EST sodium chloride 0.9 % infusion 100 mL/hr Intravenous [address] [place] [address] Melissa Ruff, RN     12/02/2023 1710 EST iohexol (OMNIPAQUE) 350 MG/ML injection (MULTI-DOSE) 100 mL 100 mL Intravenous Given Lavmanohar Gibson     12/03/2023 0135 EST ipratropium-albuterol (DUO-NEB) 0.5-2.5 mg/3 mL inhalation solution 3 mL 3 mL Nebulization Not Given Leelee Moreno, RN     12/03/2023 0134 EST metoclopramide (REGLAN) injection 10 mg 10 mg Intravenous Given Leelee Moreno, RN     12/03/2023 0134 EST diphenhydrAMINE (BENADRYL) injection 25 mg 25 mg Intravenous Given Leelee Moreno RN     12/03/2023 0134 EST rimegepant sulfate (NURTEC) disintegrating tablet 75 mg 75 mg Oral Given Leelee Moreno RN     12/03/2023 0111 EST albuterol inhalation solution 2.5 mg 2.5 mg Nebulization Given Katerina Guan RT          CURRENT MEDICATIONS Current Facility-Administered Medications   Medication Dose Route Frequency Provider Last Rate    acetaminophen  650 mg Oral Q6H PRN Ophiem Jorge, PA-C      atorvastatin  40 mg Per NG Tube QPM Ophiem Jorge, PA-C      azithromycin  250 mg Oral Q24H Ophiem Jorge, PA-C      chlorhexidine  15 mL Mouth/Throat Q12H [place], [place]      clopidogrel  75 mg Oral Daily Kaylie Decgulshan Dicesare, DO      diphenhydrAMINE  25 mg Intravenous Q8H [address], DO      guaiFENesin  400 mg Oral Q6H Ophiem Jorge, PA-C      heparin (porcine)  5,000 Units Subcutaneous [facility] Kaylie Gardner [place], DO      hydrALAZINE  10 mg Intravenous Q4H PRN Ophiem Jorge, PA-C      ipratropium  0.5 mg Nebulization TID PRN Idella Boas, MD      labetalol  10 mg Intravenous Q4H PRN Ophiem Jorge, PA-C      levalbuterol  0.31 mg Nebulization Q8H PRN Idella Boas, MD      magnesium sulfate  1 g Intravenous BID Lalito Briones, DO      melatonin  6 mg Oral HS Ophiem Jorge, PA-C      metoclopramide  10 mg Intravenous Q8H [address], DO          acetaminophen, 650 mg, Q6H PRN  hydrALAZINE, 10 mg, Q4H PRN  ipratropium, 0.5 mg, TID PRN  labetalol, 10 mg, Q4H PRN  levalbuterol, 0.31 mg, Q8H PRN        Admission Time  I spent 45 minutes admitting the patient. This involved direct patient contact where I performed a full history and physical, reviewing previous records, and reviewing laboratory and other diagnostic studies. Portions of the record may have been created with voice recognition software. Occasional wrong word or "sound a like" substitutions may have occurred due to the inherent limitations of voice recognition software.   Read the chart carefully and recognize, using context, where substitutions have occurred.    ==  Meridee Dakin, MD  [address]  Internal Medicine Residency PGY-1

## 2023-12-03 NOTE — ASSESSMENT & PLAN NOTE
Pt reporting hallucinations such as someone being in the bathroom when no one was or that family members were in the room when they were not.   Possibly 2/2 or worsened by benadryl will d/c  Possibly due to lack of sleep    Plan  - Regulate sleep wake cycle  - Delirium precautions

## 2023-12-04 ENCOUNTER — APPOINTMENT (INPATIENT)
Dept: RADIOLOGY | Facility: HOSPITAL | Age: 63
DRG: 030 | End: 2023-12-04
Payer: COMMERCIAL

## 2023-12-04 PROBLEM — R41.0 DELIRIOUS: Status: RESOLVED | Noted: 2023-12-03 | Resolved: 2023-12-04

## 2023-12-04 LAB
ANION GAP SERPL CALCULATED.3IONS-SCNC: 7 MMOL/L
ANION GAP SERPL CALCULATED.3IONS-SCNC: 7 MMOL/L
BACTERIA SPT RESP CULT: ABNORMAL
BUN SERPL-MCNC: 12 MG/DL (ref 5–25)
BUN SERPL-MCNC: 12 MG/DL (ref 5–25)
CALCIUM SERPL-MCNC: 8.2 MG/DL (ref 8.4–10.2)
CALCIUM SERPL-MCNC: 8.2 MG/DL (ref 8.4–10.2)
CHLORIDE SERPL-SCNC: 104 MMOL/L (ref 96–108)
CHLORIDE SERPL-SCNC: 104 MMOL/L (ref 96–108)
CO2 SERPL-SCNC: 23 MMOL/L (ref 21–32)
CO2 SERPL-SCNC: 23 MMOL/L (ref 21–32)
CREAT SERPL-MCNC: 0.97 MG/DL (ref 0.6–1.3)
CREAT SERPL-MCNC: 0.97 MG/DL (ref 0.6–1.3)
ERYTHROCYTE [DISTWIDTH] IN BLOOD BY AUTOMATED COUNT: 12.8 % (ref 11.6–15.1)
ERYTHROCYTE [DISTWIDTH] IN BLOOD BY AUTOMATED COUNT: 12.8 % (ref 11.6–15.1)
GFR SERPL CREATININE-BSD FRML MDRD: 82 ML/MIN/1.73SQ M
GFR SERPL CREATININE-BSD FRML MDRD: 82 ML/MIN/1.73SQ M
GLUCOSE SERPL-MCNC: 104 MG/DL (ref 65–140)
GLUCOSE SERPL-MCNC: 104 MG/DL (ref 65–140)
GRAM STN SPEC: ABNORMAL
HCT VFR BLD AUTO: 38.7 % (ref 36.5–49.3)
HCT VFR BLD AUTO: 38.7 % (ref 36.5–49.3)
HGB BLD-MCNC: 13.4 G/DL (ref 12–17)
HGB BLD-MCNC: 13.4 G/DL (ref 12–17)
MAGNESIUM SERPL-MCNC: 2.2 MG/DL (ref 1.9–2.7)
MAGNESIUM SERPL-MCNC: 2.2 MG/DL (ref 1.9–2.7)
MCH RBC QN AUTO: 31.8 PG (ref 26.8–34.3)
MCH RBC QN AUTO: 31.8 PG (ref 26.8–34.3)
MCHC RBC AUTO-ENTMCNC: 34.6 G/DL (ref 31.4–37.4)
MCHC RBC AUTO-ENTMCNC: 34.6 G/DL (ref 31.4–37.4)
MCV RBC AUTO: 92 FL (ref 82–98)
MCV RBC AUTO: 92 FL (ref 82–98)
PHOSPHATE SERPL-MCNC: 2.6 MG/DL (ref 2.3–4.1)
PHOSPHATE SERPL-MCNC: 2.6 MG/DL (ref 2.3–4.1)
PLATELET # BLD AUTO: 170 THOUSANDS/UL (ref 149–390)
PLATELET # BLD AUTO: 170 THOUSANDS/UL (ref 149–390)
PMV BLD AUTO: 9 FL (ref 8.9–12.7)
PMV BLD AUTO: 9 FL (ref 8.9–12.7)
POTASSIUM SERPL-SCNC: 4 MMOL/L (ref 3.5–5.3)
POTASSIUM SERPL-SCNC: 4 MMOL/L (ref 3.5–5.3)
PROT C AG ACT/NOR PPP IA: 84 % OF NORMAL (ref 60–150)
PROT C AG ACT/NOR PPP IA: 84 % OF NORMAL (ref 60–150)
PROT S ACT/NOR PPP: 76 % (ref 71–117)
PROT S ACT/NOR PPP: 76 % (ref 71–117)
RBC # BLD AUTO: 4.22 MILLION/UL (ref 3.88–5.62)
RBC # BLD AUTO: 4.22 MILLION/UL (ref 3.88–5.62)
SODIUM SERPL-SCNC: 134 MMOL/L (ref 135–147)
SODIUM SERPL-SCNC: 134 MMOL/L (ref 135–147)
WBC # BLD AUTO: 9.17 THOUSAND/UL (ref 4.31–10.16)
WBC # BLD AUTO: 9.17 THOUSAND/UL (ref 4.31–10.16)

## 2023-12-04 PROCEDURE — 94760 N-INVAS EAR/PLS OXIMETRY 1: CPT

## 2023-12-04 PROCEDURE — 80048 BASIC METABOLIC PNL TOTAL CA: CPT | Performed by: PSYCHIATRY & NEUROLOGY

## 2023-12-04 PROCEDURE — 71046 X-RAY EXAM CHEST 2 VIEWS: CPT

## 2023-12-04 PROCEDURE — 84100 ASSAY OF PHOSPHORUS: CPT | Performed by: PSYCHIATRY & NEUROLOGY

## 2023-12-04 PROCEDURE — 94668 MNPJ CHEST WALL SBSQ: CPT

## 2023-12-04 PROCEDURE — 99232 SBSQ HOSP IP/OBS MODERATE 35: CPT | Performed by: PSYCHIATRY & NEUROLOGY

## 2023-12-04 PROCEDURE — 85027 COMPLETE CBC AUTOMATED: CPT | Performed by: PSYCHIATRY & NEUROLOGY

## 2023-12-04 PROCEDURE — 99232 SBSQ HOSP IP/OBS MODERATE 35: CPT | Performed by: INTERNAL MEDICINE

## 2023-12-04 PROCEDURE — 83735 ASSAY OF MAGNESIUM: CPT | Performed by: PSYCHIATRY & NEUROLOGY

## 2023-12-04 PROCEDURE — 99223 1ST HOSP IP/OBS HIGH 75: CPT | Performed by: STUDENT IN AN ORGANIZED HEALTH CARE EDUCATION/TRAINING PROGRAM

## 2023-12-04 PROCEDURE — 94664 DEMO&/EVAL PT USE INHALER: CPT

## 2023-12-04 PROCEDURE — 94640 AIRWAY INHALATION TREATMENT: CPT

## 2023-12-04 RX ORDER — FLUTICASONE FUROATE AND VILANTEROL 100; 25 UG/1; UG/1
1 POWDER RESPIRATORY (INHALATION) DAILY
Status: DISCONTINUED | OUTPATIENT
Start: 2023-12-04 | End: 2023-12-08 | Stop reason: HOSPADM

## 2023-12-04 RX ORDER — ALBUTEROL SULFATE 90 UG/1
2 AEROSOL, METERED RESPIRATORY (INHALATION) EVERY 4 HOURS PRN
Status: CANCELLED | OUTPATIENT
Start: 2023-12-04

## 2023-12-04 RX ORDER — ALBUTEROL SULFATE 2.5 MG/3ML
SOLUTION RESPIRATORY (INHALATION)
Status: COMPLETED
Start: 2023-12-04 | End: 2023-12-04

## 2023-12-04 RX ORDER — ALBUTEROL SULFATE 90 UG/1
2 AEROSOL, METERED RESPIRATORY (INHALATION) EVERY 4 HOURS PRN
Status: DISCONTINUED | OUTPATIENT
Start: 2023-12-04 | End: 2023-12-04

## 2023-12-04 RX ORDER — ALBUTEROL SULFATE 90 UG/1
2 AEROSOL, METERED RESPIRATORY (INHALATION) EVERY 4 HOURS PRN
Status: DISCONTINUED | OUTPATIENT
Start: 2023-12-04 | End: 2023-12-08 | Stop reason: HOSPADM

## 2023-12-04 RX ADMIN — CHLORHEXIDINE GLUCONATE 15 ML: 1.2 SOLUTION ORAL at 20:57

## 2023-12-04 RX ADMIN — AZITHROMYCIN DIHYDRATE 250 MG: 250 TABLET ORAL at 17:19

## 2023-12-04 RX ADMIN — GUAIFENESIN 400 MG: 200 SOLUTION ORAL at 05:07

## 2023-12-04 RX ADMIN — Medication 6 MG: at 20:57

## 2023-12-04 RX ADMIN — ALBUTEROL SULFATE 2.5 MG: 2.5 SOLUTION RESPIRATORY (INHALATION) at 18:00

## 2023-12-04 RX ADMIN — METOCLOPRAMIDE HYDROCHLORIDE 10 MG: 5 INJECTION INTRAMUSCULAR; INTRAVENOUS at 02:09

## 2023-12-04 RX ADMIN — IPRATROPIUM BROMIDE 0.5 MG: 0.5 SOLUTION RESPIRATORY (INHALATION) at 07:23

## 2023-12-04 RX ADMIN — METOCLOPRAMIDE HYDROCHLORIDE 10 MG: 5 INJECTION INTRAMUSCULAR; INTRAVENOUS at 17:20

## 2023-12-04 RX ADMIN — LEVALBUTEROL HYDROCHLORIDE 1.25 MG: 1.25 SOLUTION RESPIRATORY (INHALATION) at 07:23

## 2023-12-04 RX ADMIN — HEPARIN SODIUM 5000 UNITS: 5000 INJECTION INTRAVENOUS; SUBCUTANEOUS at 20:57

## 2023-12-04 RX ADMIN — CLOPIDOGREL BISULFATE 75 MG: 75 TABLET ORAL at 09:28

## 2023-12-04 RX ADMIN — FLUTICASONE FUROATE AND VILANTEROL TRIFENATATE 1 PUFF: 100; 25 POWDER RESPIRATORY (INHALATION) at 16:45

## 2023-12-04 RX ADMIN — IPRATROPIUM BROMIDE 0.5 MG: 0.5 SOLUTION RESPIRATORY (INHALATION) at 13:46

## 2023-12-04 RX ADMIN — CHLORHEXIDINE GLUCONATE 15 ML: 1.2 SOLUTION ORAL at 09:28

## 2023-12-04 RX ADMIN — HEPARIN SODIUM 5000 UNITS: 5000 INJECTION INTRAVENOUS; SUBCUTANEOUS at 13:40

## 2023-12-04 RX ADMIN — LEVALBUTEROL HYDROCHLORIDE 1.25 MG: 1.25 SOLUTION RESPIRATORY (INHALATION) at 13:46

## 2023-12-04 RX ADMIN — METOCLOPRAMIDE HYDROCHLORIDE 10 MG: 5 INJECTION INTRAMUSCULAR; INTRAVENOUS at 09:28

## 2023-12-04 RX ADMIN — GUAIFENESIN 400 MG: 200 SOLUTION ORAL at 00:26

## 2023-12-04 RX ADMIN — HEPARIN SODIUM 5000 UNITS: 5000 INJECTION INTRAVENOUS; SUBCUTANEOUS at 05:08

## 2023-12-04 RX ADMIN — ATORVASTATIN CALCIUM 40 MG: 40 TABLET, FILM COATED ORAL at 17:19

## 2023-12-04 NOTE — CASE MANAGEMENT
Case Management Discharge Planning Note    Patient name Micki Edwards  Location [facility] 625-16 MRN [id number]  : 1960 Date 2023       Current Admission Date: 2023  Current Admission Diagnosis:Acute hypoxic respiratory failure [facility])   Patient Active Problem List    Diagnosis Date Noted    Acute hypoxic respiratory failure ([address]) 2023    Delirious 2023    Asthma 2023    DVT of lower extremity (deep venous thrombosis) ([address]) 2023    Hypertension 2023    Stroke due to R ICA to MCA occlusion, s/p TNK and thrombectomy 2023      LOS (days): 4  Geometric Mean LOS (GMLOS) (days): 7.50  Days to GMLOS:3.7     OBJECTIVE:  Risk of Unplanned Readmission Score: 12.79         Current admission status: Inpatient   Preferred Pharmacy: No Pharmacies Listed  Primary Care Provider: No primary care provider on file. Primary Insurance: [employer] PENDING  Secondary Insurance:     DISCHARGE DETAILS:          Family notified[de-identified] Spoke with pt and daughter bedside. Pt has been approved for ARC/LE when medically stable and insurnace auth is received  Additional Comments: Pt will be put o na heprin drip Th/Fridayt pending pulm consult has asthma at baseline is currently back on o2.  Will remain IP thrugh the week

## 2023-12-04 NOTE — CONSULTS
Consultation - Pulmonary Medicine   Eva Avila 58 y.o. male MRN: [id number]  Unit/Bed#: [facility] 734-01 Encounter: [id number]      Assessment:  Acute hypoxic respiratory failure  Patient has been told by an outside provider in the past that he has asthma, but there are no PFTs on file. He utilizes his albuterol inhaler 4-6 times daily at home. Given the patient's smoking history, he likely has some underlying COPD requiring more management than just prn albuterol. 2.   Acute occlusive DVT of Left Gastrocnemius Veins    Plan:   Acute hypoxic respiratory failure  Continue Atrovent 0.5 mg 3 times daily  Continue Xopenex 1.25 mg 3 times daily  Start Breo Ellipta once daily. Patient will need to continue this upon discharge and have outpatient workup for COPD. Maintain SpO2>88%  Wean O2 as tolerated  Continue azithromycin for total of 5 days  Acute occlusive DVT of left gastrocnemius veins  Patient ideally would be on anticoagulation, but patient likely requires an IVC filter given the risk for hemorrhagic conversion. Will defer to the primary team on this topic. History of Present Illness   Physician Requesting Consult: Maira Sen MD  Reason for Consult / Principal Problem: Acute hypoxic respiratory failure  Hx and PE limited by: None  HPI: Eva Avila is a 58y.o. year old male with a past medical history of hypertension, asthma, and tobacco use who initially presented to the hospital with left-sided weakness and was found to have a right ICA occlusion and is s/p thrombectomy on 11/30. Patient developed increased work of breathing on the night of 12/2 and oxygen requirements increased from 1L to 4L. CT CAP showed findings concerning for aspiration vs atypical/viral infection. Pulmonology was consulted for management of acute hypoxic respiratory failure. Today patient reports chest tightness with inspiration.  He also endorses right sided aching chest pain along the lower rib margins that has been present for days. He denies fever, chills, cough, congestion, orthopnea, PND, nausea, vomiting, or diarrhea. He has been told by an outside provider in the past that he has asthma and was given albuterol by his PCP. He reports using his inhaler 4-6 times per day at home for the last 2 years. Inpatient consult to Pulmonology  Consult performed by: Ping Ferreira DO  Consult ordered by: Diego Rios DO          Review of Systems   Constitutional:  Positive for chills. Negative for fever. Respiratory:  Positive for chest tightness and shortness of breath. Negative for wheezing. Cardiovascular:  Positive for chest pain. Negative for palpitations. Gastrointestinal:  Negative for abdominal pain, diarrhea, nausea and vomiting. Neurological:  Negative for dizziness, light-headedness and headaches. Psychiatric/Behavioral:  Negative for confusion. Historical Information   No past medical history on file. Past Surgical History:   Procedure Laterality Date    IR STROKE ALERT  11/30/2023     Social History   Social History     Substance and Sexual Activity   Alcohol Use Not on file     Social History     Substance and Sexual Activity   Drug Use Not on file     E-Cigarette/Vaping     E-Cigarette/Vaping Substances     Social History     Tobacco Use   Smoking Status Not on file   Smokeless Tobacco Not on file         Meds/Allergies   all current active meds have been reviewed    Allergies   Allergen Reactions    Aspirin Anaphylaxis    Ibuprofen Anaphylaxis       Objective   Vitals: Blood pressure 126/79, pulse 63, temperature 98.1 °F (36.7 °C), temperature source Oral, resp. rate (!) 28, height 5' 11" (1.803 m), weight 99.1 kg (218 lb 7.6 oz), SpO2 97 %. ,Body mass index is 30.47 kg/m².     Intake/Output Summary (Last 24 hours) at 12/4/2023 1441  Last data filed at 12/4/2023 0826  Gross per 24 hour   Intake 360 ml   Output 750 ml   Net -390 ml     Invasive Devices       Peripheral Intravenous Line  Duration Peripheral IV 12/01/23 Right Antecubital 2 days                    Physical Exam  Vitals and nursing note reviewed. Constitutional:       General: He is not in acute distress. Appearance: Normal appearance. He is well-developed. HENT:      Head: Normocephalic and atraumatic. Mouth/Throat:      Mouth: Mucous membranes are moist.      Pharynx: Oropharynx is clear. Eyes:      Conjunctiva/sclera: Conjunctivae normal.      Pupils: Pupils are equal, round, and reactive to light. Cardiovascular:      Rate and Rhythm: Normal rate and regular rhythm. Pulses: Normal pulses. Heart sounds: Normal heart sounds. No murmur heard. Pulmonary:      Effort: Pulmonary effort is normal. No respiratory distress. Breath sounds: Rales present. No wheezing or rhonchi. Comments: Fine crackles along the right lung base. Tenderness along the right lower rib margins. Chest:      Chest wall: Tenderness present. Abdominal:      Palpations: Abdomen is soft. Tenderness: There is no abdominal tenderness. Musculoskeletal:         General: No swelling. Skin:     General: Skin is warm and dry. Capillary Refill: Capillary refill takes less than 2 seconds. Neurological:      General: No focal deficit present. Mental Status: He is alert and oriented to person, place, and time. Psychiatric:         Mood and Affect: Mood normal.         Lab Results: I have personally reviewed pertinent lab results. Imaging Studies: I have personally reviewed pertinent reports. EKG, Pathology, and Other Studies: I have personally reviewed pertinent reports.     VTE Prophylaxis: Heparin    Code Status: Level 1 - Full Code  Advance Directive and Living Will:      Power of :    POLST:

## 2023-12-04 NOTE — CASE MANAGEMENT
Case Management Discharge Planning Note    Patient name Antwon Gillespie  Location [facility] 353-26 MRN [id number]  : 1960 Date 2023       Current Admission Date: 2023  Current Admission Diagnosis:Acute hypoxic respiratory failure [facility])   Patient Active Problem List    Diagnosis Date Noted    Acute hypoxic respiratory failure ([address]) 2023    Delirious 2023    Asthma 2023    DVT of lower extremity (deep venous thrombosis) ([address]) 2023    Hypertension 2023    Stroke due to R ICA to MCA occlusion, s/p TNK and thrombectomy 2023      LOS (days): 4  Geometric Mean LOS (GMLOS) (days): 7.50  Days to GMLOS:3.9     OBJECTIVE:  Risk of Unplanned Readmission Score: 12.89         Current admission status: Inpatient   Preferred Pharmacy: No Pharmacies Listed  Primary Care Provider: No primary care provider on file. Primary Insurance: M[employer] PENDING  Secondary Insurance:     DISCHARGE DETAILS:             Additional Comments: CM spoke with pt and daughter bedside and provided update on arc referral. Pt stated if he is not able to make decisions for himself he would prefer his children to make them The daughter stated it would be a collaboration however they would make final decision.  Cm will f/u with
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
## 2023-12-04 NOTE — PROGRESS NOTES
INTERNAL MEDICINE RESIDENCY PROGRESS NOTE     Name: Trenton Pablo   Age & Sex: 58 y.o. male   MRN: [id number]  Unit/Bed#: [facility] 734-01   Encounter: [id number]  Team: SOD Team C     PATIENT INFORMATION     Name: Trenton Pablo   Age & Sex: 58 y.o. male   MRN: [id number]  Hospital Stay Days: 4    ASSESSMENT/PLAN     Principal Problem:    Acute hypoxic respiratory failure ([address])  Active Problems:    Stroke due to R ICA to MCA occlusion, s/p TNK and thrombectomy    DVT of lower extremity (deep venous thrombosis) ([address])    Hypertension    Asthma    Delirious      * Acute hypoxic respiratory failure ([address])  Assessment & Plan  63M w/ PMH of asthma, tobacco use who has been admitted for R MCA ischemic stroke s/p TNK and mechanical thrombectomy and hospital course complicated by acute hypoxic respiratory failure (2-4L NC) in the setting of prior mechanical ventilation and concern for pulmonary infection on CT. On physical exam, patient is resting comfortably on room air without signs of respiratory compromised, there is wheezing present in the right lung fields on auscultation. One time Temp of 100.6 this evening, but resolved and has otherwise been afebrile. Recent labs notable for resolving leukocytosis. Differential includes Asthma, viral/bacterial URI, atelectasis, PE    CT w/ contrast C/A/P (12/2/23): Patchy groundglass consolidations throughout the right lung. Correlate for aspiration versus atypical/viral infection. This AM patient states he is feeling better with inhaler treatments. Denies SOB. Currently satting 94% on 2L NC   WBC 9.17  Plan  - Continue with scheduled nebs q8h w/ xopenex and ipratropium   - Procal 0.11 - bacterial PNA less likely. - Azithromycin Day 4/5   - Flu/RSV/COVID -- negative  - Follow up Bcx x2 from 12/1 - NGTD  - Pulmonology on board - considering asthma/COPD overlap. Breo Ellipta once daily added.    - If no improvement or clinical deterioration can escalate workup to include  - CTA PE in setting of known DVT w/o AC   - Repeat CXR  - Repeat BC, sputum cultures    Stroke due to R ICA to MCA occlusion, s/p TNK and thrombectomy  Assessment & Plan  63M w/ PMH of HTN, tobacco use who presented to [facility] for left sided weakness and right gaze preference found to have right ICA/MCA occlusion and is now s/p TNK and mechanical thrombectomy. Etiology of ischemic stroke unclear but undergoing workup for hypercoagulability as well as cardiac source. Plan:  - Management per neurology   - Stroke pathway with telemetry monitoring  - Frequent Neuro checks, obtain stat CTH if any acute changes  - BP goal < 160 to avoid further hemorrhagic conversion  - Rule out hypercoag state with thrombosis panel, Factor V, and homocysteine lab studies and CT CAP to r/o underlying malignancy  - TAO Monday   - Secondary stroke prevention:  - AP/AC: Will start Plavix 75 mg QD as history of allergy to ASA with anaphylaxis. Closely monitor for any signs of allergic reaction.   - Statin: Atorvastatin 40 mg   - Euglycemia with SSI if indicated  - PT/OT/PMR/ST w/ swallow eval  DVT prophylaxis: Heparin SQ and SCDs    DVT of lower extremity (deep venous thrombosis) ([address])  Assessment & Plan  Left gastrocnemius DVT noted on duplex US on 12/1. AC deferred 2/2 risk of intracerebral hemorrhage given above. DVT prophylaxis restarted. Plan  - Continue heparin for DVT prophylaxis  - Holding full AC given risk of intracerebral hemorrage  - Pulmonology following, will follow recommendations for PE prophylaxis. Hypertension  Assessment & Plan  Hx of HTN maintained on lisinopril 10 mg. BP controlled without medication while admitted    Plan  - SBP goal < 160 given above  - Recommend reinitiating pta lisinopril 10 mg once stable from neurologic standpoint    Asthma  Assessment & Plan  Hx of asthma for many years. Managed by PCP with albuterol. No PFTs on chart review.  PE demonstrates wheezing in the right middle and lower lung fields    Plan  - Xopenex nebs q8hr prn  - Ipratropium nebs q8hr prn  -Pulmonology following; add breo ellipta once daily        Disposition: Patient with cough and SOB. Discharge per primary    SUBJECTIVE     Patient seen and examined. No acute events overnight. Patient is sleepy but offers no complaints. Feels better with his inhaler. States he uses albuterol 6x per day at home. Still has cough but it is improving. Neurology asking about PE prophylaxis d/t his DVT and his stroke. Will inquire with pulmonology    OBJECTIVE     Vitals:    23 0343 23 0600 23 0744 23 1056   BP: 129/80  119/100 126/79   BP Location:    Right arm   Pulse: 67  74 63   Resp:   (!) 28 (!) 28   Temp: 97.7 °F (36.5 °C)  98.4 °F (36.9 °C) 98.1 °F (36.7 °C)   TempSrc:   Oral Oral   SpO2: 92%  94% 97%   Weight:  99.1 kg (218 lb 7.6 oz)     Height:          Temperature:   Temp (24hrs), Av.3 °F (36.8 °C), Min:97.7 °F (36.5 °C), Max:98.9 °F (37.2 °C)    Temperature: 98.1 °F (36.7 °C)  Intake & Output:  I/O          07 0700  0701   0700  0701   0700    P. O.  600     I.V. (mL/kg)       IV Piggyback       Total Intake(mL/kg)  600 (6.1)     Urine (mL/kg/hr)  1650 (0.7)     Stool       Total Output  1650     Net  -1050            Unmeasured Urine Occurrence 2 x            Weights:   IBW (Ideal Body Weight): 75.3 kg    Body mass index is 30.47 kg/m². Weight (last 2 days)       Date/Time Weight    23 06 99.1 (218.48)    23 06 99.1 (218.48)          Physical Exam  Constitutional:       General: He is not in acute distress. Appearance: He is normal weight. HENT:      Head: Normocephalic and atraumatic. Right Ear: External ear normal.      Left Ear: External ear normal.      Nose: Nose normal.      Mouth/Throat:      Mouth: Mucous membranes are moist.      Pharynx: No oropharyngeal exudate or posterior oropharyngeal erythema.    Eyes:      Extraocular Movements: Extraocular movements intact. Conjunctiva/sclera: Conjunctivae normal.   Cardiovascular:      Rate and Rhythm: Normal rate and regular rhythm. Pulses: Normal pulses. Heart sounds: Normal heart sounds. No friction rub. Abdominal:      General: Abdomen is flat. Bowel sounds are normal. There is no distension. Palpations: Abdomen is soft. There is no mass. Musculoskeletal:      Cervical back: Normal range of motion and neck supple. No rigidity. Skin:     General: Skin is warm and dry. Neurological:      Mental Status: He is alert and oriented to person, place, and time. Motor: Weakness present. Comments: Left sided facial weakness   Psychiatric:         Mood and Affect: Mood normal.       LABORATORY DATA     Labs: I have personally reviewed pertinent reports. Results from last 7 days   Lab Units 12/04/23 0826 12/03/23 0544 12/02/23  0455   WBC Thousand/uL 9.17 9.26 10.51*   HEMOGLOBIN g/dL 13.4 12.3 12.1   HEMATOCRIT % 38.7 36.8 37.0   PLATELETS Thousands/uL 170 155 145*   MONO PCT %  --   --  9   EOS PCT %  --   --  3      Results from last 7 days   Lab Units 12/04/23 0826 12/03/23  0544 12/02/23  0455 12/01/23  0605   POTASSIUM mmol/L 4.0 3.4* 3.6 3.6   CHLORIDE mmol/L 104 105 107 107   CO2 mmol/L 23 24 25 26   BUN mg/dL 12 10 13 21   CREATININE mg/dL 0.97 0.96 1.15 1.13   CALCIUM mg/dL 8.2* 7.2* 7.2* 7.2*   ALK PHOS U/L  --  42  --  43   ALT U/L  --  16  --  17   AST U/L  --  19  --  15     Results from last 7 days   Lab Units 12/04/23  0826 12/03/23  0544 12/01/23  0605   MAGNESIUM mg/dL 2.2 2.2 1.8*     Results from last 7 days   Lab Units 12/04/23  0826 12/03/23  0544 12/01/23  0605   PHOSPHORUS mg/dL 2.6 1.8* 3.8      Results from last 7 days   Lab Units 11/30/23  1653   INR  0.99   PTT seconds 22*               IMAGING & DIAGNOSTIC TESTING     Radiology Results: I have personally reviewed pertinent reports.   CT pelvis wo contrast    Result Date: 12/3/2023  Impression: No evidence of hip fracture status post fall. Small volume of gas urinary bladder may be iatrogenic from instrumentation or may represent the sequela of gas-forming infection which should only be considered in the right clinical setting. Workstation performed: [id number]     CT spine cervical wo contrast    Result Date: 12/3/2023  Impression: No cervical spine fracture or traumatic malalignment. Mild degenerative changes are noted. Workstation performed: [id number]     CT head wo contrast    Result Date: 12/3/2023  Impression: Expected appearance of evolving right MCA distribution infarcts with areas of hemorrhagic transformation. Slight interval increase in mass effect on the frontal horn of the right lateral ventricle without midline shift. New areas of recent infarction right now better visualized located in the subcortical right parietal lobe and deep right frontal lobe white matter. Workstation performed: [id number]     CT chest abdomen pelvis w contrast    Result Date: 12/2/2023  Impression: Patchy groundglass consolidations throughout the right lung. Correlate for aspiration versus atypical/viral infection. No definite evidence of malignancy. Mild wall thickening of the urinary bladder may be secondary to chronic outlet obstruction. The study was marked in [facility] for immediate notification. Workstation performed: [id number]     MRI brain wo contrast    Result Date: 12/2/2023  Impression: Evolving acute to early subacute right MCA distribution infarction since November 30, 2023 with focal areas of hemorrhagic transformation within the ganglial capsular region. Mass effect on the right frontal horn is stable when comparing to the head CT performed earlier the same day but increased when comparing to November 30, 2023 examination. Pansinusitis.  I personally discussed this study with Dr Vinie Crigler on 12/2/2023 7:36 AM. Workstation performed: [id number]     Bronchoscopy    Result Date: 12/1/2023  Impression: RUL obstructive mucous plug RECOMMENDATION: Proceed with vent weaning as hypoxemia resolves and extubate if possible     CT head without contrast    Result Date: 12/1/2023  Impression: 1. Evolving acute right MCA territory infarct predominantly involving the right gangliocapsular region and corona radiata with mild regional mass effect without midline shift. Minimal hyperdensity in the right caudate is likely contrast staining from earlier thrombectomy. Hemorrhage is less favored. Recommend close follow-up head CT. 2.  Acute/subacute sinusitis. Workstation performed: [id number]     IR stroke alert    Result Date: 12/1/2023  Impression: Right cervical ICA occlusion extending into the right middle cerebral artery M1 segment, TICI 0. Successful mechanical thrombectomy with TICI 3 reperfusion. Workstation performed: [id number]     XR chest portable ICU    Result Date: 12/1/2023  Impression: Near complete resolution of right-sided atelectatic change. Small right pleural effusion. Indeterminate hazy airspace opacity in the left parahilar midlung field region. Workstation performed: [id number]     XR chest portable ICU    Result Date: 12/1/2023  Impression: Bandlike opacity in the right mid to upper lung field with relative lucency of the right lower lung field is consistent with at least segmental atelectasis. There is relative increased density of the right hilum. Recommend CT for further evaluation. Workstation performed: [id number]     CT head wo contrast    Result Date: 11/30/2023  Impression: Reidentified subtle areas of loss of gray-white differentiation involving the right MCA territory. However, enhancement of the right middle cerebral artery M1 segment is now visible, from prior intravenous contrast administration for a CTA head and neck performed few hours prior. An MRI brain could be performed for further evaluation.  Workstation performed: [id number]     CT stroke alert brain    Result Date: 11/30/2023  Impression: No acute intracranial hemorrhage. Right MCA distribution ischemia suspected. I personally discussed this study with Dr Everardo Shah on 11/30/2023 4:46 PM. Workstation performed: [id number]     Other Diagnostic Testing: I have personally reviewed pertinent reports. ACTIVE MEDICATIONS     Current Facility-Administered Medications   Medication Dose Route Frequency    acetaminophen (TYLENOL) oral suspension 650 mg  650 mg Oral Q6H PRN    atorvastatin (LIPITOR) tablet 40 mg  40 mg Per NG Tube QPM    azithromycin (ZITHROMAX) tablet 250 mg  250 mg Oral Q24H    chlorhexidine (PERIDEX) 0.12 % oral rinse 15 mL  15 mL Mouth/Throat Q12H LUIZ    clopidogrel (PLAVIX) tablet 75 mg  75 mg Oral Daily    Fluticasone Furoate-Vilanterol 100-25 mcg/actuation 1 puff  1 puff Inhalation Daily    heparin (porcine) subcutaneous injection 5,000 Units  5,000 Units Subcutaneous Q8H [address]    hydrALAZINE (APRESOLINE) injection 10 mg  10 mg Intravenous Q4H PRN    ipratropium (ATROVENT) 0.02 % inhalation solution 0.5 mg  0.5 mg Nebulization TID    labetalol (NORMODYNE) injection 10 mg  10 mg Intravenous Q4H PRN    levalbuterol (XOPENEX) inhalation solution 1.25 mg  1.25 mg Nebulization TID    melatonin tablet 6 mg  6 mg Oral HS    metoclopramide (REGLAN) injection 10 mg  10 mg Intravenous Q8H [address]       VTE Pharmacologic Prophylaxis: Heparin  VTE Mechanical Prophylaxis: sequential compression device    Portions of the record may have been created with voice recognition software. Occasional wrong word or "sound a like" substitutions may have occurred due to the inherent limitations of voice recognition software.   Read the chart carefully and recognize, using context, where substitutions have occurred.  ==  [place]  Medical Student OMS 4

## 2023-12-04 NOTE — ARC ADMISSION
Referral received for consideration of patient for inpatient acute rehab. Will review patient's case with [facility] physician and update CM as able. Reviewed patient's case with [facility] physician - patient is approved for [facility] pending medical stability, functional progress, insurance authorization (currently [employer] pending) and bed availability. Noted patient is planned for TAO today. CM has been updated. Will continue to follow at this time.

## 2023-12-04 NOTE — PLAN OF CARE
Problem: Prexisting or High Potential for Compromised Skin Integrity  Goal: Skin integrity is maintained or improved  Description: INTERVENTIONS:  - Identify patients at risk for skin breakdown  - Assess and monitor skin integrity  - Assess and monitor nutrition and hydration status  - Monitor labs   - Assess for incontinence   - Turn and reposition patient  - Assist with mobility/ambulation  - Relieve pressure over bony prominences  - Avoid friction and shearing  - Provide appropriate hygiene as needed including keeping skin clean and dry  - Evaluate need for skin moisturizer/barrier cream  - Collaborate with interdisciplinary team   - Patient/family teaching  - Consider wound care consult   Outcome: Progressing     Problem: PAIN - ADULT  Goal: Verbalizes/displays adequate comfort level or baseline comfort level  Description: Interventions:  - Encourage patient to monitor pain and request assistance  - Assess pain using appropriate pain scale  - Administer analgesics based on type and severity of pain and evaluate response  - Implement non-pharmacological measures as appropriate and evaluate response  - Consider cultural and social influences on pain and pain management  - Notify physician/advanced practitioner if interventions unsuccessful or patient reports new pain  Outcome: Progressing     Problem: INFECTION - ADULT  Goal: Absence or prevention of progression during hospitalization  Description: INTERVENTIONS:  - Assess and monitor for signs and symptoms of infection  - Monitor lab/diagnostic results  - Monitor all insertion sites, i.e. indwelling lines, tubes, and drains  - Monitor endotracheal if appropriate and nasal secretions for changes in amount and color  - [place] appropriate cooling/warming therapies per order  - Administer medications as ordered  - Instruct and encourage patient and family to use good hand hygiene technique  - Identify and instruct in appropriate isolation precautions for identified infection/condition  Outcome: Progressing  Goal: Absence of fever/infection during neutropenic period  Description: INTERVENTIONS:  - Monitor WBC    Outcome: Progressing     Problem: SAFETY ADULT  Goal: Patient will remain free of falls  Description: INTERVENTIONS:  - Educate patient/family on patient safety including physical limitations  - Instruct patient to call for assistance with activity   - Consult OT/PT to assist with strengthening/mobility   - Keep Call bell within reach  - Keep bed low and locked with side rails adjusted as appropriate  - Keep care items and personal belongings within reach  - Initiate and maintain comfort rounds  - Make Fall Risk Sign visible to staff  - Offer Toileting every  Hours, in advance of need  - Initiate/Maintain alarm  - Obtain necessary fall risk management equipment:   - Apply yellow socks and bracelet for high fall risk patients  - Consider moving patient to room near nurses station  Outcome: Progressing  Goal: Maintain or return to baseline ADL function  Description: INTERVENTIONS:  -  Assess patient's ability to carry out ADLs; assess patient's baseline for ADL function and identify physical deficits which impact ability to perform ADLs (bathing, care of mouth/teeth, toileting, grooming, dressing, etc.)  - Assess/evaluate cause of self-care deficits   - Assess range of motion  - Assess patient's mobility; develop plan if impaired  - Assess patient's need for assistive devices and provide as appropriate  - Encourage maximum independence but intervene and supervise when necessary  - Involve family in performance of ADLs  - Assess for home care needs following discharge   - Consider OT consult to assist with ADL evaluation and planning for discharge  - Provide patient education as appropriate  Outcome: Progressing  Goal: Maintains/Returns to pre admission functional level  Description: INTERVENTIONS:  - Perform AM-PAC 6 Click Basic Mobility/ Daily Activity assessment daily.  - Set and communicate daily mobility goal to care team and patient/family/caregiver. - Collaborate with rehabilitation services on mobility goals if consulted  - Perform Range of Motion  times a day. - Reposition patient every  hours. - Dangle patient  times a day  - Stand patient  times a day  - Ambulate patient  times a day  - Out of bed to chair  times a day   - Out of bed for meals  times a day  - Out of bed for toileting  - Record patient progress and toleration of activity level   Outcome: Progressing     Problem: DISCHARGE PLANNING  Goal: Discharge to home or other facility with appropriate resources  Description: INTERVENTIONS:  - Identify barriers to discharge w/patient and caregiver  - Arrange for needed discharge resources and transportation as appropriate  - Identify discharge learning needs (meds, wound care, etc.)  - Arrange for interpretive services to assist at discharge as needed  - Refer to Case Management Department for coordinating discharge planning if the patient needs post-hospital services based on physician/advanced practitioner order or complex needs related to functional status, cognitive ability, or social support system  Outcome: Progressing     Problem: Knowledge Deficit  Goal: Patient/family/caregiver demonstrates understanding of disease process, treatment plan, medications, and discharge instructions  Description: Complete learning assessment and assess knowledge base. Interventions:  - Provide teaching at level of understanding  - Provide teaching via preferred learning methods  Outcome: Progressing     Problem: Nutrition/Hydration-ADULT  Goal: Nutrient/Hydration intake appropriate for improving, restoring or maintaining nutritional needs  Description: Monitor and assess patient's nutrition/hydration status for malnutrition. Collaborate with interdisciplinary team and initiate plan and interventions as ordered.   Monitor patient's weight and dietary intake as ordered or per policy. Utilize nutrition screening tool and intervene as necessary. Determine patient's food preferences and provide high-protein, high-caloric foods as appropriate. INTERVENTIONS:  - Monitor oral intake, urinary output, labs, and treatment plans  - Assess nutrition and hydration status and recommend course of action  - Evaluate amount of meals eaten  - Assist patient with eating if necessary   - Allow adequate time for meals  - Recommend/ encourage appropriate diets, oral nutritional supplements, and vitamin/mineral supplements  - Order, calculate, and assess calorie counts as needed  - Recommend, monitor, and adjust tube feedings and TPN/PPN based on assessed needs  - Assess need for intravenous fluids  - Provide specific nutrition/hydration education as appropriate  - Include patient/family/caregiver in decisions related to nutrition  Outcome: Progressing     Problem: Neurological Deficit  Goal: Neurological status is stable or improving  Description: Interventions:  - Monitor and assess patient's level of consciousness, motor function, sensory function, and level of assistance needed for ADLs. - Monitor and report changes from baseline. Collaborate with interdisciplinary team to initiate plan and implement interventions as ordered. - Provide and maintain a safe environment. - Consider seizure precautions. - Consider fall precautions. - Consider aspiration precautions. - Consider bleeding precautions. Outcome: Progressing     Problem: Activity Intolerance/Impaired Mobility  Goal: Mobility/activity is maintained at optimum level for patient  Description: Interventions:  - Assess and monitor patient  barriers to mobility and need for assistive/adaptive devices. - Assess patient's emotional response to limitations. - Collaborate with interdisciplinary team and initiate plans and interventions as ordered.   - Encourage independent activity per ability.  - Maintain proper body alignment. - Perform active/passive rom as tolerated/ordered. - Plan activities to conserve energy.  - Turn patient as appropriate  Outcome: Progressing     Problem: Communication Impairment  Goal: Ability to express needs and understand communication  Description: Assess patient's communication skills and ability to understand information. Patient will demonstrate use of effective communication techniques, alternative methods of communication and understanding even if not able to speak. - Encourage communication and provide alternate methods of communication as needed. - Collaborate with case management/ for discharge needs. - Include patient/family/caregiver in decisions related to communication. Outcome: Progressing     Problem: Potential for Aspiration  Goal: Non-ventilated patient's risk of aspiration is minimized  Description: Assess and monitor vital signs, respiratory status, and labs (WBC). Monitor for signs of aspiration (tachypnea, cough, rales, wheezing, cyanosis, fever). - Assess and monitor patient's ability to swallow. - Place patient up in chair to eat if possible. - HOB up at 90 degrees to eat if unable to get patient up into chair.  - Supervise patient during oral intake. - Instruct patient/ family to take small bites. - Instruct patient/ family to take small single sips when taking liquids. - Follow patient-specific strategies generated by speech pathologist.  Outcome: Progressing  Goal: Ventilated patient's risk of aspiration is minimized  Description: Assess and monitor vital signs, respiratory status, airway cuff pressure, and labs (WBC). Monitor for signs of aspiration (tachypnea, cough, rales, wheezing, cyanosis, fever). - Elevate head of bed 30 degrees if patient has tube feeding.  - Monitor tube feeding.   Outcome: Progressing     Problem: Nutrition  Goal: Nutrition/Hydration status is improving  Description: Monitor and assess patient's nutrition/hydration status for malnutrition (ex- brittle hair, bruises, dry skin, pale skin and conjunctiva, muscle wasting, smooth red tongue, and disorientation). Collaborate with interdisciplinary team and initiate plan and interventions as ordered. Monitor patient's weight and dietary intake as ordered or per policy. Utilize nutrition screening tool and intervene per policy. Determine patient's food preferences and provide high-protein, high-caloric foods as appropriate. - Assist patient with eating.  - Allow adequate time for meals.  - Encourage patient to take dietary supplement as ordered. - Collaborate with clinical nutritionist.  - Include patient/family/caregiver in decisions related to nutrition.   Outcome: Progressing     Problem: Potential for Falls  Goal: Patient will remain free of falls  Description: INTERVENTIONS:  - Educate patient/family on patient safety including physical limitations  - Instruct patient to call for assistance with activity   - Consult OT/PT to assist with strengthening/mobility   - Keep Call bell within reach  - Keep bed low and locked with side rails adjusted as appropriate  - Keep care items and personal belongings within reach  - Initiate and maintain comfort rounds  - Make Fall Risk Sign visible to staff  - Offer Toileting every  Hours, in advance of need  - Initiate/Maintain alarm  - Obtain necessary fall risk management equipment:   - Apply yellow socks and bracelet for high fall risk patients  - Consider moving patient to room near nurses station  Outcome: Progressing

## 2023-12-04 NOTE — PLAN OF CARE
Problem: Prexisting or High Potential for Compromised Skin Integrity  Goal: Skin integrity is maintained or improved  Description: INTERVENTIONS:  - Identify patients at risk for skin breakdown  - Assess and monitor skin integrity  - Assess and monitor nutrition and hydration status  - Monitor labs   - Assess for incontinence   - Turn and reposition patient  - Assist with mobility/ambulation  - Relieve pressure over bony prominences  - Avoid friction and shearing  - Provide appropriate hygiene as needed including keeping skin clean and dry  - Evaluate need for skin moisturizer/barrier cream  - Collaborate with interdisciplinary team   - Patient/family teaching  - Consider wound care consult   Outcome: Progressing     Problem: PAIN - ADULT  Goal: Verbalizes/displays adequate comfort level or baseline comfort level  Description: Interventions:  - Encourage patient to monitor pain and request assistance  - Assess pain using appropriate pain scale  - Administer analgesics based on type and severity of pain and evaluate response  - Implement non-pharmacological measures as appropriate and evaluate response  - Consider cultural and social influences on pain and pain management  - Notify physician/advanced practitioner if interventions unsuccessful or patient reports new pain  Outcome: Progressing     Problem: INFECTION - ADULT  Goal: Absence or prevention of progression during hospitalization  Description: INTERVENTIONS:  - Assess and monitor for signs and symptoms of infection  - Monitor lab/diagnostic results  - Monitor all insertion sites, i.e. indwelling lines, tubes, and drains  - Monitor endotracheal if appropriate and nasal secretions for changes in amount and color  - [place] appropriate cooling/warming therapies per order  - Administer medications as ordered  - Instruct and encourage patient and family to use good hand hygiene technique  - Identify and instruct in appropriate isolation precautions for identified infection/condition  Outcome: Progressing  Goal: Absence of fever/infection during neutropenic period  Description: INTERVENTIONS:  - Monitor WBC    Outcome: Progressing     Problem: SAFETY ADULT  Goal: Patient will remain free of falls  Description: INTERVENTIONS:  - Educate patient/family on patient safety including physical limitations  - Instruct patient to call for assistance with activity   - Consult OT/PT to assist with strengthening/mobility   - Keep Call bell within reach  - Keep bed low and locked with side rails adjusted as appropriate  - Keep care items and personal belongings within reach  - Initiate and maintain comfort rounds  - Make Fall Risk Sign visible to staff  - Offer Toileting every 2 Hours, in advance of need  - Initiate/Maintain bed alarm  - Apply yellow socks and bracelet for high fall risk patients  - Consider moving patient to room near nurses station  Outcome: Progressing  Goal: Maintain or return to baseline ADL function  Description: INTERVENTIONS:  -  Assess patient's ability to carry out ADLs; assess patient's baseline for ADL function and identify physical deficits which impact ability to perform ADLs (bathing, care of mouth/teeth, toileting, grooming, dressing, etc.)  - Assess/evaluate cause of self-care deficits   - Assess range of motion  - Assess patient's mobility; develop plan if impaired  - Assess patient's need for assistive devices and provide as appropriate  - Encourage maximum independence but intervene and supervise when necessary  - Involve family in performance of ADLs  - Assess for home care needs following discharge   - Consider OT consult to assist with ADL evaluation and planning for discharge  - Provide patient education as appropriate  Outcome: Progressing  Goal: Maintains/Returns to pre admission functional level  Description: INTERVENTIONS:  - Perform AM-PAC 6 Click Basic Mobility/ Daily Activity assessment daily.  - Set and communicate daily mobility goal to care team and patient/family/caregiver. - Collaborate with rehabilitation services on mobility goals if consulted  - Perform Range of Motion 2 times a day. - Reposition patient every 2 hours. - Dangle patient 2 times a day  - Stand patient 2 times a day  - Ambulate patient 2 times a day  - Out of bed to chair 2 times a day   - Out of bed for meals 2 times a day  - Out of bed for toileting  - Record patient progress and toleration of activity level   Outcome: Progressing     Problem: DISCHARGE PLANNING  Goal: Discharge to home or other facility with appropriate resources  Description: INTERVENTIONS:  - Identify barriers to discharge w/patient and caregiver  - Arrange for needed discharge resources and transportation as appropriate  - Identify discharge learning needs (meds, wound care, etc.)  - Arrange for interpretive services to assist at discharge as needed  - Refer to Case Management Department for coordinating discharge planning if the patient needs post-hospital services based on physician/advanced practitioner order or complex needs related to functional status, cognitive ability, or social support system  Outcome: Progressing     Problem: Knowledge Deficit  Goal: Patient/family/caregiver demonstrates understanding of disease process, treatment plan, medications, and discharge instructions  Description: Complete learning assessment and assess knowledge base. Interventions:  - Provide teaching at level of understanding  - Provide teaching via preferred learning methods  Outcome: Progressing     Problem: Nutrition/Hydration-ADULT  Goal: Nutrient/Hydration intake appropriate for improving, restoring or maintaining nutritional needs  Description: Monitor and assess patient's nutrition/hydration status for malnutrition. Collaborate with interdisciplinary team and initiate plan and interventions as ordered. Monitor patient's weight and dietary intake as ordered or per policy.  Utilize nutrition screening tool and intervene as necessary. Determine patient's food preferences and provide high-protein, high-caloric foods as appropriate. INTERVENTIONS:  - Monitor oral intake, urinary output, labs, and treatment plans  - Assess nutrition and hydration status and recommend course of action  - Evaluate amount of meals eaten  - Assist patient with eating if necessary   - Allow adequate time for meals  - Recommend/ encourage appropriate diets, oral nutritional supplements, and vitamin/mineral supplements  - Order, calculate, and assess calorie counts as needed  - Recommend, monitor, and adjust tube feedings and TPN/PPN based on assessed needs  - Assess need for intravenous fluids  - Provide specific nutrition/hydration education as appropriate  - Include patient/family/caregiver in decisions related to nutrition  Outcome: Progressing     Problem: Neurological Deficit  Goal: Neurological status is stable or improving  Description: Interventions:  - Monitor and assess patient's level of consciousness, motor function, sensory function, and level of assistance needed for ADLs. - Monitor and report changes from baseline. Collaborate with interdisciplinary team to initiate plan and implement interventions as ordered. - Provide and maintain a safe environment. - Consider seizure precautions. - Consider fall precautions. - Consider aspiration precautions. - Consider bleeding precautions. Outcome: Progressing     Problem: Activity Intolerance/Impaired Mobility  Goal: Mobility/activity is maintained at optimum level for patient  Description: Interventions:  - Assess and monitor patient  barriers to mobility and need for assistive/adaptive devices. - Assess patient's emotional response to limitations. - Collaborate with interdisciplinary team and initiate plans and interventions as ordered. - Encourage independent activity per ability.  - Maintain proper body alignment.   - Perform active/passive rom as tolerated/ordered. - Plan activities to conserve energy.  - Turn patient as appropriate  Outcome: Progressing     Problem: Communication Impairment  Goal: Ability to express needs and understand communication  Description: Assess patient's communication skills and ability to understand information. Patient will demonstrate use of effective communication techniques, alternative methods of communication and understanding even if not able to speak. - Encourage communication and provide alternate methods of communication as needed. - Collaborate with case management/ for discharge needs. - Include patient/family/caregiver in decisions related to communication. Outcome: Progressing     Problem: Potential for Aspiration  Goal: Non-ventilated patient's risk of aspiration is minimized  Description: Assess and monitor vital signs, respiratory status, and labs (WBC). Monitor for signs of aspiration (tachypnea, cough, rales, wheezing, cyanosis, fever). - Assess and monitor patient's ability to swallow. - Place patient up in chair to eat if possible. - HOB up at 90 degrees to eat if unable to get patient up into chair.  - Supervise patient during oral intake. - Instruct patient/ family to take small bites. - Instruct patient/ family to take small single sips when taking liquids. - Follow patient-specific strategies generated by speech pathologist.  Outcome: Progressing  Goal: Ventilated patient's risk of aspiration is minimized  Description: Assess and monitor vital signs, respiratory status, airway cuff pressure, and labs (WBC). Monitor for signs of aspiration (tachypnea, cough, rales, wheezing, cyanosis, fever). - Elevate head of bed 30 degrees if patient has tube feeding.  - Monitor tube feeding.   Outcome: Progressing     Problem: Nutrition  Goal: Nutrition/Hydration status is improving  Description: Monitor and assess patient's nutrition/hydration status for malnutrition (ex- brittle hair, bruises, dry skin, pale skin and conjunctiva, muscle wasting, smooth red tongue, and disorientation). Collaborate with interdisciplinary team and initiate plan and interventions as ordered. Monitor patient's weight and dietary intake as ordered or per policy. Utilize nutrition screening tool and intervene per policy. Determine patient's food preferences and provide high-protein, high-caloric foods as appropriate. - Assist patient with eating.  - Allow adequate time for meals.  - Encourage patient to take dietary supplement as ordered. - Collaborate with clinical nutritionist.  - Include patient/family/caregiver in decisions related to nutrition.   Outcome: Progressing     Problem: Potential for Falls  Goal: Patient will remain free of falls  Description: INTERVENTIONS:  - Educate patient/family on patient safety including physical limitations  - Instruct patient to call for assistance with activity   - Consult OT/PT to assist with strengthening/mobility   - Keep Call bell within reach  - Keep bed low and locked with side rails adjusted as appropriate  - Keep care items and personal belongings within reach  - Initiate and maintain comfort rounds  - Make Fall Risk Sign visible to staff  - Offer Toileting every 2 Hours, in advance of need  - Initiate/Maintain bed alarm  - Apply yellow socks and bracelet for high fall risk patients  - Consider moving patient to room near nurses station  Outcome: Progressing

## 2023-12-05 ENCOUNTER — APPOINTMENT (INPATIENT)
Dept: NON INVASIVE DIAGNOSTICS | Facility: HOSPITAL | Age: 63
DRG: 030 | End: 2023-12-05
Payer: COMMERCIAL

## 2023-12-05 LAB
ANION GAP SERPL CALCULATED.3IONS-SCNC: 10 MMOL/L
ANION GAP SERPL CALCULATED.3IONS-SCNC: 10 MMOL/L
B2 GLYCOPROT1 IGA SERPL IA-ACNC: 2.6
B2 GLYCOPROT1 IGA SERPL IA-ACNC: 2.6
B2 GLYCOPROT1 IGG SERPL IA-ACNC: 1.5
B2 GLYCOPROT1 IGG SERPL IA-ACNC: 1.5
B2 GLYCOPROT1 IGM SERPL IA-ACNC: <2.4
B2 GLYCOPROT1 IGM SERPL IA-ACNC: <2.4
BUN SERPL-MCNC: 13 MG/DL (ref 5–25)
BUN SERPL-MCNC: 13 MG/DL (ref 5–25)
CALCIUM SERPL-MCNC: 8.5 MG/DL (ref 8.4–10.2)
CALCIUM SERPL-MCNC: 8.5 MG/DL (ref 8.4–10.2)
CARDIOLIPIN IGA SER IA-ACNC: 4.7
CARDIOLIPIN IGA SER IA-ACNC: 4.7
CARDIOLIPIN IGG SER IA-ACNC: 1.4
CARDIOLIPIN IGG SER IA-ACNC: 1.4
CARDIOLIPIN IGM SER IA-ACNC: 2.8
CARDIOLIPIN IGM SER IA-ACNC: 2.8
CHLORIDE SERPL-SCNC: 104 MMOL/L (ref 96–108)
CHLORIDE SERPL-SCNC: 104 MMOL/L (ref 96–108)
CO2 SERPL-SCNC: 22 MMOL/L (ref 21–32)
CO2 SERPL-SCNC: 22 MMOL/L (ref 21–32)
CREAT SERPL-MCNC: 1.06 MG/DL (ref 0.6–1.3)
CREAT SERPL-MCNC: 1.06 MG/DL (ref 0.6–1.3)
ERYTHROCYTE [DISTWIDTH] IN BLOOD BY AUTOMATED COUNT: 12.9 % (ref 11.6–15.1)
ERYTHROCYTE [DISTWIDTH] IN BLOOD BY AUTOMATED COUNT: 12.9 % (ref 11.6–15.1)
GFR SERPL CREATININE-BSD FRML MDRD: 73 ML/MIN/1.73SQ M
GFR SERPL CREATININE-BSD FRML MDRD: 73 ML/MIN/1.73SQ M
GLUCOSE SERPL-MCNC: 100 MG/DL (ref 65–140)
GLUCOSE SERPL-MCNC: 100 MG/DL (ref 65–140)
GLUCOSE SERPL-MCNC: 158 MG/DL (ref 65–140)
GLUCOSE SERPL-MCNC: 158 MG/DL (ref 65–140)
HCT VFR BLD AUTO: 39.5 % (ref 36.5–49.3)
HCT VFR BLD AUTO: 39.5 % (ref 36.5–49.3)
HGB BLD-MCNC: 13.5 G/DL (ref 12–17)
HGB BLD-MCNC: 13.5 G/DL (ref 12–17)
MAGNESIUM SERPL-MCNC: 2.3 MG/DL (ref 1.9–2.7)
MAGNESIUM SERPL-MCNC: 2.3 MG/DL (ref 1.9–2.7)
MCH RBC QN AUTO: 30.8 PG (ref 26.8–34.3)
MCH RBC QN AUTO: 30.8 PG (ref 26.8–34.3)
MCHC RBC AUTO-ENTMCNC: 34.2 G/DL (ref 31.4–37.4)
MCHC RBC AUTO-ENTMCNC: 34.2 G/DL (ref 31.4–37.4)
MCV RBC AUTO: 90 FL (ref 82–98)
MCV RBC AUTO: 90 FL (ref 82–98)
PHOSPHATE SERPL-MCNC: 3.5 MG/DL (ref 2.3–4.1)
PHOSPHATE SERPL-MCNC: 3.5 MG/DL (ref 2.3–4.1)
PLATELET # BLD AUTO: 209 THOUSANDS/UL (ref 149–390)
PLATELET # BLD AUTO: 209 THOUSANDS/UL (ref 149–390)
PMV BLD AUTO: 8.8 FL (ref 8.9–12.7)
PMV BLD AUTO: 8.8 FL (ref 8.9–12.7)
POTASSIUM SERPL-SCNC: 3.9 MMOL/L (ref 3.5–5.3)
POTASSIUM SERPL-SCNC: 3.9 MMOL/L (ref 3.5–5.3)
PROT S ACT/NOR PPP: 95 % (ref 61–136)
PROT S ACT/NOR PPP: 95 % (ref 61–136)
PROT S PPP-ACNC: 76 % (ref 60–150)
PROT S PPP-ACNC: 76 % (ref 60–150)
RBC # BLD AUTO: 4.38 MILLION/UL (ref 3.88–5.62)
RBC # BLD AUTO: 4.38 MILLION/UL (ref 3.88–5.62)
SL CV LV EF: 65
SL CV LV EF: 65
SODIUM SERPL-SCNC: 136 MMOL/L (ref 135–147)
SODIUM SERPL-SCNC: 136 MMOL/L (ref 135–147)
WBC # BLD AUTO: 8.5 THOUSAND/UL (ref 4.31–10.16)
WBC # BLD AUTO: 8.5 THOUSAND/UL (ref 4.31–10.16)

## 2023-12-05 PROCEDURE — 85027 COMPLETE CBC AUTOMATED: CPT

## 2023-12-05 PROCEDURE — 93312 ECHO TRANSESOPHAGEAL: CPT

## 2023-12-05 PROCEDURE — 84100 ASSAY OF PHOSPHORUS: CPT

## 2023-12-05 PROCEDURE — B246ZZ4 ULTRASONOGRAPHY OF RIGHT AND LEFT HEART, TRANSESOPHAGEAL: ICD-10-PCS | Performed by: INTERNAL MEDICINE

## 2023-12-05 PROCEDURE — 83735 ASSAY OF MAGNESIUM: CPT

## 2023-12-05 PROCEDURE — 94664 DEMO&/EVAL PT USE INHALER: CPT

## 2023-12-05 PROCEDURE — 97116 GAIT TRAINING THERAPY: CPT

## 2023-12-05 PROCEDURE — 93325 DOPPLER ECHO COLOR FLOW MAPG: CPT | Performed by: INTERNAL MEDICINE

## 2023-12-05 PROCEDURE — 97535 SELF CARE MNGMENT TRAINING: CPT

## 2023-12-05 PROCEDURE — 93970 EXTREMITY STUDY: CPT | Performed by: SURGERY

## 2023-12-05 PROCEDURE — 80048 BASIC METABOLIC PNL TOTAL CA: CPT

## 2023-12-05 PROCEDURE — 97530 THERAPEUTIC ACTIVITIES: CPT

## 2023-12-05 PROCEDURE — 99232 SBSQ HOSP IP/OBS MODERATE 35: CPT | Performed by: STUDENT IN AN ORGANIZED HEALTH CARE EDUCATION/TRAINING PROGRAM

## 2023-12-05 PROCEDURE — 94640 AIRWAY INHALATION TREATMENT: CPT

## 2023-12-05 PROCEDURE — 94760 N-INVAS EAR/PLS OXIMETRY 1: CPT

## 2023-12-05 PROCEDURE — 93320 DOPPLER ECHO COMPLETE: CPT | Performed by: INTERNAL MEDICINE

## 2023-12-05 PROCEDURE — 94668 MNPJ CHEST WALL SBSQ: CPT

## 2023-12-05 PROCEDURE — 93970 EXTREMITY STUDY: CPT

## 2023-12-05 PROCEDURE — 99232 SBSQ HOSP IP/OBS MODERATE 35: CPT | Performed by: PSYCHIATRY & NEUROLOGY

## 2023-12-05 PROCEDURE — 93312 ECHO TRANSESOPHAGEAL: CPT | Performed by: INTERNAL MEDICINE

## 2023-12-05 PROCEDURE — 99232 SBSQ HOSP IP/OBS MODERATE 35: CPT | Performed by: INTERNAL MEDICINE

## 2023-12-05 RX ORDER — ATORVASTATIN CALCIUM 40 MG/1
40 TABLET, FILM COATED ORAL EVERY EVENING
Status: DISCONTINUED | OUTPATIENT
Start: 2023-12-05 | End: 2023-12-08 | Stop reason: HOSPADM

## 2023-12-05 RX ADMIN — IPRATROPIUM BROMIDE 0.5 MG: 0.5 SOLUTION RESPIRATORY (INHALATION) at 19:29

## 2023-12-05 RX ADMIN — METOCLOPRAMIDE HYDROCHLORIDE 10 MG: 5 INJECTION INTRAMUSCULAR; INTRAVENOUS at 18:37

## 2023-12-05 RX ADMIN — HEPARIN SODIUM 5000 UNITS: 5000 INJECTION INTRAVENOUS; SUBCUTANEOUS at 21:58

## 2023-12-05 RX ADMIN — METOCLOPRAMIDE HYDROCHLORIDE 10 MG: 5 INJECTION INTRAMUSCULAR; INTRAVENOUS at 02:57

## 2023-12-05 RX ADMIN — CHLORHEXIDINE GLUCONATE 15 ML: 1.2 SOLUTION ORAL at 11:14

## 2023-12-05 RX ADMIN — ALBUTEROL SULFATE 2 PUFF: 90 AEROSOL, METERED RESPIRATORY (INHALATION) at 09:19

## 2023-12-05 RX ADMIN — IPRATROPIUM BROMIDE 0.5 MG: 0.5 SOLUTION RESPIRATORY (INHALATION) at 13:27

## 2023-12-05 RX ADMIN — LEVALBUTEROL HYDROCHLORIDE 1.25 MG: 1.25 SOLUTION RESPIRATORY (INHALATION) at 19:29

## 2023-12-05 RX ADMIN — ATORVASTATIN CALCIUM 40 MG: 40 TABLET, FILM COATED ORAL at 16:42

## 2023-12-05 RX ADMIN — Medication 6 MG: at 21:58

## 2023-12-05 RX ADMIN — METOCLOPRAMIDE HYDROCHLORIDE 10 MG: 5 INJECTION INTRAMUSCULAR; INTRAVENOUS at 11:15

## 2023-12-05 RX ADMIN — FLUTICASONE FUROATE AND VILANTEROL TRIFENATATE 1 PUFF: 100; 25 POWDER RESPIRATORY (INHALATION) at 11:19

## 2023-12-05 RX ADMIN — LEVALBUTEROL HYDROCHLORIDE 1.25 MG: 1.25 SOLUTION RESPIRATORY (INHALATION) at 07:28

## 2023-12-05 RX ADMIN — IPRATROPIUM BROMIDE 0.5 MG: 0.5 SOLUTION RESPIRATORY (INHALATION) at 07:28

## 2023-12-05 RX ADMIN — HEPARIN SODIUM 5000 UNITS: 5000 INJECTION INTRAVENOUS; SUBCUTANEOUS at 16:42

## 2023-12-05 RX ADMIN — LEVALBUTEROL HYDROCHLORIDE 1.25 MG: 1.25 SOLUTION RESPIRATORY (INHALATION) at 13:27

## 2023-12-05 RX ADMIN — ALBUTEROL SULFATE 2 PUFF: 90 AEROSOL, METERED RESPIRATORY (INHALATION) at 13:18

## 2023-12-05 RX ADMIN — CLOPIDOGREL BISULFATE 75 MG: 75 TABLET ORAL at 11:15

## 2023-12-05 RX ADMIN — CHLORHEXIDINE GLUCONATE 15 ML: 1.2 SOLUTION ORAL at 21:58

## 2023-12-05 RX ADMIN — AZITHROMYCIN DIHYDRATE 250 MG: 250 TABLET ORAL at 16:42

## 2023-12-05 RX ADMIN — HEPARIN SODIUM 5000 UNITS: 5000 INJECTION INTRAVENOUS; SUBCUTANEOUS at 05:40

## 2023-12-05 NOTE — PROGRESS NOTES
NEUROLOGY RESIDENCY PROGRESS NOTE     Name: Margareth Bartlett   Age & Sex: 58 y.o. male   MRN: [id number]  Unit/Bed#: [facility] 734-01   Encounter: [id number]    Margareth Bartlett will need follow up in in 6 weeks with neurovascular attending or AP . He will not require outpatient neurological testing. Pending for discharge: Acute Rehab Placement    ASSESSMENT & PLAN     Stroke due to R ICA to MCA occlusion, s/p TNK and thrombectomy  Assessment & Plan  Margareth Bartlett is a 58 y.o. male w/ PMH HTN, smoking who p/w left-sided weakness and right gaze preference concerning for acute ischemic stroke and found to have extensive R ICA occlusion extending up through M1 and concerning for embolism vs dissection by imaging appearance. Given LA dilation on TTE, consider Afib. Would consider TAO for extra evaluation possible thrombus not observed on TTE  Presenting sx: left-sided weakness and right gaze preference; NIHSS: 11 (LOC, gaze, VF, facial, LUE, LLE, dysarthria, extinction); /110 on arrival; TNK administered 1712 (LKN 15:35, SA 16:13, delay for BP); Endovascular intervention: R MCA/ICA thrombectomy, TICI 3 w/ Dr. Leonila Lopez  Workup reviewed:  CTH/CTA: extensive R ICA occlusion from bifurcation through M1  Labs: HgbA1c 5.6, LDL 98  CTH (24 hrs): Acute right MCA territory infarct, with mild regional mass effect and without midline shift. Minimal hyperdensity noted in the R caudate consistent with hemorrhage vs contrast staining. Acute/Subacute sinusitits  MRI:t MRI: Evolving acute to subacute right MCA distribution infarction with focal areas of hemorrhagic transformation within the gangliocapsular region. Stable mass effect on the right frontal horn compared to CT head performed on same day. CT CAP w contrast, 12/2: Patchy groundglass consolidations throughout R lung (aspiration vs atypical/viral infection). No e/o malignancy. Mild wall thickening of urinary bladder may be 2/2 chronic outlet obstruction.   TTE: LVEF 65%, LA dilation, mild MV annular calcification  RoPE score 3 - 0% likelihood stroke caused by PFO    Impression: 58year old male with R ICA occlusion from bifurcation through M1 s/p TNK and TICI 3 revascularization found to have acute/subacute R MCA distribution infarction with focal areas of hemorrhagic transformation on MRI imaging. Also found to have nonocclusive L Gastrocnemius Vein DVT. Stroke etiology unclear at this time and will require further work up to r/o cardioembolic source or underlining hypercoagulability. Plan:  Continue stroke pathway with telemetry monitoring  Frequent Neuro checks, obtain stat CTH if any acute changes  BP goal < 160 to avoid further hemorrhagic conversion  Rule out hypercoag state - Protein C and Protein S within range. Antithrombin C below range at 76   Noncontributory homocysteine and CT CAP r/o underlying malignancy  TAO 12/5 - NPO @ midnight  Secondary stroke prevention:  AP/AC: Plavix 75 mg QD as history of allergy to ASA with anaphylaxis. Closely monitor for any signs of allergic reaction. Statin: Atorvastatin 40 mg   Euglycemia with SSI if indicated  PT/OT/PMR/ST w/ swallow eval   DVT prophylaxis: Heparin SQ and SCDs  Dispo: Patient is approved for [employer] and insurance authorization has been received. Pending medical stability, functional progress, and bed availability               SUBJECTIVE     Patient was seen and examined today. No acute events overnight. Patient reports LUE strength improvement. Patient admits to mild headache and fatigue due to being woken up throughout the night. Denies dizziness, vision changes, chest pain, and nausea. Patient feels he is ready to be discharged. Pertinent Negatives include: numbness, weakness, speech or visual changes     Review of Systems   Constitutional:  Positive for fatigue. Negative for chills and fever. Eyes:  Negative for visual disturbance.    Respiratory:  Negative for cough, chest tightness and shortness of breath. Cardiovascular:  Negative for chest pain. Gastrointestinal:  Negative for abdominal distention and abdominal pain. Genitourinary:  Negative for difficulty urinating. Neurological:  Positive for headaches (unchanged from previous day). Negative for dizziness. All other systems reviewed and are negative. OBJECTIVE     Patient ID: Sahil Vasquez is a 58 y.o. male. Vitals:    23 1522 23 2124 23 0539 23 0730   BP: 124/67 151/67     BP Location:       Pulse: 86 74     Resp:       Temp: 98.1 °F (36.7 °C) 98.3 °F (36.8 °C)     TempSrc:       SpO2: 94% 93%  93%   Weight:   100 kg (220 lb 14.4 oz)    Height:          Temperature:   Temp (24hrs), Av.2 °F (36.8 °C), Min:98.1 °F (36.7 °C), Max:98.3 °F (36.8 °C)    Temperature: 98.3 °F (36.8 °C)      Physical Exam  Vitals and nursing note reviewed. Constitutional:       Appearance: Normal appearance. He is not ill-appearing. HENT:      Head: Normocephalic and atraumatic. Nose: Nose normal.      Mouth/Throat:      Mouth: Mucous membranes are moist.      Pharynx: Oropharynx is clear. Eyes:      Extraocular Movements: Extraocular movements intact and EOM normal.      Pupils: Pupils are equal, round, and reactive to light. Cardiovascular:      Rate and Rhythm: Normal rate and regular rhythm. Pulmonary:      Effort: Pulmonary effort is normal.      Breath sounds: Normal breath sounds. Abdominal:      General: Abdomen is flat. Bowel sounds are normal.      Palpations: Abdomen is soft. Skin:     General: Skin is warm. Neurological:      Mental Status: He is oriented to person, place, and time. Coordination: Finger-Nose-Finger Test (b/l) normal.   Psychiatric:         Speech: Speech normal.          Neurologic Exam     Mental Status   Oriented to person, place, and time. Speech: speech is normal   Level of consciousness: alert    Cranial Nerves     CN II   Visual fields full to confrontation. CN III, IV, VI   Pupils are equal, round, and reactive to light. Extraocular motions are normal.   Nystagmus: none   Conjugate gaze: absent    CN V   Facial sensation intact. CN VII   Facial expression full, symmetric. CN VIII   CN VIII normal.     CN IX, X   CN IX normal.   CN X normal.     CN XI   CN XI normal.     CN XII   CN XII normal.     Motor Exam Normal muscle bulk throughout. No fasciculations present. Normal muscle tone. Mild pronator drift in LUE. Strength is 5/5 in all four extremities except as noted. LUE 4+/5 throughout. Sensory Exam   Light touch normal.     Gait, Coordination, and Reflexes     Coordination   Finger to nose coordination: normal (b/l)    Reflexes   Reflexes 2+ except as noted. Right plantar: normal  Left plantar: normal         LABORATORY DATA     Labs: I have personally reviewed pertinent reports. Results from last 7 days   Lab Units 12/05/23 0620 12/04/23 0826 12/03/23 0544 12/02/23 0455   WBC Thousand/uL 8.50 9.17 9.26 10.51*   HEMOGLOBIN g/dL 13.5 13.4 12.3 12.1   HEMATOCRIT % 39.5 38.7 36.8 37.0   PLATELETS Thousands/uL 209 170 155 145*   MONO PCT %  --   --   --  9   EOS PCT %  --   --   --  3      Results from last 7 days   Lab Units 12/05/23 0620 12/04/23 0826 12/03/23 0544 12/02/23 0455 12/01/23  0605   SODIUM mmol/L 136 134* 138   < > 139   POTASSIUM mmol/L 3.9 4.0 3.4*   < > 3.6   CHLORIDE mmol/L 104 104 105   < > 107   CO2 mmol/L 22 23 24   < > 26   BUN mg/dL 13 12 10   < > 21   CREATININE mg/dL 1.06 0.97 0.96   < > 1.13   CALCIUM mg/dL 8.5 8.2* 7.2*   < > 7.2*   ALK PHOS U/L  --   --  42  --  43   ALT U/L  --   --  16  --  17   AST U/L  --   --  19  --  15    < > = values in this interval not displayed.      Results from last 7 days   Lab Units 12/05/23  0620 12/04/23  0826 12/03/23  0544   MAGNESIUM mg/dL 2.3 2.2 2.2     Results from last 7 days   Lab Units 12/05/23  0620 12/04/23  0826 12/03/23  0544   PHOSPHORUS mg/dL 3.5 2.6 1.8* Results from last 7 days   Lab Units 11/30/23  1653   INR  0.99   PTT seconds 22*               IMAGING & DIAGNOSTIC TESTING     Radiology Results: I have personally reviewed pertinent reports. and I have personally reviewed pertinent films in PACS    CT spine cervical wo contrast   [facility]      No cervical spine fracture or traumatic malalignment. Mild degenerative changes are noted. Workstation performed: [id number]         CT head wo contrast   [facility]12/[facility] 1238)      Expected appearance of evolving right MCA distribution infarcts with areas of hemorrhagic transformation. Slight interval increase in mass effect on the frontal horn of the right lateral ventricle without midline shift. New areas of recent infarction    right now better visualized located in the subcortical right parietal lobe and deep right frontal lobe white matter. Workstation performed: [id number]         CT pelvis wo contrast   [facility]12/03 1250)      No evidence of hip fracture status post fall. Small volume of gas urinary bladder may be iatrogenic from instrumentation or may represent the sequela of gas-forming infection which should only be considered in the right clinical setting. Workstation performed: [id number]         CT chest abdomen pelvis w contrast   Final Result by Ross Charles DO (12/02 6357)      Patchy groundglass consolidations throughout the right lung. Correlate for aspiration versus atypical/viral infection. No definite evidence of malignancy. Mild wall thickening of the urinary bladder may be secondary to chronic outlet obstruction. The study was marked in [facility] for immediate notification.       Workstation performed: [id number]         MRI brain wo contrast   Final Result by Casie Hartley MD (12/02 0940)      Evolving acute to early subacute right MCA distribution infarction since November 30, 2023 with focal areas of hemorrhagic transformation within the ganglial capsular region. Mass effect on the right frontal horn is stable when comparing to the head CT performed earlier the same day but increased when comparing to November 30, 2023 examination. Pansinusitis. I personally discussed this study with Dr Samantha Gamble on 12/2/2023 7:36 AM.            Workstation performed: [id number]         VAS lower limb venous duplex study, complete bilateral   Final Result by Ric Guardado DO (12/02 1412)      CT head without contrast   Final Result by Uzma Epps MD (12/01 1815)      1. Evolving acute right MCA territory infarct predominantly involving the right gangliocapsular region and corona radiata with mild regional mass effect without midline shift. Minimal hyperdensity in the right caudate is likely contrast staining from    earlier thrombectomy. Hemorrhage is less favored. Recommend close follow-up head CT. 2.  Acute/subacute sinusitis. Workstation performed: [id number]         XR chest portable ICU   Final Result by Gabi Kilgore MD (12/01 1042)         Near complete resolution of right-sided atelectatic change. Small right pleural effusion. Indeterminate hazy airspace opacity in the left parahilar midlung field region. Workstation performed: [id number]         XR chest portable ICU   Final Result by Gabi Kilgore MD (12/01 1004)      Bandlike opacity in the right mid to upper lung field with relative lucency of the right lower lung field is consistent with at least segmental atelectasis. There is relative increased density of the right hilum. Recommend CT for further evaluation.                      Workstation performed: [id number]         CT head wo contrast   Final Result by Teo Gaines MD (11/30 2052)      Reidentified subtle areas of loss of gray-white differentiation involving the right MCA territory. However, enhancement of the right middle cerebral artery M1 segment is now visible, from prior intravenous contrast administration for a CTA head and neck    performed few hours prior. An MRI brain could be performed for further evaluation. Workstation performed: [id number]         XR chest pa & lateral    (Results Pending)       Other Diagnostic Testing: I have personally reviewed pertinent reports. ACTIVE MEDICATIONS     Current Facility-Administered Medications   Medication Dose Route Frequency    acetaminophen (TYLENOL) oral suspension 650 mg  650 mg Oral Q6H PRN    albuterol (PROVENTIL HFA,VENTOLIN HFA) inhaler 2 puff  2 puff Inhalation Q4H PRN    atorvastatin (LIPITOR) tablet 40 mg  40 mg Per NG Tube QPM    azithromycin (ZITHROMAX) tablet 250 mg  250 mg Oral Q24H    chlorhexidine (PERIDEX) 0.12 % oral rinse 15 mL  15 mL Mouth/Throat Q12H LUIZ    clopidogrel (PLAVIX) tablet 75 mg  75 mg Oral Daily    Fluticasone Furoate-Vilanterol 100-25 mcg/actuation 1 puff  1 puff Inhalation Daily    heparin (porcine) subcutaneous injection 5,000 Units  5,000 Units Subcutaneous Q8H [address]    hydrALAZINE (APRESOLINE) injection 10 mg  10 mg Intravenous Q4H PRN    ipratropium (ATROVENT) 0.02 % inhalation solution 0.5 mg  0.5 mg Nebulization TID    labetalol (NORMODYNE) injection 10 mg  10 mg Intravenous Q4H PRN    levalbuterol (XOPENEX) inhalation solution 1.25 mg  1.25 mg Nebulization TID    melatonin tablet 6 mg  6 mg Oral HS    metoclopramide (REGLAN) injection 10 mg  10 mg Intravenous Q8H [address]     Facility-Administered Medications Ordered in Other Encounters   Medication Dose Route Frequency    sodium chloride 0.9 % infusion   Intravenous Continuous PRN       Prior to Admission medications    Medication Sig Start Date End Date Taking?  Authorizing Provider   albuterol (PROVENTIL HFA,VENTOLIN HFA) 90 mcg/act inhaler Inhale 2 puffs every 6 (six) hours as needed for wheezing   Yes Historical Provider, MD   lisinopril (ZESTRIL) 10 mg tablet Take 10 mg by mouth daily   Yes Historical Provider, MD   omeprazole (PriLOSEC) 20 mg delayed release capsule Take 20 mg by mouth daily   Yes Historical Provider, MD         VTE Pharmacologic Prophylaxis: Heparin  VTE Mechanical Prophylaxis: sequential compression device    ======    I have discussed the patient's history, physical exam findings, assessment, and plan in detail with attending, Dr. Jazmine Montesinos. Thank you for allowing me to participate in the care of your patient, Mirza Kirby.     [address] [place] [address] Student, MS4

## 2023-12-05 NOTE — PROGRESS NOTES
INTERNAL MEDICINE RESIDENCY PROGRESS NOTE     Name: Melanie Smith   Age & Sex: 58 y.o. male   MRN: [id number]  Unit/Bed#: [facility] 734-01   Encounter: [id number]  Team: SOD Team C     PATIENT INFORMATION     Name: Melanie Smith   Age & Sex: 58 y.o. male   MRN: [id number]  Hospital Stay Days: 5    ASSESSMENT/PLAN     Principal Problem:    Acute hypoxic respiratory failure ([address])  Active Problems:    Stroke due to R ICA to MCA occlusion, s/p TNK and thrombectomy    DVT of lower extremity (deep venous thrombosis) ([address])    Hypertension    Asthma      * Acute hypoxic respiratory failure ([address])  Assessment & Plan  63M w/ PMH of asthma, tobacco use who has been admitted for R MCA ischemic stroke s/p TNK and mechanical thrombectomy and hospital course complicated by acute hypoxic respiratory failure (2-4L NC) in the setting of prior mechanical ventilation and concern for pulmonary infection on CT. On physical exam, patient is resting comfortably on room air without signs of respiratory compromised, there is wheezing present in the right lung fields on auscultation. One time Temp of 100.6 this evening, but resolved and has otherwise been afebrile. Recent labs notable for resolving leukocytosis. Differential includes Asthma, viral/bacterial URI, atelectasis, PE    CT w/ contrast C/A/P (12/2/23): Patchy groundglass consolidations throughout the right lung. Correlate for aspiration versus atypical/viral infection. This AM: WBC 8.5, clinically improving, on RA, no SOB, cough is less. Plan  - Continue with scheduled nebs q8h w/ xopenex and ipratropium   - Procal 0.11 - bacterial PNA less likely. - Azithromycin Day 5/5  - Flu/RSV/COVID -- negative  - Follow up Bcx x2 from 12/1 - NGTD  - Pulmonology on board - considering asthma/COPD overlap. Breo Ellipta once daily added.    - If no improvement or clinical deterioration can escalate workup to include  - CTA PE in setting of known DVT w/o AC   - Repeat CXR  - Repeat BC, sputum cultures    Stroke due to R ICA to MCA occlusion, s/p TNK and thrombectomy  Assessment & Plan  63M w/ PMH of HTN, tobacco use who presented to [address] for left sided weakness and right gaze preference found to have right ICA/MCA occlusion and is now s/p TNK and mechanical thrombectomy. Etiology of ischemic stroke unclear but undergoing workup for hypercoagulability as well as cardiac source. Plan:  - Management per neurology   - Pending placement in Acute inpatient rehab   - TAO completed 12/5  - DVT prophylaxis: Heparin SQ and SCDs    DVT of lower extremity (deep venous thrombosis) ([address])  Assessment & Plan  Left gastrocnemius DVT noted on duplex US on 12/1. AC deferred 2/2 risk of intracerebral hemorrhage given above. DVT prophylaxis restarted. Plan  - Continue heparin for DVT prophylaxis  - Holding full AC given risk of intracerebral hemorrage  - Pulmonology following, no anticoagulation indicated at this time per CHEST guidelines    Hypertension  Assessment & Plan  Hx of HTN maintained on lisinopril 10 mg. BP controlled without medication while admitted    Plan  - SBP goal < 160 given above  - Recommend reinitiating pta lisinopril 10 mg once stable from neurologic standpoint    Asthma  Assessment & Plan  Hx of asthma for many years. Managed by PCP with albuterol. No PFTs on chart review. PE demonstrates wheezing in the right middle and lower lung fields    Plan  - Xopenex nebs q8hr prn  - Ipratropium nebs q8hr prn  -Pulmonology following; add breo ellipta once daily        Disposition: Continue IP management per primary team (neurology)    SUBJECTIVE     Patient seen and examined. No acute events overnight. Patient no longer SOB. Satting well on RA.  Some cough but improving    OBJECTIVE     Vitals:    12/05/23 0730 12/05/23 0929 12/05/23 1113 12/05/23 1329   BP:  120/62 133/85    BP Location:       Pulse:  74 72    Resp:       Temp:   (!) 97.3 °F (36.3 °C)    TempSrc:       SpO2: 93%  96% 94%   Weight: 99.8 kg (220 lb)     Height:  5' 11" (1.803 m)        Temperature:   Temp (24hrs), Av.9 °F (36.6 °C), Min:97.3 °F (36.3 °C), Max:98.3 °F (36.8 °C)    Temperature: (!) 97.3 °F (36.3 °C)  Intake & Output:  I/O          07 07 07 07    P. O. 600 60     Total Intake(mL/kg) 600 (6.1) 60 (0.6)     Urine (mL/kg/hr) 1650 (0.7) 600 (0.3)     Total Output 1650 600     Net -1050 -540                  Weights:   IBW (Ideal Body Weight): 75.3 kg    Body mass index is 30.68 kg/m². Weight (last 2 days)       Date/Time Weight    23 0929 99.8 (220)    23 0539 100 (220.9)    23 0600 99.1 (218.48)          Physical Exam  Constitutional:       Appearance: Normal appearance. HENT:      Head: Normocephalic and atraumatic. Right Ear: External ear normal.      Left Ear: External ear normal.      Nose: Nose normal.      Mouth/Throat:      Mouth: Mucous membranes are moist.   Eyes:      Extraocular Movements: Extraocular movements intact. Cardiovascular:      Rate and Rhythm: Normal rate and regular rhythm. Pulses: Normal pulses. Pulmonary:      Effort: Pulmonary effort is normal. No respiratory distress. Breath sounds: Normal breath sounds. No stridor. No wheezing or rhonchi. Abdominal:      General: Abdomen is flat. Bowel sounds are normal.      Palpations: Abdomen is soft. Musculoskeletal:         General: Normal range of motion. Cervical back: Normal range of motion and neck supple. Skin:     General: Skin is warm and dry. Neurological:      General: No focal deficit present. Mental Status: He is alert and oriented to person, place, and time. Psychiatric:         Mood and Affect: Mood normal.         Behavior: Behavior normal.       LABORATORY DATA     Labs: I have personally reviewed pertinent reports.   Results from last 7 days   Lab Units 23  0620 23  0826 23  0544 23  0455   WBC Thousand/uL 8. 50 9.17 9.26 10.51*   HEMOGLOBIN g/dL 13.5 13.4 12.3 12.1   HEMATOCRIT % 39.5 38.7 36.8 37.0   PLATELETS Thousands/uL 209 170 155 145*   MONO PCT %  --   --   --  9   EOS PCT %  --   --   --  3      Results from last 7 days   Lab Units 12/05/23  0620 12/04/23  0826 12/03/23  0544 12/02/23  0455 12/01/23  0605   POTASSIUM mmol/L 3.9 4.0 3.4*   < > 3.6   CHLORIDE mmol/L 104 104 105   < > 107   CO2 mmol/L 22 23 24   < > 26   BUN mg/dL 13 12 10   < > 21   CREATININE mg/dL 1.06 0.97 0.96   < > 1.13   CALCIUM mg/dL 8.5 8.2* 7.2*   < > 7.2*   ALK PHOS U/L  --   --  42  --  43   ALT U/L  --   --  16  --  17   AST U/L  --   --  19  --  15    < > = values in this interval not displayed. Results from last 7 days   Lab Units 12/05/23  0620 12/04/23  0826 12/03/23  0544   MAGNESIUM mg/dL 2.3 2.2 2.2     Results from last 7 days   Lab Units 12/05/23  0620 12/04/23  0826 12/03/23  0544   PHOSPHORUS mg/dL 3.5 2.6 1.8*      Results from last 7 days   Lab Units 11/30/23  1653   INR  0.99   PTT seconds 22*               IMAGING & DIAGNOSTIC TESTING     Radiology Results: I have personally reviewed pertinent reports. CT pelvis wo contrast    Result Date: 12/3/2023  Impression: No evidence of hip fracture status post fall. Small volume of gas urinary bladder may be iatrogenic from instrumentation or may represent the sequela of gas-forming infection which should only be considered in the right clinical setting. Workstation performed: [id number]     CT spine cervical wo contrast    Result Date: 12/3/2023  Impression: No cervical spine fracture or traumatic malalignment. Mild degenerative changes are noted. Workstation performed: [id number]     CT head wo contrast    Result Date: 12/3/2023  Impression: Expected appearance of evolving right MCA distribution infarcts with areas of hemorrhagic transformation. Slight interval increase in mass effect on the frontal horn of the right lateral ventricle without midline shift.  New areas of recent infarction right now better visualized located in the subcortical right parietal lobe and deep right frontal lobe white matter. Workstation performed: [id number]     CT chest abdomen pelvis w contrast    Result Date: 12/2/2023  Impression: Patchy groundglass consolidations throughout the right lung. Correlate for aspiration versus atypical/viral infection. No definite evidence of malignancy. Mild wall thickening of the urinary bladder may be secondary to chronic outlet obstruction. The study was marked in [facility] for immediate notification. Workstation performed: [id number]     MRI brain wo contrast    Result Date: 12/2/2023  Impression: Evolving acute to early subacute right MCA distribution infarction since November 30, 2023 with focal areas of hemorrhagic transformation within the ganglial capsular region. Mass effect on the right frontal horn is stable when comparing to the head CT performed earlier the same day but increased when comparing to November 30, 2023 examination. Pansinusitis. I personally discussed this study with Dr Miller Bloom on 12/2/2023 7:36 AM. Workstation performed: [id number]     Bronchoscopy    Result Date: 12/1/2023  Impression: RUL obstructive mucous plug RECOMMENDATION: Proceed with vent weaning as hypoxemia resolves and extubate if possible     CT head without contrast    Result Date: 12/1/2023  Impression: 1. Evolving acute right MCA territory infarct predominantly involving the right gangliocapsular region and corona radiata with mild regional mass effect without midline shift. Minimal hyperdensity in the right caudate is likely contrast staining from earlier thrombectomy. Hemorrhage is less favored. Recommend close follow-up head CT. 2.  Acute/subacute sinusitis. Workstation performed: [id number]     IR stroke alert    Result Date: 12/1/2023  Impression: Right cervical ICA occlusion extending into the right middle cerebral artery M1 segment, TICI 0.  Successful mechanical thrombectomy with TICI 3 reperfusion. Workstation performed: [id number]     XR chest portable ICU    Result Date: 12/1/2023  Impression: Near complete resolution of right-sided atelectatic change. Small right pleural effusion. Indeterminate hazy airspace opacity in the left parahilar midlung field region. Workstation performed: [id number]     XR chest portable ICU    Result Date: 12/1/2023  Impression: Bandlike opacity in the right mid to upper lung field with relative lucency of the right lower lung field is consistent with at least segmental atelectasis. There is relative increased density of the right hilum. Recommend CT for further evaluation. Workstation performed: [id number]     CT head wo contrast    Result Date: 11/30/2023  Impression: Reidentified subtle areas of loss of gray-white differentiation involving the right MCA territory. However, enhancement of the right middle cerebral artery M1 segment is now visible, from prior intravenous contrast administration for a CTA head and neck performed few hours prior. An MRI brain could be performed for further evaluation. Workstation performed: [id number]     CT stroke alert brain    Result Date: 11/30/2023  Impression: No acute intracranial hemorrhage. Right MCA distribution ischemia suspected. I personally discussed this study with Dr Justina Castaneda on 11/30/2023 4:46 PM. Workstation performed: [id number]     Other Diagnostic Testing: I have personally reviewed pertinent reports.     ACTIVE MEDICATIONS     Current Facility-Administered Medications   Medication Dose Route Frequency    acetaminophen (TYLENOL) oral suspension 650 mg  650 mg Oral Q6H PRN    albuterol (PROVENTIL HFA,VENTOLIN HFA) inhaler 2 puff  2 puff Inhalation Q4H PRN    atorvastatin (LIPITOR) tablet 40 mg  40 mg Per NG Tube QPM    azithromycin (ZITHROMAX) tablet 250 mg  250 mg Oral Q24H    chlorhexidine (PERIDEX) 0.12 % oral rinse 15 mL  15 mL Mouth/Throat Q12H LUIZ    clopidogrel (PLAVIX) tablet 75 mg  75 mg Oral Daily    Fluticasone Furoate-Vilanterol 100-25 mcg/actuation 1 puff  1 puff Inhalation Daily    heparin (porcine) subcutaneous injection 5,000 Units  5,000 Units Subcutaneous Q8H [facility]    hydrALAZINE (APRESOLINE) injection 10 mg  10 mg Intravenous Q4H PRN    ipratropium (ATROVENT) 0.02 % inhalation solution 0.5 mg  0.5 mg Nebulization TID    labetalol (NORMODYNE) injection 10 mg  10 mg Intravenous Q4H PRN    levalbuterol (XOPENEX) inhalation solution 1.25 mg  1.25 mg Nebulization TID    melatonin tablet 6 mg  6 mg Oral HS    metoclopramide (REGLAN) injection 10 mg  10 mg Intravenous Q8H [facility]       VTE Pharmacologic Prophylaxis: Heparin  VTE Mechanical Prophylaxis: sequential compression device    Portions of the record may have been created with voice recognition software. Occasional wrong word or "sound a like" substitutions may have occurred due to the inherent limitations of voice recognition software.   Read the chart carefully and recognize, using context, where substitutions have occurred.  ==  [place]  Medical Student OMS 4

## 2023-12-05 NOTE — PLAN OF CARE
Problem: PHYSICAL THERAPY ADULT  Goal: Performs mobility at highest level of function for planned discharge setting. See evaluation for individualized goals. Description: Treatment/Interventions: OT, Spoke to case management, Spoke to nursing, Gait training, Bed mobility, Patient/family training, Endurance training, LE strengthening/ROM, Functional transfer training          See flowsheet documentation for full assessment, interventions and recommendations. Outcome: Progressing  Note: Prognosis: Good  Problem List: Decreased strength, Decreased endurance, Impaired balance, Decreased mobility, Decreased coordination, Decreased cognition, Impaired judgement, Decreased safety awareness  Assessment: Pt agreeable to participate in PT session. Pt performed functional mobility and therex as outlined above. VC for hand placement with transfers for safety. LLE weakness noted in ambulation especially in L hip abductors. Requires cues for safety and for repeat instructions throughout session. SOB with exertion at times however SPO2 remained stable on RA. VC for pursed lipped breathing to decrease respiratory rate. Pt left seated in chair with chair alarm, call bell, phone, and all personal needs within reach. Pt will continue to benefit from skilled acute care PT to further address their functional mobility limitations. ARC following. Barriers to Discharge: Inaccessible home environment     Rehab Resource Intensity Level, PT: I (Maximum Resource Intensity)    See flowsheet documentation for full assessment.

## 2023-12-05 NOTE — CONSULTS
Progress Note - Pulmonary   Mac Profit 58 y.o. male MRN: [id number]  Unit/Bed#: [facility] 734-01 Encounter: [id number]    Assessment:  Reactive airway disease  Current smoker    Plan:  Continue Breo Ellipta daily  Continue Atrovent and Xopenex 3 times daily while inpatient  Albuterol as needed  Patient educated about the importance of smoking cessation  Continue azithromycin until completion of 5-day course. Consider repeat sputum sample if symptoms worsen and oxygen requirements increased    Chief Complaint:   Dyspnea and increasing oxygen requirements    Subjective:   60-year-old male with a past medical history of right ICA occlusion s/p thrombectomy on 11/30, hypertension, asthma, and tobacco use. Patient states that his dyspnea is improving and he is currently satting well on room air. He denies fever, chills, congestion, cough, chest pain, palpitations. Review of Systems   Constitutional:  Negative for chills and fever. Respiratory:  Positive for shortness of breath. Negative for cough and chest tightness. Gastrointestinal:  Negative for abdominal pain, constipation, diarrhea, nausea and vomiting. Neurological:  Negative for light-headedness and headaches. Psychiatric/Behavioral:  Negative for confusion. Objective:     Vitals: Blood pressure 132/79, pulse 83, temperature 98.2 °F (36.8 °C), resp. rate 17, height 5' 11" (1.803 m), weight 99.8 kg (220 lb), SpO2 97 %. ,Body mass index is 30.68 kg/m². Intake/Output Summary (Last 24 hours) at 12/5/2023 1549  Last data filed at 12/5/2023 1004  Gross per 24 hour   Intake 400 ml   Output 600 ml   Net -200 ml       Invasive Devices       Peripheral Intravenous Line  Duration             Peripheral IV 12/04/23 Ventral (anterior); Right Forearm <1 day                    Physical Exam: Physical Exam  Vitals reviewed. Constitutional:       General: He is not in acute distress. Appearance: He is well-developed.    HENT:      Head: Normocephalic and atraumatic. Eyes:      Conjunctiva/sclera: Conjunctivae normal.   Cardiovascular:      Rate and Rhythm: Normal rate and regular rhythm. Pulses: Normal pulses. Heart sounds: Normal heart sounds. No murmur heard. Pulmonary:      Effort: Pulmonary effort is normal. No respiratory distress. Breath sounds: Normal breath sounds. No wheezing, rhonchi or rales. Abdominal:      General: Bowel sounds are normal.      Palpations: Abdomen is soft. Tenderness: There is no abdominal tenderness. Musculoskeletal:         General: No swelling. Skin:     General: Skin is warm and dry. Capillary Refill: Capillary refill takes less than 2 seconds. Neurological:      Mental Status: He is alert and oriented to person, place, and time. Psychiatric:         Mood and Affect: Mood normal.           Labs: I have personally reviewed pertinent lab results. Imaging and other studies: I have personally reviewed pertinent reports.

## 2023-12-05 NOTE — PROGRESS NOTES
NEUROLOGY RESIDENCY PROGRESS NOTE     Name: Melanie Smith   Age & Sex: 58 y.o. male   MRN: [id number]  Unit/Bed#: [facility] 734-01   Encounter: [id number]    ASSESSMENT & PLAN     Stroke due to R ICA to MCA occlusion, s/p TNK and thrombectomy  Assessment & Plan  Melanie Smith is a 58 y.o. male w/ PMH HTN, smoking who p/w left-sided weakness and right gaze preference concerning for acute ischemic stroke and found to have extensive R ICA occlusion extending up through M1 and concerning for embolism vs dissection by imaging appearance. Given LA dilation on TTE, consider Afib. Would consider TAO for extra evaluation possible thrombus not observed on TTE  Presenting sx: left-sided weakness and right gaze preference; NIHSS: 11 (LOC, gaze, VF, facial, LUE, LLE, dysarthria, extinction); /110 on arrival; TNK administered 1712 (LKN 15:35, SA 16:13, delay for BP); Endovascular intervention: R MCA/ICA thrombectomy, TICI 3 w/ Dr. Tabitha Thorpe  Workup reviewed:  CTH/CTA: extensive R ICA occlusion from bifurcation through M1  Labs: HgbA1c 5.6, LDL 98  CTH (24 hrs): Acute right MCA territory infarct, with mild regional mass effect and without midline shift. Minimal hyperdensity noted in the R caudate consistent with hemorrhage vs contrast staining. Acute/Subacute sinusitits  MRI:t MRI: Evolving acute to subacute right MCA distribution infarction with focal areas of hemorrhagic transformation within the gangliocapsular region. Stable mass effect on the right frontal horn compared to CT head performed on same day. CT CAP w contrast, 12/2: Patchy groundglass consolidations throughout R lung (aspiration vs atypical/viral infection). No e/o malignancy. Mild wall thickening of urinary bladder may be 2/2 chronic outlet obstruction.   TTE: LVEF 65%, LA dilation, mild MV annular calcification  RoPE score 3 - 0% likelihood stroke caused by PFO    Impression: 58year old male with R ICA occlusion from bifurcation through M1 s/p TNK and TICI 3 revascularization found to have acute/subacute R MCA distribution infarction with focal areas of hemorrhagic transformation on MRI imaging. Also found to have nonocclusive L Gastrocnemius Vein DVT. Stroke etiology unclear at this time and will require further work up to r/o cardioembolic source or underlining hypercoagulability. Plan:  Continue stroke pathway with telemetry monitoring  Frequent Neuro checks, obtain stat CTH if any acute changes  BP goal < 160 to avoid further hemorrhagic conversion  Rule out hypercoag state - thrombosis panel pending, noncontributory homocysteine and CT CAP r/o underlying malignancy  TAO 12/5 - NPO @ midnight  Secondary stroke prevention:  AP/AC: Plavix 75 mg QD as history of allergy to ASA with anaphylaxis. Closely monitor for any signs of allergic reaction. Statin: Atorvastatin 40 mg   Euglycemia with SSI if indicated  PT/OT/PMR/ST w/ swallow eval  DVT prophylaxis: Heparin SQ and SCDs  Dispo: ARC evaluation - pt preference for [facility]    * Acute hypoxic respiratory failure ([address])  Assessment & Plan  Pt with history of asthma and maintained on albuterol at home. Pt reports almost 2x daily usage of albuterol at home   Started on azithromycin coverage (day 3 of 4) by critical care team given CxR findings with indeterminate hazy airspace opacity in the L parahilar midlung field region, CTA C/A/P revealed Patchy groundglass consolidations throughout the right lung  Pt had required supplemental O2, now has been weaned of NC  Pt now on levalbuterol and atrovent nebs TID  Encouraged the use of incentive Spirometry  Pulm consulted, recs appreciated as of 12/4:  Started Breo 100  Cont ANGELIKA DIOMEDES nebs for now. Discontinue upon discharge. Use rescue albuterol PRN.   If hypoxemia worsens, obtain another sputum sample, MRSA nares, and possible consideration for hospital-acquired PNA    DVT of lower extremity (deep venous thrombosis) (HCC)  Assessment & Plan  Doppler of the lower extremities reveals acute occlusive DVT in the left gastrocnemius veins. Will hold off on AC at this time given increased risk of further hemorrhagic bleed  Given that treatment with anticoagulation for DVTs is controversial given low rate of propogation can consider [facility] [place] at this time given increased risk of further hemorrhagic conversion and obtain repeat duplex ultrasound of the bilateral lower extremities in 5 to 7 days to assess for propagation (approx 12/5-12/7)    Pulm and Internal Medicine team consulted, Rpuert Adam appreciated  Per pulm on 12/4: "Acute DVT with evidence on the gastrocnemius, based on CHEST guidelines would not anticoagulate for distal DVT in a high risk patient, would benefit from surveillance ultrasound protocol. Marcial Lacey Would hold off on anticoagulation for distal DVT and a high risk patient. He would benefit from surveillance ultrasound as an outpatient."    Asthma  Assessment & Plan  See above under acute hypoxic resp. failure    Hypertension  Assessment & Plan  Regimen of lisinopril 10 mg QD has been held given hypotension previously reuiqring pressor support  Will continue to hold as patient currently not profoundly hypertensive and want to be cautious given his need for pressors previously    Delirious-resolved as of 12/4/2023  Assessment & Plan  Pt reporting hallucinations such as someone being in the bathroom when no one was or that family members were in the room when they were not. Possibly 2/2 or worsened by benadryl will d/c  Possibly due to lack of sleep    Plan  - Regulate sleep wake cycle  - Delirium precautions            Nataly Frazier will need follow up in in 6 weeks with neurovascular AP or Attending . He will not require outpatient neurological testing. Disposition pending: TAO    SUBJECTIVE     Patient was seen and examined. Reports LUE improvement with some mild dexterity issues with some occasional numbness in b/l hands.  Denies SOB, lightheadedness, dizziness, syncope, headache, vision changes, diaphoresis, chest pain, palpitations, nausea, vomiting, abdominal pain or lower extremity edema. Review of Systems  A 12 point ROS was completed. Other than the above mentioned complaints, all remaining systems were negative. OBJECTIVE     Patient ID: Antwon Gillespie is a 58 y.o. male. Vitals:    23 0600 23 0744 23 1056 23 1522   BP:  119/100 126/79 124/67   BP Location:   Right arm    Pulse:  74 63 86   Resp:  (!) 28 (!) 28    Temp:  98.4 °F (36.9 °C) 98.1 °F (36.7 °C) 98.1 °F (36.7 °C)   TempSrc:  Oral Oral    SpO2:  94% 97% 94%   Weight: 99.1 kg (218 lb 7.6 oz)      Height:          Temperature:   Temp (24hrs), Av.1 °F (36.7 °C), Min:97.7 °F (36.5 °C), Max:98.4 °F (36.9 °C)    Temperature: 98.1 °F (36.7 °C)    Physical Exam:  Vitals and nursing note reviewed. Constitutional: Alert. Not in acute distress. Not ill-appearing, toxic-appearing or diaphoretic. HENT: Normocephalic and atraumatic. Nose and Ears normal.    Eyes: No scleral icterus. No discharge. Neck: Neck Supple. ROM normal.   Cardiovascular: Distal extremities warm without palpable edema or tenderness, no observed significant swelling. Pulmonary:  Pulmonary effort is normal. Not in respiratory distress   Abdominal: Abdomen is soft and not distended   Musculoskeletal: No swelling or deformity. Skin: Warm and dry   Psychiatric:  Normal behavior and appropriate affect. Reports hallucinations. Neurological Exam  Mental Status  Awake, alert and oriented to person, place and time. Recent and remote memory are intact. Speech is normal. Language is fluent with no aphasia. Attention and concentration are normal.    Cranial Nerves  CN II: Visual fields full to confrontation. CN III, IV, VI: Extraocular movements intact bilaterally. Normal lids and orbits bilaterally. Pupils equal round and reactive to light bilaterally.   CN V: Facial sensation is normal.  CN VII:  Right: There is no facial weakness. Left: Mild L facial weakness. CN VIII: Hearing is normal.  CN IX, X: Palate elevates symmetrically  CN XI: Shoulder shrug strength is normal.  CN XII: Tongue midline without atrophy or fasciculations. Motor  Normal muscle bulk throughout. No fasciculations present. Normal muscle tone. No abnormal involuntary movements. Strength is 5/5 in all four extremities except as noted. LUE 4+/5 throughout. Sensory  Light touch is normal in upper and lower extremities. Reflexes  Deep tendon reflexes are 2+ and symmetric except as noted. Coordination  Right: Finger-to-nose abnormality: Dysmetria, likely 2/2 LUE weakness. Left: Finger-to-nose normal.    LABORATORY DATA     Labs: Additional Pertinent Lab Tests Reviewed:  All Labs Within Last 24 Hours Reviewed  Results from last 7 days   Lab Units 12/04/23 0826 12/03/23 0544 12/02/23  0455   WBC Thousand/uL 9.17 9.26 10.51*   HEMOGLOBIN g/dL 13.4 12.3 12.1   HEMATOCRIT % 38.7 36.8 37.0   PLATELETS Thousands/uL 170 155 145*   MONO PCT %  --   --  9   EOS PCT %  --   --  3      Results from last 7 days   Lab Units 12/04/23 0826 12/03/23  0544 12/02/23  0455 12/01/23  0605   POTASSIUM mmol/L 4.0 3.4* 3.6 3.6   CHLORIDE mmol/L 104 105 107 107   CO2 mmol/L 23 24 25 26   BUN mg/dL 12 10 13 21   CREATININE mg/dL 0.97 0.96 1.15 1.13   CALCIUM mg/dL 8.2* 7.2* 7.2* 7.2*   ALK PHOS U/L  --  42  --  43   ALT U/L  --  16  --  17   AST U/L  --  19  --  15     Results from last 7 days   Lab Units 12/04/23  0826 12/03/23  0544 12/01/23  0605   MAGNESIUM mg/dL 2.2 2.2 1.8*     Results from last 7 days   Lab Units 12/04/23 0826 12/03/23  0544 12/01/23  0605   PHOSPHORUS mg/dL 2.6 1.8* 3.8      Results from last 7 days   Lab Units 11/30/23  1653   INR  0.99   PTT seconds 22*               IMAGING & DIAGNOSTIC TESTING     Radiology Results: I have personally reviewed pertinent films in PACS    CT spine cervical wo contrast   Final Result by [facility],  (12/03 1244)      No cervical spine fracture or traumatic malalignment. Mild degenerative changes are noted. Workstation performed: [id number]         CT head wo contrast   Final Result by [address] [place] [address] (12/03 1238)      Expected appearance of evolving right MCA distribution infarcts with areas of hemorrhagic transformation. Slight interval increase in mass effect on the frontal horn of the right lateral ventricle without midline shift. New areas of recent infarction    right now better visualized located in the subcortical right parietal lobe and deep right frontal lobe white matter. Workstation performed: [id number]         CT pelvis wo contrast   Final Result by [address] [place] [address] (12/03 1250)      No evidence of hip fracture status post fall. Small volume of gas urinary bladder may be iatrogenic from instrumentation or may represent the sequela of gas-forming infection which should only be considered in the right clinical setting. Workstation performed: [id number]         CT chest abdomen pelvis w contrast   Final Result by Cody Plata  (12/02 7017)      Patchy groundglass consolidations throughout the right lung. Correlate for aspiration versus atypical/viral infection. No definite evidence of malignancy. Mild wall thickening of the urinary bladder may be secondary to chronic outlet obstruction. The study was marked in [facility] for immediate notification. Workstation performed: [id number]         MRI brain wo contrast   Final Result by Katharine Villalpando MD (12/02 9652)      Evolving acute to early subacute right MCA distribution infarction since November 30, 2023 with focal areas of hemorrhagic transformation within the ganglial capsular region.       Mass effect on the right frontal horn is stable when comparing to the head CT performed earlier the same day but increased when comparing to November 30, 2023 examination. Pansinusitis. I personally discussed this study with Dr Kofi Gonzalez on 12/2/2023 7:36 AM.            Workstation performed: [id number]         VAS lower limb venous duplex study, complete bilateral   Final Result by Jennifer Jarrett DO (12/02 1412)      CT head without contrast   Final Result by Maxime Swenson MD (12/01 1815)      1. Evolving acute right MCA territory infarct predominantly involving the right gangliocapsular region and corona radiata with mild regional mass effect without midline shift. Minimal hyperdensity in the right caudate is likely contrast staining from    earlier thrombectomy. Hemorrhage is less favored. Recommend close follow-up head CT. 2.  Acute/subacute sinusitis. Workstation performed: [id number]         XR chest portable ICU   Final Result by Elsi Carranza MD (12/01 1042)         Near complete resolution of right-sided atelectatic change. Small right pleural effusion. Indeterminate hazy airspace opacity in the left parahilar midlung field region. Workstation performed: [id number]         XR chest portable ICU   Final Result by Elsi Carranza MD (12/01 1004)      Bandlike opacity in the right mid to upper lung field with relative lucency of the right lower lung field is consistent with at least segmental atelectasis. There is relative increased density of the right hilum. Recommend CT for further evaluation. Workstation performed: [id number]         CT head wo contrast   Final Result by Marylen Lathe, MD (11/30 2052)      Reidentified subtle areas of loss of gray-white differentiation involving the right MCA territory. However, enhancement of the right middle cerebral artery M1 segment is now visible, from prior intravenous contrast administration for a CTA head and neck    performed few hours prior. An MRI brain could be performed for further evaluation.                   Workstation performed: [id number]         XR chest pa & lateral    (Results Pending)       Other Diagnostic Testing: I have personally reviewed pertinent reports. ACTIVE MEDICATIONS     Current Facility-Administered Medications   Medication Dose Route Frequency    acetaminophen (TYLENOL) oral suspension 650 mg  650 mg Oral Q6H PRN    albuterol (PROVENTIL HFA,VENTOLIN HFA) inhaler 2 puff  2 puff Inhalation Q4H PRN    atorvastatin (LIPITOR) tablet 40 mg  40 mg Per NG Tube QPM    azithromycin (ZITHROMAX) tablet 250 mg  250 mg Oral Q24H    chlorhexidine (PERIDEX) 0.12 % oral rinse 15 mL  15 mL Mouth/Throat Q12H LUIZ    clopidogrel (PLAVIX) tablet 75 mg  75 mg Oral Daily    Fluticasone Furoate-Vilanterol 100-25 mcg/actuation 1 puff  1 puff Inhalation Daily    heparin (porcine) subcutaneous injection 5,000 Units  5,000 Units Subcutaneous Q8H [facility] long term    hydrALAZINE (APRESOLINE) injection 10 mg  10 mg Intravenous Q4H PRN    ipratropium (ATROVENT) 0.02 % inhalation solution 0.5 mg  0.5 mg Nebulization TID    labetalol (NORMODYNE) injection 10 mg  10 mg Intravenous Q4H PRN    levalbuterol (XOPENEX) inhalation solution 1.25 mg  1.25 mg Nebulization TID    melatonin tablet 6 mg  6 mg Oral HS    metoclopramide (REGLAN) injection 10 mg  10 mg Intravenous Q8H [facility] long [facility]       Prior to Admission medications    Medication Sig Start Date End Date Taking?  Authorizing Provider   albuterol (PROVENTIL HFA,VENTOLIN HFA) 90 mcg/act inhaler Inhale 2 puffs every 6 (six) hours as needed for wheezing   Yes Historical Provider, MD   lisinopril (ZESTRIL) 10 mg tablet Take 10 mg by mouth daily   Yes Historical Provider, MD   omeprazole (PriLOSEC) 20 mg delayed release capsule Take 20 mg by mouth daily   Yes Historical Provider, MD       VTE Pharmacologic Prophylaxis: Heparin   VTE Mechanical Prophylaxis: sequential compression device    ==  DO Jaiden White Neurology Residency, PGY-2

## 2023-12-05 NOTE — PHYSICAL THERAPY NOTE
PHYSICAL THERAPY NOTE          Patient Name: Nataly SCOTT Date: 12/5/2023 12/05/23 1150   PT Last Visit   PT Visit Date 12/05/23   Note Type   Note Type Treatment   Pain Assessment   Pain Assessment Tool 0-10   Pain Score No Pain   Restrictions/Precautions   Weight Bearing Precautions Per Order No   Other Precautions Cognitive; Chair Alarm; Bed Alarm; Impulsive;Multiple lines;Telemetry; Fall Risk  (L hemiparesis, mild L neglect, LLE DVT)   General   Chart Reviewed Yes   Response to Previous Treatment Patient with no complaints from previous session. Family/Caregiver Present Yes  (son&grandson. GF requesting to speak to PT on phone. concerned re LLE DVT and mobilization. Per chart, no restrictions in regard to mobilization and all PT orders active. RN also cleared for pt to be mobilized. Asked RN to call GF to explain medication)   Cognition   Overall Cognitive Status Impaired   Arousal/Participation Cooperative   Attention Attends with cues to redirect   Orientation Level Oriented X4   Following Commands Follows one step commands with increased time or repetition   Subjective   Subjective agreeable   Bed Mobility   Supine to Sit 4  Minimal assistance   Additional items Assist x 1; Increased time required;HOB elevated;Verbal cues   Sit to Supine Unable to assess   Transfers   Sit to Stand 4  Minimal assistance   Additional items Assist x 1; Increased time required;Verbal cues   Stand to Sit 4  Minimal assistance   Additional items Assist x 1; Increased time required;Verbal cues   Stand pivot 4  Minimal assistance   Additional items Assist x 1; Increased time required;Verbal cues   Toilet transfer 4  Minimal assistance   Additional items Assist x 1; Increased time required;Verbal cues; Commode   Additional Comments c rw; x4 STS throughout   Ambulation/Elevation   Gait pattern Improper Weight shift;L Hemiparesis; Decreased foot clearance; Short stride; Excessively slow   Gait Assistance 4  Minimal assist   Additional items Assist x 1;Verbal cues  (+ chair follow of 2nd)   Assistive Device Rolling walker   Distance 10'x2+50'+20' c RW + 15'x2 at single HR   Balance   Static Sitting Fair +   Dynamic Sitting Fair -   Static Standing Fair -   Dynamic Standing Poor +   Ambulatory Poor +   Endurance Deficit   Endurance Deficit Yes   Endurance Deficit Description fatigue   Activity Tolerance   Activity Tolerance Patient limited by fatigue   Medical Staff Made Aware co-tx with OT due to decreased activity tolerance and increased medical complexity   Nurse Made Aware yes-cleared to mobilize   Exercises   Neuro re-ed lateral walking at HR to L x12' with b/l UE support and minAx1   Assessment   Prognosis Good   Problem List Decreased strength;Decreased endurance; Impaired balance;Decreased mobility; Decreased coordination;Decreased cognition; Impaired judgement;Decreased safety awareness   Assessment Pt agreeable to participate in PT session. Pt performed functional mobility and therex as outlined above. VC for hand placement with transfers for safety. LLE weakness noted in ambulation especially in L hip abductors. Requires cues for safety and for repeat instructions throughout session. SOB with exertion at times however SPO2 remained stable on RA. VC for pursed lipped breathing to decrease respiratory rate. Pt left seated in chair with chair alarm, call bell, phone, and all personal needs within reach. Pt will continue to benefit from skilled acute care PT to further address their functional mobility limitations. ARC following. Barriers to Discharge Inaccessible home environment   Goals   Patient Goals to improve   STG Expiration Date 12/13/23   PT Treatment Day 1   Plan   Treatment/Interventions Functional transfer training;LE strengthening/ROM; Elevations; Therapeutic exercise; Endurance training;Cognitive reorientation;Patient/family training;Equipment eval/education; Bed mobility;Gait training;Spoke to nursing;Spoke to case management;OT;Family   Progress Progressing toward goals   PT Frequency 3-5x/wk   Discharge Recommendation   Rehab Resource Intensity Level, PT I (Maximum Resource Intensity)   Equipment Recommended Sherdonald Casiano Package Recommended Wheeled walker   AM-PAC Basic Mobility Inpatient   Turning in Flat Bed Without Bedrails 3   Lying on Back to Sitting on Edge of Flat Bed Without Bedrails 2   Moving Bed to Chair 3   Standing Up From Chair Using Arms 3   Walk in Room 3   Climb 3-5 Stairs With Railing 2   Basic Mobility Inpatient Raw Score 16   Basic Mobility Standardized Score 38.32   Highest Level Of Mobility   JH-HLM Goal 5: Stand one or more mins   JH-HLM Achieved 7: Walk 25 feet or more   Oliver Calle, PT, DPT

## 2023-12-05 NOTE — ASSESSMENT & PLAN NOTE
Micki Edwards is a 58 y.o. male w/ PMH HTN, smoking who p/w left-sided weakness and right gaze preference concerning for acute ischemic stroke and found to have extensive R ICA occlusion extending up through M1 and concerning for embolism vs dissection by imaging appearance. Given LA dilation on TTE, consider Afib. Would consider TAO for extra evaluation possible thrombus not observed on TTE  Presenting sx: left-sided weakness and right gaze preference; NIHSS: 11 (LOC, gaze, VF, facial, LUE, LLE, dysarthria, extinction); /110 on arrival; TNK administered 1712 (LKN 15:35, SA 16:13, delay for BP); Endovascular intervention: R MCA/ICA thrombectomy, TICI 3 w/ Dr. Randy Bhat  Workup reviewed:  CTH/CTA: extensive R ICA occlusion from bifurcation through M1  Labs: HgbA1c 5.6, LDL 98  CTH (24 hrs): Acute right MCA territory infarct, with mild regional mass effect and without midline shift. Minimal hyperdensity noted in the R caudate consistent with hemorrhage vs contrast staining. Acute/Subacute sinusitits  MRI:t MRI: Evolving acute to subacute right MCA distribution infarction with focal areas of hemorrhagic transformation within the gangliocapsular region. Stable mass effect on the right frontal horn compared to CT head performed on same day. CT CAP w contrast, 12/2: Patchy groundglass consolidations throughout R lung (aspiration vs atypical/viral infection). No e/o malignancy. Mild wall thickening of urinary bladder may be 2/2 chronic outlet obstruction. TTE: LVEF 65%, LA dilation, mild MV annular calcification  RoPE score 3 - 0% likelihood stroke caused by PFO    Impression: 58year old male with R ICA occlusion from bifurcation through M1 s/p TNK and TICI 3 revascularization found to have acute/subacute R MCA distribution infarction with focal areas of hemorrhagic transformation on MRI imaging. Also found to have nonocclusive L Gastrocnemius Vein DVT.  Stroke etiology unclear at this time and will require further work up to r/o cardioembolic source or underlining hypercoagulability. Plan:  Continue stroke pathway with telemetry monitoring  Frequent Neuro checks, obtain stat CTH if any acute changes  BP goal < 160 to avoid further hemorrhagic conversion  Rule out hypercoag state - Protein C and Protein S within range. Antithrombin C below range at 76   Noncontributory homocysteine and CT CAP r/o underlying malignancy  TAO read pending  Secondary stroke prevention:  AP/AC: Plavix 75 mg QD as history of allergy to ASA with anaphylaxis. Closely monitor for any signs of allergic reaction. Statin: Atorvastatin 40 mg   Euglycemia with SSI if indicated  PT/OT/PMR/ST w/ swallow eval   DVT prophylaxis: Heparin SQ and SCDs  Dispo: Patient is approved for Loc[facility] and insurance authorization has been received.  Pending medical stability, functional progress, and bed availability

## 2023-12-05 NOTE — QUICK NOTE
Update in plan: Will plan to repeat Doppler US of LE on 12/6. Will clinically evaluate on 12/6 to determine medical stability for discharge.

## 2023-12-05 NOTE — ASSESSMENT & PLAN NOTE
Doppler of the lower extremities reveals acute occlusive DVT in the left gastrocnemius veins. Will hold off on AC at this time given increased risk of further hemorrhagic bleed  Given that treatment with anticoagulation for DVTs is controversial given low rate of propogation can consider will hold off on [facility] at this time given increased risk of further hemorrhagic conversion and obtain repeat duplex ultrasound of the bilateral lower extremities in 5 to 7 days to assess for propagation (approx 12/5-12/7)    Pulm and Internal Medicine team consulted, Gaudencio Ndiaye appreciated  Per pulm on 12/4: "Acute DVT with evidence on the gastrocnemius, based on CHEST guidelines would not anticoagulate for distal DVT in a high risk patient, would benefit from surveillance ultrasound protocol. Mac Nunez Would hold off on anticoagulation for distal DVT and a high risk patient.   He would benefit from surveillance ultrasound as an outpatient."  Per guidelines, repeat venous duplex study 7 days after initial US to reevaluate for progression of DVT

## 2023-12-06 ENCOUNTER — APPOINTMENT (INPATIENT)
Dept: RADIOLOGY | Facility: HOSPITAL | Age: 63
DRG: 030 | End: 2023-12-06
Payer: COMMERCIAL

## 2023-12-06 LAB
BACTERIA BLD CULT: NORMAL

## 2023-12-06 PROCEDURE — 94664 DEMO&/EVAL PT USE INHALER: CPT

## 2023-12-06 PROCEDURE — 99232 SBSQ HOSP IP/OBS MODERATE 35: CPT | Performed by: INTERNAL MEDICINE

## 2023-12-06 PROCEDURE — 94640 AIRWAY INHALATION TREATMENT: CPT

## 2023-12-06 PROCEDURE — 70450 CT HEAD/BRAIN W/O DYE: CPT

## 2023-12-06 PROCEDURE — NC001 PR NO CHARGE: Performed by: PHYSICIAN ASSISTANT

## 2023-12-06 PROCEDURE — 94760 N-INVAS EAR/PLS OXIMETRY 1: CPT

## 2023-12-06 PROCEDURE — 99232 SBSQ HOSP IP/OBS MODERATE 35: CPT | Performed by: PSYCHIATRY & NEUROLOGY

## 2023-12-06 PROCEDURE — G1004 CDSM NDSC: HCPCS

## 2023-12-06 PROCEDURE — 94668 MNPJ CHEST WALL SBSQ: CPT

## 2023-12-06 RX ORDER — ACETAMINOPHEN 325 MG/1
650 TABLET ORAL EVERY 6 HOURS PRN
Status: DISCONTINUED | OUTPATIENT
Start: 2023-12-06 | End: 2023-12-08 | Stop reason: HOSPADM

## 2023-12-06 RX ORDER — PANTOPRAZOLE SODIUM 40 MG/1
40 TABLET, DELAYED RELEASE ORAL
Status: DISCONTINUED | OUTPATIENT
Start: 2023-12-06 | End: 2023-12-08 | Stop reason: HOSPADM

## 2023-12-06 RX ORDER — PANTOPRAZOLE SODIUM 40 MG/1
40 TABLET, DELAYED RELEASE ORAL
Status: DISCONTINUED | OUTPATIENT
Start: 2023-12-07 | End: 2023-12-06

## 2023-12-06 RX ORDER — CALCIUM CARBONATE 500 MG/1
500 TABLET, CHEWABLE ORAL DAILY PRN
Status: DISCONTINUED | OUTPATIENT
Start: 2023-12-06 | End: 2023-12-08 | Stop reason: HOSPADM

## 2023-12-06 RX ADMIN — ACETAMINOPHEN 650 MG: 325 TABLET, FILM COATED ORAL at 14:17

## 2023-12-06 RX ADMIN — ALBUTEROL SULFATE 2 PUFF: 90 AEROSOL, METERED RESPIRATORY (INHALATION) at 05:42

## 2023-12-06 RX ADMIN — CLOPIDOGREL BISULFATE 75 MG: 75 TABLET ORAL at 08:32

## 2023-12-06 RX ADMIN — MAGNESIUM HYDROXIDE 30 ML: 400 SUSPENSION ORAL at 17:18

## 2023-12-06 RX ADMIN — CHLORHEXIDINE GLUCONATE 15 ML: 1.2 SOLUTION ORAL at 08:32

## 2023-12-06 RX ADMIN — LEVALBUTEROL HYDROCHLORIDE 1.25 MG: 1.25 SOLUTION RESPIRATORY (INHALATION) at 07:59

## 2023-12-06 RX ADMIN — IPRATROPIUM BROMIDE 0.5 MG: 0.5 SOLUTION RESPIRATORY (INHALATION) at 07:59

## 2023-12-06 RX ADMIN — IPRATROPIUM BROMIDE 0.5 MG: 0.5 SOLUTION RESPIRATORY (INHALATION) at 19:56

## 2023-12-06 RX ADMIN — ATORVASTATIN CALCIUM 40 MG: 40 TABLET, FILM COATED ORAL at 17:18

## 2023-12-06 RX ADMIN — CHLORHEXIDINE GLUCONATE 15 ML: 1.2 SOLUTION ORAL at 20:51

## 2023-12-06 RX ADMIN — CALCIUM CARBONATE (ANTACID) CHEW TAB 500 MG 500 MG: 500 CHEW TAB at 14:18

## 2023-12-06 RX ADMIN — HEPARIN SODIUM 5000 UNITS: 5000 INJECTION INTRAVENOUS; SUBCUTANEOUS at 14:18

## 2023-12-06 RX ADMIN — HEPARIN SODIUM 5000 UNITS: 5000 INJECTION INTRAVENOUS; SUBCUTANEOUS at 05:41

## 2023-12-06 RX ADMIN — LEVALBUTEROL HYDROCHLORIDE 1.25 MG: 1.25 SOLUTION RESPIRATORY (INHALATION) at 19:56

## 2023-12-06 RX ADMIN — HEPARIN SODIUM 5000 UNITS: 5000 INJECTION INTRAVENOUS; SUBCUTANEOUS at 22:23

## 2023-12-06 RX ADMIN — IPRATROPIUM BROMIDE 0.5 MG: 0.5 SOLUTION RESPIRATORY (INHALATION) at 14:17

## 2023-12-06 RX ADMIN — ACETAMINOPHEN 650 MG: 325 TABLET, FILM COATED ORAL at 20:50

## 2023-12-06 RX ADMIN — FLUTICASONE FUROATE AND VILANTEROL TRIFENATATE 1 PUFF: 100; 25 POWDER RESPIRATORY (INHALATION) at 08:33

## 2023-12-06 RX ADMIN — PANTOPRAZOLE SODIUM 40 MG: 40 TABLET, DELAYED RELEASE ORAL at 17:18

## 2023-12-06 RX ADMIN — LEVALBUTEROL HYDROCHLORIDE 1.25 MG: 1.25 SOLUTION RESPIRATORY (INHALATION) at 14:17

## 2023-12-06 RX ADMIN — Medication 6 MG: at 20:50

## 2023-12-06 NOTE — ASSESSMENT & PLAN NOTE
Doppler of the lower extremities on 12/1 reveals acute occlusive DVT in the left gastrocnemius veins - likely present on admission (11/30). Will hold off on AC at this time given increased risk of further hemorrhagic bleed  Given that treatment with anticoagulation for DVTs is controversial given low rate of propogation can consider [facility] at this time given increased risk of further hemorrhagic conversion and obtain repeat duplex ultrasound of the bilateral lower extremities in 5 to 7 days to assess for propagation (approx 12/5-12/7)  Pulm and Internal Medicine team consulted, Jayda Russ appreciated  Per pulm on 12/4: "Acute DVT with evidence on the gastrocnemius, based on CHEST guidelines would not anticoagulate for distal DVT in a high risk patient, would benefit from surveillance ultrasound protocol. Indra Gonzales Would hold off on anticoagulation for distal DVT and a high risk patient. He would benefit from surveillance ultrasound as an outpatient."  Repeat Doppler LE 12/5: No DVT in RLE. LLE: Acute DVT in proximal femoral vein (this was not present on 12/1 Doppler LE). Prev DVT in L gastrocnemius vein appears to be resolved. Strong suspicion for propagation of clot proximally, although cannot rule out new clot formation. Internal medicine team and IR attending aware. Repeated CTH to determine whether or not pt can be started on full anticoagulation versus requiring IVC filter. Upgraded level of care to stepdown and increased vital sign checks to every 2 hours. 12/6: Pt does continue to report SOB, but denied CP and no tachycardia or tachypnea at this time. Continue heparin subQ q8h for dvt ppx. Update: Discussed with internal medicine and pulmonary teams. Will hold off on anticoagulation until 12/10 at the earliest given large stroke on 11/30 (c/f hemorrhagic conversion potential). IR consulted for IVC filter - plan for 12/7, NPO overnight.  Discharge pending clinical progress, which will be assessed on daily basis.

## 2023-12-06 NOTE — ASSESSMENT & PLAN NOTE
Pt with history of asthma and maintained on albuterol at home. Pt reports almost 2x daily usage of albuterol at home   Started on azithromycin coverage (day 3 of 4) by critical care team given CxR findings with indeterminate hazy airspace opacity in the L parahilar midlung field region, CTA C/A/P revealed Patchy groundglass consolidations throughout the right lung  Pt had required supplemental O2, now has been weaned of NC  Pt now on levalbuterol and atrovent nebs TID  Encouraged the use of incentive Spirometry  Pulm consulted, recs appreciated as of 12/4:  Started Breo 100  Cont ANGELIKA DIOMEDES nebs for now. Discontinue upon discharge. Use rescue albuterol PRN. If hypoxemia worsens, obtain another sputum sample, MRSA nares, and possible consideration for hospital-acquired PNA  Currently saturating well on room air. Oscillatory positive expiratory pressure ordered.

## 2023-12-06 NOTE — RESTORATIVE TECHNICIAN NOTE
Restorative Technician Note      Patient Name: Lindsay Tony     Note Type: Mobility (Pt refused sitting EOB/getting OOB to the chair 2* sitting up earlier today/comfortable in the bed.)    Jose LAU, Restorative Technician, [employer]

## 2023-12-06 NOTE — PROGRESS NOTES
Patient's wife requesting that the bed alarm be turned off while she is there as she has been an RN for 22 years. Explained the risks of having the bed alarm turned off and the expectation that she notifies staff when she is leaving. Patient's wife agreeable to this.

## 2023-12-06 NOTE — PROGRESS NOTES
INTERNAL MEDICINE RESIDENCY PROGRESS NOTE     Name: Shelbie Marin   Age & Sex: 58 y.o. male   MRN: [id number]  Unit/Bed#: [facility] 734-01   Encounter: [id number]  Team: SOD Team C     PATIENT INFORMATION     Name: Shelbie Marin   Age & Sex: 58 y.o. male   MRN: [id number]  Hospital Stay Days: 6    ASSESSMENT/PLAN     Principal Problem:    Acute hypoxic respiratory failure ([address])  Active Problems:    Stroke due to R ICA to MCA occlusion, s/p TNK and thrombectomy    DVT of lower extremity (deep venous thrombosis) ([address])    Hypertension    Asthma      DVT of lower extremity (deep venous thrombosis) ([address])  Assessment & Plan  Doppler of the lower extremities on 12/1 reveals acute occlusive DVT in the left gastrocnemius veins - likely present on admission (11/30). Repeat Doppler LE 12/5: No DVT in RLE. LLE: Acute DVT in proximal femoral vein (this was not present on 12/1 Doppler LE). Prev DVT in L gastrocnemius vein appears to be resolved. Strong suspicion for propagation of clot proximally, although cannot rule out new clot formation. IR consulted for IVC filter - plan for 12/7, NPO overnight. Will hold off on anticoagulation until 12/10 at the earliest given large stroke on 11/30 (c/f hemorrhagic conversion potential). Stroke due to R ICA to MCA occlusion, s/p TNK and thrombectomy  Assessment & Plan  Presenting sx: left-sided weakness and right gaze preference; NIHSS: 11 (LOC, gaze, VF, facial, LUE, LLE, dysarthria, extinction); /110 on arrival; TNK administered 1712 (LKN 15:35, SA 16:13, delay for BP); Endovascular intervention: R MCA/ICA thrombectomy, TICI 3 w/ Dr. Connie Watkins. CTH/CTA: extensive R ICA occlusion from bifurcation through M1. CTH (24 hrs): Acute right MCA territory infarct, with mild regional mass effect and without midline shift. Minimal hyperdensity noted in the R caudate consistent with hemorrhage vs contrast staining. Acute/Subacute sinusitits.  MRI:t MRI: Evolving acute to subacute right MCA distribution infarction with focal areas of hemorrhagic transformation within the gangliocapsular region. Stable mass effect on the right frontal horn compared to CT head performed on same day. Repeat CTH (12/6): Evolving right MCA distribution infarction with areas of hemorrhagic transformation. Degree of hemorrhage within the right basal ganglia is slightly improved since the prior examination. Stable effacement of the right lateral ventricle without significant midline shift. Basilar cisterns are patent. Management per neurology  Continue stroke pathway with telemetry monitoring  Frequent Neuro checks, obtain stat CTH if any acute changes  BP goal < 160 to avoid further hemorrhagic conversion  Rule out hypercoag state - pending thrombosis panel  Strongly recommend repeat thrombosis panel in 6 weeks, given currently on heparin subQ. Secondary stroke prevention:  AP/AC: Plavix 75 mg QD as history of allergy to ASA with anaphylaxis. Closely monitor for any signs of allergic reaction. Discussed with neurology attending, Dr. Gio Epstein - if plan to initiate anticoagulation given dvt hx, would strongly recommend continue Plavix for stroke ppx given unclear etiology at this time. Statin: Atorvastatin 40 mg   Euglycemia with SSI if indicated  PT/OT/PMR/ST w/ swallow eval   DVT prophylaxis: Heparin SQ and SCDs  Will need cardiology referral in OP setting to evaluate for cardiac arrhythmia with Zio patch/Holter monitor  Follow up with OP neurovascular attending in 6-8 weeks. Dispo: Patient is approved for [employer]. Pending medical stability. * Acute hypoxic respiratory failure (HCC)  Assessment & Plan  63M w/ PMH of asthma, tobacco use who has been admitted for R MCA ischemic stroke s/p TNK and mechanical thrombectomy and hospital course complicated by acute hypoxic respiratory failure (2-4L NC) in the setting of prior mechanical ventilation and concern for pulmonary infection on CT.  On physical exam, patient is resting comfortably on room air without signs of respiratory compromised, there is wheezing present in the right lung fields on auscultation. One time Temp of 100.6 this evening, but resolved and has otherwise been afebrile. Recent labs notable for resolving leukocytosis. Differential includes Asthma, viral/bacterial URI, atelectasis, PE. Procal 0.11 - bacterial PNA less likely. Flu/RSV/COVID -- negative. Started on azithromycin coverage by critical care team given CxR findings with indeterminate hazy airspace opacity in the L parahilar midlung field region. CT w/ contrast C/A/P (12/2/23): Patchy groundglass consolidations throughout the right lung.    - Continue with scheduled nebs q8h w/ xopenex and ipratropium  - S/P Azithromycin x 5 days  - Follow up Bcx x2 from 12/1 - NGTD  - Pulmonology on board - considering asthma/COPD overlap. Breo Ellipta once daily added. - If no improvement or clinical deterioration can escalate workup to include:  - CTA PE in setting of known DVT w/o AC   - Repeat CXR  - Repeat BC, sputum cultures    Asthma  Assessment & Plan  Hx of asthma for many years. Managed by PCP with albuterol. No PFTs on chart review. PE demonstrates wheezing in the right middle and lower lung fields    Plan  - Xopenex nebs q8hr prn  - Ipratropium nebs q8hr prn  - Pulmonology following; add breo ellipta once daily    Hypertension  Assessment & Plan  Hx of HTN maintained on lisinopril 10 mg. BP controlled without medication while admitted    Plan  - SBP goal < 160 given above  - Recommend reinitiating pta lisinopril 10 mg once stable from neurologic standpoint        Disposition: Continue inpatient management       SUBJECTIVE     Patient seen and examined. No acute events overnight.  No complaints this AM.    OBJECTIVE     Vitals:    12/06/23 0759 12/06/23 1003 12/06/23 1104 12/06/23 1205   BP:  133/73 128/82 144/75   Pulse:  73 64 69   Resp:       Temp:       TempSrc:       SpO2: 98% 96% 96% 96%   Weight:       Height:          Temperature:   Temp (24hrs), Av.2 °F (36.8 °C), Min:97.8 °F (36.6 °C), Max:98.5 °F (36.9 °C)    Temperature: 97.8 °F (36.6 °C)  Intake & Output:  I/O          07 07 07    P. O. 60      I.V. (mL/kg)  400 (4)     Total Intake(mL/kg) 60 (0.6) 400 (4)     Urine (mL/kg/hr) 600 (0.3)      Total Output 600      Net -540 +400                  Weights:   IBW (Ideal Body Weight): 75.3 kg    Body mass index is 30.38 kg/m². Weight (last 2 days)       Date/Time Weight    23 06 98.8 (217.81)    23 0929 99.8 (220)    23 0539 100 (220.9)    23 0600 99.1 (218.48)          Physical Exam  Vitals and nursing note reviewed. Constitutional:       General: He is not in acute distress. Appearance: He is well-developed. HENT:      Head: Normocephalic and atraumatic. Mouth/Throat:      Mouth: Mucous membranes are moist.   Eyes:      Extraocular Movements: Extraocular movements intact. Conjunctiva/sclera: Conjunctivae normal.      Pupils: Pupils are equal, round, and reactive to light. Cardiovascular:      Rate and Rhythm: Normal rate and regular rhythm. Heart sounds: No murmur heard. Pulmonary:      Effort: Pulmonary effort is normal. No respiratory distress. Breath sounds: Normal breath sounds. Abdominal:      Palpations: Abdomen is soft. Tenderness: There is no abdominal tenderness. Musculoskeletal:         General: No swelling. Cervical back: Neck supple. Skin:     General: Skin is warm and dry. Capillary Refill: Capillary refill takes less than 2 seconds. Neurological:      Mental Status: He is alert and oriented to person, place, and time. Cranial Nerves: Cranial nerves 2-12 are intact. Sensory: Sensation is intact. Motor: Motor function is intact.    Psychiatric:         Mood and Affect: Mood normal.         Speech: Speech normal. LABORATORY DATA     Labs: I have personally reviewed pertinent reports. Results from last 7 days   Lab Units 12/05/23 0620 12/04/23 0826 12/03/23 0544 12/02/23  0455   WBC Thousand/uL 8.50 9.17 9.26 10.51*   HEMOGLOBIN g/dL 13.5 13.4 12.3 12.1   HEMATOCRIT % 39.5 38.7 36.8 37.0   PLATELETS Thousands/uL 209 170 155 145*   MONO PCT %  --   --   --  9   EOS PCT %  --   --   --  3      Results from last 7 days   Lab Units 12/05/23 0620 12/04/23 0826 12/03/23 0544 12/02/23 0455 12/01/23  0605   POTASSIUM mmol/L 3.9 4.0 3.4*   < > 3.6   CHLORIDE mmol/L 104 104 105   < > 107   CO2 mmol/L 22 23 24   < > 26   BUN mg/dL 13 12 10   < > 21   CREATININE mg/dL 1.06 0.97 0.96   < > 1.13   CALCIUM mg/dL 8.5 8.2* 7.2*   < > 7.2*   ALK PHOS U/L  --   --  42  --  43   ALT U/L  --   --  16  --  17   AST U/L  --   --  19  --  15    < > = values in this interval not displayed. Results from last 7 days   Lab Units 12/05/23 0620 12/04/23 0826 12/03/23  0544   MAGNESIUM mg/dL 2.3 2.2 2.2     Results from last 7 days   Lab Units 12/05/23 0620 12/04/23 0826 12/03/23  0544   PHOSPHORUS mg/dL 3.5 2.6 1.8*      Results from last 7 days   Lab Units 11/30/23  1653   INR  0.99   PTT seconds 22*               IMAGING & DIAGNOSTIC TESTING     Radiology Results: I have personally reviewed pertinent reports. CT pelvis wo contrast    Result Date: 12/3/2023  Impression: No evidence of hip fracture status post fall. Small volume of gas urinary bladder may be iatrogenic from instrumentation or may represent the sequela of gas-forming infection which should only be considered in the right clinical setting. Workstation performed: [id number]     CT spine cervical wo contrast    Result Date: 12/3/2023  Impression: No cervical spine fracture or traumatic malalignment. Mild degenerative changes are noted.  Workstation performed: [id number]     CT head wo contrast    Result Date: 12/3/2023  Impression: Expected appearance of evolving right MCA distribution infarcts with areas of hemorrhagic transformation. Slight interval increase in mass effect on the frontal horn of the right lateral ventricle without midline shift. New areas of recent infarction right now better visualized located in the subcortical right parietal lobe and deep right frontal lobe white matter. Workstation performed: [id number]     CT chest abdomen pelvis w contrast    Result Date: 12/2/2023  Impression: Patchy groundglass consolidations throughout the right lung. Correlate for aspiration versus atypical/viral infection. No definite evidence of malignancy. Mild wall thickening of the urinary bladder may be secondary to chronic outlet obstruction. The study was marked in [facility] for immediate notification. Workstation performed: [id number]     MRI brain wo contrast    Result Date: 12/2/2023  Impression: Evolving acute to early subacute right MCA distribution infarction since November 30, 2023 with focal areas of hemorrhagic transformation within the ganglial capsular region. Mass effect on the right frontal horn is stable when comparing to the head CT performed earlier the same day but increased when comparing to November 30, 2023 examination. Pansinusitis. I personally discussed this study with Dr Tru Talamantes on 12/2/2023 7:36 AM. Workstation performed: [id number]     Bronchoscopy    Result Date: 12/1/2023  Impression: RUL obstructive mucous plug RECOMMENDATION: Proceed with vent weaning as hypoxemia resolves and extubate if possible     CT head without contrast    Result Date: 12/1/2023  Impression: 1. Evolving acute right MCA territory infarct predominantly involving the right gangliocapsular region and corona radiata with mild regional mass effect without midline shift. Minimal hyperdensity in the right caudate is likely contrast staining from earlier thrombectomy. Hemorrhage is less favored. Recommend close follow-up head CT. 2.  Acute/subacute sinusitis.  Workstation performed: [id number]     IR stroke alert    Result Date: 12/1/2023  Impression: Right cervical ICA occlusion extending into the right middle cerebral artery M1 segment, TICI 0. Successful mechanical thrombectomy with TICI 3 reperfusion. Workstation performed: [id number]     XR chest portable ICU    Result Date: 12/1/2023  Impression: Near complete resolution of right-sided atelectatic change. Small right pleural effusion. Indeterminate hazy airspace opacity in the left parahilar midlung field region. Workstation performed: [id number]     XR chest portable ICU    Result Date: 12/1/2023  Impression: Bandlike opacity in the right mid to upper lung field with relative lucency of the right lower lung field is consistent with at least segmental atelectasis. There is relative increased density of the right hilum. Recommend CT for further evaluation. Workstation performed: [id number]     CT head wo contrast    Result Date: 11/30/2023  Impression: Reidentified subtle areas of loss of gray-white differentiation involving the right MCA territory. However, enhancement of the right middle cerebral artery M1 segment is now visible, from prior intravenous contrast administration for a CTA head and neck performed few hours prior. An MRI brain could be performed for further evaluation. Workstation performed: [id number]     CT stroke alert brain    Result Date: 11/30/2023  Impression: No acute intracranial hemorrhage. Right MCA distribution ischemia suspected. I personally discussed this study with Dr Unruly Sanchez on 11/30/2023 4:46 PM. Workstation performed: [id number]     Other Diagnostic Testing: I have personally reviewed pertinent reports.     ACTIVE MEDICATIONS     Current Facility-Administered Medications   Medication Dose Route Frequency    acetaminophen (TYLENOL) oral suspension 650 mg  650 mg Oral Q6H PRN    albuterol (PROVENTIL HFA,VENTOLIN HFA) inhaler 2 puff  2 puff Inhalation Q4H PRN    atorvastatin (LIPITOR) tablet 40 mg  40 mg Oral QPM chlorhexidine (PERIDEX) 0.12 % oral rinse 15 mL  15 mL Mouth/Throat Q12H [facility]    clopidogrel (PLAVIX) tablet 75 mg  75 mg Oral Daily    Fluticasone Furoate-Vilanterol 100-25 mcg/actuation 1 puff  1 puff Inhalation Daily    heparin (porcine) subcutaneous injection 5,000 Units  5,000 Units Subcutaneous Q8H [facility]    hydrALAZINE (APRESOLINE) injection 10 mg  10 mg Intravenous Q4H PRN    ipratropium (ATROVENT) 0.02 % inhalation solution 0.5 mg  0.5 mg Nebulization TID    labetalol (NORMODYNE) injection 10 mg  10 mg Intravenous Q4H PRN    levalbuterol (XOPENEX) inhalation solution 1.25 mg  1.25 mg Nebulization TID    melatonin tablet 6 mg  6 mg Oral HS       VTE Pharmacologic Prophylaxis: Reason for no pharmacologic prophylaxis Will hold off on anticoagulation until 12/10 at the earliest given large stroke on 11/30 (c/f hemorrhagic conversion potential). VTE Mechanical Prophylaxis: sequential compression device    Portions of the record may have been created with voice recognition software. Occasional wrong word or "sound a like" substitutions may have occurred due to the inherent limitations of voice recognition software.   Read the chart carefully and recognize, using context, where substitutions have occurred.  ==  Sudha Jerry MD  [id number]  Internal Medicine Residency PGY-1

## 2023-12-06 NOTE — PROGRESS NOTES
NEUROLOGY RESIDENCY PROGRESS NOTE     Name: Margareth Bartlett   Age & Sex: 58 y.o. male   MRN: [id number]  Unit/Bed#: [facility] 734-01   Encounter: [id number]    Margareth Bartlett will need follow up in in 6 weeks with neurovascular attending or AP . He will not require outpatient neurological testing. Pending for discharge: IVC filter placement on 12/7, clinical monitoring on daily basis to evaluate for discharge stability to [facility]    ASSESSMENT & PLAN     Stroke due to R ICA to MCA occlusion, s/p TNK and thrombectomy  Assessment & Plan  Margareth Bartlett is a 58 y.o. male w/ PMH HTN, smoking who p/w left-sided weakness and right gaze preference concerning for acute ischemic stroke and found to have extensive R ICA occlusion extending up through M1 and concerning for embolism vs dissection by imaging appearance. Given LA dilation on TTE, consider Afib. Would consider TAO for extra evaluation possible thrombus not observed on TTE  Presenting sx: left-sided weakness and right gaze preference; NIHSS: 11 (LOC, gaze, VF, facial, LUE, LLE, dysarthria, extinction); /110 on arrival; TNK administered 1712 (LKN 15:35, SA 16:13, delay for BP); Endovascular intervention: R MCA/ICA thrombectomy, TICI 3 w/ Dr. Leonila Lopez  Workup reviewed:  CTH/CTA: extensive R ICA occlusion from bifurcation through M1  Labs: HgbA1c 5.6, LDL 98  CTH (24 hrs): Acute right MCA territory infarct, with mild regional mass effect and without midline shift. Minimal hyperdensity noted in the R caudate consistent with hemorrhage vs contrast staining. Acute/Subacute sinusitits  MRI:t MRI: Evolving acute to subacute right MCA distribution infarction with focal areas of hemorrhagic transformation within the gangliocapsular region. Stable mass effect on the right frontal horn compared to CT head performed on same day. CT CAP w contrast, 12/2: Patchy groundglass consolidations throughout R lung (aspiration vs atypical/viral infection). No e/o malignancy. Mild wall thickening of urinary bladder may be 2/2 chronic outlet obstruction. TTE: LVEF 65%, LA dilation, mild MV annular calcification  RoPE score 3 - 0% likelihood stroke caused by PFO  TAO, 12/5: EF 09%, normal systolic fxn, LA dilated, no PFO, JOSÉ MIGUEL nml size, no thrombus, mild MV regurg. Homocysteine normal  Protein C and Protein S within range. Antithrombin III decreased - however, patient was receiving heparin subQ for dvt ppx at this time. CTH, 12/6: Evolving R MCA distribution infarction w areas of hemorrhagic transformation. Degree of hemorrhage w/in R BS slightly improved. Stable effacement of R lateral ventricle w/o significant midline shift. Impression: 58year old male with R ICA occlusion from bifurcation through M1 s/p TNK and TICI 3 revascularization found to have acute/subacute R MCA distribution infarction with focal areas of hemorrhagic transformation on MRI imaging. Also found to have nonocclusive L Gastrocnemius Vein DVT, followed by L proximal femoral vein DVT. Stroke etiology unclear at this time and will require further work up to r/o cardioembolic source or underlining hypercoagulability. Left atrium dilated on TAO, but NSR on telemetry. Plan:  Continue stroke pathway with telemetry monitoring  Frequent Neuro checks, obtain stat CTH if any acute changes  BP goal < 160 to avoid further hemorrhagic conversion  Rule out hypercoag state - pending thrombosis panel  Strongly recommend repeat thrombosis panel in 6 weeks, given currently on heparin subQ. Secondary stroke prevention:  AP/AC: Plavix 75 mg QD as history of allergy to ASA with anaphylaxis. Closely monitor for any signs of allergic reaction. Discussed with neurology attending, Dr. Rod Beat - if plan to initiate anticoagulation given dvt hx, would strongly recommend continue Plavix for stroke ppx given unclear etiology at this time.   Statin: Atorvastatin 40 mg   Euglycemia with SSI if indicated  PT/OT/PMR/ST w/ swallow eval   DVT prophylaxis: Heparin SQ and SCDs  Will need cardiology referral in OP setting to evaluate for cardiac arrhythmia with Zio patch/Holter monitor  Follow up with OP neurovascular attending in 6-8 weeks. Dispo: Patient is approved for [employer]. Pending medical stability. * Acute hypoxic respiratory failure ([address])  Assessment & Plan  Pt with history of asthma and maintained on albuterol at home. Pt reports almost 2x daily usage of albuterol at home   Started on azithromycin coverage (day 3 of 4) by critical care team given CxR findings with indeterminate hazy airspace opacity in the L parahilar midlung field region, CTA C/A/P revealed Patchy groundglass consolidations throughout the right lung  Pt had required supplemental O2, now has been weaned of NC  Pt now on levalbuterol and atrovent nebs TID  Encouraged the use of incentive Spirometry  Pulm consulted, kalee appreciated as of 12/4:  Started Breo 100  Cont ANGELIKA DIOMEDES nebs for now. Discontinue upon discharge. Use rescue albuterol PRN. If hypoxemia worsens, obtain another sputum sample, MRSA nares, and possible consideration for hospital-acquired PNA  Currently saturating well on room air. Oscillatory positive expiratory pressure ordered. DVT of lower extremity (deep venous thrombosis) (HCC)  Assessment & Plan  Doppler of the lower extremities on 12/1 reveals acute occlusive DVT in the left gastrocnemius veins - likely present on admission (11/30).   Will hold off on AC at this time given increased risk of further hemorrhagic bleed  Given that treatment with anticoagulation for DVTs is controversial given low rate of propogation can consider will hold off on [facility] at this time given increased risk of further hemorrhagic conversion and obtain repeat duplex ultrasound of the bilateral lower extremities in 5 to 7 days to assess for propagation (approx 12/5-12/7)  Pulm and Internal Medicine team consulted, Cheryle Must appreciated  Per pulm on 12/4: "Acute DVT with evidence on the gastrocnemius, based on CHEST guidelines would not anticoagulate for distal DVT in a high risk patient, would benefit from surveillance ultrasound protocol. Panfilo Rosenthal Would hold off on anticoagulation for distal DVT and a high risk patient. He would benefit from surveillance ultrasound as an outpatient."  Repeat Doppler LE 12/5: No DVT in RLE. LLE: Acute DVT in proximal femoral vein (this was not present on 12/1 Doppler LE). Prev DVT in L gastrocnemius vein appears to be resolved. Strong suspicion for propagation of clot proximally, although cannot rule out new clot formation. Internal medicine team and IR attending aware. Repeated CTH to determine whether or not pt can be started on full anticoagulation versus requiring IVC filter. Upgraded level of care to stepdown and increased vital sign checks to every 2 hours. 12/6: Pt does continue to report SOB, but denied CP and no tachycardia or tachypnea at this time. Continue heparin subQ q8h for dvt ppx. Update: Discussed with internal medicine and pulmonary teams. Will hold off on anticoagulation until 12/10 at the earliest given large stroke on 11/30 (c/f hemorrhagic conversion potential). IR consulted for IVC filter - plan for 12/7, NPO overnight. Discharge pending clinical progress, which will be assessed on daily basis. Asthma  Assessment & Plan  See above under acute hypoxic resp. failure    Hypertension  Assessment & Plan  Regimen of lisinopril 10 mg QD has been held given hypotension previously reuiqring pressor support  Will continue to hold as patient currently not profoundly hypertensive and want to be cautious given his need for pressors previously    Delirious-resolved as of 12/4/2023  Assessment & Plan  Pt reporting hallucinations such as someone being in the bathroom when no one was or that family members were in the room when they were not.   Possibly 2/2 or worsened by benadryl will d/c  Possibly due to lack of sleep    Plan  - Regulate sleep wake cycle  - Delirium precautions                  SUBJECTIVE     Patient was seen and examined today. Patient reports mild SOB, possibly associated with reactive airway disease. Nursing aware - will continue frequent vital checks and oscillatory PEP. Denies any other acute concerns at this time. Jayjay Heart for discharge. Aware of repeat Doppler US findings and plan moving forward. Review of Systems   Constitutional:  Negative for chills, fatigue and fever. HENT:  Negative for trouble swallowing. Eyes:  Negative for visual disturbance. Respiratory:  Negative for cough, chest tightness and shortness of breath. Cardiovascular:  Negative for chest pain. Gastrointestinal:  Negative for abdominal distention and abdominal pain. Genitourinary:  Negative for difficulty urinating. Neurological:  Negative for dizziness and weakness. All other systems reviewed and are negative. OBJECTIVE     Patient ID: Sahil Vasquez is a 58 y.o. male. Vitals:    23 0701 23 0759 23 1003 23 1104   BP: 142/81  133/73 128/82   Pulse: 78  73 64   Resp:       Temp: 97.8 °F (36.6 °C)      TempSrc:       SpO2: 97% 98% 96% 96%   Weight:       Height:          Temperature:   Temp (24hrs), Av.2 °F (36.8 °C), Min:97.8 °F (36.6 °C), Max:98.5 °F (36.9 °C)    Temperature: 97.8 °F (36.6 °C)      Physical Exam  Vitals and nursing note reviewed. Constitutional:       Appearance: Normal appearance. He is not ill-appearing. HENT:      Head: Normocephalic and atraumatic. Nose: Nose normal.      Mouth/Throat:      Mouth: Mucous membranes are moist.      Pharynx: Oropharynx is clear. Eyes:      Extraocular Movements: Extraocular movements intact and EOM normal.      Pupils: Pupils are equal, round, and reactive to light. Cardiovascular:      Rate and Rhythm: Normal rate and regular rhythm. Pulmonary:      Effort: Pulmonary effort is normal.      Breath sounds: Normal breath sounds. Abdominal:      General: Abdomen is flat. Bowel sounds are normal.      Palpations: Abdomen is soft. Skin:     General: Skin is warm. Neurological:      Mental Status: He is oriented to person, place, and time. Cranial Nerves: Cranial nerves 2-12 are intact. Motor: Motor strength is normal.     Coordination: Finger-Nose-Finger Test (b/l) normal.   Psychiatric:         Speech: Speech normal.          Neurologic Exam     Mental Status   Oriented to person, place, and time. Attention: normal. Concentration: normal.   Speech: speech is normal   Level of consciousness: alert  Normal comprehension. Cranial Nerves   Cranial nerves II through XII intact. CN II   Visual fields full to confrontation. CN III, IV, VI   Pupils are equal, round, and reactive to light. Extraocular motions are normal.   Nystagmus: none   Conjugate gaze: absent    CN V   Facial sensation intact. CN VII   Facial expression full, symmetric. CN VIII   CN VIII normal.     CN IX, X   CN IX normal.   CN X normal.     CN XI   CN XI normal.     CN XII   CN XII normal.     Motor Exam   Muscle bulk: normal  Overall muscle tone: normal    Strength   Strength 5/5 throughout. Sensory Exam   Light touch normal.     Gait, Coordination, and Reflexes     Coordination   Finger to nose coordination: normal (b/l)    Reflexes   Reflexes 2+ except as noted. Right plantar: normal  Left plantar: normal         LABORATORY DATA     Labs: I have personally reviewed pertinent reports.     Results from last 7 days   Lab Units 12/05/23  0620 12/04/23 0826 12/03/23 0544 12/02/23  0455   WBC Thousand/uL 8.50 9.17 9.26 10.51*   HEMOGLOBIN g/dL 13.5 13.4 12.3 12.1   HEMATOCRIT % 39.5 38.7 36.8 37.0   PLATELETS Thousands/uL 209 170 155 145*   MONO PCT %  --   --   --  9   EOS PCT %  --   --   --  3      Results from last 7 days   Lab Units 12/05/23  0620 12/04/23  0826 12/03/23  0544 12/02/23  0455 12/01/23  0605   SODIUM mmol/L 136 134* 138   < > 139   POTASSIUM mmol/L 3.9 4.0 3.4*   < > 3.6   CHLORIDE mmol/L 104 104 105   < > 107   CO2 mmol/L 22 23 24   < > 26   BUN mg/dL 13 12 10   < > 21   CREATININE mg/dL 1.06 0.97 0.96   < > 1.13   CALCIUM mg/dL 8.5 8.2* 7.2*   < > 7.2*   ALK PHOS U/L  --   --  42  --  43   ALT U/L  --   --  16  --  17   AST U/L  --   --  19  --  15    < > = values in this interval not displayed. Results from last 7 days   Lab Units 12/05/23  0620 12/04/23  0826 12/03/23  0544   MAGNESIUM mg/dL 2.3 2.2 2.2     Results from last 7 days   Lab Units 12/05/23  0620 12/04/23  0826 12/03/23  0544   PHOSPHORUS mg/dL 3.5 2.6 1.8*      Results from last 7 days   Lab Units 11/30/23  1653   INR  0.99   PTT seconds 22*               IMAGING & DIAGNOSTIC TESTING     Radiology Results: I have personally reviewed pertinent reports. and I have personally reviewed pertinent films in PACS    CT head wo contrast   Final Result by Dulce Morales MD (12/06 1101)      Evolving right MCA distribution infarction with areas of hemorrhagic transformation. Degree of hemorrhage within the right basal ganglia is slightly improved since the prior examination. Stable effacement of the right lateral ventricle without significant midline shift. Basilar cisterns are patent. Resident: Michael Victor, the attending radiologist, have reviewed the images and agree with the final report above. Workstation performed: [id number]         VAS lower limb venous duplex study, complete bilateral   Final Result by Noar Marinelli MD (12/05 2213)      CT spine cervical wo contrast   Final Result by [facility]  (12/03 1244)      No cervical spine fracture or traumatic malalignment. Mild degenerative changes are noted.                   Workstation performed: [id number]         CT head wo contrast   Final Result by [facility],  (12/03 1238)      Expected appearance of evolving right MCA distribution infarcts with areas of hemorrhagic transformation. Slight interval increase in mass effect on the frontal horn of the right lateral ventricle without midline shift. New areas of recent infarction    right now better visualized located in the subcortical right parietal lobe and deep right frontal lobe white matter. Workstation performed: [id number]         CT pelvis wo contrast   Final Result by [address] [place] [address],  (12/03 1250)      No evidence of hip fracture status post fall. Small volume of gas urinary bladder may be iatrogenic from instrumentation or may represent the sequela of gas-forming infection which should only be considered in the right clinical setting. Workstation performed: [id number]         CT chest abdomen pelvis w contrast   Final Result by Jose Antonio Montemayor DO (12/02 7157)      Patchy groundglass consolidations throughout the right lung. Correlate for aspiration versus atypical/viral infection. No definite evidence of malignancy. Mild wall thickening of the urinary bladder may be secondary to chronic outlet obstruction. The study was marked in [facility] for immediate notification. Workstation performed: [id number]         MRI brain wo contrast   Final Result by Lynita Dance, MD (12/02 7675)      Evolving acute to early subacute right MCA distribution infarction since November 30, 2023 with focal areas of hemorrhagic transformation within the ganglial capsular region. Mass effect on the right frontal horn is stable when comparing to the head CT performed earlier the same day but increased when comparing to November 30, 2023 examination. Pansinusitis.       I personally discussed this study with Dr Dilcia Choudhary on 12/2/2023 7:36 AM.            Workstation performed: [id number]         VAS lower limb venous duplex study, complete bilateral   Final Result by Iftikhar Presley DO (12/02 5252)      CT head without contrast   Final Result by Zahraa Harry MD (12/01 1815)      1. Evolving acute right MCA territory infarct predominantly involving the right gangliocapsular region and corona radiata with mild regional mass effect without midline shift. Minimal hyperdensity in the right caudate is likely contrast staining from    earlier thrombectomy. Hemorrhage is less favored. Recommend close follow-up head CT. 2.  Acute/subacute sinusitis. Workstation performed: [id number]         XR chest portable ICU   Final Result by Allison Collins MD (12/01 1042)         Near complete resolution of right-sided atelectatic change. Small right pleural effusion. Indeterminate hazy airspace opacity in the left parahilar midlung field region. Workstation performed: [id number]         XR chest portable ICU   Final Result by Allison Collins MD (12/01 1004)      Bandlike opacity in the right mid to upper lung field with relative lucency of the right lower lung field is consistent with at least segmental atelectasis. There is relative increased density of the right hilum. Recommend CT for further evaluation. Workstation performed: [id number]         CT head wo contrast   Final Result by Ana Maria Rodriguez MD (11/30 2052)      Reidentified subtle areas of loss of gray-white differentiation involving the right MCA territory. However, enhancement of the right middle cerebral artery M1 segment is now visible, from prior intravenous contrast administration for a CTA head and neck    performed few hours prior. An MRI brain could be performed for further evaluation. Workstation performed: [id number]         XR chest pa & lateral    (Results Pending)   IR IVC filter placement optional/temporary    (Results Pending)       Other Diagnostic Testing: I have personally reviewed pertinent reports.       ACTIVE MEDICATIONS     Current Facility-Administered Medications   Medication Dose Route Frequency    acetaminophen (TYLENOL) oral suspension 650 mg  650 mg Oral Q6H PRN    albuterol (PROVENTIL HFA,VENTOLIN HFA) inhaler 2 puff  2 puff Inhalation Q4H PRN    atorvastatin (LIPITOR) tablet 40 mg  40 mg Oral QPM    chlorhexidine (PERIDEX) 0.12 % oral rinse 15 mL  15 mL Mouth/Throat Q12H LUIZ    clopidogrel (PLAVIX) tablet 75 mg  75 mg Oral Daily    Fluticasone Furoate-Vilanterol 100-25 mcg/actuation 1 puff  1 puff Inhalation Daily    heparin (porcine) subcutaneous injection 5,000 Units  5,000 Units Subcutaneous Q8H [address]    hydrALAZINE (APRESOLINE) injection 10 mg  10 mg Intravenous Q4H PRN    ipratropium (ATROVENT) 0.02 % inhalation solution 0.5 mg  0.5 mg Nebulization TID    labetalol (NORMODYNE) injection 10 mg  10 mg Intravenous Q4H PRN    levalbuterol (XOPENEX) inhalation solution 1.25 mg  1.25 mg Nebulization TID    melatonin tablet 6 mg  6 mg Oral HS       Prior to Admission medications    Medication Sig Start Date End Date Taking? Authorizing Provider   albuterol (PROVENTIL HFA,VENTOLIN HFA) 90 mcg/act inhaler Inhale 2 puffs every 6 (six) hours as needed for wheezing   Yes Historical Provider, MD   lisinopril (ZESTRIL) 10 mg tablet Take 10 mg by mouth daily   Yes Historical Provider, MD   omeprazole (PriLOSEC) 20 mg delayed release capsule Take 20 mg by mouth daily   Yes Historical Provider, MD         VTE Pharmacologic Prophylaxis: Heparin  VTE Mechanical Prophylaxis: sequential compression device    ======    I have discussed the patient's history, physical exam findings, assessment, and plan in detail with attending, Dr. Gilson Lira. Thank you for allowing me to participate in the care of your patient, Tena Pitts.     Jonathna Prajapati,   [facility] Neurology Resident, PGY2

## 2023-12-06 NOTE — OCCUPATIONAL THERAPY NOTE
Occupational Therapy Progress Note     Patient Name: Eva BOLANOS Date: 12/6/2023  Problem List  Principal Problem:    Acute hypoxic respiratory failure ([address])  Active Problems:    Stroke due to R ICA to MCA occlusion, s/p TNK and thrombectomy    Hypertension    Asthma    DVT of lower extremity (deep venous thrombosis) ([address])     12/05/23 1130   OT Last Visit   OT Visit Date 12/05/23   Note Type   Note Type Treatment   Pain Assessment   Pain Assessment Tool 0-10   Pain Score No Pain   Restrictions/Precautions   Weight Bearing Precautions Per Order No   Other Precautions Bed Alarm; Chair Alarm;Cognitive;Multiple lines;Telemetry; Fall Risk;Visual impairment  (left UE inattention)   Lifestyle   Autonomy PTA, pt was independent in ADLs/IADLs and functional mobility w/ no DME; (+) driving   Reciprocal Relationships Lives with his girlfriend   Service to Others Reports working for [address] Enjoys spending time with his dog   ADL   LB Dressing Assistance 2  Maximal Assistance   LB Dressing Deficit Don/doff R sock; Don/doff L sock   Bed Mobility   Supine to Sit 4  Minimal assistance   Additional items Assist x 1   Sit to Supine Unable to assess   Transfers   Sit to Stand 4  Minimal assistance   Additional items Assist x 1   Stand to Sit 4  Minimal assistance   Additional items Assist x 1   Stand pivot 4  Minimal assistance   Additional items Assist x 1 with RW and cues for safety (hand placement)   Cognition   Overall Cognitive Status Impaired   Arousal/Participation Alert; Cooperative   Attention Attends with cues to redirect   Orientation Level Oriented X4   Memory Decreased recall of precautions;Decreased recall of recent events   Following Commands Follows one step commands with increased time or repetition   Comments Pt impaired attention, and overall safety awareness.    Assessment   Assessment Patient participated in Skilled OT session this date with interventions consisting of self care tasks, functional transfers . Patient agreeable to OT treatment session, upon arrival patient was found supine in bed. In comparison to previous session, patient with improvements in . Patient requiring verbal cues for safety and verbal cues for correct technique. Patient continues to be functioning below baseline level, occupational performance remains limited secondary to factors listed above and increased risk for falls and injury. From OT standpoint, recommendation at time of d/c would be Short Term Rehab. Patient to benefit from continued Occupational Therapy treatment while in the hospital to address deficits as defined above and maximize level of functional independence with ADLs and functional mobility. Plan   Treatment Interventions ADL retraining;Visual perceptual retraining;Functional transfer training;UE strengthening/ROM; Endurance training;Cognitive reorientation;Patient/family training;Equipment evaluation/education; Compensatory technique education; Energy conservation;Continued evaluation   Goal Expiration Date 12/15/23   OT Treatment Day 1   OT Frequency 3-5x/wk   Discharge Recommendation   Rehab Resource Intensity Level, OT I (Maximum Resource Intensity)   AM-PAC Daily Activity Inpatient   Lower Body Dressing 2   Bathing 2   Toileting 2   Upper Body Dressing 3   Grooming 3   Eating 3   Daily Activity Raw Score 15   Daily Activity Standardized Score (Calc for Raw Score >=11) 34.69   AM-PAC Applied Cognition Inpatient   Following a Speech/Presentation 2   Understanding Ordinary Conversation 4   Taking Medications 2   Remembering Where Things Are Placed or Put Away 3   Remembering List of 4-5 Errands 2   Taking Care of Complicated Tasks 2   Applied Cognition Raw Score 15   Applied Cognition Standardized Score 33.54   End of Consult   Patient Position at End of Consult Bed/Chair alarm activated; All needs within reach; Bedside chair   Nurse Communication Nurse aware of consult   Faraz Martinez MOT, OTR/L

## 2023-12-06 NOTE — CASE MANAGEMENT
Case Management Discharge Planning Note    Patient name Caty Dunham  Location [facility] 734/[facility] 553-12 MRN [id number]  : 1960 Date 2023       Current Admission Date: 2023  Current Admission Diagnosis:Acute hypoxic respiratory failure [facility])   Patient Active Problem List    Diagnosis Date Noted    Acute hypoxic respiratory failure ([address]) 2023    Asthma 2023    DVT of lower extremity (deep venous thrombosis) ([address]) 2023    Hypertension 2023    Stroke due to R ICA to MCA occlusion, s/p TNK and thrombectomy 2023      LOS (days): 6  Geometric Mean LOS (GMLOS) (days): 7.50  Days to GMLOS:1.7     OBJECTIVE:  Risk of Unplanned Readmission Score: 11.39         Current admission status: Inpatient   Preferred Pharmacy: No Pharmacies Listed  Primary Care Provider: No primary care provider on file. Primary Insurance: [employer] PENDING  Secondary Insurance:     DISCHARGE DETAILS:           Additional Comments: Pt will remain IP >72 hours.

## 2023-12-06 NOTE — OCCUPATIONAL THERAPY NOTE
Occupational Therapy Cancel Note       12/06/23 0944   OT Last Visit   OT Visit Date 12/06/23   Note Type   Note Type Cancelled Session   Cancel Reasons Patient off floor/test         Amador Clark OT

## 2023-12-06 NOTE — PLAN OF CARE
Problem: PAIN - ADULT  Goal: Verbalizes/displays adequate comfort level or baseline comfort level  Description: Interventions:  - Encourage patient to monitor pain and request assistance  - Assess pain using appropriate pain scale  - Administer analgesics based on type and severity of pain and evaluate response  - Implement non-pharmacological measures as appropriate and evaluate response  - Consider cultural and social influences on pain and pain management  - Notify physician/advanced practitioner if interventions unsuccessful or patient reports new pain  Outcome: Progressing     Problem: SAFETY ADULT  Goal: Patient will remain free of falls  Description: INTERVENTIONS:  - Educate patient/family on patient safety including physical limitations  - Instruct patient to call for assistance with activity   - Consult OT/PT to assist with strengthening/mobility   - Keep Call bell within reach  - Keep bed low and locked with side rails adjusted as appropriate  - Keep care items and personal belongings within reach  - Initiate and maintain comfort rounds  - Make Fall Risk Sign visible to staff  - Offer Toileting every 2 Hours, in advance of need  - Initiate/Maintain bed alarm  - Obtain necessary fall risk management equipment: non skid socks  - Apply yellow socks and bracelet for high fall risk patients  - Consider moving patient to room near nurses station  Outcome: Progressing  Goal: Maintain or return to baseline ADL function  Description: INTERVENTIONS:  -  Assess patient's ability to carry out ADLs; assess patient's baseline for ADL function and identify physical deficits which impact ability to perform ADLs (bathing, care of mouth/teeth, toileting, grooming, dressing, etc.)  - Assess/evaluate cause of self-care deficits   - Assess range of motion  - Assess patient's mobility; develop plan if impaired  - Assess patient's need for assistive devices and provide as appropriate  - Encourage maximum independence but intervene and supervise when necessary  - Involve family in performance of ADLs  - Assess for home care needs following discharge   - Consider OT consult to assist with ADL evaluation and planning for discharge  - Provide patient education as appropriate  Outcome: Progressing  Goal: Maintains/Returns to pre admission functional level  Description: INTERVENTIONS:  - Perform AM-PAC 6 Click Basic Mobility/ Daily Activity assessment daily.  - Set and communicate daily mobility goal to care team and patient/family/caregiver. - Collaborate with rehabilitation services on mobility goals if consulted  - Perform Range of Motion 3 times a day. - Reposition patient every 3 hours.   - Dangle patient 3 times a day  - Stand patient 3 times a day  - Ambulate patient 3 times a day  - Out of bed to chair 3 times a day   - Out of bed for meals 3 times a day  - Out of bed for toileting  - Record patient progress and toleration of activity level   Outcome: Progressing

## 2023-12-06 NOTE — QUICK NOTE
Notified of femoral vein DVT on US by neuro team. Discussed with IR attending IVC filter with earliest intervention being on 12/6.  Discussed timeline for [facility] vs IVC filter with neuro team.

## 2023-12-06 NOTE — ASSESSMENT & PLAN NOTE
Doppler of the lower extremities on 12/1 reveals acute occlusive DVT in the left gastrocnemius veins - likely present on admission (11/30). Will hold off on AC at this time given increased risk of further hemorrhagic bleed  Given that treatment with anticoagulation for DVTs is controversial given low rate of propogation can consider [facility] given increased risk of further hemorrhagic conversion and obtain repeat duplex ultrasound of the bilateral lower extremities in 5 to 7 days to assess for propagation (approx 12/5-12/7)  Pulm and Internal Medicine team consulted, Praneeth Medel appreciated  Per pulm on 12/4: "Acute DVT with evidence on the gastrocnemius, based on CHEST guidelines would not anticoagulate for distal DVT in a high risk patient, would benefit from surveillance ultrasound protocol. Love Bass Would hold off on anticoagulation for distal DVT and a high risk patient. He would benefit from surveillance ultrasound as an outpatient."  Repeat Doppler LE 12/5: No DVT in RLE. LLE: Acute DVT in proximal femoral vein (this was not present on 12/1 Doppler LE). Prev DVT in L gastrocnemius vein appears to be resolved. Internal medicine team and IR attending aware. Pending repeat CTH to determine whether or not pt can be started on full anticoagulation versus requiring IVC filter. Upgraded level of care to stepdown and increased vital sign checks to every 2 hours. 12/6: Pt does continue to report SOB, but denied CP and no tachycardia or tachypnea at this time. Continue heparin subQ q8h for dvt ppx.

## 2023-12-06 NOTE — TELEMEDICINE
e-Consult (IPC)  - Interventional Radiology  Antwon Gillespie 58 y.o. male MRN: [id number]  Unit/Bed#: [facility] 734-01 Encounter: [id number]          Interventional Radiology has been consulted to evaluate Antwon Gillespie    We were consulted by neurology concerning this patient with recent CVA approximately 1 week ago which required TNK and mechanical thrombectomy. During his hospital stay the patient was identified to have DVTs of the lower extremities. Most concerning, he has a left lower extremity proximal femoral DVT. Areas of CVA have hemorrhagic transformation. Interventional radiology has been asked to evaluate this patient for IVC filter placement given his inability to be anticoagulated. Inpatient Consult to IR  Consult performed by: Emelia Lopez PA-C  Consult ordered by: Libby Deluna DO        12/06/23    Assessment/Recommendation:     Deep Vein Thrombosis and Intracranial Hemorrhage  - plan for IVC filter placement tomorrow, pending IR scheduling  - NPO midnight prior  - Goal INR <3.0, plt >20      11-20 minutes, >50% of the total time devoted to medical consultative verbal/EMR discussion between providers. Written report will be generated in the EMR. Thank you for allowing Interventional Radiology to participate in the care of Antwon Gillespie. Please don't hesitate to call or TigerText us with any questions.      Emelia Lopez PA-C

## 2023-12-06 NOTE — QUICK NOTE
Notified of femoral vein DVT on repeat U/S. Resolution of patient's gastroc DVT. At this time would be prudent to continue to hold off on full AC/heparinization for now given the size of his stroke and the hemorrhagic conversion - the latter of which would suggest 7-10 day ideal waiting period before putting him on full dose anticoagulation.  Will obtain repeat CTH, upgrade level of care, increase VS and will revisit with medicine and pulm teams about IVC filter

## 2023-12-06 NOTE — PLAN OF CARE
Problem: OCCUPATIONAL THERAPY ADULT  Goal: Performs self-care activities at highest level of function for planned discharge setting. See evaluation for individualized goals. Description: Treatment Interventions: ADL retraining, Visual perceptual retraining, Functional transfer training, UE strengthening/ROM, Endurance training, Cognitive reorientation, Patient/family training, Equipment evaluation/education, Neuromuscular reeducation, Fine motor coordination activities, Compensatory technique education, Continued evaluation, Energy conservation, Activityengagement          See flowsheet documentation for full assessment, interventions and recommendations. Note: Limitation: Decreased ADL status, Decreased UE ROM, Decreased UE strength, Decreased Safe judgement during ADL, Decreased cognition, Decreased endurance, Visual deficit, Decreased fine motor control, Decreased self-care trans, Decreased high-level ADLs  Prognosis: Fair  Assessment: Patient participated in Skilled OT session this date with interventions consisting of self care tasks, functional transfers . Patient agreeable to OT treatment session, upon arrival patient was found supine in bed. In comparison to previous session, patient with improvements in . Patient requiring verbal cues for safety and verbal cues for correct technique. Patient continues to be functioning below baseline level, occupational performance remains limited secondary to factors listed above and increased risk for falls and injury. From OT standpoint, recommendation at time of d/c would be Short Term Rehab. Patient to benefit from continued Occupational Therapy treatment while in the hospital to address deficits as defined above and maximize level of functional independence with ADLs and functional mobility.      Rehab Resource Intensity Level, OT: I (Maximum Resource Intensity)

## 2023-12-06 NOTE — ASSESSMENT & PLAN NOTE
Trenton Pablo is a 58 y.o. male w/ PMH HTN, smoking who p/w left-sided weakness and right gaze preference concerning for acute ischemic stroke and found to have extensive R ICA occlusion extending up through M1 and concerning for embolism vs dissection by imaging appearance. Given LA dilation on TTE, consider Afib. Would consider TAO for extra evaluation possible thrombus not observed on TTE  Presenting sx: left-sided weakness and right gaze preference; NIHSS: 11 (LOC, gaze, VF, facial, LUE, LLE, dysarthria, extinction); /110 on arrival; TNK administered 1712 (LKN 15:35, SA 16:13, delay for BP); Endovascular intervention: R MCA/ICA thrombectomy, TICI 3 w/ Dr. Bev Rivera  Workup reviewed:  CTH/CTA: extensive R ICA occlusion from bifurcation through M1  Labs: HgbA1c 5.6, LDL 98  CTH (24 hrs): Acute right MCA territory infarct, with mild regional mass effect and without midline shift. Minimal hyperdensity noted in the R caudate consistent with hemorrhage vs contrast staining. Acute/Subacute sinusitits  MRI:t MRI: Evolving acute to subacute right MCA distribution infarction with focal areas of hemorrhagic transformation within the gangliocapsular region. Stable mass effect on the right frontal horn compared to CT head performed on same day. CT CAP w contrast, 12/2: Patchy groundglass consolidations throughout R lung (aspiration vs atypical/viral infection). No e/o malignancy. Mild wall thickening of urinary bladder may be 2/2 chronic outlet obstruction. TTE: LVEF 65%, LA dilation, mild MV annular calcification  RoPE score 3 - 0% likelihood stroke caused by PFO  TAO, 12/5: EF 59%, normal systolic fxn, LA dilated, no PFO, JOSÉ MIGUEL nml size, no thrombus, mild MV regurg. Homocysteine normal  Protein C and Protein S within range. Antithrombin III decreased - however, patient was receiving heparin subQ for dvt ppx at this time.     Impression: 58year old male with R ICA occlusion from bifurcation through M1 s/p TNK and TICI 3 revascularization found to have acute/subacute R MCA distribution infarction with focal areas of hemorrhagic transformation on MRI imaging. Also found to have nonocclusive L Gastrocnemius Vein DVT, followed by L proximal femoral vein DVT. Stroke etiology unclear at this time and will require further work up to r/o cardioembolic source or underlining hypercoagulability. Left atrium dilated on TAO, but NSR on telemetry. Plan:  Continue stroke pathway with telemetry monitoring  Frequent Neuro checks, obtain stat CTH if any acute changes  BP goal < 160 to avoid further hemorrhagic conversion  Rule out hypercoag state - pending thrombosis panel  Repeat CTH wo contrast on 12/6  Secondary stroke prevention:  AP/AC: Plavix 75 mg QD as history of allergy to ASA with anaphylaxis. Closely monitor for any signs of allergic reaction. Statin: Atorvastatin 40 mg   Euglycemia with SSI if indicated  PT/OT/PMR/ST w/ swallow eval   DVT prophylaxis: Heparin SQ and SCDs  Dispo: Patient is approved for [employer]. Pending medical stability.

## 2023-12-07 ENCOUNTER — APPOINTMENT (INPATIENT)
Dept: RADIOLOGY | Facility: HOSPITAL | Age: 63
DRG: 030 | End: 2023-12-07
Payer: COMMERCIAL

## 2023-12-07 LAB
ANION GAP SERPL CALCULATED.3IONS-SCNC: 10 MMOL/L
ANION GAP SERPL CALCULATED.3IONS-SCNC: 10 MMOL/L
BUN SERPL-MCNC: 16 MG/DL (ref 5–25)
BUN SERPL-MCNC: 16 MG/DL (ref 5–25)
CALCIUM SERPL-MCNC: 8.6 MG/DL (ref 8.4–10.2)
CALCIUM SERPL-MCNC: 8.6 MG/DL (ref 8.4–10.2)
CHLORIDE SERPL-SCNC: 105 MMOL/L (ref 96–108)
CHLORIDE SERPL-SCNC: 105 MMOL/L (ref 96–108)
CO2 SERPL-SCNC: 22 MMOL/L (ref 21–32)
CO2 SERPL-SCNC: 22 MMOL/L (ref 21–32)
CREAT SERPL-MCNC: 1.05 MG/DL (ref 0.6–1.3)
CREAT SERPL-MCNC: 1.05 MG/DL (ref 0.6–1.3)
ERYTHROCYTE [DISTWIDTH] IN BLOOD BY AUTOMATED COUNT: 12.9 % (ref 11.6–15.1)
ERYTHROCYTE [DISTWIDTH] IN BLOOD BY AUTOMATED COUNT: 12.9 % (ref 11.6–15.1)
GFR SERPL CREATININE-BSD FRML MDRD: 74 ML/MIN/1.73SQ M
GFR SERPL CREATININE-BSD FRML MDRD: 74 ML/MIN/1.73SQ M
GLUCOSE SERPL-MCNC: 89 MG/DL (ref 65–140)
GLUCOSE SERPL-MCNC: 89 MG/DL (ref 65–140)
HCT VFR BLD AUTO: 39.4 % (ref 36.5–49.3)
HCT VFR BLD AUTO: 39.4 % (ref 36.5–49.3)
HGB BLD-MCNC: 13.2 G/DL (ref 12–17)
HGB BLD-MCNC: 13.2 G/DL (ref 12–17)
INR PPP: 1.05 (ref 0.84–1.19)
INR PPP: 1.05 (ref 0.84–1.19)
MAGNESIUM SERPL-MCNC: 2.2 MG/DL (ref 1.9–2.7)
MAGNESIUM SERPL-MCNC: 2.2 MG/DL (ref 1.9–2.7)
MCH RBC QN AUTO: 30.2 PG (ref 26.8–34.3)
MCH RBC QN AUTO: 30.2 PG (ref 26.8–34.3)
MCHC RBC AUTO-ENTMCNC: 33.5 G/DL (ref 31.4–37.4)
MCHC RBC AUTO-ENTMCNC: 33.5 G/DL (ref 31.4–37.4)
MCV RBC AUTO: 90 FL (ref 82–98)
MCV RBC AUTO: 90 FL (ref 82–98)
PLATELET # BLD AUTO: 259 THOUSANDS/UL (ref 149–390)
PLATELET # BLD AUTO: 259 THOUSANDS/UL (ref 149–390)
PMV BLD AUTO: 9.2 FL (ref 8.9–12.7)
PMV BLD AUTO: 9.2 FL (ref 8.9–12.7)
POTASSIUM SERPL-SCNC: 3.7 MMOL/L (ref 3.5–5.3)
POTASSIUM SERPL-SCNC: 3.7 MMOL/L (ref 3.5–5.3)
PROTHROMBIN TIME: 13.6 SECONDS (ref 11.6–14.5)
PROTHROMBIN TIME: 13.6 SECONDS (ref 11.6–14.5)
RBC # BLD AUTO: 4.37 MILLION/UL (ref 3.88–5.62)
RBC # BLD AUTO: 4.37 MILLION/UL (ref 3.88–5.62)
SODIUM SERPL-SCNC: 137 MMOL/L (ref 135–147)
SODIUM SERPL-SCNC: 137 MMOL/L (ref 135–147)
WBC # BLD AUTO: 8.72 THOUSAND/UL (ref 4.31–10.16)
WBC # BLD AUTO: 8.72 THOUSAND/UL (ref 4.31–10.16)

## 2023-12-07 PROCEDURE — 85610 PROTHROMBIN TIME: CPT

## 2023-12-07 PROCEDURE — 94640 AIRWAY INHALATION TREATMENT: CPT

## 2023-12-07 PROCEDURE — 92526 ORAL FUNCTION THERAPY: CPT

## 2023-12-07 PROCEDURE — 99152 MOD SED SAME PHYS/QHP 5/>YRS: CPT

## 2023-12-07 PROCEDURE — 76937 US GUIDE VASCULAR ACCESS: CPT

## 2023-12-07 PROCEDURE — 99232 SBSQ HOSP IP/OBS MODERATE 35: CPT | Performed by: INTERNAL MEDICINE

## 2023-12-07 PROCEDURE — 94760 N-INVAS EAR/PLS OXIMETRY 1: CPT

## 2023-12-07 PROCEDURE — 83735 ASSAY OF MAGNESIUM: CPT

## 2023-12-07 PROCEDURE — C1769 GUIDE WIRE: HCPCS

## 2023-12-07 PROCEDURE — 37191 INS ENDOVAS VENA CAVA FILTR: CPT

## 2023-12-07 PROCEDURE — B5191ZA FLUOROSCOPY OF INFERIOR VENA CAVA USING LOW OSMOLAR CONTRAST, GUIDANCE: ICD-10-PCS | Performed by: RADIOLOGY

## 2023-12-07 PROCEDURE — 06H03DZ INSERTION OF INTRALUMINAL DEVICE INTO INFERIOR VENA CAVA, PERCUTANEOUS APPROACH: ICD-10-PCS | Performed by: RADIOLOGY

## 2023-12-07 PROCEDURE — 80048 BASIC METABOLIC PNL TOTAL CA: CPT

## 2023-12-07 PROCEDURE — 94664 DEMO&/EVAL PT USE INHALER: CPT

## 2023-12-07 PROCEDURE — 94668 MNPJ CHEST WALL SBSQ: CPT

## 2023-12-07 PROCEDURE — 99152 MOD SED SAME PHYS/QHP 5/>YRS: CPT | Performed by: RADIOLOGY

## 2023-12-07 PROCEDURE — C1894 INTRO/SHEATH, NON-LASER: HCPCS

## 2023-12-07 PROCEDURE — 85027 COMPLETE CBC AUTOMATED: CPT

## 2023-12-07 PROCEDURE — 37191 INS ENDOVAS VENA CAVA FILTR: CPT | Performed by: RADIOLOGY

## 2023-12-07 PROCEDURE — C1880 VENA CAVA FILTER: HCPCS

## 2023-12-07 RX ORDER — LEVALBUTEROL INHALATION SOLUTION 1.25 MG/3ML
1.25 SOLUTION RESPIRATORY (INHALATION)
Status: DISCONTINUED | OUTPATIENT
Start: 2023-12-07 | End: 2023-12-08 | Stop reason: HOSPADM

## 2023-12-07 RX ORDER — MIDAZOLAM HYDROCHLORIDE 2 MG/2ML
INJECTION, SOLUTION INTRAMUSCULAR; INTRAVENOUS AS NEEDED
Status: COMPLETED | OUTPATIENT
Start: 2023-12-07 | End: 2023-12-07

## 2023-12-07 RX ORDER — FENTANYL CITRATE 50 UG/ML
INJECTION, SOLUTION INTRAMUSCULAR; INTRAVENOUS AS NEEDED
Status: COMPLETED | OUTPATIENT
Start: 2023-12-07 | End: 2023-12-07

## 2023-12-07 RX ORDER — LIDOCAINE WITH 8.4% SOD BICARB 0.9%(10ML)
SYRINGE (ML) INJECTION AS NEEDED
Status: COMPLETED | OUTPATIENT
Start: 2023-12-07 | End: 2023-12-07

## 2023-12-07 RX ADMIN — LEVALBUTEROL HYDROCHLORIDE 1.25 MG: 1.25 SOLUTION RESPIRATORY (INHALATION) at 08:26

## 2023-12-07 RX ADMIN — IPRATROPIUM BROMIDE 0.5 MG: 0.5 SOLUTION RESPIRATORY (INHALATION) at 08:26

## 2023-12-07 RX ADMIN — IPRATROPIUM BROMIDE 0.5 MG: 0.5 SOLUTION RESPIRATORY (INHALATION) at 20:14

## 2023-12-07 RX ADMIN — IOHEXOL 20 ML: 350 INJECTION, SOLUTION INTRAVENOUS at 12:24

## 2023-12-07 RX ADMIN — MIDAZOLAM 0.5 MG: 1 INJECTION INTRAMUSCULAR; INTRAVENOUS at 11:30

## 2023-12-07 RX ADMIN — PANTOPRAZOLE SODIUM 40 MG: 40 TABLET, DELAYED RELEASE ORAL at 05:53

## 2023-12-07 RX ADMIN — IPRATROPIUM BROMIDE 0.5 MG: 0.5 SOLUTION RESPIRATORY (INHALATION) at 13:42

## 2023-12-07 RX ADMIN — FLUTICASONE FUROATE AND VILANTEROL TRIFENATATE 1 PUFF: 100; 25 POWDER RESPIRATORY (INHALATION) at 08:53

## 2023-12-07 RX ADMIN — ATORVASTATIN CALCIUM 40 MG: 40 TABLET, FILM COATED ORAL at 20:15

## 2023-12-07 RX ADMIN — LEVALBUTEROL HYDROCHLORIDE 1.25 MG: 1.25 SOLUTION RESPIRATORY (INHALATION) at 13:43

## 2023-12-07 RX ADMIN — MIDAZOLAM 0.5 MG: 1 INJECTION INTRAMUSCULAR; INTRAVENOUS at 11:19

## 2023-12-07 RX ADMIN — CHLORHEXIDINE GLUCONATE 15 ML: 1.2 SOLUTION ORAL at 08:54

## 2023-12-07 RX ADMIN — Medication 10 ML: at 11:33

## 2023-12-07 RX ADMIN — LEVALBUTEROL HYDROCHLORIDE 1.25 MG: 1.25 SOLUTION RESPIRATORY (INHALATION) at 20:14

## 2023-12-07 RX ADMIN — HEPARIN SODIUM 5000 UNITS: 5000 INJECTION INTRAVENOUS; SUBCUTANEOUS at 05:54

## 2023-12-07 RX ADMIN — FENTANYL CITRATE 25 MCG: 50 INJECTION, SOLUTION INTRAMUSCULAR; INTRAVENOUS at 11:19

## 2023-12-07 RX ADMIN — HEPARIN SODIUM 5000 UNITS: 5000 INJECTION INTRAVENOUS; SUBCUTANEOUS at 14:14

## 2023-12-07 RX ADMIN — CHLORHEXIDINE GLUCONATE 15 ML: 1.2 SOLUTION ORAL at 20:15

## 2023-12-07 RX ADMIN — Medication 6 MG: at 20:15

## 2023-12-07 RX ADMIN — ALBUTEROL SULFATE 2 PUFF: 90 AEROSOL, METERED RESPIRATORY (INHALATION) at 01:02

## 2023-12-07 RX ADMIN — ALBUTEROL SULFATE 2 PUFF: 90 AEROSOL, METERED RESPIRATORY (INHALATION) at 20:06

## 2023-12-07 RX ADMIN — FENTANYL CITRATE 25 MCG: 50 INJECTION, SOLUTION INTRAMUSCULAR; INTRAVENOUS at 11:29

## 2023-12-07 NOTE — PLAN OF CARE
Problem: PAIN - ADULT  Goal: Verbalizes/displays adequate comfort level or baseline comfort level  Description: Interventions:  - Encourage patient to monitor pain and request assistance  - Assess pain using appropriate pain scale  - Administer analgesics based on type and severity of pain and evaluate response  - Implement non-pharmacological measures as appropriate and evaluate response  - Consider cultural and social influences on pain and pain management  - Notify physician/advanced practitioner if interventions unsuccessful or patient reports new pain  Outcome: Progressing     Problem: SAFETY ADULT  Goal: Patient will remain free of falls  Description: INTERVENTIONS:  - Educate patient/family on patient safety including physical limitations  - Instruct patient to call for assistance with activity   - Consult OT/PT to assist with strengthening/mobility   - Keep Call bell within reach  - Keep bed low and locked with side rails adjusted as appropriate  - Keep care items and personal belongings within reach  - Initiate and maintain comfort rounds  - Make Fall Risk Sign visible to staff  - Apply yellow socks and bracelet for high fall risk patients  - Consider moving patient to room near nurses station  Outcome: Progressing  Goal: Maintain or return to baseline ADL function  Description: INTERVENTIONS:  -  Assess patient's ability to carry out ADLs; assess patient's baseline for ADL function and identify physical deficits which impact ability to perform ADLs (bathing, care of mouth/teeth, toileting, grooming, dressing, etc.)  - Assess/evaluate cause of self-care deficits   - Assess range of motion  - Assess patient's mobility; develop plan if impaired  - Assess patient's need for assistive devices and provide as appropriate  - Encourage maximum independence but intervene and supervise when necessary  - Involve family in performance of ADLs  - Assess for home care needs following discharge   - Consider OT consult to assist with ADL evaluation and planning for discharge  - Provide patient education as appropriate  Outcome: Progressing  Goal: Maintains/Returns to pre admission functional level  Description: INTERVENTIONS:  - Perform AM-PAC 6 Click Basic Mobility/ Daily Activity assessment daily.  - Set and communicate daily mobility goal to care team and patient/family/caregiver.    - Collaborate with rehabilitation services on mobility goals if consulted  - Out of bed for toileting  - Record patient progress and toleration of activity level   Outcome: Progressing

## 2023-12-07 NOTE — SPEECH THERAPY NOTE
Speech-Language Pathology Progress Note    Patient Name: Georgi Ramírez    HEJCV'T Date: 12/7/2023      Subjective:  Pt was awake and alert. He was sitting upright in bed. Patient stated "I'm ready to get out of here. When am I going home?"    Objective:  Pt was seen today for dysphagia therapy. Previously on dysphagia 3/thin liquids, changed to regular, and s/p procedure now back on dysphagia 3. Discussed diet textures with pt. He reported some difficulty swallowing solid foods, feeling of globus sensation. He confirmed softer foods are easier for him to swallow, agreeable to continuing current diet at this time. Seen with thin liquid via straw, with adequate bolus retrieval, control and transfer, and no s/s aspiration. Reviewed aspiration precautions and safe swallow strategies with pt, including upright positioning with PO intake, small bites/sips, and slow rate of intake. Pt verbalized understanding. Assessment:  Swallow function is stable with current diet. Diet texture and liquid consistency remains appropriate at this time. Plan:  Continue dysphagia 3 dental soft and thin liquids. Advance as tolerated/per pt preference. No additional ST f/u needed at this time. Please re consult with any new changes or concerns.

## 2023-12-07 NOTE — BRIEF OP NOTE (RAD/CATH)
INTERVENTIONAL RADIOLOGY PROCEDURE NOTE    Date: 12/7/2023    Procedure:   Procedure Summary       Date:  Room / Location:     Anesthesia Start:  Anesthesia Stop:     Procedure:  Diagnosis:     Scheduled Providers:  Responsible Provider:     Anesthesia Type: Not recorded ASA Status: Not recorded            Preoperative diagnosis:   1. CVA (cerebral vascular accident) ([address])    2. Cerebrovascular accident (CVA) due to embolism of right middle cerebral artery ([address])    3. DVT of lower extremity (deep venous thrombosis) ([address])    4. Asthma    5. Shortness of breath    6. Acute hypoxic respiratory failure (HCC)         Postoperative diagnosis: Same. Surgeon: Mook Sofia MD     Assistant: None. No qualified resident was available. Blood loss: Minimal    Specimens: None     Findings: IVC filter placed. Complications: None immediate.     Anesthesia: conscious sedation

## 2023-12-07 NOTE — SEDATION DOCUMENTATION
IVC filter placement successfully completed by Dr. Suresh Yu without complications. Tolerated well. Bedrest start at 11:50 for 1hr.  Report called to Ashley Brown, RN

## 2023-12-07 NOTE — PLAN OF CARE
Problem: Prexisting or High Potential for Compromised Skin Integrity  Goal: Skin integrity is maintained or improved  Description: INTERVENTIONS:  - Identify patients at risk for skin breakdown  - Assess and monitor skin integrity  - Assess and monitor nutrition and hydration status  - Monitor labs   - Assess for incontinence   - Turn and reposition patient  - Assist with mobility/ambulation  - Relieve pressure over bony prominences  - Avoid friction and shearing  - Provide appropriate hygiene as needed including keeping skin clean and dry  - Evaluate need for skin moisturizer/barrier cream  - Collaborate with interdisciplinary team   - Patient/family teaching  - Consider wound care consult   Outcome: Progressing     Problem: INFECTION - ADULT  Goal: Absence or prevention of progression during hospitalization  Description: INTERVENTIONS:  - Assess and monitor for signs and symptoms of infection  - Monitor lab/diagnostic results  - Monitor all insertion sites, i.e. indwelling lines, tubes, and drains  - Monitor endotracheal if appropriate and nasal secretions for changes in amount and color  - [place] appropriate cooling/warming therapies per order  - Administer medications as ordered  - Instruct and encourage patient and family to use good hand hygiene technique  - Identify and instruct in appropriate isolation precautions for identified infection/condition  Outcome: Progressing     Problem: SAFETY ADULT  Goal: Maintain or return to baseline ADL function  Description: INTERVENTIONS:  -  Assess patient's ability to carry out ADLs; assess patient's baseline for ADL function and identify physical deficits which impact ability to perform ADLs (bathing, care of mouth/teeth, toileting, grooming, dressing, etc.)  - Assess/evaluate cause of self-care deficits   - Assess range of motion  - Assess patient's mobility; develop plan if impaired  - Assess patient's need for assistive devices and provide as appropriate  - Encourage maximum independence but intervene and supervise when necessary  - Involve family in performance of ADLs  - Assess for home care needs following discharge   - Consider OT consult to assist with ADL evaluation and planning for discharge  - Provide patient education as appropriate  Outcome: Progressing     Problem: SAFETY ADULT  Goal: Maintains/Returns to pre admission functional level  Description: INTERVENTIONS:  - Perform AM-PAC 6 Click Basic Mobility/ Daily Activity assessment daily.  - Set and communicate daily mobility goal to care team and patient/family/caregiver. - Collaborate with rehabilitation services on mobility goals if consulted  - Ambulate patient 3 times a day  - Out of bed to chair 3 times a day   - Out of bed for meals 3 times a day  - Out of bed for toileting  - Record patient progress and toleration of activity level   Outcome: Progressing     Problem: DISCHARGE PLANNING  Goal: Discharge to home or other facility with appropriate resources  Description: INTERVENTIONS:  - Identify barriers to discharge w/patient and caregiver  - Arrange for needed discharge resources and transportation as appropriate  - Identify discharge learning needs (meds, wound care, etc.)  - Arrange for interpretive services to assist at discharge as needed  - Refer to Case Management Department for coordinating discharge planning if the patient needs post-hospital services based on physician/advanced practitioner order or complex needs related to functional status, cognitive ability, or social support system  Outcome: Progressing     Problem: Knowledge Deficit  Goal: Patient/family/caregiver demonstrates understanding of disease process, treatment plan, medications, and discharge instructions  Description: Complete learning assessment and assess knowledge base.   Interventions:  - Provide teaching at level of understanding  - Provide teaching via preferred learning methods  Outcome: Progressing     Problem: Nutrition/Hydration-ADULT  Goal: Nutrient/Hydration intake appropriate for improving, restoring or maintaining nutritional needs  Description: Monitor and assess patient's nutrition/hydration status for malnutrition. Collaborate with interdisciplinary team and initiate plan and interventions as ordered. Monitor patient's weight and dietary intake as ordered or per policy. Utilize nutrition screening tool and intervene as necessary. Determine patient's food preferences and provide high-protein, high-caloric foods as appropriate. INTERVENTIONS:  - Monitor oral intake, urinary output, labs, and treatment plans  - Assess nutrition and hydration status and recommend course of action  - Evaluate amount of meals eaten  - Assist patient with eating if necessary   - Allow adequate time for meals  - Recommend/ encourage appropriate diets, oral nutritional supplements, and vitamin/mineral supplements  - Order, calculate, and assess calorie counts as needed  - Recommend, monitor, and adjust tube feedings and TPN/PPN based on assessed needs  - Assess need for intravenous fluids  - Provide specific nutrition/hydration education as appropriate  - Include patient/family/caregiver in decisions related to nutrition  Outcome: Progressing     Problem: Neurological Deficit  Goal: Neurological status is stable or improving  Description: Interventions:  - Monitor and assess patient's level of consciousness, motor function, sensory function, and level of assistance needed for ADLs. - Monitor and report changes from baseline. Collaborate with interdisciplinary team to initiate plan and implement interventions as ordered. - Provide and maintain a safe environment. - Consider seizure precautions. - Consider fall precautions. - Consider aspiration precautions. - Consider bleeding precautions. Outcome: Progressing     Problem:  Activity Intolerance/Impaired Mobility  Goal: Mobility/activity is maintained at optimum level for patient  Description: Interventions:  - Assess and monitor patient  barriers to mobility and need for assistive/adaptive devices. - Assess patient's emotional response to limitations. - Collaborate with interdisciplinary team and initiate plans and interventions as ordered. - Encourage independent activity per ability.  - Maintain proper body alignment. - Perform active/passive rom as tolerated/ordered.   - Plan activities to conserve energy.  - Turn patient as appropriate  Outcome: Progressing     Problem: Potential for Falls  Goal: Patient will remain free of falls  Description: INTERVENTIONS:  - Educate patient/family on patient safety including physical limitations  - Instruct patient to call for assistance with activity   - Consult OT/PT to assist with strengthening/mobility   - Keep Call bell within reach  - Keep bed low and locked with side rails adjusted as appropriate  - Keep care items and personal belongings within reach  - Initiate and maintain comfort rounds  - Make Fall Risk Sign visible to staff  - Offer Toileting every 2 Hours, in advance of need  - Initiate/Maintain bed/chair alarm  - Obtain necessary fall risk management equipment: alarm  - Apply yellow socks and bracelet for high fall risk patients  - Consider moving patient to room near nurses station  Outcome: Progressing     Problem: NEUROSENSORY - ADULT  Goal: Achieves stable or improved neurological status  Description: INTERVENTIONS  - Monitor and report changes in neurological status  - Monitor vital signs such as temperature, blood pressure, glucose, and any other labs ordered   - Initiate measures to prevent increased intracranial pressure  - Monitor for seizure activity and implement precautions if appropriate      Outcome: Progressing     Problem: NEUROSENSORY - ADULT  Goal: Achieves maximal functionality and self care  Description: INTERVENTIONS  - Monitor swallowing and airway patency with patient fatigue and changes in neurological status  - Encourage and assist patient to increase activity and self care. - Encourage visually impaired, hearing impaired and aphasic patients to use assistive/communication devices  Outcome: Progressing     Problem: RESPIRATORY - ADULT  Goal: Achieves optimal ventilation and oxygenation  Description: INTERVENTIONS:  - Assess for changes in respiratory status  - Assess for changes in mentation and behavior  - Position to facilitate oxygenation and minimize respiratory effort  - Oxygen administered by appropriate delivery if ordered  - Initiate smoking cessation education as indicated  - Encourage broncho-pulmonary hygiene including cough, deep breathe, Incentive Spirometry  - Assess the need for suctioning and aspirate as needed  - Assess and instruct to report SOB or any respiratory difficulty  - Respiratory Therapy support as indicated  Outcome: Progressing     Problem: GASTROINTESTINAL - ADULT  Goal: Maintains adequate nutritional intake  Description: INTERVENTIONS:  - Monitor percentage of each meal consumed  - Identify factors contributing to decreased intake, treat as appropriate  - Assist with meals as needed  - Monitor I&O, weight, and lab values if indicated  - Obtain nutrition services referral as needed  Outcome: Progressing     Problem: HEMATOLOGIC - ADULT  Goal: Maintains hematologic stability  Description: INTERVENTIONS  - Assess for signs and symptoms of bleeding or hemorrhage  - Monitor labs  - Administer supportive blood products/factors as ordered and appropriate  Outcome: Progressing     Problem: Communication Impairment  Goal: Ability to express needs and understand communication  Description: Assess patient's communication skills and ability to understand information. Patient will demonstrate use of effective communication techniques, alternative methods of communication and understanding even if not able to speak.      - Encourage communication and provide alternate methods of communication as needed. - Collaborate with case management/ for discharge needs. - Include patient/family/caregiver in decisions related to communication. Outcome: Completed     Problem: Potential for Aspiration  Goal: Non-ventilated patient's risk of aspiration is minimized  Description: Assess and monitor vital signs, respiratory status, and labs (WBC). Monitor for signs of aspiration (tachypnea, cough, rales, wheezing, cyanosis, fever). - Assess and monitor patient's ability to swallow. - Place patient up in chair to eat if possible. - HOB up at 90 degrees to eat if unable to get patient up into chair.  - Supervise patient during oral intake. - Instruct patient/ family to take small bites. - Instruct patient/ family to take small single sips when taking liquids. - Follow patient-specific strategies generated by speech pathologist.  Outcome: Completed     Problem: Nutrition  Goal: Nutrition/Hydration status is improving  Description: Monitor and assess patient's nutrition/hydration status for malnutrition (ex- brittle hair, bruises, dry skin, pale skin and conjunctiva, muscle wasting, smooth red tongue, and disorientation). Collaborate with interdisciplinary team and initiate plan and interventions as ordered. Monitor patient's weight and dietary intake as ordered or per policy. Utilize nutrition screening tool and intervene per policy. Determine patient's food preferences and provide high-protein, high-caloric foods as appropriate. - Assist patient with eating.  - Allow adequate time for meals.  - Encourage patient to take dietary supplement as ordered. - Collaborate with clinical nutritionist.  - Include patient/family/caregiver in decisions related to nutrition.   Outcome: Completed

## 2023-12-07 NOTE — QUICK NOTE
Discussed the patient with [facility], given that neurology work up is largely completed and his primary issue now is his DVT they have agreed to accept the patient as a primary on their service.

## 2023-12-07 NOTE — RESTORATIVE TECHNICIAN NOTE
Restorative Technician Note      Patient Name: Tena Pitts     Note Type: Mobility  Patient Position Upon Consult: Supine  Activity Performed: Ambulated; Dangled; Stood  Assistive Device: Other (Comment) (none)  Education Provided: Yes  Patient Position at End of Consult:  Other (comment) (Assisted pt to the stretcher.)    Wen ALU, Restorative Technician, [employer]

## 2023-12-07 NOTE — PHYSICAL THERAPY NOTE
PHYSICAL THERAPY CANCEL NOTE          Patient Name: Abril HAMMONDR Date: 12/7/2023 12/07/23 0800   PT Last Visit   PT Visit Date 12/07/23   Note Type   Note Type Cancelled Session   Cancel Reasons Patient to operating room  (IVC filter-will follow post op as able.  Thank you.)   Highest Level Of Mobility   -HLM Achieved 6: Walk 10 steps or more     Lebron Heller, PT, DPT

## 2023-12-07 NOTE — PROGRESS NOTES
INTERNAL MEDICINE RESIDENCY PROGRESS NOTE     Name: Eugenio Sparks   Age & Sex: 58 y.o. male   MRN: [id number]  Unit/Bed#: [facility] 734-01   Encounter: [id number]  Team: SOD Team C     PATIENT INFORMATION     Name: Eugenio Sparks   Age & Sex: 58 y.o. male   MRN: [id number]  Hospital Stay Days: 7    ASSESSMENT/PLAN     Principal Problem:    Stroke due to R ICA to MCA occlusion, s/p TNK and thrombectomy  Active Problems:    DVT of lower extremity (deep venous thrombosis) (HCC)    Acute hypoxic respiratory failure ([address])    Hypertension    Asthma      DVT of lower extremity (deep venous thrombosis) ([address])  Assessment & Plan  Doppler of the lower extremities on 12/1 reveals acute occlusive DVT in the left gastrocnemius veins - likely present on admission (11/30). Repeat Doppler LE 12/5: No DVT in RLE. LLE: Acute DVT in proximal femoral vein (this was not present on 12/1 Doppler LE). Prev DVT in L gastrocnemius vein appears to be resolved. Strong suspicion for propagation of clot proximally, although cannot rule out new clot formation. 12/7 - Patient NPO overnight. INR 1.09,  - meets IR requirements for procedure   IVC filter to be placed 12/7  Will hold off on anticoagulation until 12/10 at the earliest given large stroke on 11/30 (c/f hemorrhagic conversion potential). * Stroke due to R ICA to MCA occlusion, s/p TNK and thrombectomy  Assessment & Plan  Presenting sx: left-sided weakness and right gaze preference; NIHSS: 11 (LOC, gaze, VF, facial, LUE, LLE, dysarthria, extinction); /110 on arrival; TNK administered 1712 (LKN 15:35, SA 16:13, delay for BP); Endovascular intervention: R MCA/ICA thrombectomy, TICI 3 w/ Dr. Sylvie Cooney. CTH/CTA: extensive R ICA occlusion from bifurcation through M1. CTH (24 hrs): Acute right MCA territory infarct, with mild regional mass effect and without midline shift. Minimal hyperdensity noted in the R caudate consistent with hemorrhage vs contrast staining. Acute/Subacute sinusitits. MRI:t MRI: Evolving acute to subacute right MCA distribution infarction with focal areas of hemorrhagic transformation within the gangliocapsular region. Stable mass effect on the right frontal horn compared to CT head performed on same day. Repeat CTH (12/6): Evolving right MCA distribution infarction with areas of hemorrhagic transformation. Degree of hemorrhage within the right basal ganglia is slightly improved since the prior examination. Stable effacement of the right lateral ventricle without significant midline shift. Basilar cisterns are patent. Management per neurology  Continue stroke pathway with telemetry monitoring  Neuro checks q4h, obtain stat CTH if any acute changes  BP goal < 160 to avoid further hemorrhagic conversion  Rule out hypercoag state - thrombosis panel pending  Repeat thrombosis panel 6 weeks d/t subQ Heparin   Secondary stroke prevention:  AP/AC: Plavix 75 mg QD as history of allergy to ASA with anaphylaxis. Closely monitor for any signs of allergic reaction. Discussed with neurology attending, Dr. Veronica Carranza - if plan to initiate anticoagulation given dvt hx, would strongly recommend continue Plavix for stroke ppx given unclear etiology at this time. Statin: Atorvastatin 40 mg   Euglycemia with SSI if indicated  PT/OT/PMR/ST w/ swallow eval   DVT prophylaxis: Heparin SQ and SCDs  Will need cardiology referral in OP setting to evaluate for cardiac arrhythmia with Zio patch/Holter monitor  Follow up with OP neurovascular attending in 6-8 weeks. Dispo: Patient is approved for [facility]. Pending medical stability.     Acute hypoxic respiratory failure (HCC)  Assessment & Plan  63M w/ PMH of asthma, tobacco use who has been admitted for R MCA ischemic stroke s/p TNK and mechanical thrombectomy and hospital course complicated by acute hypoxic respiratory failure (2-4L NC) in the setting of prior mechanical ventilation and concern for pulmonary infection on CT. On physical exam, patient is resting comfortably on room air without signs of respiratory compromised, there is wheezing present in the right lung fields on auscultation. One time Temp of 100.6 this evening, but resolved and has otherwise been afebrile. Recent labs notable for resolving leukocytosis. Differential includes Asthma, viral/bacterial URI, atelectasis, PE. Procal 0.11 - bacterial PNA less likely. Flu/RSV/COVID -- negative. Started on azithromycin coverage by critical care team given CxR findings with indeterminate hazy airspace opacity in the L parahilar midlung field region. CT w/ contrast C/A/P (12/2/23): Patchy groundglass consolidations throughout the right lung.    - Continue with scheduled nebs q8h w/ xopenex and ipratropium  - S/P Azithromycin x 5 days  - Follow up Bcx x2 from 12/1 - NGTD  - Pulmonology consulted; breo ellipta qd. - If no improvement or clinical deterioration can escalate workup to include:  - CTA PE in setting of known DVT w/o AC   - Repeat CXR  - Repeat BC, sputum cultures    Hypertension  Assessment & Plan  Hx of HTN maintained on lisinopril 10 mg. BP controlled without medication while admitted    Plan  - SBP goal < 160 given above  - Recommend reinitiating pta lisinopril 10 mg once stable from neurologic standpoint    Asthma  Assessment & Plan  Hx of asthma for many years. Managed by PCP with albuterol. No PFTs on chart review. PE demonstrates wheezing in the right middle and lower lung fields    Plan  - Xopenex nebs q8hr prn  - Ipratropium nebs q8hr prn  - Breo Ellipta qd        Disposition: Patient to have IVC filter 12/7. Continue inpatient management    SUBJECTIVE     Patient seen and examined at bedside. No acute events overnight. Patient to have IVC filter placed by IR today. Denies any lower extremity tenderness, redness, SOB, weakness, sensation changes. Admits to some throat irritation from his TAO scope.      OBJECTIVE     Vitals:    12/07/23 0450 12/07/23 0451 23 0531 23 0722   BP: 158/78 158/78  101/58   Pulse: 71 71  61   Resp:    16   Temp:    97.6 °F (36.4 °C)   TempSrc:       SpO2: 96% 96%  92%   Weight:   95.6 kg (210 lb 12.2 oz)    Height:          Temperature:   Temp (24hrs), Av.9 °F (36.6 °C), Min:97.6 °F (36.4 °C), Max:98.2 °F (36.8 °C)    Temperature: 97.6 °F (36.4 °C)  Intake & Output:  I/O             P. O.  915     I.V. (mL/kg) 400 (4)      Total Intake(mL/kg) 400 (4) 915 (9.6)     Urine (mL/kg/hr)       Total Output       Net +400 +915            Unmeasured Urine Occurrence  2 x     Unmeasured Stool Occurrence  1 x           Weights:   IBW (Ideal Body Weight): 75.3 kg    Body mass index is 29.4 kg/m². Weight (last 2 days)       Date/Time Weight    23 0531 95.6 (210.76)    23 0600 98.8 (217.81)    23 0929 99.8 (220)    23 0539 100 (220.9)          Physical Exam  Constitutional:       General: He is not in acute distress. HENT:      Head: Normocephalic and atraumatic. Right Ear: External ear normal.      Left Ear: External ear normal.      Nose: Nose normal.      Mouth/Throat:      Mouth: Mucous membranes are moist.   Eyes:      General:         Right eye: No discharge. Left eye: No discharge. Extraocular Movements: Extraocular movements intact. Cardiovascular:      Rate and Rhythm: Normal rate and regular rhythm. Pulmonary:      Effort: Pulmonary effort is normal. No respiratory distress. Breath sounds: Normal breath sounds. Abdominal:      General: Abdomen is flat. There is no distension. Palpations: Abdomen is soft. Tenderness: There is no abdominal tenderness. There is no guarding. Musculoskeletal:         General: No swelling or tenderness. Normal range of motion. Right lower leg: No edema. Left lower leg: No edema. Skin:     General: Skin is warm and dry.    Neurological:      General: No focal deficit present. Mental Status: He is alert and oriented to person, place, and time. Sensory: No sensory deficit. Motor: No weakness. Psychiatric:         Mood and Affect: Mood normal.         Behavior: Behavior normal.       LABORATORY DATA     Labs: I have personally reviewed pertinent reports. Results from last 7 days   Lab Units 12/07/23 0524 12/05/23 0620 12/04/23 0826 12/03/23 0544 12/02/23 0455   WBC Thousand/uL 8.72 8.50 9.17   < > 10.51*   HEMOGLOBIN g/dL 13.2 13.5 13.4   < > 12.1   HEMATOCRIT % 39.4 39.5 38.7   < > 37.0   PLATELETS Thousands/uL 259 209 170   < > 145*   MONO PCT %  --   --   --   --  9   EOS PCT %  --   --   --   --  3    < > = values in this interval not displayed. Results from last 7 days   Lab Units 12/07/23 0524 12/05/23 0620 12/04/23 0826 12/03/23 0544 12/02/23  0455 12/01/23  0605   POTASSIUM mmol/L 3.7 3.9 4.0 3.4*   < > 3.6   CHLORIDE mmol/L 105 104 104 105   < > 107   CO2 mmol/L 22 22 23 24   < > 26   BUN mg/dL 16 13 12 10   < > 21   CREATININE mg/dL 1.05 1.06 0.97 0.96   < > 1.13   CALCIUM mg/dL 8.6 8.5 8.2* 7.2*   < > 7.2*   ALK PHOS U/L  --   --   --  42  --  43   ALT U/L  --   --   --  16  --  17   AST U/L  --   --   --  19  --  15    < > = values in this interval not displayed. Results from last 7 days   Lab Units 12/07/23 0524 12/05/23 0620 12/04/23 0826   MAGNESIUM mg/dL 2.2 2.3 2.2     Results from last 7 days   Lab Units 12/05/23 0620 12/04/23 0826 12/03/23 0544   PHOSPHORUS mg/dL 3.5 2.6 1.8*      Results from last 7 days   Lab Units 12/07/23  0524 11/30/23  1653   INR  1.05 0.99   PTT seconds  --  22*               IMAGING & DIAGNOSTIC TESTING     Radiology Results: I have personally reviewed pertinent reports. CT pelvis wo contrast    Result Date: 12/3/2023  Impression: No evidence of hip fracture status post fall.  Small volume of gas urinary bladder may be iatrogenic from instrumentation or may represent the sequela of gas-forming infection which should only be considered in the right clinical setting. Workstation performed: [id number]     CT spine cervical wo contrast    Result Date: 12/3/2023  Impression: No cervical spine fracture or traumatic malalignment. Mild degenerative changes are noted. Workstation performed: [id number]     CT head wo contrast    Result Date: 12/3/2023  Impression: Expected appearance of evolving right MCA distribution infarcts with areas of hemorrhagic transformation. Slight interval increase in mass effect on the frontal horn of the right lateral ventricle without midline shift. New areas of recent infarction right now better visualized located in the subcortical right parietal lobe and deep right frontal lobe white matter. Workstation performed: [id number]     CT chest abdomen pelvis w contrast    Result Date: 12/2/2023  Impression: Patchy groundglass consolidations throughout the right lung. Correlate for aspiration versus atypical/viral infection. No definite evidence of malignancy. Mild wall thickening of the urinary bladder may be secondary to chronic outlet obstruction. The study was marked in [facility] for immediate notification. Workstation performed: [id number]     MRI brain wo contrast    Result Date: 12/2/2023  Impression: Evolving acute to early subacute right MCA distribution infarction since November 30, 2023 with focal areas of hemorrhagic transformation within the ganglial capsular region. Mass effect on the right frontal horn is stable when comparing to the head CT performed earlier the same day but increased when comparing to November 30, 2023 examination. Pansinusitis.  I personally discussed this study with Dr Delio Ralph on 12/2/2023 7:36 AM. Workstation performed: [id number]     Bronchoscopy    Result Date: 12/1/2023  Impression: RUL obstructive mucous plug RECOMMENDATION: Proceed with vent weaning as hypoxemia resolves and extubate if possible     CT head without contrast    Result Date: 12/1/2023  Impression: 1. Evolving acute right MCA territory infarct predominantly involving the right gangliocapsular region and corona radiata with mild regional mass effect without midline shift. Minimal hyperdensity in the right caudate is likely contrast staining from earlier thrombectomy. Hemorrhage is less favored. Recommend close follow-up head CT. 2.  Acute/subacute sinusitis. Workstation performed: [id number]     IR stroke alert    Result Date: 12/1/2023  Impression: Right cervical ICA occlusion extending into the right middle cerebral artery M1 segment, TICI 0. Successful mechanical thrombectomy with TICI 3 reperfusion. Workstation performed: [id number]     XR chest portable ICU    Result Date: 12/1/2023  Impression: Near complete resolution of right-sided atelectatic change. Small right pleural effusion. Indeterminate hazy airspace opacity in the left parahilar midlung field region. Workstation performed: [id number]     XR chest portable ICU    Result Date: 12/1/2023  Impression: Bandlike opacity in the right mid to upper lung field with relative lucency of the right lower lung field is consistent with at least segmental atelectasis. There is relative increased density of the right hilum. Recommend CT for further evaluation. Workstation performed: [id number]     CT head wo contrast    Result Date: 11/30/2023  Impression: Reidentified subtle areas of loss of gray-white differentiation involving the right MCA territory. However, enhancement of the right middle cerebral artery M1 segment is now visible, from prior intravenous contrast administration for a CTA head and neck performed few hours prior. An MRI brain could be performed for further evaluation. Workstation performed: [id number]     CT stroke alert brain    Result Date: 11/30/2023  Impression: No acute intracranial hemorrhage. Right MCA distribution ischemia suspected.  I personally discussed this study with Dr Pio Askew on 11/30/2023 4:46 PM. Workstation performed: [id number]     Other Diagnostic Testing: I have personally reviewed pertinent reports. ACTIVE MEDICATIONS     Current Facility-Administered Medications   Medication Dose Route Frequency    acetaminophen (TYLENOL) tablet 650 mg  650 mg Oral Q6H PRN    albuterol (PROVENTIL HFA,VENTOLIN HFA) inhaler 2 puff  2 puff Inhalation Q4H PRN    atorvastatin (LIPITOR) tablet 40 mg  40 mg Oral QPM    calcium carbonate (TUMS) chewable tablet 500 mg  500 mg Oral Daily PRN    chlorhexidine (PERIDEX) 0.12 % oral rinse 15 mL  15 mL Mouth/Throat Q12H LUIZ    clopidogrel (PLAVIX) tablet 75 mg  75 mg Oral Daily    Fluticasone Furoate-Vilanterol 100-25 mcg/actuation 1 puff  1 puff Inhalation Daily    heparin (porcine) subcutaneous injection 5,000 Units  5,000 Units Subcutaneous Q8H LUIZ    hydrALAZINE (APRESOLINE) injection 10 mg  10 mg Intravenous Q4H PRN    ipratropium (ATROVENT) 0.02 % inhalation solution 0.5 mg  0.5 mg Nebulization TID    labetalol (NORMODYNE) injection 10 mg  10 mg Intravenous Q4H PRN    levalbuterol (XOPENEX) inhalation solution 1.25 mg  1.25 mg Nebulization TID    magnesium hydroxide (MILK OF MAGNESIA) oral suspension 30 mL  30 mL Oral Daily PRN    melatonin tablet 6 mg  6 mg Oral HS    pantoprazole (PROTONIX) EC tablet 40 mg  40 mg Oral Early Morning       VTE Pharmacologic Prophylaxis: Heparin  VTE Mechanical Prophylaxis: sequential compression device    Portions of the record may have been created with voice recognition software. Occasional wrong word or "sound a like" substitutions may have occurred due to the inherent limitations of voice recognition software.   Read the chart carefully and recognize, using context, where substitutions have occurred.  ==  [place]  Medical Student OMS-4

## 2023-12-08 ENCOUNTER — HOSPITAL ENCOUNTER (INPATIENT)
Facility: HOSPITAL | Age: 63
LOS: 1 days | Discharge: HOME | DRG: 058 | End: 2023-12-09
Attending: FAMILY MEDICINE | Admitting: FAMILY MEDICINE
Payer: COMMERCIAL

## 2023-12-08 VITALS
DIASTOLIC BLOOD PRESSURE: 63 MMHG | RESPIRATION RATE: 21 BRPM | TEMPERATURE: 97.2 F | OXYGEN SATURATION: 95 % | HEIGHT: 71 IN | HEART RATE: 67 BPM | BODY MASS INDEX: 29.51 KG/M2 | WEIGHT: 210.76 LBS | SYSTOLIC BLOOD PRESSURE: 123 MMHG

## 2023-12-08 DIAGNOSIS — I10 HYPERTENSION: Primary | ICD-10-CM

## 2023-12-08 DIAGNOSIS — J45.909 ASTHMA: ICD-10-CM

## 2023-12-08 DIAGNOSIS — I63.411 CEREBROVASCULAR ACCIDENT (CVA) DUE TO EMBOLISM OF RIGHT MIDDLE CEREBRAL ARTERY (HCC): ICD-10-CM

## 2023-12-08 DIAGNOSIS — I82.409 DVT OF LOWER EXTREMITY (DEEP VENOUS THROMBOSIS) (HCC): ICD-10-CM

## 2023-12-08 PROBLEM — J96.01 ACUTE HYPOXIC RESPIRATORY FAILURE (HCC): Status: RESOLVED | Noted: 2023-12-03 | Resolved: 2023-12-08

## 2023-12-08 LAB
APTT SCREEN TO CONFIRM RATIO: 0.85 RATIO (ref 0–1.34)
APTT SCREEN TO CONFIRM RATIO: 0.85 RATIO (ref 0–1.34)
APTT-LA 1H NP PPP: 34.7 SEC (ref 0–40.5)
APTT-LA 1H NP PPP: 34.7 SEC (ref 0–40.5)
APTT-LA IMM 4:1 NP PPP: 40 SEC (ref 0–40.5)
APTT-LA IMM 4:1 NP PPP: 40 SEC (ref 0–40.5)
CONFIRM APTT/NORMAL: 55.1 SEC (ref 0–47.6)
CONFIRM APTT/NORMAL: 55.1 SEC (ref 0–47.6)
LA PPP-IMP: ABNORMAL
LA PPP-IMP: ABNORMAL
SCREEN APTT: 45.5 SEC (ref 0–43.5)
SCREEN APTT: 45.5 SEC (ref 0–43.5)
SCREEN DRVVT: 42.7 SEC (ref 0–47)
SCREEN DRVVT: 42.7 SEC (ref 0–47)
THROMBIN TIME: 14.7 SEC (ref 0–23)
THROMBIN TIME: 14.7 SEC (ref 0–23)

## 2023-12-08 PROCEDURE — 90471 IMMUNIZATION ADMIN: CPT | Performed by: FAMILY MEDICINE

## 2023-12-08 PROCEDURE — 94668 MNPJ CHEST WALL SBSQ: CPT

## 2023-12-08 PROCEDURE — 94640 AIRWAY INHALATION TREATMENT: CPT

## 2023-12-08 PROCEDURE — 94760 N-INVAS EAR/PLS OXIMETRY 1: CPT

## 2023-12-08 PROCEDURE — NC001 PR NO CHARGE

## 2023-12-08 PROCEDURE — 94664 DEMO&/EVAL PT USE INHALER: CPT

## 2023-12-08 PROCEDURE — 99239 HOSP IP/OBS DSCHRG MGMT >30: CPT | Performed by: INTERNAL MEDICINE

## 2023-12-08 PROCEDURE — 97130 THER IVNTJ EA ADDL 15 MIN: CPT

## 2023-12-08 PROCEDURE — 97112 NEUROMUSCULAR REEDUCATION: CPT

## 2023-12-08 PROCEDURE — 97535 SELF CARE MNGMENT TRAINING: CPT

## 2023-12-08 PROCEDURE — 97116 GAIT TRAINING THERAPY: CPT

## 2023-12-08 PROCEDURE — 99232 SBSQ HOSP IP/OBS MODERATE 35: CPT | Performed by: NURSE PRACTITIONER

## 2023-12-08 PROCEDURE — 97129 THER IVNTJ 1ST 15 MIN: CPT

## 2023-12-08 PROCEDURE — 90686 IIV4 VACC NO PRSV 0.5 ML IM: CPT | Performed by: FAMILY MEDICINE

## 2023-12-08 PROCEDURE — 99222 1ST HOSP IP/OBS MODERATE 55: CPT | Performed by: FAMILY MEDICINE

## 2023-12-08 RX ORDER — ATORVASTATIN CALCIUM 40 MG/1
40 TABLET, FILM COATED ORAL EVERY EVENING
Qty: 30 TABLET | Refills: 0 | Status: ON HOLD
Start: 2023-12-08 | End: 2023-12-09 | Stop reason: SDUPTHER

## 2023-12-08 RX ORDER — ALBUTEROL SULFATE 90 UG/1
2 AEROSOL, METERED RESPIRATORY (INHALATION) EVERY 4 HOURS PRN
Status: DISCONTINUED | OUTPATIENT
Start: 2023-12-08 | End: 2023-12-09 | Stop reason: HOSPADM

## 2023-12-08 RX ORDER — HYDRALAZINE HYDROCHLORIDE 20 MG/ML
10 INJECTION INTRAMUSCULAR; INTRAVENOUS EVERY 4 HOURS PRN
Status: DISCONTINUED | OUTPATIENT
Start: 2023-12-08 | End: 2023-12-08

## 2023-12-08 RX ORDER — LABETALOL HYDROCHLORIDE 5 MG/ML
10 INJECTION, SOLUTION INTRAVENOUS EVERY 4 HOURS PRN
Status: DISCONTINUED | OUTPATIENT
Start: 2023-12-08 | End: 2023-12-08

## 2023-12-08 RX ORDER — FAMOTIDINE 20 MG/1
20 TABLET, FILM COATED ORAL DAILY
Status: DISCONTINUED | OUTPATIENT
Start: 2023-12-08 | End: 2023-12-08

## 2023-12-08 RX ORDER — CALCIUM CARBONATE 500 MG/1
500 TABLET, CHEWABLE ORAL DAILY PRN
Status: DISCONTINUED | OUTPATIENT
Start: 2023-12-08 | End: 2023-12-08

## 2023-12-08 RX ORDER — PANTOPRAZOLE SODIUM 40 MG/1
40 TABLET, DELAYED RELEASE ORAL
Status: DISCONTINUED | OUTPATIENT
Start: 2023-12-09 | End: 2023-12-09 | Stop reason: HOSPADM

## 2023-12-08 RX ORDER — CHLORHEXIDINE GLUCONATE ORAL RINSE 1.2 MG/ML
15 SOLUTION DENTAL EVERY 12 HOURS SCHEDULED
Status: DISCONTINUED | OUTPATIENT
Start: 2023-12-08 | End: 2023-12-08

## 2023-12-08 RX ORDER — CLOPIDOGREL BISULFATE 75 MG/1
75 TABLET ORAL DAILY
Status: DISCONTINUED | OUTPATIENT
Start: 2023-12-09 | End: 2023-12-09 | Stop reason: HOSPADM

## 2023-12-08 RX ORDER — ATORVASTATIN CALCIUM 40 MG/1
40 TABLET, FILM COATED ORAL EVERY EVENING
Status: DISCONTINUED | OUTPATIENT
Start: 2023-12-08 | End: 2023-12-09 | Stop reason: HOSPADM

## 2023-12-08 RX ORDER — LANOLIN ALCOHOL/MO/W.PET/CERES
6 CREAM (GRAM) TOPICAL
Status: DISCONTINUED | OUTPATIENT
Start: 2023-12-08 | End: 2023-12-09 | Stop reason: HOSPADM

## 2023-12-08 RX ORDER — ONDANSETRON 4 MG/1
4 TABLET, ORALLY DISINTEGRATING ORAL EVERY 6 HOURS PRN
Status: DISCONTINUED | OUTPATIENT
Start: 2023-12-08 | End: 2023-12-09 | Stop reason: HOSPADM

## 2023-12-08 RX ORDER — FLUTICASONE FUROATE AND VILANTEROL 100; 25 UG/1; UG/1
1 POWDER RESPIRATORY (INHALATION) DAILY
Status: DISCONTINUED | OUTPATIENT
Start: 2023-12-09 | End: 2023-12-09 | Stop reason: HOSPADM

## 2023-12-08 RX ORDER — LEVALBUTEROL INHALATION SOLUTION 1.25 MG/3ML
1.25 SOLUTION RESPIRATORY (INHALATION)
Status: DISCONTINUED | OUTPATIENT
Start: 2023-12-08 | End: 2023-12-09 | Stop reason: HOSPADM

## 2023-12-08 RX ORDER — CLOPIDOGREL BISULFATE 75 MG/1
75 TABLET ORAL DAILY
Qty: 30 TABLET | Refills: 0
Start: 2023-12-09 | End: 2023-12-09

## 2023-12-08 RX ORDER — DOCUSATE SODIUM 100 MG/1
100 CAPSULE, LIQUID FILLED ORAL 2 TIMES DAILY
Status: DISCONTINUED | OUTPATIENT
Start: 2023-12-08 | End: 2023-12-09 | Stop reason: HOSPADM

## 2023-12-08 RX ORDER — ACETAMINOPHEN 325 MG/1
650 TABLET ORAL EVERY 6 HOURS PRN
Status: DISCONTINUED | OUTPATIENT
Start: 2023-12-08 | End: 2023-12-09 | Stop reason: HOSPADM

## 2023-12-08 RX ORDER — MAGNESIUM HYDROXIDE/ALUMINUM HYDROXICE/SIMETHICONE 120; 1200; 1200 MG/30ML; MG/30ML; MG/30ML
30 SUSPENSION ORAL EVERY 4 HOURS PRN
Status: DISCONTINUED | OUTPATIENT
Start: 2023-12-08 | End: 2023-12-09 | Stop reason: HOSPADM

## 2023-12-08 RX ADMIN — ATORVASTATIN CALCIUM 40 MG: 40 TABLET, FILM COATED ORAL at 17:56

## 2023-12-08 RX ADMIN — IPRATROPIUM BROMIDE 0.5 MG: 0.5 SOLUTION RESPIRATORY (INHALATION) at 09:26

## 2023-12-08 RX ADMIN — IPRATROPIUM BROMIDE 0.5 MG: 0.5 SOLUTION RESPIRATORY (INHALATION) at 20:23

## 2023-12-08 RX ADMIN — FLUTICASONE FUROATE AND VILANTEROL TRIFENATATE 1 PUFF: 100; 25 POWDER RESPIRATORY (INHALATION) at 08:29

## 2023-12-08 RX ADMIN — LEVALBUTEROL HYDROCHLORIDE 1.25 MG: 1.25 SOLUTION RESPIRATORY (INHALATION) at 09:26

## 2023-12-08 RX ADMIN — DOCUSATE SODIUM 100 MG: 100 CAPSULE, LIQUID FILLED ORAL at 17:56

## 2023-12-08 RX ADMIN — CLOPIDOGREL BISULFATE 75 MG: 75 TABLET ORAL at 08:29

## 2023-12-08 RX ADMIN — LEVALBUTEROL HYDROCHLORIDE 1.25 MG: 1.25 SOLUTION RESPIRATORY (INHALATION) at 20:23

## 2023-12-08 RX ADMIN — Medication 6 MG: at 20:23

## 2023-12-08 RX ADMIN — INFLUENZA VIRUS VACCINE 0.5 ML: 15; 15; 15; 15 SUSPENSION INTRAMUSCULAR at 17:56

## 2023-12-08 RX ADMIN — PANTOPRAZOLE SODIUM 40 MG: 40 TABLET, DELAYED RELEASE ORAL at 06:15

## 2023-12-08 NOTE — CONSULTS
REASON FOR CONSULTATION:   Medical management    HPI: Fabiana Sands is a 58y.o. year old male who presents with R ICA to MCA occlusion, s/p TNK and thrombectomy. Medical consultation was requested for management of patient's medical problems. Review of Systems   Constitutional:  Negative for chills and fever. HENT:  Negative for ear pain and sore throat. Eyes:  Negative for pain and visual disturbance. Respiratory:  Negative for cough and shortness of breath. Cardiovascular:  Negative for chest pain and palpitations. Gastrointestinal:  Negative for abdominal pain, constipation, diarrhea, nausea and vomiting. Genitourinary:  Negative for difficulty urinating and dysuria. Musculoskeletal:  Negative for arthralgias and back pain. Skin:  Negative for color change and rash. Neurological:  Positive for weakness. All other systems reviewed and are negative. Historical Information   No past medical history on file. Past Surgical History:   Procedure Laterality Date    IR IVC FILTER PLACEMENT OPTIONAL/TEMPORARY  12/7/2023    IR STROKE ALERT  11/30/2023     Social History   Social History     Substance and Sexual Activity   Alcohol Use Not on file     Social History     Substance and Sexual Activity   Drug Use Not on file     Social History     Tobacco Use   Smoking Status Not on file   Smokeless Tobacco Not on file     Family History: No family history on file.     Current Facility-Administered Medications on File Prior to Encounter   Medication Dose Route Frequency Provider Last Rate Last Admin    [DISCONTINUED] acetaminophen (TYLENOL) tablet 650 mg  650 mg Oral Q6H PRN Cuba Hatfield, DO   650 mg at 12/06/23 2050    [DISCONTINUED] albuterol (PROVENTIL HFA,VENTOLIN HFA) inhaler 2 puff  2 puff Inhalation Q4H PRN Niles Franks DO   2 puff at 12/07/23 2006    [DISCONTINUED] atorvastatin (LIPITOR) tablet 40 mg  40 mg Oral QPM Isabel Ross, DO   40 mg at 12/07/23 2015    [DISCONTINUED] calcium carbonate (TUMS) chewable tablet 500 mg  500 mg Oral Daily PRN Cuba Mcmanus, DO   500 mg at 12/06/23 1418    [DISCONTINUED] chlorhexidine (PERIDEX) 0.12 % oral rinse 15 mL  15 mL Mouth/Throat Q12H [address] [place] Jorge Bills PA-C   15 mL at 12/07/23 2015    [DISCONTINUED] clopidogrel (PLAVIX) tablet 75 mg  75 mg Oral Daily Kiyakatherine Spence Santhoshhayderanup, DO   75 mg at 12/08/23 3139    [DISCONTINUED] Fluticasone Furoate-Vilanterol 100-25 mcg/actuation 1 puff  1 puff Inhalation Daily Regannurys Hernandez, DO   1 puff at 12/08/23 8613    [DISCONTINUED] heparin (porcine) subcutaneous injection 5,000 Units  5,000 Units Subcutaneous [facility] Kiya Covarrubias, DO   5,000 Units at 12/07/23 1414    [DISCONTINUED] hydrALAZINE (APRESOLINE) injection 10 mg  10 mg Intravenous Q4H PRN Jamaal Fall, DO        [DISCONTINUED] ipratropium (ATROVENT) 0.02 % inhalation solution 0.5 mg  0.5 mg Nebulization BID Ghanshyam Hernandez MD   0.5 mg at 12/08/23 6684    [DISCONTINUED] labetalol (NORMODYNE) injection 10 mg  10 mg Intravenous Q4H PRN Mckenna Pressley DO        [DISCONTINUED] levalbuterol (XOPENEX) inhalation solution 1.25 mg  1.25 mg Nebulization BID Ghanshyam Hernandez MD   1.25 mg at 12/08/23 1674    [DISCONTINUED] magnesium hydroxide (MILK OF MAGNESIA) oral suspension 30 mL  30 mL Oral Daily PRN Juan Dobson MD   30 mL at 12/06/23 1718    [DISCONTINUED] melatonin tablet 6 mg  6 mg Oral HS Kyra Flynn MD   6 mg at 12/07/23 2015    [DISCONTINUED] pantoprazole (PROTONIX) EC tablet 40 mg  40 mg Oral Early Morning Juan Dobson MD   40 mg at 12/08/23 3061     Current Outpatient Medications on File Prior to Encounter   Medication Sig Dispense Refill    albuterol (PROVENTIL HFA,VENTOLIN HFA) 90 mcg/act inhaler Inhale 2 puffs every 6 (six) hours as needed for wheezing      atorvastatin (LIPITOR) 40 mg tablet Take 1 tablet (40 mg total) by mouth every evening 30 tablet 0    [START ON 12/9/2023] clopidogrel (PLAVIX) 75 mg tablet Take 1 tablet (75 mg total) by mouth daily Do not start before December 9, 2023. 30 tablet 0    lisinopril (ZESTRIL) 10 mg tablet Take 10 mg by mouth daily      omeprazole (PriLOSEC) 20 mg delayed release capsule Take 20 mg by mouth daily         Allergies   Allergen Reactions    Aspirin Anaphylaxis    Ibuprofen Anaphylaxis       Social History:   Social History     Socioeconomic History    Marital status: Single     Spouse name: Not on file    Number of children: Not on file    Years of education: Not on file    Highest education level: Not on file   Occupational History    Not on file   Tobacco Use    Smoking status: Not on file    Smokeless tobacco: Not on file   Substance and Sexual Activity    Alcohol use: Not on file    Drug use: Not on file    Sexual activity: Not on file   Other Topics Concern    Not on file   Social History Narrative    Not on file     Social Determinants of Health     Financial Resource Strain: Not on file   Food Insecurity: No Food Insecurity (12/1/2023)    Hunger Vital Sign     Worried About Running Out of Food in the Last Year: Never true     Ran Out of Food in the Last Year: Never true   Transportation Needs: No Transportation Needs (12/1/2023)    PRAPARE - Transportation     Lack of Transportation (Medical): No     Lack of Transportation (Non-Medical): No   Physical Activity: Not on file   Stress: Not on file   Social Connections: Not on file   Intimate Partner Violence: Not on file   Housing Stability: Unknown (12/1/2023)    Housing Stability Vital Sign     Unable to Pay for Housing in the Last Year: No     Number of Places Lived in the Last Year: 1     Unstable Housing in the Last Year: Not on file     Family History: No family history on file. Objective   Vitals: Blood pressure 112/78, pulse 67, temperature 98.1 °F (36.7 °C), temperature source Temporal, resp. rate 18, height 5' 11" (1.803 m), weight 97.4 kg (214 lb 11.7 oz), SpO2 94 %.   No intake or output data in the 24 hours ending 12/08/23 1509  Invasive Devices       None                 Physical Exam  Vitals and nursing note reviewed. Constitutional:       General: He is awake. He is not in acute distress. HENT:      Head: Normocephalic and atraumatic. Right Ear: Tympanic membrane, ear canal and external ear normal.      Left Ear: Tympanic membrane, ear canal and external ear normal.      Nose: Nose normal.      Mouth/Throat:      Lips: Pink. Mouth: Mucous membranes are moist.      Pharynx: Oropharynx is clear. Uvula midline. Eyes:      General: Lids are normal.      Extraocular Movements: Extraocular movements intact. Conjunctiva/sclera: Conjunctivae normal.   Neck:      Thyroid: No thyromegaly. Cardiovascular:      Rate and Rhythm: Normal rate and regular rhythm. Pulmonary:      Effort: Pulmonary effort is normal.      Breath sounds: Normal breath sounds. Abdominal:      General: Abdomen is flat. Bowel sounds are normal.      Palpations: Abdomen is soft. Tenderness: There is no abdominal tenderness. Musculoskeletal:      Cervical back: Neck supple. Lymphadenopathy:      Cervical: No cervical adenopathy. Skin:     General: Skin is warm and dry. Neurological:      Mental Status: He is alert and oriented to person, place, and time. Motor: Weakness present. Wallene Schaumann is a(n) 58y.o. year old male with R ICA to MCA occlusion, s/p TNK and thrombectomy. 1.  Cardiac with hx of HTN, HLD. Continue atorvastatin 40 mg daily. On lisinopril 10 mg daily at home - will hold for now as BP appears well controlled at this time. 2.  Asthma. Patient is on Breo Ellipta daily, Xopenex and Atrovent neb treatments twice daily. 3.  GERD. Continue Protonix 40 mg daily (Prilosec nonformulary), Pepcid 20 mg daily. 4.  DVT s/p IVC filter placed on 12/7/23 by IR.   Per discharge summary, hold off on anticoagulation until 12/10 at earliest given large stroke on 11/30.  5.  Pain and bowel regimen. As per physiatry. 6.  R ICA to MCA occlusion. Patient is on Plavix 75 mg daily, statin. Patient is receiving intensive PT OT ST with management per physiatry. Prognosis: Fair. Discharge Plan: In progress. Advanced Directives: I have discussed in detail the patient the advanced directives. The patient does not have an appointed POA and does not have a living will. Patient's emergency contact is his significant other, Roger Ganser, whose number is [phone]. When discussing cardiac and pulmonary resuscitation efforts with the patient, the patient wishes to be full code. I have spent more than 50 minutes gathering data, doing physical examination, and discussing the advanced directives, which was witnessed by caring staff.

## 2023-12-08 NOTE — PLAN OF CARE
Problem: INFECTION - ADULT  Goal: Absence or prevention of progression during hospitalization  Description: INTERVENTIONS:  - Assess and monitor for signs and symptoms of infection  - Monitor lab/diagnostic results  - Monitor all insertion sites, i.e. indwelling lines, tubes, and drains  - Monitor endotracheal if appropriate and nasal secretions for changes in amount and color  - Prospect appropriate cooling/warming therapies per order  - Administer medications as ordered  - Instruct and encourage patient and family to use good hand hygiene technique  - Identify and instruct in appropriate isolation precautions for identified infection/condition  Outcome: Progressing     Problem: Neurological Deficit  Goal: Neurological status is stable or improving  Description: Interventions:  - Monitor and assess patient's level of consciousness, motor function, sensory function, and level of assistance needed for ADLs. - Monitor and report changes from baseline. Collaborate with interdisciplinary team to initiate plan and implement interventions as ordered. - Provide and maintain a safe environment. - Consider seizure precautions. - Consider fall precautions. - Consider aspiration precautions. - Consider bleeding precautions. Outcome: Progressing     Problem: Activity Intolerance/Impaired Mobility  Goal: Mobility/activity is maintained at optimum level for patient  Description: Interventions:  - Assess and monitor patient  barriers to mobility and need for assistive/adaptive devices. - Assess patient's emotional response to limitations. - Collaborate with interdisciplinary team and initiate plans and interventions as ordered. - Encourage independent activity per ability.  - Maintain proper body alignment. - Perform active/passive rom as tolerated/ordered.   - Plan activities to conserve energy.  - Turn patient as appropriate  Outcome: Progressing     Problem: NEUROSENSORY - ADULT  Goal: Achieves stable or improved neurological status  Description: INTERVENTIONS  - Monitor and report changes in neurological status  - Monitor vital signs such as temperature, blood pressure, glucose, and any other labs ordered   - Initiate measures to prevent increased intracranial pressure  - Monitor for seizure activity and implement precautions if appropriate      Outcome: Progressing  Goal: Achieves maximal functionality and self care  Description: INTERVENTIONS  - Monitor swallowing and airway patency with patient fatigue and changes in neurological status  - Encourage and assist patient to increase activity and self care.    - Encourage visually impaired, hearing impaired and aphasic patients to use assistive/communication devices  Outcome: Progressing

## 2023-12-08 NOTE — PROGRESS NOTES
PHYSICAL MEDICINE AND REHABILITATION   PREADMISSION ASSESSMENT     Projected [facility] Diagnoses:  Impairment of mobility, safety and Activities of Daily Living (ADLs) due to Stroke:  01.1  Left Body Involvement (Right Brain)  Etiologic: R ICA to MCA occlusion   Date of Onset: 11/30/23  Date of surgery: 11/30/23 Thrombectomy     PATIENT INFORMATION  Name: Caty Dunham Phone #: [phone] (M)   Address: [address] [place] [address]  YOB: 1960 Age: 58 y.o. #   Marital Status: Single  Ethnicity: White/ ; Not //Swiss   Employment Status: currently employed  Extended Emergency Contact Information  Primary Emergency Contact: Zehra Ventura  Address: [address] [place], [place] [address] Phone: [phone]  Mobile Phone: [phone]  Relation: Significant Other  Secondary Emergency Contact: Luisana Romano  Mobile Phone: [phone]  Relation: Daughter  Advance Directive: Level 1 Full Code - No advance directive on file     INSURANCE/COVERAGE:     Primary Payor: MICK CORD[employer] PENDING / Plan: [employer] PENDING / Product Type: Self Pay /   Secondary Payer: SELF PAY   Payer Contact: n/a Payer Contact:n/a   Contact Phone: n/a Contact Phone:n/a     Chetan Wyatt at [facility] patient with " great likelihood of Medicaid approval"  Medical Record #: [id number]    REFERRAL SOURCE:   Referring provider: Avril Kurtz MD  Referring facility: [address] [place] [address]   Room: [facility]34/[facility]-  PCP: No primary care provider on file. PCP phone number: None    MEDICAL INFORMATION  HPI: Joy Rm is a 58 y.o. male with a medical history of but not limited to HTN, tobacco use and moderate persistent asthma who presented to the [address] [place] [address] 11/30/2023 with acute onset of left sided weakness and right gaze preference while working in his garage.  Imaging was concerning for right ICA to MCA occlusion and he was transferred [facility]. He was intubated due to agitation and underwent thrombectomy. He was started on Precedex. On arrival to ICU he was noted to be hypoxemic with concern for right sided mucous plugging on CXR and underwent emergent bronchoscopy with clearance. He was extubated on 12/1. He was found to have an acute DVT of the L gastrocnemius, however anticoagulation was deferred due to risk for intracerebral hemorrhage. Internal Medicine was consulted due to SOB and cough as well as management of the DVT. CT C/A/P was performed which demonstrated right lung groundglass consolidations consistent with aspiration vs atypical/viral infection. Patient completed 5 days of azithromycin. Patient continued to have cough and some SOB despite antibiotic treatment and scheduled nebulizer with Xopenex and ipratropium. Pulm was consulted and recommended the addition of Breo Ellipta qd. Patient's SOB and cough improved. Neurology workup included TAO with LVEF of 65%, Thrombosis panel, CTH on 12/6 demonstrated evolving R. MCA distrubution infarction w/ areas of hemorrhagic transformation. Patient to continue Plavix for stroke ppx, Atorvastatin 40 mg, and have OP cardiology and neuro follow up. Internal Medicine came on as primary team on 12/6/23 for further workup of his DVT. Doppler US of the lower extremity demonstrated: "acute DVT in proximal femoral vein (this was not present on 12/1 Doppler LE). Prev DVT in L gastrocnemius vein appears to be resolved." Due to clot propagation and inability to anticoagulate due to a small area of hemorrhage around his stroke patient was sent for IVC filter placement for PE prevention. He has been deemed hemodynamically stable at this time. PT/OT therapies were consulted and continue to follow patient at this time. PM&R was consulted and are recommending inpatient acute rehab when medically stable.  All involved medical disciplines feel/agree patient is medically ready for discharge at this time. Inpatient acute rehabilitation physician was consulted. Upon review of patient's case and correspondence with PT/OT therapies and PM&R services, [facility] physician feels Yaquelin Ware will benefit and is a good candidate / appropriate for inpatient acute rehab at this time. Yaquelin Ware has demonstrated the willingness / desire and tolerance to participate in the required 3 hours or more of therapies per day. Past Medical History:   Past Surgical History: Allergies:     No past medical history on file. Past Surgical History:   Procedure Laterality Date    IR STROKE ALERT  11/30/2023     Allergies   Allergen Reactions    Aspirin Anaphylaxis    Ibuprofen Anaphylaxis         Medical/functional conditions requiring inpatient rehabilitation: impaired balance / ambulation  ; impaired mobility / self care     Risk for medical/clinical complications: Risk for further infarcts ; risk for further DVTs ; risk for pain ; risk for falls ; risk for skin breakdown ; risk for infection     Comorbidities/Surgeries in the last 100 days:   Acute CVA  Acute hypoxic respiratory failure s/p intubation now extubated   Hypotension/hypertension   Hematuria   Tobacco use  Moderate persistent asthma   DVT ppx: SCD / IVC Filter     CURRENT VITAL SIGNS:   Temp:  [97.2 °F (36.2 °C)-98.6 °F (37 °C)] 97.2 °F (36.2 °C)  HR:  [61-89] 67  Resp:  [16-21] 21  BP: (110-137)/(61-85) 123/63 No intake or output data in the 24 hours ending 12/08/23 1144     LABORATORY RESULTS:      Lab Results   Component Value Date    HGB 13.2 12/07/2023    HCT 39.4 12/07/2023    WBC 8.72 12/07/2023     Lab Results   Component Value Date    BUN 16 12/07/2023    K 3.7 12/07/2023     12/07/2023    CREATININE 1.05 12/07/2023     Lab Results   Component Value Date    PROTIME 13.6 12/07/2023    INR 1.05 12/07/2023        DIAGNOSTIC STUDIES:  CT pelvis wo contrast    Result Date: 12/3/2023  Impression: No evidence of hip fracture status post fall. Small volume of gas urinary bladder may be iatrogenic from instrumentation or may represent the sequela of gas-forming infection which should only be considered in the right clinical setting. Workstation performed: [id number]     CT spine cervical wo contrast    Result Date: 12/3/2023  Impression: No cervical spine fracture or traumatic malalignment. Mild degenerative changes are noted. Workstation performed: [id number]     CT head wo contrast    Result Date: 12/3/2023  Impression: Expected appearance of evolving right MCA distribution infarcts with areas of hemorrhagic transformation. Slight interval increase in mass effect on the frontal horn of the right lateral ventricle without midline shift. New areas of recent infarction right now better visualized located in the subcortical right parietal lobe and deep right frontal lobe white matter. Workstation performed: [id number]     CT chest abdomen pelvis w contrast    Result Date: 12/2/2023  Impression: Patchy groundglass consolidations throughout the right lung. Correlate for aspiration versus atypical/viral infection. No definite evidence of malignancy. Mild wall thickening of the urinary bladder may be secondary to chronic outlet obstruction. The study was marked in [facility] for immediate notification. Workstation performed: [id number]     MRI brain wo contrast    Result Date: 12/2/2023  Impression: Evolving acute to early subacute right MCA distribution infarction since November 30, 2023 with focal areas of hemorrhagic transformation within the ganglial capsular region. Mass effect on the right frontal horn is stable when comparing to the head CT performed earlier the same day but increased when comparing to November 30, 2023 examination. Pansinusitis.  I personally discussed this study with Dr Kofi Gonzalez on 12/2/2023 7:36 AM. Workstation performed: [id number]     Bronchoscopy    Result Date: 12/1/2023  Impression: RUL obstructive mucous plug RECOMMENDATION: Proceed with vent weaning as hypoxemia resolves and extubate if possible     CT head without contrast    Result Date: 12/1/2023  Impression: 1. Evolving acute right MCA territory infarct predominantly involving the right gangliocapsular region and corona radiata with mild regional mass effect without midline shift. Minimal hyperdensity in the right caudate is likely contrast staining from earlier thrombectomy. Hemorrhage is less favored. Recommend close follow-up head CT. 2.  Acute/subacute sinusitis. Workstation performed: [id number]     IR stroke alert    Result Date: 12/1/2023  Impression: Right cervical ICA occlusion extending into the right middle cerebral artery M1 segment, TICI 0. Successful mechanical thrombectomy with TICI 3 reperfusion. Workstation performed: [id number]     XR chest portable ICU    Result Date: 12/1/2023  Impression: Near complete resolution of right-sided atelectatic change. Small right pleural effusion. Indeterminate hazy airspace opacity in the left parahilar midlung field region. Workstation performed: [id number]     XR chest portable ICU    Result Date: 12/1/2023  Impression: Bandlike opacity in the right mid to upper lung field with relative lucency of the right lower lung field is consistent with at least segmental atelectasis. There is relative increased density of the right hilum. Recommend CT for further evaluation. Workstation performed: [id number]     CT head wo contrast    Result Date: 11/30/2023  Impression: Reidentified subtle areas of loss of gray-white differentiation involving the right MCA territory. However, enhancement of the right middle cerebral artery M1 segment is now visible, from prior intravenous contrast administration for a CTA head and neck performed few hours prior. An MRI brain could be performed for further evaluation.  Workstation performed: [id number]     CTA stroke alert (head/neck)    Result Date: 11/30/2023  Impression: Occluded right intracranial and cervical ICA. Right M1 MCA occlusion. Distal M2 vessels are reconstituted likely from collateral vessels. I personally discussed this study with Dr Giovanny Esquivel on 11/30/2023 4:46 PM. Workstation performed: [id number]     CT stroke alert brain    Result Date: 11/30/2023  Impression: No acute intracranial hemorrhage. Right MCA distribution ischemia suspected.  I personally discussed this study with Dr Giovanny Esquivel on 11/30/2023 4:46 PM. Workstation performed: [id number]       PRECAUTIONS/SPECIAL NEEDS:  Tobacco:   Social History     Tobacco Use   Smoking Status Not on file   Smokeless Tobacco Not on file   , Alcohol:    Social History     Substance and Sexual Activity   Alcohol Use Not on file   , Anticoagulation:  clopidogrel 75 mg orally every day, Edema Management, Safety Concerns, Pain Management, Aspiration Risk/Precautions, Dietary Restrictions: Dysphagia 3 - dental soft , Language Preference: English , and Fall precautions    MEDICATIONS:     Current Facility-Administered Medications:     acetaminophen (TYLENOL) tablet 650 mg, 650 mg, Oral, Q6H PRN, Ang Zeeshan Drop, DO, 650 mg at 12/06/23 2050    albuterol (PROVENTIL HFA,VENTOLIN HFA) inhaler 2 puff, 2 puff, Inhalation, Q4H PRN, Cinthia George, DO, 2 puff at 12/07/23 2006    atorvastatin (LIPITOR) tablet 40 mg, 40 mg, Oral, QPM, Elba Yiald, DO, 40 mg at 12/07/23 2015    calcium carbonate (TUMS) chewable tablet 500 mg, 500 mg, Oral, Daily PRN, Ang Zeeshan Drop, DO, 500 mg at 12/06/23 1418    chlorhexidine (PERIDEX) 0.12 % oral rinse 15 mL, 15 mL, Mouth/Throat, Q12H [facility], Hernando Pelayo PA-C, 15 mL at 12/07/23 2015    clopidogrel (PLAVIX) tablet 75 mg, 75 mg, Oral, Daily, Virginia Esters Dicesare, DO, 75 mg at 12/08/23 0829    Fluticasone Furoate-Vilanterol 100-25 mcg/actuation 1 puff, 1 puff, Inhalation, Daily, Jamnurys Hadsingh, DO, 1 puff at 12/08/23 0829    hydrALAZINE (APRESOLINE) injection 10 mg, 10 mg, Intravenous, Q4H PRN, Jamaal Fall DO    ipratropium (ATROVENT) 0.02 % inhalation solution 0.5 mg, 0.5 mg, Nebulization, BID, Tori Leon MD, 0.5 mg at 12/08/23 0926    labetalol (NORMODYNE) injection 10 mg, 10 mg, Intravenous, Q4H PRN, Pino Ferrera DO    levalbuterol (XOPENEX) inhalation solution 1.25 mg, 1.25 mg, Nebulization, BID, Tori Leon MD, 1.25 mg at 12/08/23 3383    magnesium hydroxide (MILK OF MAGNESIA) oral suspension 30 mL, 30 mL, Oral, Daily PRN, Leodan Ascencio MD, 30 mL at 12/06/23 1718    melatonin tablet 6 mg, 6 mg, Oral, HS, Maria Del Rosario Craft MD, 6 mg at 12/07/23 2015    pantoprazole (PROTONIX) EC tablet 40 mg, 40 mg, Oral, Early Morning, Leodan Ascencio MD, 40 mg at 12/08/23 1120    SKIN INTEGRITY:   Right neck s/p procedure site - treatment as ordered     PRIOR LEVEL OF FUNCTION:  He lives in a(n) single family home  Shelbie Simmonsdle is not  and lives with an adult . Self Care: Independent, Indoor Mobility: Independent, Stairs (in/outdoor): Independent, and Cognition: Independent    FALLS IN THE LAST 6 MONTHS: 1    HOME ENVIRONMENT:  The living area: can live on one level  There are 1 step to enter the home. The patient will have 24 hour supervision available upon discharge. PREVIOUS DME:  Equipment in home (previous DME): None    FUNCTIONAL STATUS:  Physical Therapy Occupational Therapy Speech Therapy   As per PT:    PT Visit Date 12/08/23   Pain Assessment   Pain Assessment Tool 0-10   Pain Score No Pain   Restrictions/Precautions   Weight Bearing Precautions Per Order No   Other Precautions Cognitive; Chair Alarm; Bed Alarm; Fall Risk   General   Family/Caregiver Present Yes  (GF)   Cognition   Overall Cognitive Status Impaired   Attention Attends with cues to redirect   Orientation Level Oriented to person;Oriented to place;Oriented to time;Oriented to situation   Following Commands Follows multistep commands without difficulty   Comments please see OT MOCA assessment.  Pt does demonstrate some cog limitations during session including difficulty sequencing tasks such as setting up tooth brush to brush teeth and poor insight to impairments asking if he can go for hike with dog and GF prior to stay at rehab. Subjective   Subjective agreeable, eager to return home   Bed Mobility   Supine to Sit 5  Supervision   Additional items Increased time required;Verbal cues   Sit to Supine Unable to assess   Transfers   Sit to Stand 5  Supervision   Additional items Verbal cues   Stand to Sit 5  Supervision   Additional items Verbal cues   Stand pivot 4  Minimal assistance   Additional items Assist x 1; Increased time required;Verbal cues   Additional Comments c RW vs no AD; x5 STS Throughout session   Ambulation/Elevation   Gait pattern Improper Weight shift;Decreased foot clearance; Excessively slow   Gait Assistance 4  Minimal assist   Additional items Assist x 1;Verbal cues  (CGA)   Assistive Device None   Distance 100'x2+40'x2+75'x2+20'x2  (includes DGI testing ; standing vs seated rest breaks bw)   Stair Management Assistance 4  Minimal assist   Additional items Assist x 1; Increased time required;Verbal cues   Stair Management Technique One rail R;Alternating pattern; Foreward;Reciprocal   Number of Stairs 7   Ambulation/Elevation Additional Comments SOB following stair negotation-SPO2 stable on RA when checked with rest break   Balance   Static Sitting Fair   Dynamic Sitting Fair   Static Standing Fair -   Dynamic Standing Poor +   Ambulatory Poor +   Endurance Deficit   Endurance Deficit Yes   Endurance Deficit Description fatigue   Activity Tolerance   Activity Tolerance Patient limited by fatigue   Medical Staff Made Aware partial co-tx with JUVENTINO Gould due to medical complexity, impulsivity, and cog limitations as well as decreased activity tolerance noted last session   Nurse Made Aware yes   Assessment   Prognosis Good   Problem List Decreased strength;Decreased endurance; Impaired balance;Decreased mobility; Decreased cognition; Impaired judgement;Decreased safety awareness   Assessment Pt agreeable to participate in PT session. Pt performed functional mobility and therex as outlined above. FGA score 15/30, scoring 2 in all areas except for walking backwards (0), gait eyes closed (1), and tandem walking (0). Able to ambulate without AD however decreased foot clearance, inconsistent marshall, and imbalance especially head turns and distractions in environment. Pt left seated in chair with chair alarm donned and OT upon conclusion. Pt will continue to benefit from skilled acute care PT to further address their functional mobility limitations. As per OT:    OT Visit Date 12/08/23   Note Type   Note Type Treatment for insurance authorization   Pain Assessment   Pain Assessment Tool 0-10   Pain Score No Pain   Restrictions/Precautions   Weight Bearing Precautions Per Order No   Other Precautions Cognitive; Chair Alarm; Bed Alarm; Fall Risk   Lifestyle   Autonomy PTA, pt was independent in ADLs/IADLs and functional mobility w/ no DME; (+) driving   Reciprocal Relationships Lives with his girlfriend   Service to Others Reports working for [address] Enjo[employer] spending time with his dog   ADL   Where Assessed Standing at sink  (rest break retirement thru, seated for LB, some cues o left side)   Eating Assistance 5  Supervision/Setup   Grooming Assistance 5  Supervision/Setup   Grooming Deficit Verbal cueing;Supervision/safety; Increased time to complete;Wash/dry hands; Wash/dry face   Grooming Comments cues for safety and stability   UB Bathing Assistance 4  Minimal Assistance   UB Bathing Deficit Right arm;Left arm   UB Bathing Comments assist for back in standing   LB Bathing Assistance 4  Minimal Assistance   LB Bathing Deficit Right lower leg including foot; Left lower leg including foot   LB Bathing Comments seated   UB Dressing Assistance 4  Minimal Assistance   UB Dressing Deficit Thread RUE; Thread LUE   UB Dressing Comments doff gown, don shirt   LB Dressing Assistance 3  Moderate Assistance   LB Dressing Deficit Don/doff R sock; Don/doff L sock; Don/doff R shoe;Don/doff L shoe   LB Dressing Comments cues for safety   Functional Standing Tolerance   Time 5 minutes   Activity standing at sink adls   Comments min a steadying   Bed Mobility   Supine to Sit 5  Supervision   Additional items Increased time required   Transfers   Sit to Stand 5  Supervision   Additional items Increased time required   Stand to Sit 5  Supervision   Additional items Increased time required   Stand pivot 4  Minimal assistance   Additional items Assist x 1; Increased time required   Functional Mobility   Functional Mobility 4  Minimal assistance   Additional items Rolling walker   Subjective   Subjective "Can i just go home"   Cognition   Overall Cognitive Status Impaired   Arousal/Participation Cooperative   Attention Attends with cues to redirect   Orientation Level Oriented X4   Memory Decreased recall of precautions;Decreased recall of recent events;Decreased short term memory   Following Commands Follows multistep commands without difficulty   Comments decreased insight to deficits, difficulty sequencing at timesa   Cognition Assessment Tools MOCA   Score 24   Activity Tolerance   Activity Tolerance Patient tolerated treatment well   Medical Staff Made Aware PT, RN   Assessment   Assessment Patient participated in Skilled OT session this date with interventions consisting of ADL re training with the use of correct body mechnaics, Energy Conservation techniques, Work simplification skills , safety awareness and fall prevention techniques, and  formal cognitive testing* . Patient agreeable to OT treatment session, upon arrival patient was found seated OOB to Chair. In comparison to previous session, patient with improvements in activity tolerance and attention to task* . Patient requiring frequent rest periods and ocassional safety reminders.  Patient continues to be functioning below baseline level, occupational performance remains limited secondary to factors listed above and increased risk for falls and injury. From OT standpoint, recommendation at time of d/c would be Short Term Rehab. Patient to benefit from continued Occupational Therapy treatment while in the hospital to address deficits as defined above and maximize level of functional independence with ADLs and functional mobility. As per SLP:    Date: 2023        Subjective:  Pt was awake and alert. He was sitting upright in bed. Patient stated "I'm ready to get out of here. When am I going home?"     Objective:  Pt was seen today for dysphagia therapy. Previously on dysphagia 3/thin liquids, changed to regular, and s/p procedure now back on dysphagia 3. Discussed diet textures with pt. He reported some difficulty swallowing solid foods, feeling of globus sensation. He confirmed softer foods are easier for him to swallow, agreeable to continuing current diet at this time. Seen with thin liquid via straw, with adequate bolus retrieval, control and transfer, and no s/s aspiration. Reviewed aspiration precautions and safe swallow strategies with pt, including upright positioning with PO intake, small bites/sips, and slow rate of intake. Pt verbalized understanding. Assessment:  Swallow function is stable with current diet. Diet texture and liquid consistency remains appropriate at this time. Plan:  Continue dysphagia 3 dental soft and thin liquids. Advance as tolerated/per pt preference. No additional ST f/u needed at this time. Please re consult with any new changes or concerns.         CARE SCORES:  Self Care:  Eatin: Setup or clean-up assistance  Oral hygiene: 04: Supervision or touching  assistance  Toilet hygiene: 03: Partial/moderate assistance  Shower/bathing self: 03: Partial/moderate assistance  Upper body dressin: Partial/moderate assistance  Lower body dressing: 03: Partial/moderate assistance  Putting on/taking off footwear: 03: Partial/moderate assistance  Transfers:  Roll left and right: 04: Supervision or touching  assistance  Sit to lyin: Supervision or touching  assistance  Lying to sitting on side of bed: 03: Partial/moderate assistance  Sit to stand: 03: Partial/moderate assistance  Chair/bed to chair transfer: 03: Partial/moderate assistance  Toilet transfer: 03: Partial/moderate assistance  Mobility:  Walk 10 ft: 03: Partial/moderate assistance  Walk 50 ft with two turns: 03: Partial/moderate assistance  Walk [id number]: 09: Not applicable    CURRENT GAP IN FUNCTION  Prior to Admission: Functional Status: Patient was independent with mobility/ambulation, transfers, ADL's, IADL's. Expected functional outcomes: It is expected that with skilled acute rehabilitation services the patient will progress to Independent for self care and Independent for mobility     Estimated length of stay: 10 to 14 days    Anticipated Post-Discharge Disposition/Treatment  Disposition: Return to previous home/apartment. Outpatient Services: Physical Therapy (PT), Occupational Therapy (OT), and Speech Therapy    BARRIERS TO DISCHARGE  Weakness, Pain, Balance Difficulty, Fatigue, Home Accessibility, Caregiver Accessibility, and Equipment Needs    INTERVENTIONS FOR DISCHARGE  Adaptive equipment, Patient/Family/Caregiver Education, Arrange DME needs, Medication Changes per MD, Therapy exercises, Center of balance support , and Energy conservation education     REQUIRED THERAPY:  Patient will require PT and OT 90 minutes each per day, five days per week to achieve rehab goals.      REQUIRED FUNCTIONAL AND MEDICAL MANAGEMENT FOR INPATIENT REHABILITATION:   Pain Management: Overall pain is well controlled, Deep Vein Thrombosis (DVT) Prophylaxis:  SCD's while in bed, IVC filter, and  Nursing education and bowel/bladder management, internal medicine to manage/monitor medical conditions, PM&R to maximize function and provide medical oversight, PT/OT intervention, patient/family education/training, and any consults as needed     RECOMMENDED LEVEL OF CARE:   Tigre Dawson is a 58 y.o. male with a medical history of but not limited to HTN, tobacco use and moderate persistent asthma [facility] acute onset of left sided weakness and right gaze preference while working in his garage. Imaging was concerning for right ICA to MCA occlusion and he was transferred to [facility]. He was intubated due to agitation and underwent thrombectomy. He was started on Precedex. On arrival to ICU he was noted to be hypoxemic with concern for right sided mucous plugging on CXR and underwent emergent bronchoscopy with clearance. He was extubated on 12/1. He was found to have an acute DVT of the L gastrocnemius, however anticoagulation was deferred due to risk for intracerebral hemorrhage. Internal Medicine was consulted due to SOB and cough as well as management of the DVT. CT C/A/P was performed which demonstrated right lung groundglass consolidations consistent with aspiration vs atypical/viral infection. Patient completed 5 days of azithromycin. Patient continued to have cough and some SOB despite antibiotic treatment and scheduled nebulizer with Xopenex and ipratropium. Pulm was consulted and recommended the addition of Breo Ellipta qd. Patient's SOB and cough improved. Neurology workup included TAO with LVEF of 65%, Thrombosis panel, CTH on 12/6 demonstrated evolving R. MCA distrubution infarction w/ areas of hemorrhagic transformation. Patient to continue Plavix for stroke ppx, Atorvastatin 40 mg, and have OP cardiology and neuro follow up. Internal Medicine came on as primary team on 12/6/23 for further workup of his DVT. Doppler US of the lower extremity demonstrated: "acute DVT in proximal femoral vein (this was not present on 12/1 Doppler LE).  Prev DVT in L gastrocnemius vein appears to be resolved." Due to clot propagation and inability to anticoagulate due to a small area of hemorrhage around his stroke patient was sent for IVC filter placement for PE prevention. He has been deemed hemodynamically stable at this time. PT/OT therapies were consulted and continue to follow patient at this time. PM&R was consulted and are recommending inpatient acute rehab when medically stable. All involved medical disciplines feel/agree patient is medically ready for discharge at this time. Inpatient acute rehabilitation physician was consulted. Upon review of patient's case and correspondence with PT/OT therapies and PM&R services, [facility] physician feels Sarah Martinez will benefit and is a good candidate / appropriate for inpatient acute rehab at this time. Sarah Martinez has demonstrated the willingness / desire and tolerance to participate in the required 3 hours or more of therapies per day. Prior to admission / hospital stay Sarah Martinez was Fully independent with all ADLs /functional mobility / iADLs. Currently with PT therapies /  OT therapies : upper body ADLs - supervision - min A ; lower body min-mod A ; min a for bed mobility / transfers ; min a with rolling walker for ambulation Nursing is being recommended for medication distribution / management , bowel / bladder management and educational purposes , internal medicine to continue to monitor and manage medical conditions ,PM&R to maximize  function and provide medical oversight, and inpatient rehab to maximize self care ,mobility ,strength , and endurance upon discharge to home.

## 2023-12-08 NOTE — OCCUPATIONAL THERAPY NOTE
Occupational Therapy Progress Note     Patient Name: Sharyle Emery  QNTIZ'A Date: 12/8/2023  Problem List  Principal Problem:    Stroke due to R ICA to MCA occlusion, s/p TNK and thrombectomy  Active Problems:    Hypertension    Asthma    DVT of lower extremity (deep venous thrombosis) (HCC)    Acute hypoxic respiratory failure ([address])        12/08/23 1050   OT Last Visit   OT Visit Date 12/08/23   Note Type   Note Type Treatment for insurance authorization   Pain Assessment   Pain Assessment Tool 0-10   Pain Score No Pain   Restrictions/Precautions   Weight Bearing Precautions Per Order No   Other Precautions Cognitive; Chair Alarm; Bed Alarm; Fall Risk   Lifestyle   Autonomy PTA, pt was independent in ADLs/IADLs and functional mobility w/ no DME; (+) driving   Reciprocal Relationships Lives with his girlfriend   Service to Others Reports working for [address] Enjoys spending time with his dog   ADL   Where Assessed Standing at sink  (rest break custodial thru, seated for LB, some cues o left side)   Eating Assistance 5  Supervision/Setup   Grooming Assistance 5  Supervision/Setup   Grooming Deficit Verbal cueing;Supervision/safety; Increased time to complete;Wash/dry hands; Wash/dry face   Grooming Comments cues for safety and stability   UB Bathing Assistance 4  Minimal Assistance   UB Bathing Deficit Right arm;Left arm   UB Bathing Comments assist for back in standing   LB Bathing Assistance 4  Minimal Assistance   LB Bathing Deficit Right lower leg including foot; Left lower leg including foot   LB Bathing Comments seated   UB Dressing Assistance 4  Minimal Assistance   UB Dressing Deficit Thread RUE; Thread LUE   UB Dressing Comments doff gown, don shirt   LB Dressing Assistance 3  Moderate Assistance   LB Dressing Deficit Don/doff R sock; Don/doff L sock; Don/doff R shoe;Don/doff L shoe   LB Dressing Comments cues for safety   Functional Standing Tolerance   Time 5 minutes Activity standing at sink adls   Comments min a steadying   Bed Mobility   Supine to Sit 5  Supervision   Additional items Increased time required   Transfers   Sit to Stand 5  Supervision   Additional items Increased time required   Stand to Sit 5  Supervision   Additional items Increased time required   Stand pivot 4  Minimal assistance   Additional items Assist x 1; Increased time required   Functional Mobility   Functional Mobility 4  Minimal assistance   Additional items Rolling walker   Subjective   Subjective "Can i just go home"   Cognition   Overall Cognitive Status Impaired   Arousal/Participation Cooperative   Attention Attends with cues to redirect   Orientation Level Oriented X4   Memory Decreased recall of precautions;Decreased recall of recent events;Decreased short term memory   Following Commands Follows multistep commands without difficulty   Comments decreased insight to deficits, difficulty sequencing at timesa   Cognition Assessment Tools MOCA   Score 24   Activity Tolerance   Activity Tolerance Patient tolerated treatment well   Medical Staff Made Aware PT, RN   Assessment   Assessment Patient participated in Skilled OT session this date with interventions consisting of ADL re training with the use of correct body mechnaics, Energy Conservation techniques, Work simplification skills , safety awareness and fall prevention techniques, and  formal cognitive testing* . Patient agreeable to OT treatment session, upon arrival patient was found seated OOB to Chair. In comparison to previous session, patient with improvements in activity tolerance and attention to task* . Patient requiring frequent rest periods and ocassional safety reminders. Patient continues to be functioning below baseline level, occupational performance remains limited secondary to factors listed above and increased risk for falls and injury. From OT standpoint, recommendation at time of d/c would be Short Term Rehab. Patient to benefit from continued Occupational Therapy treatment while in the hospital to address deficits as defined above and maximize level of functional independence with ADLs and functional mobility. Plan   Treatment Interventions ADL retraining;Functional transfer training; Endurance training;Patient/family training;Cognitive reorientation; Compensatory technique education;Continued evaluation   Goal Expiration Date 12/15/23   OT Treatment Day 2   OT Frequency 3-5x/wk   Discharge Recommendation   Rehab Resource Intensity Level, OT I (Maximum Resource Intensity)   AM-PAC Daily Activity Inpatient   Lower Body Dressing 3   Bathing 3   Toileting 3   Upper Body Dressing 3   Grooming 3   Eating 3   Daily Activity Raw Score 18   Daily Activity Standardized Score (Calc for Raw Score >=11) 38.66   AM-PAC Applied Cognition Inpatient   Following a Speech/Presentation 2   Understanding Ordinary Conversation 4   Taking Medications 2   Remembering Where Things Are Placed or Put Away 3   Remembering List of 4-5 Errands 2   Taking Care of Complicated Tasks 2   Applied Cognition Raw Score 15   Applied Cognition Standardized Score 33.54   MOCA   Version 8.1   Visuopatial/Executive 4   Naming 3   Memory 0   Attention: Digits 2   Attention: Letters 1   Attention: Serial 3   Language: Repeat 2   Language: Fluency 0   Abstraction 2   Delayed Recall 1   Orientation 5   Does patient have less than or equal to 12 years of education? 1   MOCA Total Score 24   MOCA Comments cues for redirect at time   Barthel Index   Feeding 5   Bathing 0   Grooming Score 0   Dressing Score 5   Bladder Score 10   Bowels Score 10   Toilet Use Score 5   Transfers (Bed/Chair) Score 10   Mobility (Level Surface) Score 10   Stairs Score 5   Barthel Index Score 60   Modified [place] Scale   Modified Salas Scale 3       PT SEEN FOR CARLEE COGNITIVE ASSESSMENT. SCORED  19/31 INDICATING _____mild______ COGNITIVE IMPAIRMENT FOR AGE/EDUCATION.   SCORES ARE AS FOLLOWS:    VISUOSPATIAL/EXECUTIVE FUNCTION: 4/5. Pt was able to complete trail. Pt was unable to copy cube. Pt was able to draw a clock, accurately place the numbers, and properly place the hands at ten past eleven. NAMING: 3/3. Pt able to name 3/3 animals. ATTENTION: 5/6. Pt was able to repeat sequence of numbers forwards and backwards. Pt unable to attend to sequence of letters. Pt was able to correctly subtract 7 from 100 4/5 times. LANGUAGE: 1/3. Pt was able to repeat sentences back to therapist. Pt was unable to produce 11 of words starting with the letter F in 1 minute. ABSTRACTION: 2/2. Pt was able to identify the similarity of two items x2 trials. DELAYED RECALL: 1/5. Pt recalled 1/5 words without cues. Pt recalled 2/5 words with category cue. Pt recalled 2/5 words with multiple choice options. ORIENTATION: 6/6. Pt is oriented to date, month, year,  place and city.     +1 halima Duncan, [id number] [facility] Hospital[facility]UV[facility]IN[facility]/L  [facility] Certification # [id number]

## 2023-12-08 NOTE — PLAN OF CARE
Problem: PAIN - ADULT  Goal: Verbalizes/displays adequate comfort level or baseline comfort level  Description: Interventions:  - Encourage patient to monitor pain and request assistance  - Assess pain using appropriate pain scale  - Administer analgesics based on type and severity of pain and evaluate response  - Implement non-pharmacological measures as appropriate and evaluate response  - Consider cultural and social influences on pain and pain management  - Notify physician/advanced practitioner if interventions unsuccessful or patient reports new pain  Outcome: Progressing     Problem: INFECTION - ADULT  Goal: Absence or prevention of progression during hospitalization  Description: INTERVENTIONS:  - Assess and monitor for signs and symptoms of infection  - Monitor lab/diagnostic results  - Monitor all insertion sites, i.e. indwelling lines, tubes, and drains  - Monitor endotracheal if appropriate and nasal secretions for changes in amount and color  - [place] appropriate cooling/warming therapies per order  - Administer medications as ordered  - Instruct and encourage patient and family to use good hand hygiene technique  - Identify and instruct in appropriate isolation precautions for identified infection/condition  Outcome: Progressing  Goal: Absence of fever/infection during neutropenic period  Description: INTERVENTIONS:  - Monitor WBC    Outcome: Progressing     Problem: SAFETY ADULT  Goal: Patient will remain free of falls  Description: INTERVENTIONS:  - Educate patient/family on patient safety including physical limitations  - Instruct patient to call for assistance with activity   - Consult OT/PT to assist with strengthening/mobility   - Keep Call bell within reach  - Keep bed low and locked with side rails adjusted as appropriate  - Keep care items and personal belongings within reach  - Initiate and maintain comfort rounds  - Make Fall Risk Sign visible to staff  - Apply yellow socks and bracelet for high fall risk patients  - Consider moving patient to room near nurses station  Outcome: Progressing  Goal: Maintain or return to baseline ADL function  Description: INTERVENTIONS:  -  Assess patient's ability to carry out ADLs; assess patient's baseline for ADL function and identify physical deficits which impact ability to perform ADLs (bathing, care of mouth/teeth, toileting, grooming, dressing, etc.)  - Assess/evaluate cause of self-care deficits   - Assess range of motion  - Assess patient's mobility; develop plan if impaired  - Assess patient's need for assistive devices and provide as appropriate  - Encourage maximum independence but intervene and supervise when necessary  - Involve family in performance of ADLs  - Assess for home care needs following discharge   - Consider OT consult to assist with ADL evaluation and planning for discharge  - Provide patient education as appropriate  Outcome: Progressing     Problem: DISCHARGE PLANNING  Goal: Discharge to home or other facility with appropriate resources  Description: INTERVENTIONS:  - Identify barriers to discharge w/patient and caregiver  - Arrange for needed discharge resources and transportation as appropriate  - Identify discharge learning needs (meds, wound care, etc.)  - Arrange for interpretive services to assist at discharge as needed  - Refer to Case Management Department for coordinating discharge planning if the patient needs post-hospital services based on physician/advanced practitioner order or complex needs related to functional status, cognitive ability, or social support system  Outcome: Progressing     Problem: Knowledge Deficit  Goal: Patient/family/caregiver demonstrates understanding of disease process, treatment plan, medications, and discharge instructions  Description: Complete learning assessment and assess knowledge base.   Interventions:  - Provide teaching at level of understanding  - Provide teaching via preferred learning methods  Outcome: Progressing

## 2023-12-08 NOTE — DISCHARGE INSTR - AVS FIRST PAGE
Follow up with PCP, Neurology, Pulmonology and Neurosurgery outpatient.    Continue taking prescribed medication

## 2023-12-08 NOTE — PLAN OF CARE
Problem: PAIN - ADULT  Goal: Verbalizes/displays adequate comfort level or baseline comfort level  Description: Interventions:  - Encourage patient to monitor pain and request assistance  - Assess pain using appropriate pain scale  - Administer analgesics based on type and severity of pain and evaluate response  - Implement non-pharmacological measures as appropriate and evaluate response  - Consider cultural and social influences on pain and pain management  - Notify physician/advanced practitioner if interventions unsuccessful or patient reports new pain  Outcome: Progressing     Problem: INFECTION - ADULT  Goal: Absence or prevention of progression during hospitalization  Description: INTERVENTIONS:  - Assess and monitor for signs and symptoms of infection  - Monitor lab/diagnostic results  - Monitor all insertion sites, i.e. indwelling lines, tubes, and drains  - Monitor endotracheal if appropriate and nasal secretions for changes in amount and color  - [place] appropriate cooling/warming therapies per order  - Administer medications as ordered  - Instruct and encourage patient and family to use good hand hygiene technique  - Identify and instruct in appropriate isolation precautions for identified infection/condition  Outcome: Progressing  Goal: Absence of fever/infection during neutropenic period  Description: INTERVENTIONS:  - Monitor WBC    Outcome: Progressing     Problem: Knowledge Deficit  Goal: Patient/family/caregiver demonstrates understanding of disease process, treatment plan, medications, and discharge instructions  Description: Complete learning assessment and assess knowledge base.   Interventions:  - Provide teaching at level of understanding  - Provide teaching via preferred learning methods  Outcome: Progressing

## 2023-12-08 NOTE — PLAN OF CARE
Problem: OCCUPATIONAL THERAPY ADULT  Goal: Performs self-care activities at highest level of function for planned discharge setting. See evaluation for individualized goals. Description: Treatment Interventions: ADL retraining, Visual perceptual retraining, Functional transfer training, UE strengthening/ROM, Endurance training, Cognitive reorientation, Patient/family training, Equipment evaluation/education, Neuromuscular reeducation, Fine motor coordination activities, Compensatory technique education, Continued evaluation, Energy conservation, Activityengagement          See flowsheet documentation for full assessment, interventions and recommendations. Outcome: Progressing  Note: Limitation: Decreased ADL status, Decreased UE ROM, Decreased UE strength, Decreased Safe judgement during ADL, Decreased cognition, Decreased endurance, Visual deficit, Decreased fine motor control, Decreased self-care trans, Decreased high-level ADLs  Prognosis: Fair  Assessment: Patient participated in Skilled OT session this date with interventions consisting of ADL re training with the use of correct body mechnaics, Energy Conservation techniques, Work simplification skills , safety awareness and fall prevention techniques, and  formal cognitive testing* . Patient agreeable to OT treatment session, upon arrival patient was found seated OOB to Chair. In comparison to previous session, patient with improvements in activity tolerance and attention to task* . Patient requiring frequent rest periods and ocassional safety reminders. Patient continues to be functioning below baseline level, occupational performance remains limited secondary to factors listed above and increased risk for falls and injury. From OT standpoint, recommendation at time of d/c would be Short Term Rehab.    Patient to benefit from continued Occupational Therapy treatment while in the hospital to address deficits as defined above and maximize level of functional independence with ADLs and functional mobility.      Rehab Resource Intensity Level, OT: I (Maximum Resource Intensity)

## 2023-12-08 NOTE — CASE MANAGEMENT
Case Management Discharge Planning Note    Patient name Tena Pitts  Location [facility] 454-22 MRN [id number]  : 1960 Date 2023       Current Admission Date: 2023  Current Admission Diagnosis:Stroke due to R ICA to MCA occlusion, s/p TNK and thrombectomy   Patient Active Problem List    Diagnosis Date Noted    Asthma 2023    DVT of lower extremity (deep venous thrombosis) ([address]) 2023    Hypertension 2023    Stroke due to R ICA to MCA occlusion, s/p TNK and thrombectomy 2023      LOS (days): 8  Geometric Mean LOS (GMLOS) (days): 7.50  Days to GMLOS:-0.2     OBJECTIVE:  Risk of Unplanned Readmission Score: 10.37         Current admission status: Inpatient   Preferred Pharmacy: No Pharmacies Listed  Primary Care Provider: No primary care provider on file.     Primary Insurance: [employer] PENDING  Secondary Insurance:     DISCHARGE DETAILS:      Requested [address]         Is the patient interested in [address] at discharge?: No    DME Referral Provided  Referral made for DME?: No           Additional Comments: Spoke with pt and SO bedisde and advised provider and therapies have approved [facility] transporting pt to [facility]

## 2023-12-08 NOTE — DISCHARGE SUMMARY
INTERNAL MEDICINE RESIDENCY DISCHARGE SUMMARY     Jin David   58 y.o. male  MRN: [id number]  Room/Bed: [place] [address]/[address] IR   Encounter: [id number]    Principal Problem:    Stroke due to R ICA to MCA occlusion, s/p TNK and thrombectomy  Active Problems:    DVT of lower extremity (deep venous thrombosis) (HCC)    Hypertension    Asthma      DVT of lower extremity (deep venous thrombosis) (HCC)  Assessment & Plan  Doppler of the lower extremities on 12/1 reveals acute occlusive DVT in the left gastrocnemius veins - likely present on admission (11/30). Repeat Doppler LE 12/5: No DVT in RLE. LLE: Acute DVT in proximal femoral vein (this was not present on 12/1 Doppler LE). Prev DVT in L gastrocnemius vein appears to be resolved. Strong suspicion for propagation of clot proximally, although cannot rule out new clot formation. IVC filter placed on 12/7 by IR  Will hold off on anticoagulation until 12/10 at the earliest given large stroke on 11/30 (c/f hemorrhagic conversion potential). Follow up outpatient with PCP for further monitoring    * Stroke due to R ICA to MCA occlusion, s/p TNK and thrombectomy  Assessment & Plan  Presenting sx: left-sided weakness and right gaze preference; NIHSS: 11 (LOC, gaze, VF, facial, LUE, LLE, dysarthria, extinction); /110 on arrival; TNK administered 1712 (LKN 15:35, SA 16:13, delay for BP); Endovascular intervention: R MCA/ICA thrombectomy, TICI 3 w/ Dr. Paul Yee. CTH/CTA: extensive R ICA occlusion from bifurcation through M1. CTH (24 hrs): Acute right MCA territory infarct, with mild regional mass effect and without midline shift. Minimal hyperdensity noted in the R caudate consistent with hemorrhage vs contrast staining. Acute/Subacute sinusitits. MRI:t MRI: Evolving acute to subacute right MCA distribution infarction with focal areas of hemorrhagic transformation within the gangliocapsular region. Stable mass effect on the right frontal horn compared to CT head performed on same day. Repeat CTH (12/6): Evolving right MCA distribution infarction with areas of hemorrhagic transformation. Degree of hemorrhage within the right basal ganglia is slightly improved since the prior examination. Stable effacement of the right lateral ventricle without significant midline shift. Basilar cisterns are patent. Outpatient follow up with neurology  Continue Atorvastatin 40   Will need cardiology referral in OP setting to evaluate for cardiac arrhythmia with Zio patch/Holter monitor  Follow up with OP neurovascular attending in 6-8 weeks. Dispo: Patient is approved for [facility]. Acute hypoxic respiratory failure (HCC)-resolved as of 12/8/2023  Assessment & Plan  63M w/ PMH of asthma, tobacco use who has been admitted for R MCA ischemic stroke s/p TNK and mechanical thrombectomy and hospital course complicated by acute hypoxic respiratory failure (2-4L NC) in the setting of prior mechanical ventilation and concern for pulmonary infection on CT. On physical exam, patient is resting comfortably on room air without signs of respiratory compromised, there is wheezing present in the right lung fields on auscultation. One time Temp of 100.6 this evening, but resolved and has otherwise been afebrile. Recent labs notable for resolving leukocytosis. Differential includes Asthma, viral/bacterial URI, atelectasis, PE. Procal 0.11 - bacterial PNA less likely. Flu/RSV/COVID -- negative. Started on azithromycin coverage by critical care team given CxR findings with indeterminate hazy airspace opacity in the L parahilar midlung field region. CT w/ contrast C/A/P (12/2/23): Patchy groundglass consolidations throughout the right lung.    - Continue with scheduled nebs q8h w/ xopenex and ipratropium  - S/P Azithromycin x 5 days  - Follow up Bcx x2 from 12/1 - NGTD  - Pulmonology consulted; breo ellipta qd.   - If no improvement or clinical deterioration can escalate workup to include:  - CTA PE in setting of known DVT w/o AC   - Repeat CXR  - Repeat BC, sputum cultures    Hypertension  Assessment & Plan  Hx of HTN maintained on lisinopril 10 mg. BP controlled without medication while admitted    Plan  - Restart Home lisinopril    Asthma  Assessment & Plan  Hx of asthma for many years. Managed by PCP with albuterol. No PFTs on chart review. PE demonstrates wheezing in the right middle and lower lung fields    Plan  -Outpatient pulmonology followup for PFT  -Breo Ellipita Qd added        [address] [place]     Mr. Shayna Palacios is a 58year old male with a PMH of HTN, tobacco use, asthma who was admitted to [facility] for acute R ICA to M1 occlusion. On 11/30 patient presented w/ left arm and leg weakness and was found to have right MCA stroke and was administered TNK at [facility] ED. Pt was then life-flighted to [facility] [place]. He subsequently underwent successful mechanical thrombectomy with TICI 3 reperfusion on 11/30. On arrival to ICU he was noted to be hypoxemic with concern for right sided mucous plugging on CXR and underwent emergent bronchoscopy with clearance. He was extubated on 12/1. He was found to have an acute DVT of the L gastrocnemius, however anticoagulation was deferred due to risk for intracerebral hemorrhage. Internal Medicine was consulted due to SOB and cough as well as management of the DVT. CT C/A/P was performed which demonstrated right lung groundglass consolidations consistent with aspiration vs atypical/viral infection. Patient completed 5 days of azithromycin. Patient continued to have cough and some SOB despite antibiotic treatment and scheduled nebulizer with Xopenex and ipratropium. Pulm was consulted and recommended the addition of Breo Ellipta qd. Patient's SOB and cough improved. Neurology workup included TAO with LVEF of 65%, Thrombosis panel, CTH on 12/6 demonstrated evolving R.  MCA distrubution infarction w/ areas of hemorrhagic transformation. Patient to continue Plavix for stroke ppx, Atorvastatin 40 mg, and have OP cardiology and neuro follow up. Internal Medicine came on as primary team on 12/6/23 for further workup of his DVT. Doppler US of the lower extremity demonstrated: "acute DVT in proximal femoral vein (this was not present on 12/1 Doppler LE). Prev DVT in L gastrocnemius vein appears to be resolved." Due to clot propagation and inability to anticoagulate due to a small area of hemorrhage around his stroke patient was sent for IVC filter placement for PE prevention. PT/OT recommended short term rehab. Patient approved for [facility] and deemed medically stable for d/c on 12/8/23    DISCHARGE INFORMATION     PCP at Discharge: No PCP    Admitting Provider: Sayra Ennis MD  Admission Date: 11/30/2023    Discharge Provider: Cristy Cain MD  Discharge Date: 12/8/23    Discharge Disposition: [address] [place] [address]  Discharge Condition: stable  Discharge with Lines: no    Discharge Diet:  Dental Soft; Thin Liquids  Activity Restrictions: none  Test Results Pending at Discharge: Thrombosis Panel (12/3)    Discharge Diagnoses:  Principal Problem:    Stroke due to R ICA to MCA occlusion, s/p TNK and thrombectomy  Active Problems:    DVT of lower extremity (deep venous thrombosis) ([address])    Hypertension    Asthma  Resolved Problems:    Acute hypoxic respiratory failure ([address])    Delirious      Consulting Providers:  -Neurology  -Neurosurgery  -Pulmonology    Diagnostic & Therapeutic Procedures Performed:  CT pelvis wo contrast    Result Date: 12/3/2023  Impression: No evidence of hip fracture status post fall. Small volume of gas urinary bladder may be iatrogenic from instrumentation or may represent the sequela of gas-forming infection which should only be considered in the right clinical setting.  Workstation performed: [id number]     CT spine cervical wo contrast    Result Date: 12/3/2023  Impression: No cervical spine fracture or traumatic malalignment. Mild degenerative changes are noted. Workstation performed: [id number]     CT head wo contrast    Result Date: 12/3/2023  Impression: Expected appearance of evolving right MCA distribution infarcts with areas of hemorrhagic transformation. Slight interval increase in mass effect on the frontal horn of the right lateral ventricle without midline shift. New areas of recent infarction right now better visualized located in the subcortical right parietal lobe and deep right frontal lobe white matter. Workstation performed: [id number]     CT chest abdomen pelvis w contrast    Result Date: 12/2/2023  Impression: Patchy groundglass consolidations throughout the right lung. Correlate for aspiration versus atypical/viral infection. No definite evidence of malignancy. Mild wall thickening of the urinary bladder may be secondary to chronic outlet obstruction. The study was marked in [facility] for immediate notification. Workstation performed: [id number]     MRI brain wo contrast    Result Date: 12/2/2023  Impression: Evolving acute to early subacute right MCA distribution infarction since November 30, 2023 with focal areas of hemorrhagic transformation within the ganglial capsular region. Mass effect on the right frontal horn is stable when comparing to the head CT performed earlier the same day but increased when comparing to November 30, 2023 examination. Pansinusitis. I personally discussed this study with Dr Kofi Gonzalez on 12/2/2023 7:36 AM. Workstation performed: [id number]     Bronchoscopy    Result Date: 12/1/2023  Impression: RUL obstructive mucous plug RECOMMENDATION: Proceed with vent weaning as hypoxemia resolves and extubate if possible     CT head without contrast    Result Date: 12/1/2023  Impression: 1.   Evolving acute right MCA territory infarct predominantly involving the right gangliocapsular region and corona radiata with mild regional mass effect without midline shift. Minimal hyperdensity in the right caudate is likely contrast staining from earlier thrombectomy. Hemorrhage is less favored. Recommend close follow-up head CT. 2.  Acute/subacute sinusitis. Workstation performed: [id number]     IR stroke alert    Result Date: 12/1/2023  Impression: Right cervical ICA occlusion extending into the right middle cerebral artery M1 segment, TICI 0. Successful mechanical thrombectomy with TICI 3 reperfusion. Workstation performed: [id number]     XR chest portable ICU    Result Date: 12/1/2023  Impression: Near complete resolution of right-sided atelectatic change. Small right pleural effusion. Indeterminate hazy airspace opacity in the left parahilar midlung field region. Workstation performed: [id number]     XR chest portable ICU    Result Date: 12/1/2023  Impression: Bandlike opacity in the right mid to upper lung field with relative lucency of the right lower lung field is consistent with at least segmental atelectasis. There is relative increased density of the right hilum. Recommend CT for further evaluation. Workstation performed: [id number]     CT head wo contrast    Result Date: 11/30/2023  Impression: Reidentified subtle areas of loss of gray-white differentiation involving the right MCA territory. However, enhancement of the right middle cerebral artery M1 segment is now visible, from prior intravenous contrast administration for a CTA head and neck performed few hours prior. An MRI brain could be performed for further evaluation. Workstation performed: [id number]     CTA stroke alert (head/neck)    Result Date: 11/30/2023  Impression: Occluded right intracranial and cervical ICA. Right M1 MCA occlusion. Distal M2 vessels are reconstituted likely from collateral vessels.  I personally discussed this study with Dr Kendrick Heart on 11/30/2023 4:46 PM. Workstation performed: [id number]     CT stroke alert brain    Result Date: 11/30/2023  Impression: No acute intracranial hemorrhage. Right MCA distribution ischemia suspected. I personally discussed this study with Dr Kaylynn Grey on 11/30/2023 4:46 PM. Workstation performed: [id number]       Code Status: Level 1 - Full Code  Advance Directive & Living Will: <no information>  Power of :    POLST:      Medications:  Current Discharge Medication List        Current Discharge Medication List        Current Discharge Medication List        CONTINUE these medications which have NOT CHANGED    Details   albuterol (PROVENTIL HFA,VENTOLIN HFA) 90 mcg/act inhaler Inhale 2 puffs every 6 (six) hours as needed for wheezing      lisinopril (ZESTRIL) 10 mg tablet Take 10 mg by mouth daily      omeprazole (PriLOSEC) 20 mg delayed release capsule Take 20 mg by mouth daily             Allergies: Allergies   Allergen Reactions    Aspirin Anaphylaxis    Ibuprofen Anaphylaxis       FOLLOW-UP     PCP Outpatient Follow-up:  Follow up with PCP (not on file)    Consulting Providers Follow-up:  Neurology  Neurosurg  Cardio  Pulm  Active Issues Requiring Follow-up:   yes  stroke follow up with neurology, neurosurgery . Cardiology follow up as per neurology  Pulmonology follow up for PFT and management of COPD    Discharge Statement:   I spent 1 hour minutes discharging the patient. This time was spent on the day of discharge. I had direct contact with the patient on the day of discharge. Additional documentation is required if more than 30 minutes were spent on discharge. Portions of the record may have been created with voice recognition software. Occasional wrong word or "sound a like" substitutions may have occurred due to the inherent limitations of voice recognition software.   Read the chart carefully and recognize, using context, where substitutions have occurred.    ==  [place]  Medical Student OMS-4

## 2023-12-08 NOTE — PROGRESS NOTES
PHYSICAL MEDICINE AND REHABILITATION PROGRESS NOTE  Nathalie Newman 58 y.o. 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 The patient is a good candidate for acute inpatient rehabilitation, able to tolerate 3 hours of therapy/day, 5 to 6 days a week. Covid-19 Testing: [facility] inpatient rehabilitation units require testing within 48 hours of all potential admissions at this time. *Re-testing is NOT required for patients recovering from COVID-19 infection if isolation has been discontinued per CDC criteria. Medical Co-morbidities Plan:  Acute DVT proxima femoral vein s/p IVC filter 12/7/2023  Acute hypoxic respiratory failure  Hypertension   Asthma   DVT ppx: SCD    Thank you for this consultation. Do not hesitate to contact service with further questions. OSCAR Rae  PM&R    I have spent a total time of 15 minutes on 12/08/23 in caring for this patient including Patient and family education, Counseling / Coordination of care, Documenting in the medical record, Obtaining or reviewing history  , and Communicating with other healthcare professionals . Subjective/Interval history:  The patient was found to have acute DVT of the left gastrocnemius vein with extension into the proximal femoral vein for which IVC filter was placed. Repeat CT head on 12/6 showed evolving right MCA distribution infarction with areas of hemorrhagic transformation. Degree of hemorrhage within the right basal ganglia is slightly improved since the prior examination.  Stable effacement of the right lateral ventricle without significant midline shift. Basilar cisterns are patent. He will be placed on Eliquis once okay with neurology. The patient was seen face to face in his room with family at bedside. He reports feeling generally weak from not moving much. He denies any pain, SOB, chest pain or dizziness. OT performed a MOCA today 24/30. Review of Systems: 10 point ROS negative except for what is noted in HPI      Current level of function:  Physical Therapy: Supervision for bed mobility, minimal assist to supervision for transfers, minimal assist for ambulation and stair management. Occupational Therapy: Supervision for eating, grooming, minimal assist for UB bathing/LB bathing and UB dressing, moderate assist for LB dressing     Physical Exam:  /63   Pulse 67   Temp (!) 97.2 °F (36.2 °C)   Resp 21   Ht 5' 11" (1.803 m)   Wt 95.6 kg (210 lb 12.2 oz)   SpO2 95%   BMI 29.40 kg/m²      No intake or output data in the 24 hours ending 12/08/23 1123    Body mass index is 29.4 kg/m². Physical Exam  Constitutional:       General: He is not in acute distress. Appearance: He is not toxic-appearing. HENT:      Head: Normocephalic and atraumatic. Right Ear: External ear normal.      Left Ear: External ear normal.      Nose: Nose normal.      Mouth/Throat:      Mouth: Mucous membranes are moist.      Pharynx: Oropharynx is clear. Eyes:      Extraocular Movements: Extraocular movements intact. Pulmonary:      Effort: Pulmonary effort is normal. No respiratory distress. Abdominal:      General: There is no distension. Musculoskeletal:      Comments: Mild left hemiparesis    Skin:     General: Skin is warm and dry. Neurological:      Mental Status: He is alert and oriented to person, place, and time. Comments: Mild left sided ataxia    Psychiatric:         Cognition and Memory: Cognition is impaired. Memory is impaired.         Social History:    Social History Socioeconomic History    Marital status: Single     Spouse name: Not on file    Number of children: Not on file    Years of education: Not on file    Highest education level: Not on file   Occupational History    Not on file   Tobacco Use    Smoking status: Not on file    Smokeless tobacco: Not on file   Substance and Sexual Activity    Alcohol use: Not on file    Drug use: Not on file    Sexual activity: Not on file   Other Topics Concern    Not on file   Social History Narrative    Not on file     Social Determinants of Health     Financial Resource Strain: Not on file   Food Insecurity: No Food Insecurity (12/1/2023)    Hunger Vital Sign     Worried About Running Out of Food in the Last Year: Never true     Ran Out of Food in the Last Year: Never true   Transportation Needs: No Transportation Needs (12/1/2023)    PRAPARE - Transportation     Lack of Transportation (Medical): No     Lack of Transportation (Non-Medical): No   Physical Activity: Not on file   Stress: Not on file   Social Connections: Not on file   Intimate Partner Violence: Not on file   Housing Stability: Unknown (12/1/2023)    Housing Stability Vital Sign     Unable to Pay for Housing in the Last Year: No     Number of Places Lived in the Last Year: 1     Unstable Housing in the Last Year: Not on file        Family History:    No family history on file.       Medications:     Current Facility-Administered Medications:     acetaminophen (TYLENOL) tablet 650 mg, 650 mg, Oral, Q6H PRN, Cuba Rogerker, DO, 650 mg at 12/06/23 2050    albuterol (PROVENTIL HFA,VENTOLIN HFA) inhaler 2 puff, 2 puff, Inhalation, Q4H PRN, Henrietta Rivers, DO, 2 puff at 12/07/23 2006    atorvastatin (LIPITOR) tablet 40 mg, 40 mg, Oral, QPM, Jonathan Prajapati, DO, 40 mg at 12/07/23 2015    calcium carbonate (TUMS) chewable tablet 500 mg, 500 mg, Oral, Daily PRN, Cuba Rogerker, DO, 500 mg at 12/06/23 1418    chlorhexidine (PERIDEX) 0.12 % oral rinse 15 mL, 15 mL, Mouth/Throat, Q12H [address] [facility] CHRISTINE Pelayo, 15 mL at 12/07/23 2015    clopidogrel (PLAVIX) tablet 75 mg, 75 mg, Oral, Daily, Chato Sherrie Dicesare, DO, 75 mg at 12/08/23 0829    Fluticasone Furoate-Vilanterol 100-25 mcg/actuation 1 puff, 1 puff, Inhalation, Daily, Jameal Hadeed, DO, 1 puff at 12/08/23 0829    hydrALAZINE (APRESOLINE) injection 10 mg, 10 mg, Intravenous, Q4H PRN, Jamaal Fall DO    ipratropium (ATROVENT) 0.02 % inhalation solution 0.5 mg, 0.5 mg, Nebulization, BID, Tori Leon MD, 0.5 mg at 12/08/23 0926    labetalol (NORMODYNE) injection 10 mg, 10 mg, Intravenous, Q4H PRN, Kacy Valdez, DO    levalbuterol (XOPENEX) inhalation solution 1.25 mg, 1.25 mg, Nebulization, BID, Tori Leon MD, 1.25 mg at 12/08/23 3958    magnesium hydroxide (MILK OF MAGNESIA) oral suspension 30 mL, 30 mL, Oral, Daily PRN, Carlos Karimi MD, 30 mL at 12/06/23 1718    melatonin tablet 6 mg, 6 mg, Oral, HS, Cecile Hatfield MD, 6 mg at 12/07/23 2015    pantoprazole (PROTONIX) EC tablet 40 mg, 40 mg, Oral, Early Morning, Carlos Karimi MD, 40 mg at 12/08/23 8857    Past Medical History:     No past medical history on file. Past Surgical History:     Past Surgical History:   Procedure Laterality Date    IR STROKE ALERT  11/30/2023         Allergies: Allergies   Allergen Reactions    Aspirin Anaphylaxis    Ibuprofen Anaphylaxis           LABORATORY RESULTS:      Lab Results   Component Value Date    HGB 13.2 12/07/2023    HCT 39.4 12/07/2023    WBC 8.72 12/07/2023     Lab Results   Component Value Date    BUN 16 12/07/2023    K 3.7 12/07/2023     12/07/2023    CREATININE 1.05 12/07/2023     Lab Results   Component Value Date    PROTIME 13.6 12/07/2023    INR 1.05 12/07/2023        DIAGNOSTIC STUDIES: Reviewed  CT pelvis wo contrast    Result Date: 12/3/2023  Impression: No evidence of hip fracture status post fall.  Small volume of gas urinary bladder may be iatrogenic from instrumentation or may represent the sequela of gas-forming infection which should only be considered in the right clinical setting. Workstation performed: [id number]     CT spine cervical wo contrast    Result Date: 12/3/2023  Impression: No cervical spine fracture or traumatic malalignment. Mild degenerative changes are noted. Workstation performed: [id number]     CT head wo contrast    Result Date: 12/3/2023  Impression: Expected appearance of evolving right MCA distribution infarcts with areas of hemorrhagic transformation. Slight interval increase in mass effect on the frontal horn of the right lateral ventricle without midline shift. New areas of recent infarction right now better visualized located in the subcortical right parietal lobe and deep right frontal lobe white matter. Workstation performed: [id number]     CT chest abdomen pelvis w contrast    Result Date: 12/2/2023  Impression: Patchy groundglass consolidations throughout the right lung. Correlate for aspiration versus atypical/viral infection. No definite evidence of malignancy. Mild wall thickening of the urinary bladder may be secondary to chronic outlet obstruction. The study was marked in [facility] for immediate notification. Workstation performed: [id number]     MRI brain wo contrast    Result Date: 12/2/2023  Impression: Evolving acute to early subacute right MCA distribution infarction since November 30, 2023 with focal areas of hemorrhagic transformation within the ganglial capsular region. Mass effect on the right frontal horn is stable when comparing to the head CT performed earlier the same day but increased when comparing to November 30, 2023 examination. Pansinusitis.  I personally discussed this study with Dr Bassam Taveras on 12/2/2023 7:36 AM. Workstation performed: [id number]     Bronchoscopy    Result Date: 12/1/2023  Impression: RUL obstructive mucous plug RECOMMENDATION: Proceed with vent weaning as hypoxemia resolves and extubate if possible     CT head without contrast    Result Date: 12/1/2023  Impression: 1. Evolving acute right MCA territory infarct predominantly involving the right gangliocapsular region and corona radiata with mild regional mass effect without midline shift. Minimal hyperdensity in the right caudate is likely contrast staining from earlier thrombectomy. Hemorrhage is less favored. Recommend close follow-up head CT. 2.  Acute/subacute sinusitis. Workstation performed: [id number]     IR stroke alert    Result Date: 12/1/2023  Impression: Right cervical ICA occlusion extending into the right middle cerebral artery M1 segment, TICI 0. Successful mechanical thrombectomy with TICI 3 reperfusion. Workstation performed: [id number]     XR chest portable ICU    Result Date: 12/1/2023  Impression: Near complete resolution of right-sided atelectatic change. Small right pleural effusion. Indeterminate hazy airspace opacity in the left parahilar midlung field region. Workstation performed: [id number]     XR chest portable ICU    Result Date: 12/1/2023  Impression: Bandlike opacity in the right mid to upper lung field with relative lucency of the right lower lung field is consistent with at least segmental atelectasis. There is relative increased density of the right hilum. Recommend CT for further evaluation. Workstation performed: [id number]     CT head wo contrast    Result Date: 11/30/2023  Impression: Reidentified subtle areas of loss of gray-white differentiation involving the right MCA territory. However, enhancement of the right middle cerebral artery M1 segment is now visible, from prior intravenous contrast administration for a CTA head and neck performed few hours prior. An MRI brain could be performed for further evaluation. Workstation performed: [id number]     CTA stroke alert (head/neck)    Result Date: 11/30/2023  Impression: Occluded right intracranial and cervical ICA. Right M1 MCA occlusion.  Distal M2 vessels are reconstituted likely from collateral vessels. I personally discussed this study with Dr Olen Dandy on 11/30/2023 4:46 PM. Workstation performed: [id number]     CT stroke alert brain    Result Date: 11/30/2023  Impression: No acute intracranial hemorrhage. Right MCA distribution ischemia suspected.  I personally discussed this study with Dr Olen Dandy on 11/30/2023 4:46 PM. Workstation performed: [id number]

## 2023-12-08 NOTE — PLAN OF CARE
Problem: PHYSICAL THERAPY ADULT  Goal: Performs mobility at highest level of function for planned discharge setting. See evaluation for individualized goals. Description: Treatment/Interventions: OT, Spoke to case management, Spoke to nursing, Gait training, Bed mobility, Patient/family training, Endurance training, LE strengthening/ROM, Functional transfer training          See flowsheet documentation for full assessment, interventions and recommendations. Outcome: Progressing  Note: Prognosis: Good  Problem List: Decreased strength, Decreased endurance, Impaired balance, Decreased mobility, Decreased cognition, Impaired judgement, Decreased safety awareness  Assessment: Pt agreeable to participate in PT session. Pt performed functional mobility and therex as outlined above. FGA score 15/30, scoring 2 in all areas except for walking backwards (0), gait eyes closed (1), and tandem walking (0). Able to ambulate without AD however decreased foot clearance, inconsistent marshall, and imbalance especially head turns and distractions in environment. Pt left seated in chair with chair alarm donned and OT upon conclusion. Pt will continue to benefit from skilled acute care PT to further address their functional mobility limitations. Barriers to Discharge: Inaccessible home environment     Rehab Resource Intensity Level, PT: I (Maximum Resource Intensity)    See flowsheet documentation for full assessment.

## 2023-12-08 NOTE — H&P
PHYSICAL MEDICINE AND REHABILITATION H&P/ADMISSION NOTE  Sahil Vasquez 58 y.o. male MRN: [id number]  Unit/Bed#: [facility] 213-01 Encounter: [id number]     Rehab Diagnosis: Impairment of mobility, safety and Activities of Daily Living (ADLs) due to Stroke:  01.1  Left Body Involvement (Right Brain) Etiologic: R ICA to MCA occlusion   Date of Onset: 11/30/23  Date of surgery: 11/30/23 Thrombectomy     History of Present Illness:   Nam Dudley is a 58 y.o. male with a medical history of but not limited to HTN, tobacco use and moderate persistent asthma who presented to the [address] [place] [address] 11/30/2023 with acute onset of left sided weakness and right gaze preference while working in his garage. Imaging was concerning for right ICA to MCA occlusion and he was transferred to [facility]. He was intubated due to agitation and underwent thrombectomy. He was started on Precedex. On arrival to ICU he was noted to be hypoxemic with concern for right sided mucous plugging on CXR and underwent emergent bronchoscopy with clearance. He was extubated on 12/1. He was found to have an acute DVT of the L gastrocnemius, however anticoagulation was deferred due to risk for intracerebral hemorrhage. Internal Medicine was consulted due to SOB and cough as well as management of the DVT. CT C/A/P was performed which demonstrated right lung groundglass consolidations consistent with aspiration vs atypical/viral infection. Patient completed 5 days of azithromycin. Patient continued to have cough and some SOB despite antibiotic treatment and scheduled nebulizer with Xopenex and ipratropium. Pulm was consulted and recommended the addition of Breo Ellipta qd. Patient's SOB and cough improved. Neurology workup included TOA with LVEF of 65%, Thrombosis panel, CTH on 12/6 demonstrated evolving R. MCA distrubution infarction w/ areas of hemorrhagic transformation.  Patient to continue Plavix for stroke ppx, Atorvastatin 40 mg, and have OP cardiology and neuro follow up. Internal Medicine came on as primary team on 12/6/23 for further workup of his DVT. Doppler US of the lower extremity demonstrated: "acute DVT in proximal femoral vein (this was not present on 12/1 Doppler LE). Prev DVT in L gastrocnemius vein appears to be resolved." Due to clot propagation and inability to anticoagulate due to a small area of hemorrhage around his stroke patient was sent for IVC filter placement for PE prevention. He has been deemed hemodynamically stable at this time. PT/OT therapies were consulted and continue to follow patient at this time. PM&R was consulted and are recommending inpatient acute rehab when medically stable. All involved medical disciplines feel/agree patient is medically ready for discharge at this time. Inpatient acute rehabilitation physician was consulted. Upon review of patient's case and correspondence with PT/OT therapies and PM&R services, ARC physician feels Joy Rm will benefit and is a good candidate / appropriate for inpatient acute rehab at this time. Joy Rm has demonstrated the willingness / desire and tolerance to participate in the required 3 hours or more of therapies per day. Subjective: No complaints just really wants to get a shower    Review of Systems: A 10-point review of systems was performed. Negative except as listed above. Plan:     CVA Recommend acute comprehensive interdisciplinary inpatient rehabilitation to include intensive skilled therapies (PT, OT, ST) as outlined with oversight and management by rehabilitation physician as well as inpatient rehab level nursing, case management and weekly interdisciplinary team meetings.       Asthma continue his current nebulizer treatments consult the hospitalist    Hypertension continue his current meds consult the hospitalist will put on a bowel regimen    Tobacco use will give education materials to discontinue    Will put her on a bowel regimen    DVT in the proximal femoral vein had an umbrella put in (IVC Filter)              Scheduled Meds:  Current Facility-Administered Medications   Medication Dose Route Frequency Provider Last Rate    acetaminophen  650 mg Oral Q6H PRN Corey Brooks MD      albuterol  2 puff Inhalation Q4H PRN Corey Brooks MD      aluminum-magnesium hydroxide-simethicone  30 mL Oral Q4H PRN Corey Brooks MD      atorvastatin  40 mg Oral QPM Corey Brooks MD      chlorhexidine  15 mL Mouth/Throat Q12H [address] Corey Brooks MD      [START ON 12/9/2023] clopidogrel  75 mg Oral Daily Corey Brooks, MD      docusate sodium  100 mg Oral BID Corey Brooks, MD Garcia Rack ON 12/9/2023] Fluticasone Furoate-Vilanterol  1 puff Inhalation Daily Corey Brooks MD      ipratropium  0.5 mg Nebulization BID Corey Brooks MD      levalbuterol  1.25 mg Nebulization BID Corey Brooks MD      magnesium hydroxide  30 mL Oral Daily PRN Corey Brooks MD      melatonin  6 mg Oral HS Corey Brooks MD      ondansetron  4 mg Oral Q6H PRN Corey Brooks MD      [START ON 12/9/2023] pantoprazole  40 mg Oral Early Morning Corey Brooks MD         Restrictions include:   Aspiration precautions  Fall precautions      Functional History - Prior to Admission:      He lives in [place]) single family home  [place] is not  and lives with an adult . Self Care: Independent, Indoor Mobility: Independent, Stairs (in/outdoor): Independent, and Cognition: Independent    Functional Status Upon Admission to [facility]:  Mobility: Moderate assistance  Transfers: Supervision  ADLs:   Minimal assistance except for lower body dressing  Physical Exam:  Temp:  [97.2 °F (36.2 °C)-98.6 °F (37 °C)] 97.2 °F (36.2 °C)  HR:  [67-89] 67  Resp:  [21] 21  BP: (110-123)/(61-72) 123/63  SpO2:  [94 %-98 %] 95 %    General:   alert, no apparent distress, cooperative, and comfortable  HEENT:  Head: Normocephalic, no lesions, without obvious abnormality.   Eye: Normal external eye, conjunctiva, lidsc cornea  Ears: Normal external ears  Nose: Normal external nose, mucus membranes  CARDIAC:  regular rate and rhythm, S1, S2 normal, no murmur, click, rub or gallop  LUNGS:  no abnormal respiratory pattern, no retractions noted, non-labored breathing   ABDOMEN:  soft, non-tender, non-distended  EXTREMITIES:  extremities normal, warm and well-perfused; no cyanosis, clubbing, or edema  NEURO:  clear speech, following all commands, oriented he has mild left hemiparesis mild left-sided ataxia  PSYCH:  Mild cognition impairment      Laboratory:    Results from last 7 days   Lab Units 12/07/23  0524 12/05/23  0620 12/04/23  0826   HEMOGLOBIN g/dL 13.2 13.5 13.4   HEMATOCRIT % 39.4 39.5 38.7   WBC Thousand/uL 8.72 8.50 9.17     Results from last 7 days   Lab Units 12/07/23  0524 12/05/23  0620 12/04/23  0826 12/03/23  0544   BUN mg/dL 16 13 12 10   SODIUM mmol/L 137 136 134* 138   POTASSIUM mmol/L 3.7 3.9 4.0 3.4*   CHLORIDE mmol/L 105 104 104 105   CREATININE mg/dL 1.05 1.06 0.97 0.96   AST U/L  --   --   --  19   ALT U/L  --   --   --  16     Results from last 7 days   Lab Units 12/07/23  0524   PROTIME seconds 13.6   INR  1.05        Wt Readings from Last 1 Encounters:   12/07/23 95.6 kg (210 lb 12.2 oz)     Estimated body mass index is 29.4 kg/m² as calculated from the following:    Height as of 12/5/23: 5' 11" (1.803 m). Weight as of 12/7/23: 95.6 kg (210 lb 12.2 oz). Imaging: reviewed     Rehabilitation Prognosis: good     Tolerance for three hours of therapy a day: good     Family/Patient Goals:  Patient/family's goals: Return to previous home/apartment. Patient will receive PT, OT, and ST 60 minutes each per day, five days per week to achieve rehab goals or participate in 900 minutes of therapy within a 7 day week period.      Mobility Goals: Modified [place]  Transfer Goals: Modified [place]  Activities of Daily Living (ADLs) Goals: Modified [place]    Discharge Planning:  Rehabilitation and discharge goals discussed with the patient and/or family. Case Managment and Social Work to review patient/family resources and to coordinate Discharge Planning. Estimated length of stay: 10 to 14 days    Patient and Family Education and Training:  Rehabilitation and discharge goals discussed with the patient and/or family. Patient/family education/training needs to be discussed in weekly team meeting. Equipment/DME needs: Therapy teams to assess and evaluate for additional equipment/DME needs throughout rehabilitation stay    No past medical history on file. Past Surgical History:   Procedure Laterality Date    IR IVC FILTER PLACEMENT OPTIONAL/TEMPORARY  12/7/2023    IR STROKE ALERT  11/30/2023      No family history on file. Social History     Socioeconomic History    Marital status: Single     Spouse name: Not on file    Number of children: Not on file    Years of education: Not on file    Highest education level: Not on file   Occupational History    Not on file   Tobacco Use    Smoking status: Not on file    Smokeless tobacco: Not on file   Substance and Sexual Activity    Alcohol use: Not on file    Drug use: Not on file    Sexual activity: Not on file   Other Topics Concern    Not on file   Social History Narrative    Not on file     Social Determinants of Health     Financial Resource Strain: Not on file   Food Insecurity: No Food Insecurity (12/1/2023)    Hunger Vital Sign     Worried About Running Out of Food in the Last Year: Never true     Ran Out of Food in the Last Year: Never true   Transportation Needs: No Transportation Needs (12/1/2023)    PRAPARE - Transportation     Lack of Transportation (Medical): No     Lack of Transportation (Non-Medical):  No   Physical Activity: Not on file   Stress: Not on file   Social Connections: Not on file   Intimate Partner Violence: Not on file   Housing Stability: Unknown (12/1/2023)    Housing Stability Vital Sign     Unable to Pay for Housing in the Last Year: No     Number of Places Lived in the Last Year: 1     Unstable Housing in the Last Year: Not on file       Patient Active Problem List   Diagnosis    Stroke due to R ICA to MCA occlusion, s/p TNK and thrombectomy    Hypertension    Asthma    DVT of lower extremity (deep venous thrombosis) (HCC)     Allergies   Allergen Reactions    Aspirin Anaphylaxis    Ibuprofen Anaphylaxis          Medical Necessity Criteria for ARC Admission: Hypertension. In addition, the preadmission screen, post-admission physical evaluation, overall plan of care and admissions order demonstrate a reasonable expectation that the following criteria were met at the time of admission to the [address] [place] [address]. The patient requires active and ongoing therapeutic intervention of multiple therapy disciplines (physical therapy, occupational therapy, speech-language pathology, or prosthetics/orthotics), one of which is physical or occupational therapy. Patient requires an intensive rehabilitation therapy program, as defined in Chapter 1, section 110.2.2 of the CMS Medicare Policy Manual. This intensive rehabilitation therapy program will consist of at least 3 hours of therapy per day at least 5 days per week or at least 15 hours of intensive rehabilitation therapy within a 7 consecutive day period, beginning with the date of admission to the [address] [place] [address]. The patient is reasonably expected to actively participate in, and benefit significantly from, the intensive rehabilitation therapy program as defined in Chapter 1, section 110.2.2 of the CMS Medicare Policy Manual at this time of admission to the [address] [place] [address]. He can reasonably be expected to make measurable improvement (that will be of practical value to improve the patient’s functional capacity or adaptation to impairments) as a result of the rehabilitation treatment, as defined in section 110.3, and such improvement can be expected to be made within the prescribed period of time.  As noted in the CMS Medicare Policy Manual, the patient need not be expected to achieve complete independence in the domain of self-care nor be expected to return to his or her prior level of functioning in order to meet this standard. The patient must require physician supervision by a rehabilitation physician. As such, a rehabilitation physician will conduct face-to-face visits with the patient at least 3 days per week throughout the patient’s stay in the [facility] to assess the patient both medically and functionally, as well as to modify the course of treatment as needed to maximize the patient’s capacity to benefit from the rehabilitation process. The patient requires an intensive and coordinated interdisciplinary approach to providing rehabilitation, as defined in Chapter 1, section 110.2.5 of the CMS Medicare Policy Manual. This will be achieved through periodic team conferences, conducted at least once in a 7-day period, and comprising of an interdisciplinary team of medical professionals consisting of: a rehabilitation physician, registered nurse,  and/or , and a licensed/certified therapist from each therapy discipline involved in treating the patient. Changes Since Pre-admission Assessment: None -This patient's participation in rehab continues to be reasonable, necessary and appropriate. CMS Required Post-Admission Physician Evaluation Elements  History and Physical, including medical history, functional history and active comorbidities as in above text. Post-Admission Physician Evaluation:  The patient has the potential to make improvement and is in need of physical, occupational, and/or therapy services. The patient may also need nutritional services. Given the patient's complex medical condition and risk of further medical complications, rehabilitative services cannot be safely provided at a lower level of care, such as a skilled nursing facility.  I have reviewed the patient's functional and medical status at the time of the preadmission screening and they are the same as on the day of this admission. I acknowledge that I have personally performed a full physical examination on this patient within 24 hours of admission. The patient and/or family demonstrated understanding the rehabilitation program and the discharge process after we discussed them. Agree in entirety: yes  Minor adaptions: none    Major changes: none     Bruno Prince MD  Physical Medicine and Rehabilitation    ** Please Note: Fluency Direct voice to text software may have been used in the creation of this document.  **

## 2023-12-08 NOTE — NURSING NOTE
Patient had moved to bed with help of family members but did not have bed alarm on when I rounded on him. Significant other was with him in the room and said she is a nurse and would move him around the room to bathroom and chair. Educated family, significant other and patient about the importance of staff being present when he ambulated and that we needed to keep an alarm on him because he has been forgetful and had recent falls.

## 2023-12-08 NOTE — RESTORATIVE TECHNICIAN NOTE
Restorative Technician Note      Patient Name: Ameya Nieves     Note Type: Mobility  Patient Position Upon Consult: Supine  Activity Performed: Ambulated; Dangled; Stood  Assistive Device: Other (Comment) (none)  Education Provided: Yes  Patient Position at End of Consult: Supine;  All needs within reach; Bed/Chair alarm activated    Yordan LAU, Restorative Technician, [employer]

## 2023-12-08 NOTE — PHYSICAL THERAPY NOTE
PHYSICAL THERAPY NOTE          Patient Name: Eva Avila  NSVLA'Y Date: 12/8/2023 12/08/23 1031   PT Last Visit   PT Visit Date 12/08/23   Pain Assessment   Pain Assessment Tool 0-10   Pain Score No Pain   Restrictions/Precautions   Weight Bearing Precautions Per Order No   Other Precautions Cognitive; Chair Alarm; Bed Alarm; Fall Risk   General   Family/Caregiver Present Yes  (GF)   Cognition   Overall Cognitive Status Impaired   Attention Attends with cues to redirect   Orientation Level Oriented to person;Oriented to place;Oriented to time;Oriented to situation   Following Commands Follows multistep commands without difficulty   Comments please see OT MOCA assessment. Pt does demonstrate some cog limitations during session including difficulty sequencing tasks such as setting up tooth brush to brush teeth and poor insight to impairments asking if he can go for hike with dog and GF prior to stay at rehab. Subjective   Subjective agreeable, eager to return home   Bed Mobility   Supine to Sit 5  Supervision   Additional items Increased time required;Verbal cues   Sit to Supine Unable to assess   Transfers   Sit to Stand 5  Supervision   Additional items Verbal cues   Stand to Sit 5  Supervision   Additional items Verbal cues   Stand pivot 4  Minimal assistance   Additional items Assist x 1; Increased time required;Verbal cues   Additional Comments c RW vs no AD; x5 STS Throughout session   Ambulation/Elevation   Gait pattern Improper Weight shift;Decreased foot clearance; Excessively slow   Gait Assistance 4  Minimal assist   Additional items Assist x 1;Verbal cues  (CGA)   Assistive Device None   Distance 100'x2+40'x2+75'x2+20'x2  (includes DGI testing ; standing vs seated rest breaks bw)   Stair Management Assistance 4  Minimal assist   Additional items Assist x 1; Increased time required;Verbal cues   Stair Management Technique One rail R;Alternating pattern; Foreward;Reciprocal   Number of Stairs 7   Ambulation/Elevation Additional Comments SOB following stair negotation-SPO2 stable on RA when checked with rest break   Balance   Static Sitting Fair   Dynamic Sitting Fair   Static Standing Fair -   Dynamic Standing Poor +   Ambulatory Poor +   Endurance Deficit   Endurance Deficit Yes   Endurance Deficit Description fatigue   Activity Tolerance   Activity Tolerance Patient limited by fatigue   Medical Staff Made Aware partial co-tx with OT Bryanna due to medical complexity, impulsivity, and cog limitations as well as decreased activity tolerance noted last session   Nurse Made Aware yes   Assessment   Prognosis Good   Problem List Decreased strength;Decreased endurance; Impaired balance;Decreased mobility; Decreased cognition; Impaired judgement;Decreased safety awareness   Assessment Pt agreeable to participate in PT session. Pt performed functional mobility and therex as outlined above. FGA score 15/30, scoring 2 in all areas except for walking backwards (0), gait eyes closed (1), and tandem walking (0). Able to ambulate without AD however decreased foot clearance, inconsistent marshall, and imbalance especially head turns and distractions in environment. Pt left seated in chair with chair alarm donned and OT upon conclusion. Pt will continue to benefit from skilled acute care PT to further address their functional mobility limitations. Barriers to Discharge Inaccessible home environment   Goals   Patient Goals to go home   STG Expiration Date 12/13/23   PT Treatment Day 2   Plan   Treatment/Interventions Functional transfer training;LE strengthening/ROM; Elevations; Therapeutic exercise; Endurance training;Cognitive reorientation;Patient/family training;Equipment eval/education; Bed mobility;Gait training;Spoke to nursing;Spoke to case management;OT   PT Frequency 3-5x/wk   Discharge Recommendation   Rehab Resource Intensity Level, PT I (Maximum Resource Intensity)   AM-PAC Basic Mobility Inpatient   Turning in Flat Bed Without Bedrails 3   Lying on Back to Sitting on Edge of Flat Bed Without Bedrails 3   Moving Bed to Chair 3   Standing Up From Chair Using Arms 3   Walk in Room 3   Climb 3-5 Stairs With Railing 3   Basic Mobility Inpatient Raw Score 18   Basic Mobility Standardized Score 41.05   Highest Level Of Mobility   JH-HLM Goal 6: Walk 10 steps or more   JH-HLM Achieved 8: Walk 250 feet ot more   Exercises   Balance training  static/dynamic standing at sink x3 min S level with forward leans and bending over to drink out of sink faucet with CGA   Veronica Magallon, PT, DPT

## 2023-12-09 VITALS
WEIGHT: 207.23 LBS | RESPIRATION RATE: 15 BRPM | BODY MASS INDEX: 29.01 KG/M2 | OXYGEN SATURATION: 97 % | DIASTOLIC BLOOD PRESSURE: 71 MMHG | SYSTOLIC BLOOD PRESSURE: 128 MMHG | TEMPERATURE: 98 F | HEIGHT: 71 IN | HEART RATE: 68 BPM

## 2023-12-09 LAB
ANION GAP SERPL CALCULATED.3IONS-SCNC: 8 MMOL/L
ANION GAP SERPL CALCULATED.3IONS-SCNC: 8 MMOL/L
BASOPHILS # BLD AUTO: 0.05 THOUSANDS/ÂΜL (ref 0–0.1)
BASOPHILS # BLD AUTO: 0.05 THOUSANDS/ÂΜL (ref 0–0.1)
BASOPHILS NFR BLD AUTO: 1 % (ref 0–1)
BASOPHILS NFR BLD AUTO: 1 % (ref 0–1)
BUN SERPL-MCNC: 15 MG/DL (ref 5–25)
BUN SERPL-MCNC: 15 MG/DL (ref 5–25)
CALCIUM SERPL-MCNC: 8.2 MG/DL (ref 8.4–10.2)
CALCIUM SERPL-MCNC: 8.2 MG/DL (ref 8.4–10.2)
CHLORIDE SERPL-SCNC: 105 MMOL/L (ref 96–108)
CHLORIDE SERPL-SCNC: 105 MMOL/L (ref 96–108)
CO2 SERPL-SCNC: 26 MMOL/L (ref 21–32)
CO2 SERPL-SCNC: 26 MMOL/L (ref 21–32)
CREAT SERPL-MCNC: 1.26 MG/DL (ref 0.6–1.3)
CREAT SERPL-MCNC: 1.26 MG/DL (ref 0.6–1.3)
EOSINOPHIL # BLD AUTO: 0.54 THOUSAND/ÂΜL (ref 0–0.61)
EOSINOPHIL # BLD AUTO: 0.54 THOUSAND/ÂΜL (ref 0–0.61)
EOSINOPHIL NFR BLD AUTO: 6 % (ref 0–6)
EOSINOPHIL NFR BLD AUTO: 6 % (ref 0–6)
ERYTHROCYTE [DISTWIDTH] IN BLOOD BY AUTOMATED COUNT: 12.7 % (ref 11.6–15.1)
ERYTHROCYTE [DISTWIDTH] IN BLOOD BY AUTOMATED COUNT: 12.7 % (ref 11.6–15.1)
GFR SERPL CREATININE-BSD FRML MDRD: 59 ML/MIN/1.73SQ M
GFR SERPL CREATININE-BSD FRML MDRD: 59 ML/MIN/1.73SQ M
GLUCOSE SERPL-MCNC: 87 MG/DL (ref 65–140)
GLUCOSE SERPL-MCNC: 87 MG/DL (ref 65–140)
HCT VFR BLD AUTO: 41.3 % (ref 36.5–49.3)
HCT VFR BLD AUTO: 41.3 % (ref 36.5–49.3)
HCV AB SER QL: NORMAL
HCV AB SER QL: NORMAL
HGB BLD-MCNC: 13.1 G/DL (ref 12–17)
HGB BLD-MCNC: 13.1 G/DL (ref 12–17)
IMM GRANULOCYTES # BLD AUTO: 0.04 THOUSAND/UL (ref 0–0.2)
IMM GRANULOCYTES # BLD AUTO: 0.04 THOUSAND/UL (ref 0–0.2)
IMM GRANULOCYTES NFR BLD AUTO: 1 % (ref 0–2)
IMM GRANULOCYTES NFR BLD AUTO: 1 % (ref 0–2)
LYMPHOCYTES # BLD AUTO: 1.72 THOUSANDS/ÂΜL (ref 0.6–4.47)
LYMPHOCYTES # BLD AUTO: 1.72 THOUSANDS/ÂΜL (ref 0.6–4.47)
LYMPHOCYTES NFR BLD AUTO: 20 % (ref 14–44)
LYMPHOCYTES NFR BLD AUTO: 20 % (ref 14–44)
MCH RBC QN AUTO: 30.2 PG (ref 26.8–34.3)
MCH RBC QN AUTO: 30.2 PG (ref 26.8–34.3)
MCHC RBC AUTO-ENTMCNC: 31.7 G/DL (ref 31.4–37.4)
MCHC RBC AUTO-ENTMCNC: 31.7 G/DL (ref 31.4–37.4)
MCV RBC AUTO: 95 FL (ref 82–98)
MCV RBC AUTO: 95 FL (ref 82–98)
MONOCYTES # BLD AUTO: 1.17 THOUSAND/ÂΜL (ref 0.17–1.22)
MONOCYTES # BLD AUTO: 1.17 THOUSAND/ÂΜL (ref 0.17–1.22)
MONOCYTES NFR BLD AUTO: 14 % (ref 4–12)
MONOCYTES NFR BLD AUTO: 14 % (ref 4–12)
NEUTROPHILS # BLD AUTO: 5.03 THOUSANDS/ÂΜL (ref 1.85–7.62)
NEUTROPHILS # BLD AUTO: 5.03 THOUSANDS/ÂΜL (ref 1.85–7.62)
NEUTS SEG NFR BLD AUTO: 58 % (ref 43–75)
NEUTS SEG NFR BLD AUTO: 58 % (ref 43–75)
NRBC BLD AUTO-RTO: 0 /100 WBCS
NRBC BLD AUTO-RTO: 0 /100 WBCS
PLATELET # BLD AUTO: 320 THOUSANDS/UL (ref 149–390)
PLATELET # BLD AUTO: 320 THOUSANDS/UL (ref 149–390)
PMV BLD AUTO: 8.8 FL (ref 8.9–12.7)
PMV BLD AUTO: 8.8 FL (ref 8.9–12.7)
POTASSIUM SERPL-SCNC: 4 MMOL/L (ref 3.5–5.3)
POTASSIUM SERPL-SCNC: 4 MMOL/L (ref 3.5–5.3)
RBC # BLD AUTO: 4.34 MILLION/UL (ref 3.88–5.62)
RBC # BLD AUTO: 4.34 MILLION/UL (ref 3.88–5.62)
SODIUM SERPL-SCNC: 139 MMOL/L (ref 135–147)
SODIUM SERPL-SCNC: 139 MMOL/L (ref 135–147)
WBC # BLD AUTO: 8.55 THOUSAND/UL (ref 4.31–10.16)
WBC # BLD AUTO: 8.55 THOUSAND/UL (ref 4.31–10.16)

## 2023-12-09 PROCEDURE — 97116 GAIT TRAINING THERAPY: CPT

## 2023-12-09 PROCEDURE — 99239 HOSP IP/OBS DSCHRG MGMT >30: CPT | Performed by: FAMILY MEDICINE

## 2023-12-09 PROCEDURE — 97166 OT EVAL MOD COMPLEX 45 MIN: CPT

## 2023-12-09 PROCEDURE — 97162 PT EVAL MOD COMPLEX 30 MIN: CPT

## 2023-12-09 PROCEDURE — 97537 COMMUNITY/WORK REINTEGRATION: CPT

## 2023-12-09 PROCEDURE — 97535 SELF CARE MNGMENT TRAINING: CPT

## 2023-12-09 PROCEDURE — 85025 COMPLETE CBC W/AUTO DIFF WBC: CPT | Performed by: FAMILY MEDICINE

## 2023-12-09 PROCEDURE — 97110 THERAPEUTIC EXERCISES: CPT

## 2023-12-09 PROCEDURE — 80048 BASIC METABOLIC PNL TOTAL CA: CPT | Performed by: FAMILY MEDICINE

## 2023-12-09 PROCEDURE — 86803 HEPATITIS C AB TEST: CPT | Performed by: FAMILY MEDICINE

## 2023-12-09 PROCEDURE — 97530 THERAPEUTIC ACTIVITIES: CPT

## 2023-12-09 RX ORDER — ATORVASTATIN CALCIUM 40 MG/1
40 TABLET, FILM COATED ORAL EVERY EVENING
Qty: 30 TABLET | Refills: 0 | Status: SHIPPED | OUTPATIENT
Start: 2023-12-09 | End: 2024-01-08

## 2023-12-09 RX ORDER — ACETAMINOPHEN 325 MG/1
650 TABLET ORAL EVERY 6 HOURS PRN
Refills: 0
Start: 2023-12-09

## 2023-12-09 RX ORDER — ALBUTEROL SULFATE 90 UG/1
2 AEROSOL, METERED RESPIRATORY (INHALATION) EVERY 4 HOURS PRN
Qty: 6.7 G | Refills: 0 | Status: SHIPPED | OUTPATIENT
Start: 2023-12-09

## 2023-12-09 RX ORDER — FLUTICASONE FUROATE AND VILANTEROL 100; 25 UG/1; UG/1
1 POWDER RESPIRATORY (INHALATION) DAILY
Qty: 60 BLISTER | Refills: 0 | Status: SHIPPED | OUTPATIENT
Start: 2023-12-10

## 2023-12-09 RX ORDER — CLOPIDOGREL BISULFATE 75 MG/1
75 TABLET ORAL DAILY
Qty: 30 TABLET | Refills: 0 | Status: SHIPPED | OUTPATIENT
Start: 2023-12-10 | End: 2023-12-20 | Stop reason: ALTCHOICE

## 2023-12-09 RX ORDER — DOCUSATE SODIUM 100 MG/1
100 CAPSULE, LIQUID FILLED ORAL 2 TIMES DAILY
Refills: 0
Start: 2023-12-09 | End: 2023-12-20 | Stop reason: ALTCHOICE

## 2023-12-09 RX ORDER — PANTOPRAZOLE SODIUM 40 MG/1
40 TABLET, DELAYED RELEASE ORAL
Qty: 30 TABLET | Refills: 0 | Status: SHIPPED | OUTPATIENT
Start: 2023-12-10

## 2023-12-09 RX ADMIN — LEVALBUTEROL HYDROCHLORIDE 1.25 MG: 1.25 SOLUTION RESPIRATORY (INHALATION) at 08:55

## 2023-12-09 RX ADMIN — FLUTICASONE FUROATE AND VILANTEROL TRIFENATATE 1 PUFF: 100; 25 POWDER RESPIRATORY (INHALATION) at 08:55

## 2023-12-09 RX ADMIN — CLOPIDOGREL 75 MG: 75 TABLET ORAL at 08:54

## 2023-12-09 RX ADMIN — ALBUTEROL SULFATE 2 PUFF: 108 INHALANT RESPIRATORY (INHALATION) at 07:30

## 2023-12-09 RX ADMIN — ATORVASTATIN CALCIUM 40 MG: 40 TABLET, FILM COATED ORAL at 17:03

## 2023-12-09 RX ADMIN — PANTOPRAZOLE SODIUM 40 MG: 40 TABLET, DELAYED RELEASE ORAL at 05:27

## 2023-12-09 RX ADMIN — ACETAMINOPHEN 650 MG: 325 TABLET ORAL at 08:54

## 2023-12-09 RX ADMIN — DOCUSATE SODIUM 100 MG: 100 CAPSULE, LIQUID FILLED ORAL at 08:54

## 2023-12-09 RX ADMIN — IPRATROPIUM BROMIDE 0.5 MG: 0.5 SOLUTION RESPIRATORY (INHALATION) at 08:54

## 2023-12-09 NOTE — PROGRESS NOTES
OT Evaluation/ADL Session    Past Medical History:   Diagnosis Date    Asthma     Hypertension     Stroke [facility])        Past Surgical History:   Procedure Laterality Date    EYE SURGERY      IR IVC FILTER PLACEMENT OPTIONAL/TEMPORARY  12/07/2023    IR STROKE ALERT  11/30/2023 12/09/23 0700   Patient Data   Rehab Impairment Debility   Etiologic Diagnosis R MCA CVA with hemorrhagic conversion   Date of Onset 11/30/23   Support System   Name [address]   Relationship girlfriend   [employer] to provide 24 hour supervision Yes  ([address] works from home, but also travels. 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 Yes   Home Setup   Type of Home Single Level  (all needs on one level. Steps to basement/garage)   Method of Entry Stairs   Number of Stairs 1   First Floor Bathroom Full   First Floor Setup Available Yes   Prior IADL Participation   Money Management Identify Money;Estimate Costs;Manage Checkbook   Meal Preparation Full Participation   Laundry Full Participation   Home Cleaning Full Participation;Partial Participation  (shares household tasks)   Prior Level of Function   Self-Care 3. Independent - Patient completed the activities by him/herself, with or without an assistive device, with no assistance from a helper. Indoor-Mobility (Ambulation) 3. Independent - Patient completed the activities by him/herself, with or without an assistive device, with no assistance from a helper. Stairs 3. Independent - Patient completed the activities by him/herself, with or without an assistive device, with no assistance from a helper. Functional Cognition 3. Independent - Patient completed the activities by him/herself, with or without an assistive device, with no assistance from a helper.    Patient Preference   Nickname (Patient Preference) Jasen Desir   Patient Normally Wakes at 0730   Patient Normally Goes to Sleep at 1000   Psychosocial   Psychosocial (WDL) WDL Restrictions/Precautions   Weight Bearing Restrictions No   Pain Assessment   Pain Assessment Tool 0-10   Pain Score No Pain   Grooming   Able To Wash/Dry Face;Brush/Clean Teeth;Wash/Dry Hands   Limitation Noted In Safety   Tub/Shower Transfer   Reason Not Assessed Safety   Shower/Bathe Self   Type of Assistance Needed Set-up / clean-up   Physical Assistance Level No physical assistance   Shower/Bathe Self CARE Score 5   Bathing   Assessed Bath Style Sponge Bath   Anticipated D/C Bath Style Shower   Able to [place] No   Able to [employer] Temperature No   Able to Wash/Rinse/Dry (body part) Left Arm;Right Arm;L Upper Leg;R Upper Leg;L Lower Leg/Foot;R Lower Leg/Foot;Chest;Abdomen   Limitations Noted in Balance; Coordination; Endurance; Safety   Positioning Seated;Standing   Dressing/Undressing Clothing   Don UB Clothes Pullover Shirt   Type of Assistance Needed Supervision   Physical Assistance Level No physical assistance   Upper Body Dressing CARE Score 4   Don LB Clothes Shorts   Type of Assistance Needed Set-up / clean-up   Physical Assistance Level No physical assistance   Lower Body Dressing CARE Score 5   Putting On/Taking Off Footwear   Type of Assistance Needed Set-up / clean-up   Physical Assistance Level No physical assistance   Putting On/Taking Off Footwear CARE Score 5   Toileting Hygiene   Type of Assistance Needed Set-up / clean-up   Physical Assistance Level No physical assistance   Toileting Hygiene CARE Score 5   Toilet Transfer   Surface Assessed Standard Commode   Transfer Technique Standard   Limitations Noted In Balance; Endurance; Safety   Type of Assistance Needed Supervision   Physical Assistance Level No physical assistance   Toilet Transfer CARE Score 4   Toileting   Able to Pull Clothing down yes, up yes. Manage Hygiene Bladder   Limitations Noted In Balance; Safety   Lying to Sitting on Side of Bed   Type of Assistance Needed Supervision   Lying to Sitting on Side of Bed CARE Score 4   Sit to Stand   Type of Assistance Needed Physical assistance   Physical Assistance Level 25% or less   Comment CG A   Sit to Stand CARE Score 3   Picking Up Object   Reason if not Attempted Safety concerns   Picking Up Object CARE Score 88   Expression   Verbal Complex   Social Interaction   Cooperation with staff   RUE Assessment   RUE Assessment WFL  (5/5 throughout)   LUE Assessment   LUE Assessment WFL  (grossly 4+/5 throughout, grasp 4/5)   Sensation   Light Touch   (light touch WFL bilat UEs)   Cognition   Overall Cognitive Status WFL   Arousal/Participation Alert; Responsive; Cooperative   Attention Attends with cues to redirect   Orientation Level Oriented X4   Following Commands Follows one step commands without difficulty   Comments decreased insight to deficits at times   Vision   Vision Comments visual screen grossly [facility]. Pt does not wear glasses - reports cararact surgery with lenses approx 1 year prior. Very mild L inattention - mainly self corrects. Perception   Inattention/Neglect Cues to attend left visual field   Discharge Information   Patient's Discharge Plan home with sig other   OT Therapy Minutes   OT Time In 0700   OT Time Out 0830   OT Total Time (minutes) 90   OT Mode of treatment - Individual (minutes) 90       Pt seen for OT IE and ADL this date following admission to  ARU due to CVA. Pt presenting with noted ADL, functional mob, and strength deficits as outlined above. Appropriate for OT to address deficit areas to max I and safety with self care prior to discharge. Pt limited by decreased act kamari, decreased balance, slight L coordination deficits and impulsivity which impact all self care and functional mobility tasks. Vision and cognition to be monitored and addressed as needed. Pt independent and active prior to admission - continues to work in construction/rental properties. Supportive sig other at home who is RN.  Planning discharge to home once discharged from this facility. Continue to progress treatment as status allows.

## 2023-12-09 NOTE — NURSING NOTE
Pt requested to be d/c early and wants to do therapies at home. D/C'd. Home therapies will be set up on Monday via , referral sent home with d/c paper work. Medications and follow up appts reviewed with pt, wife and son. Pt wheeled in [facility] by RN. Transferred CG WC to car. No s/s of distress.

## 2023-12-09 NOTE — DISCHARGE INSTRUCTIONS
ACUTE REHABILITATION CENTER DISCHARGE INSTRUCTIONS:    WEIGHTBEARING STATUS:        ACTIVITY:  Please follow mobility, self care instructions as you were taught by inpatient rehabilitation therapists   Please continue using adaptive equipment as recommended  You will continue your rehabilitation with ***       RESTRICTIONS:  No driving  No strenuous work       MEDICATION CHANGES:   PLEASE SEE YOUR PCP FOR ALL MEDICATION REFILLS      OTHER:

## 2023-12-09 NOTE — NURSING NOTE
Pt awake resting in bed on cell phone. States did not sleep well overnight, scheduled melatonin was admin refer to [facility] pt states does not work. Pt also states does not have trouble sleeping at home, only while being in hospital. Turns & repos self, min assist with transfers and ambulation. RW to BR overnight. Bed alarm in place for safety. Continuing to monitor and follow plan of care.

## 2023-12-09 NOTE — DISCHARGE SUMMARY
Discharge Summary - PMR   Georgi Georges 58 y.o. male MRN: [id number]  Unit/Bed#: -01 Encounter: [id number]    Admission Date: 12/8/2023     Discharge Date: 12/9/2023    Etiologic/Rehabilitation Diagnosis: Impairment of mobility, safety and Activities of Daily Living (ADLs) due to Stroke:  01.1  Left Body Involvement (Right Brain)    HPI:  58 y.o. male with a medical history of but not limited to HTN, tobacco use and moderate persistent asthma who presented to the [facility] 11/30/2023 with acute onset of left sided weakness and right gaze preference while working in his garage. Imaging was concerning for right ICA to MCA occlusion and he was transferred to [facility]. He was intubated due to agitation and underwent thrombectomy. He was started on Precedex. On arrival to ICU he was noted to be hypoxemic with concern for right sided mucous plugging on CXR and underwent emergent bronchoscopy with clearance. He was extubated on 12/1. He was found to have an acute DVT of the L gastrocnemius, however anticoagulation was deferred due to risk for intracerebral hemorrhage. Internal Medicine was consulted due to SOB and cough as well as management of the DVT. CT C/A/P was performed which demonstrated right lung groundglass consolidations consistent with aspiration vs atypical/viral infection. Patient completed 5 days of azithromycin. Patient continued to have cough and some SOB despite antibiotic treatment and scheduled nebulizer with Xopenex and ipratropium. Pulm was consulted and recommended the addition of Breo Ellipta qd. Patient's SOB and cough improved. Neurology workup included TAO with LVEF of 65%, Thrombosis panel, CTH on 12/6 demonstrated evolving R. MCA distrubution infarction w/ areas of hemorrhagic transformation. Patient to continue Plavix for stroke ppx, Atorvastatin 40 mg, and have OP cardiology and neuro follow up.     Internal Medicine came on as primary team on 12/6/23 for further workup of his DVT. Doppler US of the lower extremity demonstrated: "acute DVT in proximal femoral vein (this was not present on 12/1 Doppler LE). Prev DVT in L gastrocnemius vein appears to be resolved." Due to clot propagation and inability to anticoagulate due to a small area of hemorrhage around his stroke patient was sent for IVC filter placement for PE prevention. He has been deemed hemodynamically stable at this time. PT/OT therapies were consulted and continue to follow patient at this time. PM&R was consulted and are recommending inpatient acute rehab when medically stable. All involved medical disciplines feel/agree patient is medically ready for discharge at this time. Inpatient acute rehabilitation physician was consulted. Upon review of patient's case and correspondence with PT/OT therapies and PM&R services, [facility] physician feels John Caruso will benefit and is a good candidate / appropriate for inpatient acute rehab at this time. John Caruso has demonstrated the willingness / desire and tolerance to participate in the required 3 hours or more of therapies per day. Procedures Performed During [facility] Admission: None    Acute Rehabilitation Center Course: The patient insisted on leaving wanting to go home and refused to stay any longer    No new Assessment & Plan notes have been filed under this hospital service since the last note was generated.   Service: PMR      Discharge Physical Examination: Not done    Significant Findings, Care, Treatment and Services Provided: Acute comprehensive interdisciplinary inpatient rehabilitation including PT, OT, SLP, RN, CM, SW, dietary, psychology, etc.    Complications: None    Functional Status Upon Admission to [facility]: Moderate assistance    Functional Status Upon Discharge from [facility]: Moderate assistance    Discharge Diagnosis: Impairment of mobility, safety and Activities of Daily Living (ADLs) due to Stroke:  01.1  Left Body Involvement (Right Brain)    Discharge Medications:   See after visit summary for reconciled discharge medications provided to patient and family. Condition at Discharge: [place]     Discharge instructions/Information to patient and family:   See after visit summary for information provided to patient and family. Provisions for Follow-Up Care:  See after visit summary for information related to follow-up care and any pertinent home health orders. No future appointments. Disposition:  Discharged home because he insisted on leaving    Planned Readmission: No    Discharge Statement   I spent 45 minutes discharging the patient. This time was spent on the day of discharge. I had direct contact with the patient on the day of discharge. Greater than 50% of the total time was spent examining patient, answering all patient questions, arranging and discussing plan of care with patient as well as directly providing post-discharge instructions. Additional time then spent on discharge activities. Discharge Medications:  See after visit summary for reconciled discharge medications provided to patient and family.       Facility Administered Medications Prior to Discharge:    Current Facility-Administered Medications   Medication Dose Route Frequency Provider Last Rate    acetaminophen  650 mg Oral Q6H PRN Santiago Otto MD      albuterol  2 puff Inhalation Q4H PRN Santiago Otto MD      aluminum-magnesium hydroxide-simethicone  30 mL Oral Q4H PRN Santiago Otto MD      atorvastatin  40 mg Oral QPM Santiago Otto MD      clopidogrel  75 mg Oral Daily Santiago Otto MD      docusate sodium  100 mg Oral BID Santiago Otto MD      Fluticasone Furoate-Vilanterol  1 puff Inhalation Daily Santiago Otto MD      ipratropium  0.5 mg Nebulization BID Santiago Otto MD      levalbuterol  1.25 mg Nebulization BID Santiago Otto MD      magnesium hydroxide  30 mL Oral Daily PRN Santiago Otto MD      melatonin  6 mg Oral HS Santiago Otto MD      ondansetron  4 mg Oral Q6H PRN Nano Denson MD      pantoprazole  40 mg Oral Early Morning Nano Denson MD      pneumococcal 23-valent polysaccharide vaccine  0.5 mL Subcutaneous Prior to discharge Nano Denson MD

## 2023-12-09 NOTE — PLAN OF CARE
Problem: PAIN - ADULT  Goal: Verbalizes/displays adequate comfort level or baseline comfort level  Description: Interventions:  - Encourage patient to monitor pain and request assistance  - Assess pain using appropriate pain scale  - Administer analgesics based on type and severity of pain and evaluate response  - Implement non-pharmacological measures as appropriate and evaluate response  - Notify physician/advanced practitioner if interventions unsuccessful or patient reports new pain  Outcome: Progressing     Problem: SAFETY ADULT  Goal: Patient will remain free of falls  Description: INTERVENTIONS:  - Educate patient/family on patient safety including physical limitations  - Instruct patient to call for assistance with activity   - Consult OT/PT to assist with strengthening/mobility   - Keep Call bell within reach  - Keep bed low and locked with side rails adjusted as appropriate  - Keep care items and personal belongings within reach  - Initiate and maintain comfort rounds  - Make Fall Risk Sign visible to staff  - Offer Toileting every 2 Hours, in advance of need  - Initiate/Maintain bed/chair alarm  - Apply yellow socks and bracelet for high fall risk patients  - Patient room near nurses station  Outcome: Progressing

## 2023-12-11 NOTE — PROGRESS NOTES
12/11/23 1152   Hello, [Guardian’s Name / Patient’s Galindo El, this is [Caller Galindo El from [facility], and our clinical care team wanted to check on you / your child after your recent visit to the hospital. It will only take 3-5 minutes. Is this a good time? Discharge Call Type/ Specific Diagnosis: General Call   Call Complete   Attempted Number of Calls 1   Discharge phone call complete?  Left Message

## 2023-12-11 NOTE — PHYSICAL THERAPY NOTE
PT DISCHARGE SUMMARY:      Patient seen for IE 12/09/2023, however, requested to be d/c to home with home therapy. Patient ANGEL at time of d/c S bed mobility, CG transfers with no AD, CG ambulation 265' level and unlevel surfaces no AD, CG 14 steps with single rail and cues for general safety and foot placement on steps. Remains limited by decreased ROM/strength, decreased balance and safety, decreased endurance, impulsivity, decreased awareness of deficits, and neglect. Patient d/c to home. Per NSG note, [facility] therapies to be set up on Monday. Transferred to car CG per nursing note.

## 2023-12-12 ENCOUNTER — TELEPHONE (OUTPATIENT)
Dept: FAMILY MEDICINE CLINIC | Facility: CLINIC | Age: 63
End: 2023-12-12

## 2023-12-12 NOTE — OCCUPATIONAL THERAPY NOTE
OT DISCHARGE SUMMARY     Pt d/c from ARU to home with family assist and [address]. At time of d/c, pt did not meet goals of mod I for ADL tasks and fxl transfers; however returning home with supportive family. Pt participated in ADL training, therapeutic exercises and activities, fxl mobility/transfers, and activity tolerance in order to progress towards goals.

## 2023-12-12 NOTE — PROGRESS NOTES
12/12/23 1133   Hello, [Guardian’s Name / Patient’s Leti Horton, this is [Caller Leti Horton from [facility], and our clinical care team wanted to check on you / your child after your recent visit to the hospital. It will only take 3-5 minutes. Is this a good time? Discharge Call Type/ Specific Diagnosis: General Call   Call Complete   Attempted Number of Calls 2   Discharge phone call complete?  Left Message

## 2023-12-12 NOTE — TELEPHONE ENCOUNTER
Patient girlfriend called stating [facility]. He was given a referral for PT , OT, Speech therapy for [facility] but there is a gap with his insurance. Patient is in need of referral for OT, PT and speech therapy for outpatient setting. Patient not seen since 4/2023. Offered appt as patient hasn't been seen since then. Patient girlfriend does not want to make an appt for him to get the referrals as referrals were given upon d/c from rehab. Can new orders be placed for patient?

## 2023-12-14 ENCOUNTER — OFFICE VISIT (OUTPATIENT)
Dept: FAMILY MEDICINE CLINIC | Facility: CLINIC | Age: 63
End: 2023-12-14
Payer: COMMERCIAL

## 2023-12-14 DIAGNOSIS — I10 HYPERTENSION, UNSPECIFIED TYPE: ICD-10-CM

## 2023-12-14 DIAGNOSIS — Z76.89 ENCOUNTER FOR SUPPORT AND COORDINATION OF TRANSITION OF CARE: Primary | ICD-10-CM

## 2023-12-14 DIAGNOSIS — I82.419 DEEP VEIN THROMBOSIS (DVT) OF FEMORAL VEIN, UNSPECIFIED CHRONICITY, UNSPECIFIED LATERALITY (HCC): ICD-10-CM

## 2023-12-14 DIAGNOSIS — D68.62 LUPUS ANTICOAGULANT DISORDER (HCC): ICD-10-CM

## 2023-12-14 DIAGNOSIS — I63.411 CEREBROVASCULAR ACCIDENT (CVA) DUE TO EMBOLISM OF RIGHT MIDDLE CEREBRAL ARTERY (HCC): ICD-10-CM

## 2023-12-14 DIAGNOSIS — Z59.89 DOES NOT HAVE HEALTH INSURANCE: ICD-10-CM

## 2023-12-14 DIAGNOSIS — Z87.828 TICK BITE WITH TICK ATTACHED FOR 8 HOURS OR LONGER: ICD-10-CM

## 2023-12-14 PROCEDURE — T1015 CLINIC SERVICE: HCPCS | Performed by: FAMILY MEDICINE

## 2023-12-14 PROCEDURE — 99495 TRANSJ CARE MGMT MOD F2F 14D: CPT

## 2023-12-14 RX ORDER — FLUTICASONE FUROATE AND VILANTEROL 100; 25 UG/1; UG/1
POWDER RESPIRATORY (INHALATION)
COMMUNITY
Start: 2023-12-10

## 2023-12-14 RX ORDER — ATORVASTATIN CALCIUM 40 MG/1
40 TABLET, FILM COATED ORAL EVERY EVENING
COMMUNITY
Start: 2023-12-10 | End: 2023-12-14 | Stop reason: SDUPTHER

## 2023-12-14 RX ORDER — CLOPIDOGREL BISULFATE 75 MG/1
75 TABLET ORAL DAILY
COMMUNITY
Start: 2023-12-10 | End: 2023-12-14 | Stop reason: SDUPTHER

## 2023-12-14 RX ORDER — PANTOPRAZOLE SODIUM 40 MG/1
TABLET, DELAYED RELEASE ORAL
COMMUNITY
Start: 2023-12-10

## 2023-12-14 RX ORDER — DOXYCYCLINE HYCLATE 100 MG/1
100 CAPSULE ORAL EVERY 12 HOURS SCHEDULED
Qty: 14 CAPSULE | Refills: 0 | Status: SHIPPED | OUTPATIENT
Start: 2023-12-14 | End: 2023-12-21

## 2023-12-14 SDOH — ECONOMIC STABILITY - INCOME SECURITY: OTHER PROBLEMS RELATED TO HOUSING AND ECONOMIC CIRCUMSTANCES: Z59.89

## 2023-12-14 NOTE — PROGRESS NOTES
FAMILY MEDICINE OFFICE VISIT  Formerly [facility]      NAME: Umesh Tello  AGE: 58 y.o. SEX: male    DATE OF ENCOUNTER: 12/14/2023    Assessment and Plan     1. Hypertension, unspecified type  Comments:  Off of medication  BP normal at this time  Home BP monitoring  low salt diet    2. Stroke due to R ICA to MCA occlusion, s/p TNK and thrombectomy  -     Ambulatory Referral to Physical Therapy; Future  -     Ambulatory Referral to Occupational Therapy; Future  -     Ambulatory Referral to Speech Therapy; Future  -     Ambulatory Referral to Social Work Care Management Program; Future  -     Ambulatory Referral to Neurology; Future  -     Ambulatory Referral to Pulmonary Rehabilitation; Future  -     Ambulatory Referral to Hematology / Oncology; Future; Expected date: 12/15/2023    3. Deep vein thrombosis (DVT) of femoral vein, unspecified chronicity, unspecified laterality [facility])  -     Ambulatory Referral to Hematology / Oncology; Future; Expected date: 12/15/2023    4. Tick bite with tick attached for 8 hours or longer  -     doxycycline hyclate (VIBRAMYCIN) 100 mg capsule; Take 1 capsule (100 mg total) by mouth every 12 (twelve) hours for 7 days    5. Lupus anticoagulant disorder (HCC)  -     Anti-Nuclear Ab by IFA (RDL); Future  -     Ambulatory Referral to Hematology / Oncology; Future; Expected date: 12/15/2023    6. Does not have health insurance  -     Ambulatory Referral to Social Work Care Management Program; Future          Umesh Tello is 58 y.o. male presented to the office for TCM visit. He is off of HTN medication at this time. BP stable at this time. Will monitor off of medication. Encouraged to not to drive at this time. Mazariegos tart Doxycycline for one week for the concern of Lyme disease.   Thrombosis panel positive for       Chief Complaint     Chief Complaint   Patient presents with    Follow-up     Referrals needed       History of Present Illness     Patient is 71-year-old male came to the hospital office for transition of care management. He was in hospital from 11/30-12/9 admitted with stroke. The following portions of the patient's history were reviewed and updated as appropriate: allergies, current medications, past family history, past medical history, past social history, past surgical history and problem list.    Review of Systems     Review of Systems    Active Problem List     Patient Active Problem List   Diagnosis    Mild intermittent asthma without complication    Chronic pansinusitis    Nasal polyposis    Aspirin sensitivity    HTN (hypertension)    Smoking    Moderate persistent asthma with acute exacerbation    Elevated blood-pressure reading without diagnosis of hypertension    Fatigue    Golfers elbow    History of drug abuse ([address])    Environmental allergies    Erectile dysfunction    Elevated fasting blood sugar    Stroke due to R ICA to MCA occlusion, s/p TNK and thrombectomy    Hypertension    Asthma    DVT of lower extremity (deep venous thrombosis) ([address])       Objective     There were no vitals taken for this visit. Physical Exam  Vitals and nursing note reviewed. Exam conducted with a chaperone present. Constitutional:       General: He is not in acute distress. Appearance: Normal appearance. HENT:      Head: Normocephalic and atraumatic. Right Ear: External ear normal.      Left Ear: External ear normal.      Nose: No congestion or rhinorrhea. Mouth/Throat:      Mouth: Mucous membranes are moist.      Pharynx: Oropharynx is clear. Eyes:      Extraocular Movements: Extraocular movements intact. Conjunctiva/sclera: Conjunctivae normal.   Cardiovascular:      Rate and Rhythm: Normal rate and regular rhythm. Pulses: Normal pulses. Heart sounds: Normal heart sounds. No murmur heard. Pulmonary:      Effort: Pulmonary effort is normal.      Breath sounds: Normal breath sounds. No wheezing.    Abdominal:      General: Bowel sounds are normal.      Palpations: Abdomen is soft. Tenderness: There is no abdominal tenderness. Musculoskeletal:      Cervical back: Neck supple. Right lower leg: No edema. Left lower leg: No edema. Skin:     General: Skin is warm and dry. Capillary Refill: Capillary refill takes less than 2 seconds. Neurological:      General: No focal deficit present. Mental Status: He is alert and oriented to person, place, and time. Psychiatric:         Behavior: Behavior normal.         Pertinent Laboratory/Diagnostic Studies:      Pulmonary Function test:   Date test performed: 06/23/2023     Date of test interpretation: 6/30/2023     Requesting Physician: Naheed Voss     Reason for Testing: Moderate persistent asthma     Reference set for interpretation: [id number]     Procedure: The patient was taken to pulmonary function testing laboratory. The patient demonstrated good effort and cooperation. The results of this test meet ATS standards for acceptability and repeatability. DLCO was corrected for patient's Hb     Results:  FEV1/FVC Ratio: 65 %  Forced Vital Capacity: 3.76 L    83 % predicted  FEV1: 2.45 L     71 % predicted  Post bronchodilator response: Bronchodilator was not administered     Lung volumes by body plethysmography:   Total Lung Capacity 106 % predicted   Residual volume 139 % predicted     DLCO corrected for patients hemoglobin level: 78 %     Interpretation:     Moderate obstructive airflow defect on spirometry     Bronchodilator response not performed.      Air trapping as indicated by the lung volumes     Normal diffusion capacity     Flow-volume loop consistent with obstruction    Current Medications     Current Outpatient Medications:     doxycycline hyclate (VIBRAMYCIN) 100 mg capsule, Take 1 capsule (100 mg total) by mouth every 12 (twelve) hours for 7 days, Disp: 14 capsule, Rfl: 0    Fluticasone Furoate-Vilanterol 100-25 mcg/actuation inhaler, INHALE 1 PUFF DAILY RINSE MOUTH AFTER USE DO NOT START BEFORE DECEMBER 10TH 2023, Disp: , Rfl:     ipratropium (ATROVENT) 0.02 % nebulizer solution, inhale contents of 1 vial (0.5 MG) in nebulizer every 4 to 6 hours if needed, Disp: , Rfl:     pantoprazole (PROTONIX) 40 mg tablet, TAKE 1 TABLET BY MOUTH ONCE DAILY IN THE EARLY MORNING, Disp: , Rfl:     acetaminophen (TYLENOL) 325 mg tablet, Take 2 tablets (650 mg total) by mouth every 6 (six) hours as needed for mild pain, Disp: , Rfl: 0    albuterol (2.5 mg/3 mL) 0.083 % nebulizer solution, Take 3 mL (2.5 mg total) by nebulization every 6 (six) hours as needed for wheezing or shortness of breath, Disp: 75 mL, Rfl: 0    albuterol (2.5 mg/3 mL) 0.083 % nebulizer solution, Take 3 mL (2.5 mg total) by nebulization every 6 (six) hours as needed for wheezing or shortness of breath, Disp: 90 mL, Rfl: 0    albuterol (ProAir HFA) 90 mcg/act inhaler, Inhale 2 puffs every 6 (six) hours as needed for wheezing, Disp: 8.5 g, Rfl: 0    albuterol (Proventil HFA) 90 mcg/act inhaler, Inhale 2 puffs every 4 (four) hours as needed for wheezing or shortness of breath, Disp: 18 g, Rfl: 0    albuterol (PROVENTIL HFA,VENTOLIN HFA) 90 mcg/act inhaler, Inhale 2 puffs every 4 (four) hours as needed for wheezing (chest tightness), Disp: 6.7 g, Rfl: 0    atorvastatin (LIPITOR) 40 mg tablet, Take 1 tablet (40 mg total) by mouth every evening, Disp: 30 tablet, Rfl: 0    benzonatate (TESSALON PERLES) 100 mg capsule, Take 1 capsule (100 mg total) by mouth 3 (three) times a day as needed for cough, Disp: 20 capsule, Rfl: 0    cetirizine (ZyrTEC) 5 MG tablet, Take 5 mg by mouth daily, Disp: , Rfl:     clopidogrel (PLAVIX) 75 mg tablet, Take 1 tablet (75 mg total) by mouth daily Do not start before December 10, 2023., Disp: 30 tablet, Rfl: 0    docusate sodium (COLACE) 100 mg capsule, Take 1 capsule (100 mg total) by mouth 2 (two) times a day, Disp: , Rfl: 0    Fluticasone Furoate-Vilanterol 100-25 mcg/actuation inhaler, Inhale 1 puff daily Rinse mouth after use. Do not start before December 10, 2023., Disp: 60 blister, Rfl: 0    montelukast (SINGULAIR) 10 mg tablet, Take 1 tablet (10 mg total) by mouth daily at bedtime, Disp: 90 tablet, Rfl: 2    pantoprazole (PROTONIX) 40 mg tablet, Take 1 tablet (40 mg total) by mouth daily in the early morning Do not start before December 10, 2023., Disp: 30 tablet, Rfl: 0    predniSONE 10 mg tablet, Take 6 pills on day 1, take 5 pills on day 2, take 4 pills on day 3, take 3 pills on day 4, take 2 pills on day 5, take 1 pill a day 6.  (Patient not taking: Reported on 11/25/2023), Disp: 21 tablet, Rfl: 0    predniSONE 10 mg tablet, Take 6 pills day 1, then 5 pills day 2, 4 pills day 3, 3 pills day 4, 2 pills day 5, 1 pill day 6,, Disp: 21 tablet, Rfl: 0    selenium sulfide (SELSUN) 2.5 % shampoo, Apply topically 2 (two) times a week, Disp: 118 mL, Rfl: 1    tadalafil (Cialis) 5 MG tablet, Take 1 tablet (5 mg total) by mouth daily as needed for erectile dysfunction, Disp: 10 tablet, Rfl: 0    Health Maintenance     Health Maintenance   Topic Date Due    Colorectal Cancer Screening  Never done    BMI: Followup Plan  04/26/2023    Annual Physical  04/26/2023    COVID-19 Vaccine (2 - 2023-24 season) 09/01/2023    Pneumococcal Vaccine: Pediatrics (0 to 5 Years) and At-Risk Patients (6 to 59 Years) (2 - PCV) 04/18/2024 (Originally 11/9/2017)    Hepatitis B Vaccine (2 of 3 - Risk 3-dose series) 04/18/2024 (Originally 7/12/2022)    Depression Screening  09/29/2024    BMI: Adult  12/09/2024    DTaP,Tdap,and Td Vaccines (2 - Td or Tdap) 11/09/2026    HIV Screening  Completed    Hepatitis C Screening  Completed    Influenza Vaccine  Completed    HIB Vaccine  Aged Out    IPV Vaccine  Aged Out    Hepatitis A Vaccine  Aged Out    Meningococcal ACWY Vaccine  Aged Out    HPV Vaccine  Aged Out     Immunization History   Administered Date(s) Administered    COVID-19 PFIZER VACCINE 0.3 ML IM 04/16/2021    Hep A / Hep B 06/14/2022, 06/14/2022    Influenza Quadrivalent Preservative Free 3 years and older IM 11/09/2016    Influenza, injectable, quadrivalent, preservative free 0.5 mL 12/08/2023    Pneumococcal Polysaccharide PPV23 11/09/2016    Tdap 11/09/2016           Gaviota Patel MD   Family Medicine  PGY- 3  12/14/2023 4:15 PM AM

## 2023-12-15 ENCOUNTER — TELEPHONE (OUTPATIENT)
Dept: NEUROLOGY | Facility: CLINIC | Age: 63
End: 2023-12-15

## 2023-12-15 ENCOUNTER — TELEPHONE (OUTPATIENT)
Dept: HEMATOLOGY ONCOLOGY | Facility: CLINIC | Age: 63
End: 2023-12-15

## 2023-12-15 DIAGNOSIS — I82.419 DEEP VEIN THROMBOSIS (DVT) OF FEMORAL VEIN, UNSPECIFIED CHRONICITY, UNSPECIFIED LATERALITY (HCC): ICD-10-CM

## 2023-12-15 DIAGNOSIS — I63.411 CEREBROVASCULAR ACCIDENT (CVA) DUE TO EMBOLISM OF RIGHT MIDDLE CEREBRAL ARTERY (HCC): Primary | ICD-10-CM

## 2023-12-15 NOTE — TELEPHONE ENCOUNTER
Just ordered it! Thank you! Would you mind calling patient to let him know? His PCP (Dr. Ferraro) will also follow the result to see if he can be restarted on a blood thinner (anticoagulation) for DVT management/prevention.

## 2023-12-15 NOTE — TELEPHONE ENCOUNTER
Post CVA Discharge Follow Up  Hospitalization: 11/30/23-12/8/23, 12/8/23-12/9/23 [facility]    Called patient with no answer. Left a voice message requesting for a call. Provided the office's phone number.

## 2023-12-15 NOTE — TELEPHONE ENCOUNTER
I called Alison Crawford in response to a referral that was received for patient to establish care with Hematology. Outreach was made to schedule a consultation. I left a voicemail explaining the reason for my call and advised patient to call [facility] at [phone]. Another attempt will be made to contact patient.

## 2023-12-15 NOTE — TELEPHONE ENCOUNTER
Post CVA Discharge Follow Up  Hospitalization: 11/30/23-12/8/23, 12/8/23-12/9/23 [facility]    Per inpatient neurology:  -Recommend patient to repeat CT head 10-14 days after stroke.  If no worsening hemorrhagic conversion, can start anticoagulation at that time.  Once anticoagulation started, can discontinue Plavix.     Dr. Peres- can you please place order for CT head if it is still recommended?  Thank you!

## 2023-12-18 DIAGNOSIS — Z86.73 HISTORY OF STROKE: Primary | ICD-10-CM

## 2023-12-18 DIAGNOSIS — I63.411 CEREBROVASCULAR ACCIDENT (CVA) DUE TO EMBOLISM OF RIGHT MIDDLE CEREBRAL ARTERY (HCC): ICD-10-CM

## 2023-12-18 NOTE — TELEPHONE ENCOUNTER
Called Zehra, patient's s/o. Advised her of Dr. Marcelino's response. She expressed understanding. Central scheduling is open from 7:30 am- 5:30 pm. She will call Central Scheduling tomorrow morning once it opens to schedule the CT head. Reminded her to call the PCP regarding the left leg pain. She denies any further questions at this time and was appreciative for the assistance.     **this RN is out of the office on 12/19/23, will follow up on 12/20/23 for results.

## 2023-12-18 NOTE — TELEPHONE ENCOUNTER
Zainab please call [phone] Zehra pts SO.   She is stating that a test came back that was not available when pt was admitted and that he needs to be seen ASAP.   Please assist.

## 2023-12-18 NOTE — TELEPHONE ENCOUNTER
12/18/23 at 12:36     Hi, this is Zehra Barahonajason. I'm calling for Antony Mckeon. 1960. I just spoke to your nurse. It is 12/18 at 12:35. There's a CT scan order, she instructed me to schedule it asap. However the central scheduling that the order is routine and they cannot get me in until December 26. I want to make sure that was okay or does she need to change the order? You can call me back at [phone]. Thank you.    Also, pls see below

## 2023-12-18 NOTE — TELEPHONE ENCOUNTER
"Post CVA Discharge Follow Up  Hospitalization: 11/30/23-12/8/23, 12/8/23-12/9/23 [facility]    There is no active medical communication consent form on file. Will need patient's permission to speak with Zehra regarding his medical care.     Called Zehra. Advised her of the above. Patient approved over the phone for  neurology to speak with Zehra regarding his medical care and medical information.     Reports he is doing okay overall. Patient continues to experience tiredness and feels achy. Claims patient is currently dealing with a cold.    Reports constant left leg pain that started in left knee that has moved to his left groin. Describes the pain as being moderate and started 3 days ago. Instructed her to contact the PCP for recommendations. She is also requesting for review of thrombosis panel since the results are now final. This RN will also send a message to Dr. Peres for recommendations and review.     He is ambulating independently as well as preforming his own ADLs.     Reviewed appointments - patient successfully followed up with PCP. Patient scheduled for the following: hematology on 12/20/23.     Offered to schedule stroke HFU. Per notes:  \"Patient will need to follow-up with her attending in stroke clinic in 4 to 6 weeks after discharge\" Scheduled stroke HFU for 1/11/24 at 9 am with Dr. Marcelino at the CV office. Provided appointment details.     Notified Zehra of the recommended CT head asap and Dr. Peres's response. Provided central scheduling's phone number. She will call to schedule the CT head immediately.     Reviewed medications with her. Reports he is taking as prescribed with no medication side effects or signs of bleeding.      As for risk factors, she continues to monitor his BP at home. On 12/16/23 his BP was 138/82. BP from yesterday on 12/17/23 was 140/90/   He is a non smoker.  Encouraged a well balanced diet with hydration.     She is aware the office will be in contact. She was " appreciative.

## 2023-12-18 NOTE — TELEPHONE ENCOUNTER
reentered the head CT order for a stat CT so they should get it done right away.     I agree, having him start with his primary care physician for the left leg pain is appropriate.  We know he has a DVT there distantly, it is possible that it is propagating upward a bit which may be 1 reason for his discomfort but they should evaluate his blood flow overall.  In the interim, with a stat head CT we will be able to make sure that the prior hemorrhage/bleeding as result of his clotting stroke is resolved/resolving.  Provided the days we could start him on anticoagulation to try and help reduce the risk of the clot in his leg getting worse.     In terms of the thrombosis panel, there was nothing significant per se.  There was a question about the lupus anticoagulant on the panel which would be a clotting disorder however this was just to the screening portion that was a little abnormal, when they did the full test there was no lupus anticoagulant detected.  His Antithrombin III level was slightly low.  This can commonly happen in the setting of an acute blood clot as he had.  We would want to repeat that test in likely 6 to 12 weeks to see if the level remains low before deciding whether it is clinically relevant, but regardless the plan for now is to transition him to anticoagulation once his head CT is stable for us to do so.     Please help him to get a head CT done as soon as possible and then let me know when it is available

## 2023-12-19 ENCOUNTER — TELEPHONE (OUTPATIENT)
Dept: FAMILY MEDICINE CLINIC | Facility: CLINIC | Age: 63
End: 2023-12-19

## 2023-12-19 ENCOUNTER — HOSPITAL ENCOUNTER (OUTPATIENT)
Dept: CT IMAGING | Facility: HOSPITAL | Age: 63
Discharge: HOME | End: 2023-12-19
Attending: PSYCHIATRY & NEUROLOGY

## 2023-12-19 DIAGNOSIS — Z86.73 HISTORY OF STROKE: ICD-10-CM

## 2023-12-19 PROCEDURE — 70450 CT HEAD/BRAIN W/O DYE: CPT

## 2023-12-19 PROCEDURE — G1004 CDSM NDSC: HCPCS

## 2023-12-19 NOTE — TELEPHONE ENCOUNTER
Hi, this is Zehra. Gosh, I'm calling for Antony Mckeon [id number]. He's complaining of pain in his left leg that's moved up from his knee up towards his groin. I spoke to the neurologist and they stated that I should make you guys aware. So if you give me a call back, my number is [phone], [phone]. It's approximately 2:00 PM on Tuesday. Thank you.       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 Her  needs carrier testing to determine significance to baby.  If he is also a carrier then there is a 25% chance of baby having the cystic fibrosis disorder, 50% chance of baby being a carrier of the gene mutation, and a 25% chance of baby being completely unaffected by the gene mutation.

## 2023-12-20 ENCOUNTER — OFFICE VISIT (OUTPATIENT)
Dept: FAMILY MEDICINE CLINIC | Facility: CLINIC | Age: 63
End: 2023-12-20
Payer: COMMERCIAL

## 2023-12-20 ENCOUNTER — APPOINTMENT (OUTPATIENT)
Dept: LAB | Facility: HOSPITAL | Age: 63
End: 2023-12-20
Payer: COMMERCIAL

## 2023-12-20 ENCOUNTER — HOSPITAL ENCOUNTER (OUTPATIENT)
Dept: NON INVASIVE DIAGNOSTICS | Facility: HOSPITAL | Age: 63
Discharge: HOME | End: 2023-12-20

## 2023-12-20 ENCOUNTER — HOSPITAL ENCOUNTER (EMERGENCY)
Facility: HOSPITAL | Age: 63
Discharge: HOME | End: 2023-12-20
Attending: EMERGENCY MEDICINE
Payer: COMMERCIAL

## 2023-12-20 ENCOUNTER — TELEPHONE (OUTPATIENT)
Dept: HEMATOLOGY ONCOLOGY | Facility: CLINIC | Age: 63
End: 2023-12-20

## 2023-12-20 VITALS
WEIGHT: 206 LBS | OXYGEN SATURATION: 94 % | BODY MASS INDEX: 28.84 KG/M2 | SYSTOLIC BLOOD PRESSURE: 120 MMHG | HEIGHT: 71 IN | TEMPERATURE: 97.8 F | RESPIRATION RATE: 18 BRPM | HEART RATE: 94 BPM | DIASTOLIC BLOOD PRESSURE: 82 MMHG

## 2023-12-20 VITALS
OXYGEN SATURATION: 98 % | RESPIRATION RATE: 18 BRPM | HEIGHT: 71 IN | TEMPERATURE: 98.4 F | HEART RATE: 109 BPM | BODY MASS INDEX: 28.84 KG/M2 | SYSTOLIC BLOOD PRESSURE: 118 MMHG | WEIGHT: 206 LBS | DIASTOLIC BLOOD PRESSURE: 82 MMHG

## 2023-12-20 DIAGNOSIS — M79.605 PAIN OF LEFT LOWER EXTREMITY: ICD-10-CM

## 2023-12-20 DIAGNOSIS — I63.411 CEREBROVASCULAR ACCIDENT (CVA) DUE TO EMBOLISM OF RIGHT MIDDLE CEREBRAL ARTERY (HCC): ICD-10-CM

## 2023-12-20 DIAGNOSIS — M79.605 PAIN OF LEFT LOWER EXTREMITY: Primary | ICD-10-CM

## 2023-12-20 DIAGNOSIS — I82.4Y2 DVT, LOWER EXTREMITY, PROXIMAL, ACUTE, LEFT (HCC): Primary | ICD-10-CM

## 2023-12-20 DIAGNOSIS — D68.62 LUPUS ANTICOAGULANT DISORDER (HCC): ICD-10-CM

## 2023-12-20 PROCEDURE — 85303 CLOT INHIBIT PROT C ACTIVITY: CPT

## 2023-12-20 PROCEDURE — T1015 CLINIC SERVICE: HCPCS | Performed by: FAMILY MEDICINE

## 2023-12-20 PROCEDURE — 93971 EXTREMITY STUDY: CPT | Performed by: SURGERY

## 2023-12-20 PROCEDURE — 85732 THROMBOPLASTIN TIME PARTIAL: CPT

## 2023-12-20 PROCEDURE — 85705 THROMBOPLASTIN INHIBITION: CPT

## 2023-12-20 PROCEDURE — 85613 RUSSELL VIPER VENOM DILUTED: CPT

## 2023-12-20 PROCEDURE — 85305 CLOT INHIBIT PROT S TOTAL: CPT

## 2023-12-20 PROCEDURE — 99284 EMERGENCY DEPT VISIT MOD MDM: CPT | Performed by: EMERGENCY MEDICINE

## 2023-12-20 PROCEDURE — 85670 THROMBIN TIME PLASMA: CPT

## 2023-12-20 PROCEDURE — 99283 EMERGENCY DEPT VISIT LOW MDM: CPT

## 2023-12-20 PROCEDURE — 85300 ANTITHROMBIN III ACTIVITY: CPT

## 2023-12-20 PROCEDURE — 86038 ANTINUCLEAR ANTIBODIES: CPT

## 2023-12-20 PROCEDURE — 86146 BETA-2 GLYCOPROTEIN ANTIBODY: CPT

## 2023-12-20 PROCEDURE — 85306 CLOT INHIBIT PROT S FREE: CPT

## 2023-12-20 PROCEDURE — 36415 COLL VENOUS BLD VENIPUNCTURE: CPT

## 2023-12-20 PROCEDURE — 93971 EXTREMITY STUDY: CPT

## 2023-12-20 PROCEDURE — 85598 HEXAGNAL PHOSPH PLTLT NEUTRL: CPT

## 2023-12-20 PROCEDURE — 86147 CARDIOLIPIN ANTIBODY EA IG: CPT

## 2023-12-20 RX ORDER — TIZANIDINE HYDROCHLORIDE 4 MG/1
4 CAPSULE, GELATIN COATED ORAL 3 TIMES DAILY
Qty: 21 CAPSULE | Refills: 0 | Status: SHIPPED | OUTPATIENT
Start: 2023-12-20 | End: 2023-12-27

## 2023-12-20 RX ORDER — RIVAROXABAN 15 MG-20MG
KIT ORAL
Qty: 51 EACH | Refills: 0 | Status: SHIPPED | OUTPATIENT
Start: 2023-12-20 | End: 2023-12-22

## 2023-12-20 RX ADMIN — RIVAROXABAN 15 MG: 15 TABLET, FILM COATED ORAL at 17:48

## 2023-12-20 NOTE — TELEPHONE ENCOUNTER
Eusebio Marcelino,    Patient completed CT head on 12/19/23. Results are final in the chart. Can you please review?    Thank you,  Zainab

## 2023-12-20 NOTE — PROGRESS NOTES
FAMILY MEDICINE OFFICE VISIT  [facility]      NAME: Miller Mckeon  AGE: 63 y.o. SEX: male    DATE OF ENCOUNTER: 12/20/2023    Assessment and Plan     1. Pain of left lower extremity  -     VAS lower limb venous duplex study, unilateral/limited; Future; Expected date: 12/20/2023  -     TiZANidine (ZANAFLEX) 4 MG capsule; Take 1 capsule (4 mg total) by mouth 3 (three) times a day for 7 days  -     Diclofenac Sodium (VOLTAREN) 1 %; Apply 2 g topically 4 (four) times a day    2. Cerebrovascular accident (CVA) due to embolism of right middle cerebral artery (HCC)  Comments:  Following up wiht Neurology  IVC filter in place  Currenlty not on Anticoagulation  AC as per neurology  Orders:  -     Thrombosis Panel; Future        Miller Mckeon is 63 y.o. male presented to the office with left thigh pain. At this time the differential is broad including, muscular origin, skeletal origin, DVT. Orders placed for VAS LE US to rule out DVT.   Will follow up with US reports. Plan is to consider for X-ray hip as appropriate. Continue tylenol and supportive measures for pain. Orders placed for muscle relaxer for as needed use.       Chief Complaint     Chief Complaint   Patient presents with    Pain     Lower extremities pain - especially left leg.   Sometimes both legs over the past 3 days.        History of Present Illness     Patient is 62 yo M with recent h/o CVA presented to the office with pain in left lower extremity. Started 3 days ago. Initially started at upper leg and gradually moving to left thigh. Currently he denies pain in leg and complaining of pain in medial thigh. Also reports trouble walking because of pain. Pain increased with movement and weight bearing. Improving with rest. Tried Tylenol and warm compress with out significant relief. Reports that he fell on to his left leg during the hospital stay. He denies any pain in his leg at the time of discharge. Denies fall or direct injury to  "the left lower extremity. Denies swelling, numbness, paresthesia in left leg.         The following portions of the patient's history were reviewed and updated as appropriate: allergies, current medications, past family history, past medical history, past social history, past surgical history and problem list.    Review of Systems     Review of Systems   Constitutional:  Negative for activity change.   HENT:  Negative for congestion and rhinorrhea.    Respiratory:  Negative for shortness of breath and wheezing.    Cardiovascular:  Negative for chest pain and palpitations.   Gastrointestinal:  Negative for abdominal pain, diarrhea and nausea.   Genitourinary:  Negative for difficulty urinating.   Musculoskeletal:  Positive for gait problem (leg pain on left). Negative for joint swelling.   Skin:  Negative for rash.   Neurological:  Negative for light-headedness and headaches.   Psychiatric/Behavioral:  The patient is not nervous/anxious.    All other systems reviewed and are negative.      Active Problem List     Patient Active Problem List   Diagnosis    Mild intermittent asthma without complication    Chronic pansinusitis    Nasal polyposis    Aspirin sensitivity    HTN (hypertension)    Smoking    Moderate persistent asthma with acute exacerbation    Elevated blood-pressure reading without diagnosis of hypertension    Fatigue    Golfers elbow    History of drug abuse (HCC)    Environmental allergies    Erectile dysfunction    Elevated fasting blood sugar    Stroke due to R ICA to MCA occlusion, s/p TNK and thrombectomy    Hypertension    Asthma    DVT of lower extremity (deep venous thrombosis) (Formerly [facility])       Objective     /82 (BP Location: Left arm, Patient Position: Sitting, Cuff Size: Large)   Pulse (!) 109   Temp 98.4 °F (36.9 °C) (Tympanic)   Resp 18   Ht 5' 11\" (1.803 m)   Wt 93.4 kg (206 lb)   SpO2 98%   BMI 28.73 kg/m²     Physical Exam  Vitals and nursing note reviewed. Exam conducted with a " chaperone present.   Constitutional:       General: He is not in acute distress.     Appearance: Normal appearance.   HENT:      Head: Normocephalic and atraumatic.      Right Ear: External ear normal.      Left Ear: External ear normal.      Nose: No congestion or rhinorrhea.      Mouth/Throat:      Mouth: Mucous membranes are moist.      Pharynx: Oropharynx is clear.   Eyes:      Extraocular Movements: Extraocular movements intact.      Conjunctiva/sclera: Conjunctivae normal.   Cardiovascular:      Rate and Rhythm: Normal rate and regular rhythm.      Pulses: Normal pulses.      Heart sounds: Normal heart sounds. No murmur heard.  Pulmonary:      Effort: Pulmonary effort is normal.      Breath sounds: Normal breath sounds. No wheezing.   Abdominal:      General: Bowel sounds are normal.      Palpations: Abdomen is soft.      Tenderness: There is no abdominal tenderness.   Musculoskeletal:         General: Tenderness (left thigh medially. no skin changes noted) present. No swelling.      Cervical back: Neck supple.      Right lower leg: No edema.      Left lower leg: No edema.   Skin:     General: Skin is warm and dry.      Capillary Refill: Capillary refill takes less than 2 seconds.   Neurological:      Mental Status: He is alert and oriented to person, place, and time. Mental status is at baseline.   Psychiatric:         Behavior: Behavior normal.         Current Medications     Current Outpatient Medications:     acetaminophen (TYLENOL) 325 mg tablet, Take 2 tablets (650 mg total) by mouth every 6 (six) hours as needed for mild pain, Disp: , Rfl: 0    albuterol (2.5 mg/3 mL) 0.083 % nebulizer solution, Take 3 mL (2.5 mg total) by nebulization every 6 (six) hours as needed for wheezing or shortness of breath, Disp: 75 mL, Rfl: 0    albuterol (2.5 mg/3 mL) 0.083 % nebulizer solution, Take 3 mL (2.5 mg total) by nebulization every 6 (six) hours as needed for wheezing or shortness of breath, Disp: 90 mL, Rfl: 0     albuterol (ProAir HFA) 90 mcg/act inhaler, Inhale 2 puffs every 6 (six) hours as needed for wheezing, Disp: 8.5 g, Rfl: 0    albuterol (Proventil HFA) 90 mcg/act inhaler, Inhale 2 puffs every 4 (four) hours as needed for wheezing or shortness of breath, Disp: 18 g, Rfl: 0    albuterol (PROVENTIL HFA,VENTOLIN HFA) 90 mcg/act inhaler, Inhale 2 puffs every 4 (four) hours as needed for wheezing (chest tightness), Disp: 6.7 g, Rfl: 0    atorvastatin (LIPITOR) 40 mg tablet, Take 1 tablet (40 mg total) by mouth every evening, Disp: 30 tablet, Rfl: 0    cetirizine (ZyrTEC) 5 MG tablet, Take 5 mg by mouth daily, Disp: , Rfl:     clopidogrel (PLAVIX) 75 mg tablet, Take 1 tablet (75 mg total) by mouth daily Do not start before December 10, 2023., Disp: 30 tablet, Rfl: 0    Diclofenac Sodium (VOLTAREN) 1 %, Apply 2 g topically 4 (four) times a day, Disp: 100 g, Rfl: 2    doxycycline hyclate (VIBRAMYCIN) 100 mg capsule, Take 1 capsule (100 mg total) by mouth every 12 (twelve) hours for 7 days, Disp: 14 capsule, Rfl: 0    Fluticasone Furoate-Vilanterol 100-25 mcg/actuation inhaler, Inhale 1 puff daily Rinse mouth after use. Do not start before December 10, 2023., Disp: 60 blister, Rfl: 0    Fluticasone Furoate-Vilanterol 100-25 mcg/actuation inhaler, INHALE 1 PUFF DAILY RINSE MOUTH AFTER USE DO NOT START BEFORE DECEMBER 10TH 2023, Disp: , Rfl:     tadalafil (Cialis) 5 MG tablet, Take 1 tablet (5 mg total) by mouth daily as needed for erectile dysfunction, Disp: 10 tablet, Rfl: 0    TiZANidine (ZANAFLEX) 4 MG capsule, Take 1 capsule (4 mg total) by mouth 3 (three) times a day for 7 days, Disp: 21 capsule, Rfl: 0    ipratropium (ATROVENT) 0.02 % nebulizer solution, inhale contents of 1 vial (0.5 MG) in nebulizer every 4 to 6 hours if needed (Patient not taking: Reported on 12/20/2023), Disp: , Rfl:     montelukast (SINGULAIR) 10 mg tablet, Take 1 tablet (10 mg total) by mouth daily at bedtime (Patient not taking: Reported on  12/20/2023), Disp: 90 tablet, Rfl: 2    pantoprazole (PROTONIX) 40 mg tablet, Take 1 tablet (40 mg total) by mouth daily in the early morning Do not start before December 10, 2023. (Patient not taking: Reported on 12/20/2023), Disp: 30 tablet, Rfl: 0    pantoprazole (PROTONIX) 40 mg tablet, TAKE 1 TABLET BY MOUTH ONCE DAILY IN THE EARLY MORNING (Patient not taking: Reported on 12/20/2023), Disp: , Rfl:     predniSONE 10 mg tablet, Take 6 pills on day 1, take 5 pills on day 2, take 4 pills on day 3, take 3 pills on day 4, take 2 pills on day 5, take 1 pill a day 6. (Patient not taking: Reported on 11/25/2023), Disp: 21 tablet, Rfl: 0    predniSONE 10 mg tablet, Take 6 pills day 1, then 5 pills day 2, 4 pills day 3, 3 pills day 4, 2 pills day 5, 1 pill day 6, (Patient not taking: Reported on 12/20/2023), Disp: 21 tablet, Rfl: 0    selenium sulfide (SELSUN) 2.5 % shampoo, Apply topically 2 (two) times a week (Patient not taking: Reported on 12/20/2023), Disp: 118 mL, Rfl: 1    Health Maintenance     Health Maintenance   Topic Date Due    Colorectal Cancer Screening  Never done    BMI: Followup Plan  04/26/2023    Annual Physical  04/26/2023    COVID-19 Vaccine (2 - 2023-24 season) 09/01/2023    Pneumococcal Vaccine: Pediatrics (0 to 5 Years) and At-Risk Patients (6 to 64 Years) (2 - PCV) 04/18/2024 (Originally 11/9/2017)    Hepatitis B Vaccine (2 of 3 - Risk 3-dose series) 04/18/2024 (Originally 7/12/2022)    Depression Screening  09/29/2024    BMI: Adult  12/09/2024    DTaP,Tdap,and Td Vaccines (2 - Td or Tdap) 11/09/2026    HIV Screening  Completed    Hepatitis C Screening  Completed    Influenza Vaccine  Completed    HIB Vaccine  Aged Out    IPV Vaccine  Aged Out    Hepatitis A Vaccine  Aged Out    Meningococcal ACWY Vaccine  Aged Out    HPV Vaccine  Aged Out     Immunization History   Administered Date(s) Administered    COVID-19 PFIZER VACCINE 0.3 ML IM 04/16/2021    Hep A / Hep B 06/14/2022, 06/14/2022     Influenza Quadrivalent Preservative Free 3 years and older IM 11/09/2016    Influenza, injectable, quadrivalent, preservative free 0.5 mL 12/08/2023    Pneumococcal Polysaccharide PPV23 11/09/2016    Tdap 11/09/2016           Miley Ferraro MD   Family Medicine  PGY- 3  12/20/2023 12:37 PM

## 2023-12-20 NOTE — TELEPHONE ENCOUNTER
Appointment Schedule   Who are you speaking with? Significant Other Iona   If it is not the patient, are they listed on an active communication consent form? Yes   Which provider is the appointment scheduled with? [facility]   At which location is the appointment scheduled for? [place]   When is the appointment scheduled?  Please list date and time 1/16/24 3:00pm   What is the reason for this appointment? New patient consult   Did patient voice understanding of the details of this appointment? Yes   Was the no show policy reviewed with patient? Yes       Iona calling to reschedule patient's appointment on 12/20/23 with Michelle Lora at 1:30pm.  Patient is having severe leg pain which is causing limited mobility.  Patient was scheduled for a STAT dopple done at 4:00pm due to the leg pain and patient will be unable to travel from [place] to [place].

## 2023-12-20 NOTE — TELEPHONE ENCOUNTER
I reviewed the head CT and also the radiology report.  It looks quite favorable.  At this time I would say he can transition to anticoagulation.    Typically for this we would recommend either Eliquis 5 mg twice per day or Xarelto 20 mg once per day with food.  Could you please check with him to see which she would prefer (with the Eliquis very important he does not miss doses so if he thinks he would have difficulty keeping up with twice daily medicine it might be better to do once per day, Xarelto really does need to be taken with food so if he does not typically eat when he takes his medicine Eliquis might be easier).  If 1 is preferred over the other in terms of insurance and thus is less expensive that would also be reasonable from my standpoint.    Please let me know and I will enter the appropriate order.    Once he starts on the anticoagulant medication he should stop Plavix.  Eventually we may be able to stop the anticoagulation and go back to a medicine like Plavix but at the moment he would not require both.

## 2023-12-20 NOTE — ED PROVIDER NOTES
History  Chief Complaint   Patient presents with    Abnormal Lab     Patient reports an increase in leg pain for the last few days. Had a venous doppler completed that showed a clot. History of a stroke 12/1, is supposed to start xarelto tomorrow.      HPI  This is a 63-year-old male past medical history significant for history of acute ischemic right ICA to MCA stroke on 11/30/2023, status post TNK and thrombectomy, complicated by hemorrhagic conversion, complicated by acute DVT left lower extremity during admission, status post IVC filter, on Plavix but not on anticoagulation due to start tomorrow who had worsening proximal left lower extremity pain for the last several days and was sent for a stat DVT study as an outpatient today which revealed a (known) acute left proximal DVT.  Patient was advised to come to the ER immediately for evaluation of this immediate finding.  Does note some proximal left leg pain which is mild denies any significant leg swelling, numbness, tingling, significant discoloration.  Patient with significant other who is a nurse reports intact pulses at home no acute changes today.    Patient was reportedly called in by outpatient prescriber Xarelto 20 mg daily to start tomorrow and was instructed to stop Plavix after today and to start the Xarelto tomorrow.  It appears patient also recently just underwent repeat head CT in the outpatient setting which demonstrated resolution of the hemorrhagic component of the stroke and the patient was cleared from a neurology perspective today to begin formal anticoagulation for the known DVT.  Prior to Admission Medications   Prescriptions Last Dose Informant Patient Reported? Taking?   Diclofenac Sodium (VOLTAREN) 1 %   No No   Sig: Apply 2 g topically 4 (four) times a day   Fluticasone Furoate-Vilanterol 100-25 mcg/actuation inhaler   No No   Sig: Inhale 1 puff daily Rinse mouth after use. Do not start before December 10, 2023.   Fluticasone  Furoate-Vilanterol 100-25 mcg/actuation inhaler   Yes No   Sig: INHALE 1 PUFF DAILY RINSE MOUTH AFTER USE DO NOT START BEFORE DECEMBER 10TH 2023   TiZANidine (ZANAFLEX) 4 MG capsule   No No   Sig: Take 1 capsule (4 mg total) by mouth 3 (three) times a day for 7 days   acetaminophen (TYLENOL) 325 mg tablet   No No   Sig: Take 2 tablets (650 mg total) by mouth every 6 (six) hours as needed for mild pain   albuterol (2.5 mg/3 mL) 0.083 % nebulizer solution   No No   Sig: Take 3 mL (2.5 mg total) by nebulization every 6 (six) hours as needed for wheezing or shortness of breath   albuterol (2.5 mg/3 mL) 0.083 % nebulizer solution   No No   Sig: Take 3 mL (2.5 mg total) by nebulization every 6 (six) hours as needed for wheezing or shortness of breath   albuterol (PROVENTIL HFA,VENTOLIN HFA) 90 mcg/act inhaler   No No   Sig: Inhale 2 puffs every 4 (four) hours as needed for wheezing (chest tightness)   albuterol (ProAir HFA) 90 mcg/act inhaler   No No   Sig: Inhale 2 puffs every 6 (six) hours as needed for wheezing   albuterol (Proventil HFA) 90 mcg/act inhaler   No No   Sig: Inhale 2 puffs every 4 (four) hours as needed for wheezing or shortness of breath   atorvastatin (LIPITOR) 40 mg tablet   No No   Sig: Take 1 tablet (40 mg total) by mouth every evening   cetirizine (ZyrTEC) 5 MG tablet   Yes No   Sig: Take 5 mg by mouth daily   doxycycline hyclate (VIBRAMYCIN) 100 mg capsule   No No   Sig: Take 1 capsule (100 mg total) by mouth every 12 (twelve) hours for 7 days   ipratropium (ATROVENT) 0.02 % nebulizer solution   Yes No   Sig: inhale contents of 1 vial (0.5 MG) in nebulizer every 4 to 6 hours if needed   Patient not taking: Reported on 12/20/2023   montelukast (SINGULAIR) 10 mg tablet   No No   Sig: Take 1 tablet (10 mg total) by mouth daily at bedtime   Patient not taking: Reported on 12/20/2023   pantoprazole (PROTONIX) 40 mg tablet   No No   Sig: Take 1 tablet (40 mg total) by mouth daily in the early morning Do  not start before December 10, 2023.   Patient not taking: Reported on 12/20/2023   pantoprazole (PROTONIX) 40 mg tablet   Yes No   Sig: TAKE 1 TABLET BY MOUTH ONCE DAILY IN THE EARLY MORNING   Patient not taking: Reported on 12/20/2023   predniSONE 10 mg tablet   No No   Sig: Take 6 pills on day 1, take 5 pills on day 2, take 4 pills on day 3, take 3 pills on day 4, take 2 pills on day 5, take 1 pill a day 6.   Patient not taking: Reported on 11/25/2023   predniSONE 10 mg tablet   No No   Sig: Take 6 pills day 1, then 5 pills day 2, 4 pills day 3, 3 pills day 4, 2 pills day 5, 1 pill day 6,   Patient not taking: Reported on 12/20/2023   rivaroxaban (Xarelto) 20 mg tablet   Yes Yes   Sig: Take 20 mg by mouth in the morning   selenium sulfide (SELSUN) 2.5 % shampoo   No No   Sig: Apply topically 2 (two) times a week   Patient not taking: Reported on 12/20/2023   tadalafil (Cialis) 5 MG tablet   No No   Sig: Take 1 tablet (5 mg total) by mouth daily as needed for erectile dysfunction      Facility-Administered Medications: None       Past Medical History:   Diagnosis Date    Asthma     Cataract     Hypertension     Stroke (HCC)        Past Surgical History:   Procedure Laterality Date    EYE SURGERY      IR IVC FILTER PLACEMENT OPTIONAL/TEMPORARY  12/07/2023    IR STROKE ALERT  11/30/2023    [place] SEPTOPLASTY/SUBMUCOUS RESECJ W/WO CARTILAGE GRF N/A 7/1/2021    Procedure: SEPTOPLASTY;  Surgeon: Artem Shanks MD;  [facility]: [facility];  Service: ENT    [place] STRTCTC CPTR ASSTD PX EXTRADURAL CRANIAL N/A 7/1/2021    Procedure: FUNCTIONAL ENDOSCOPIC SINUS SURGERY (FESS) IMAGED GUIDED, ALL SINUSES;  Surgeon: Artem Shanks MD;  Location[facility];  Service: ENT       Family History   Problem Relation Age of Onset    No Known Problems Mother     Heart attack Father      I have reviewed and agree with the history as documented.    E-Cigarette/Vaping    E-Cigarette Use Never User      E-Cigarette/Vaping Substances    Nicotine  No     THC No     CBD No     Flavoring No     Other No     Unknown No      Social History     Tobacco Use    Smoking status: Former     Current packs/day: 0.10     Average packs/day: 0.1 packs/day for 43.0 years (4.3 ttl pk-yrs)     Types: Cigarettes     Start date: 12/5/1980    Smokeless tobacco: Never    Tobacco comments:     smokes only when drinks 2 cigs     Quit 12/012/2023   Vaping Use    Vaping status: Never Used   Substance Use Topics    Alcohol use: Not Currently     Alcohol/week: 1.0 standard drink of alcohol     Types: 1 Cans of beer per week     Comment: occasionally/ Weekends    Drug use: Not Currently     Frequency: 1.0 times per week     Types: Marijuana       Review of Systems   Constitutional:  Negative for chills and fever.   HENT:  Negative for ear pain and sore throat.    Eyes:  Negative for pain and visual disturbance.   Respiratory:  Negative for cough and shortness of breath.    Cardiovascular:  Negative for chest pain and palpitations.   Gastrointestinal:  Negative for abdominal pain and vomiting.   Genitourinary:  Negative for dysuria and hematuria.   Musculoskeletal:  Negative for arthralgias and back pain.        Left leg pain   Skin:  Negative for color change and rash.   Neurological:  Negative for seizures and syncope.   All other systems reviewed and are negative.      Physical Exam  Physical Exam  Vitals and nursing note reviewed. Exam conducted with a chaperone present.   Constitutional:       Appearance: Normal appearance. He is obese.   HENT:      Head: Normocephalic and atraumatic.      Right Ear: External ear normal.      Left Ear: External ear normal.      Nose: Nose normal.      Mouth/Throat:      Mouth: Mucous membranes are moist.      Pharynx: Oropharynx is clear.   Eyes:      Conjunctiva/sclera: Conjunctivae normal.   Cardiovascular:      Rate and Rhythm: Normal rate and regular rhythm.      Pulses: Normal pulses.      Heart sounds: Normal heart sounds.   Pulmonary:       Effort: Pulmonary effort is normal.      Breath sounds: Normal breath sounds. No wheezing, rhonchi or rales.   Abdominal:      General: Abdomen is flat.      Tenderness: There is no abdominal tenderness. There is no guarding or rebound.   Musculoskeletal:      Right lower leg: No edema.      Left lower leg: No edema.   Skin:     General: Skin is warm and dry.   Neurological:      Mental Status: He is alert. Mental status is at baseline.         Vital Signs  ED Triage Vitals [12/20/23 1656]   Temp Pulse Respirations Blood Pressure SpO2   -- 94 18 120/82 94 %      Temp Source Heart Rate Source Patient Position - Orthostatic VS BP Location FiO2 (%)   Temporal Monitor Sitting Right arm --      Pain Score       No Pain           Vitals:    12/20/23 1656   BP: 120/82   Pulse: 94   Patient Position - Orthostatic VS: Sitting         Visual Acuity      ED Medications  Medications   rivaroxaban (XARELTO) tablet 15 mg (15 mg Oral Given 12/20/23 1748)       Diagnostic Studies  Results Reviewed       None                   No orders to display              Procedures  Procedures         ED Course  ED Course as of 12/20/23 1834   Wed Dec 20, 2023   1734 Case d/w heme-onc on call for recs regarding DVT.                                SBIRT 22yo+      Flowsheet Row Most Recent Value   Initial Alcohol Screen: US AUDIT-C     1. How often do you have a drink containing alcohol? 0 Filed at: 12/20/2023 1654   2. How many drinks containing alcohol do you have on a typical day you are drinking?  0 Filed at: 12/20/2023 1654   3a. Male UNDER 65: How often do you have five or more drinks on one occasion? 0 Filed at: 12/20/2023 1654   3b. FEMALE Any Age, or MALE 65+: How often do you have 4 or more drinks on one occassion? 0 Filed at: 12/20/2023 1654   Audit-C Score 0 Filed at: 12/20/2023 1654   ODETTE: How many times in the past year have you...    Used an illegal drug or used a prescription medication for non-medical reasons? Never Filed at:  12/20/2023 1654                      Medical Decision Making  This is a 63-year-old male who has a known acute DVT of left lower extremity found within the last admission, proximately 2 weeks ago, who had an IVC filter placed at that time due to ICH preventing formal oral anticoagulation who is due to start anticoagulation tomorrow and stop his Plavix presents here for a repeat DVT study which evidence for an acute DVT.    The DVT is known.  There are no evidence of acute neurovascular compromise.  The patient has an IVC filter in already.  Patient is due to start Xarelto tomorrow.  I discussed the case with hematology to review as I do not feel this patient requires inpatient management of DVT at this time and I think patient can simply start the Xarelto prescription to be discharged.  Hematology agrees but they wanted to confirm the outpatient prescription.  It appears the PCP had called in a 20 mg daily prescription which is not adequate to start for DVT in the outpatient setting and the patient should be started on 15 mg twice daily x 21 days and then switch to 20 mg daily thereafter.  Therefore, I did  patient on correct dosing and did ease prescribed a Xarelto starter pack and advised to be taking the starting dosing for the first 3 weeks.  Follow-up with hematology and PCP are instructed they are agreeable to that plan the first dose of 50 mg was given here as the evening dose and the patient can resume normal outpatient prescription starting tomorrow.    Problems Addressed:  DVT, lower extremity, proximal, acute, left (HCC): acute illness or injury    Risk  Prescription drug management.             Disposition  Final diagnoses:   DVT, lower extremity, proximal, acute, left (HCC)     Time reflects when diagnosis was documented in both MDM as applicable and the Disposition within this note       Time User Action Codes Description Comment    12/20/2023  5:32 PM Omari Dove Add [I82.4Y2] DVT,  lower extremity, proximal, acute, left (HCC)           ED Disposition       ED Disposition   Discharge    Condition   Stable    Date/Time   Wed Dec 20, 2023 1826    Comment   Miller Mckeon discharge to home/self care.                   Follow-up Information    None         Patient's Medications   Discharge Prescriptions    RIVAROXABAN (XARELTO STARTER PACK) 15 & 20 MG STARTER PACK    Take 15 mg by mouth twice daily for 21 days, then 20 mg once daily thereafter.       Start Date: 12/20/2023End Date: --       Order Dose: --       Quantity: 51 each    Refills: 0       No discharge procedures on file.    PDMP Review         Value Time User    PDMP Reviewed  Yes 7/1/2021  1:14 PM Artem Shanks MD            ED Provider  Electronically Signed by             Omari Dove DO  12/20/23 2096

## 2023-12-20 NOTE — TELEPHONE ENCOUNTER
Called Zehra, patient's significant other (previously received patient's verbal permission to speak with her about his medical information). Patient was also in the background of the phone conversation. Notified her of Dr. Marcelino's response. Patient is requesting Xarelto 20 mg once per day with food. Inquired about patient's insurance coverage. She reports he used to have [employer] plan and they in the process of reinstating it. They are aware of the involved costs and patient would like to proceed forward with the Xarelto. She is requesting for the xarelto to be sent to [facility] in [place]. Will send a message to the social work team to see if there is a financial program for Xarelto. Unable to complete prior authorization since insurance is pending. She expressed understanding and was appreciative.

## 2023-12-20 NOTE — TELEPHONE ENCOUNTER
SW looked into Xarelto 20mg 1x/day with a quantity of 30-    Patient may qualify for the [employer]F [place]) depending on household size and income.  Family of 2 must make less than 59,000/year to be potentially eligible for the program.      There are no copay cards for assistance if one does not have insurance.     Good Rx- The least expensive for a 30 day supply is currently at [facility] or [facility] for $528.96.      [facility]-  through a zip code search, [place] (aprx [place] miles away) is the least expensive close to patient's home but still costly at $542.  For a 30 day supply.      MICHAEL called patient at [phone].  Patient did not answer, called Zehra, significant other who also didn't answer.  Called patient back and left detailed message with information above.  MICHAEL provided patient with the [phone] to call if he wants to speak with a program counselor directly.  Provided MICHAEL contact information and requested c/b to discuss further.  MICHAEL remains available.

## 2023-12-20 NOTE — DISCHARGE INSTRUCTIONS
START XARELTO 15MG TWICE DAILY FOR 21 DAYS  THEN TAKE 20mg ONCE DAILY     A STARTER PACK PRESCRIPTION IS SENT TO YOUR PHARMACY    STOP PLAVIX

## 2023-12-21 ENCOUNTER — PATIENT OUTREACH (OUTPATIENT)
Dept: FAMILY MEDICINE CLINIC | Facility: CLINIC | Age: 63
End: 2023-12-21

## 2023-12-21 DIAGNOSIS — I63.411 CEREBROVASCULAR ACCIDENT (CVA) DUE TO EMBOLISM OF RIGHT MIDDLE CEREBRAL ARTERY (HCC): Primary | ICD-10-CM

## 2023-12-21 LAB
DEPRECATED AT III PPP: 124 % OF NORMAL (ref 92–136)
PROT C AG ACT/NOR PPP IA: 133 % OF NORMAL (ref 60–150)
PROT S ACT/NOR PPP: 78 % (ref 71–117)

## 2023-12-21 NOTE — TELEPHONE ENCOUNTER
MICHAEL looked into cost for Eliquis-    Patient can apply for the [place] PAP and potentially get the medication for free pending eligibility.  Patient would need to complete an application first and process takes about a month.      MICHAEL called Eliquis Support at ph# [phone].  Spoke with Claudia who states the only way to get the medication without having insurance is through the [place] program.  The  does not have any other free or reduced cost programs.      MICHAEL viewed [facility], Good Rx.  Unfortunately, the least expensive place to obtain Eliquis at this time is [facility], and it is $543 for 60 pills.      MICHAEL called patient at [phone].  Left detailed message of above.  MICHAEL also called patient's significant other and left message with the information above and attempt to contact patient.  SW remains available.

## 2023-12-21 NOTE — TELEPHONE ENCOUNTER
Zehra, significant other called back.  Patient was in the ER last night for clots in legs. Patient was discharged back home.  Zehra reports that the ER doctor prescribed patient a starter pack/different dosing of the Xarelto.  Patient has taken one pill.      Zehra is comfortable with obtaining the Xarelto at [facility] for aprx $460.  for the first month until the insurance is re-instated.  Zehra states the prescription the ER doctor provided is different than what Dr. Marcelino prescribed.      Zehra would like to  the medication at [facility] but needs a phone call so she knows which script to have filled.       Clincal team/Dr. Marcelino-  Please assist.  Zehra, significant other requesting she be called as patient not feeling the best.  Thank you in advance.

## 2023-12-21 NOTE — PROGRESS NOTES
[facility] SW received referral due to patient's lack of health insurance.  [facility] SW reached out regarding the above.  Patient preferred [facility] SW speak with his spouse.  [facility] SW spoke with spouse whom confirmed they are in the process of having their insurance reinstated and have been working with [facility] financial counselor.    Referral closed

## 2023-12-21 NOTE — QUICK NOTE
Quick Note: Medical Oncology:  Miller Mckeon 63 y.o. male MRN: [id number]  Unit/Bed#: RM10 Encounter: [id number]    Quick Note:  Telephone Query: Paged by Emergency Department Provider.  Description: In brief, patient is a 63-year-old male, with a past medical history significant for hypertension, moderate-persistent asthma, and tobacco-use disorder; who presents as a referral to [facility] Emergency Department as a referral for left lower extremity swelling.     Of particular importance, patient was recently hospitalized at [facility] from November 30th through December 8th for right M1 middle-cerebral artery occlusion, with accompanying occlusion of the right intracranial, and cervical internal carotid artery. Given the above-mentioned, Tenectaplase was administered, and patient underwent mechanical-thrombectomy. Patient was started on Plavix monotherapy (Anaphylactic reaction to Aspirin). Hospital course was further complicated by development of an acute occlusive deep vein thrombosis in the gastrocnemius vein of the left lower extremity on December 1st. Therapeutic-anticoagulation was deferred due to high-risk of hemorrhagic conversion. Neurology recommended to withhold therapeutic-anticoagulation. Ultimately, Repeat Lower Extremity Doppler Ultrasound was obtained on December 5th to re-evaluate for clot-burden; and imaging showed an acute deep vein thrombosis in the proximal femoral vein (Previous DVT in left gastrocnemius was reportedly resolved). Thus, a retrievable IVC-filter was placed on December 7th.     Of note, Repeat CT Head was recently obtained on December 19th, and was largely stable. Per Neurology documentation (See 'Telephone Encounter' from December 20th from Omari Marcelino M.D.), patient was recommended transition to oral-anticoagulation with a direct-oral anticoagulant, and discontinuation of Plavix. Patient opted to start  Xarelto. He subsequently presents to [facility] Emergency Department as a referral for left lower extremity swelling. Doppler Ultrasound was obtained and showed an occlusive deep vein thrombosis in the femoral, popliteal, and paired peroneal veins. Non-occlusive acute DVT was also noted in the paired posterior tibial veins.     The attending Emergency Department provider contacted me regarding the above-mentioned. Discussed that, if the patient is clinically stable, and neurovascularly-intact, agree with discharge to home on therapeutic anticoagulation with Xarelto 15 mg BID for 21-days, followed by Xarelto 20 mg Daily, thereafter. Patient is to discontinue Plavix, as per Neurology recommendations above. He is to follow-up closely with his Primary Care Provider, and Neurology accordingly.    Note: Patient has follow-up with Neurology on January 11th, and Hematology on January 16th.    Discussed the above-mentioned with my attending physician, Ponce Felton M.D. He is in agreement with this plan.    Sheila Johns D.O.  Hematology-Oncology Fellow (PGY-4)

## 2023-12-21 NOTE — TELEPHONE ENCOUNTER
Patient's gf called stating this wasn't called In yet and after he had a VAS study, they found 5 clots in his leg and she'd really like to get him started on this.

## 2023-12-22 DIAGNOSIS — I63.411 CEREBROVASCULAR ACCIDENT (CVA) DUE TO EMBOLISM OF RIGHT MIDDLE CEREBRAL ARTERY (HCC): ICD-10-CM

## 2023-12-22 NOTE — TELEPHONE ENCOUNTER
12/21/23 at 0958    Good morning, I'm calling for Antony Mckeon, date of birth 1960. I spoke with Zainab yesterday. She said that they were gonna order Xarelto 20. We ended up at the ER last night because he has a occlusive deep vein thrombosis. They want it to start him on a starter pack of 15 milligrams twice a day of the xarelto. So I went to the pharmacy, they didn't have the prescription for the 20 and they're having trouble getting the starter pack. So I'm just wondering, should I use the starter pack? Should I wait for you to put the 20 prescription in? He did have one dose last night, but he has not had xarelto today. And I did not give him the plavix. Thank you. It's 10 AM. Thanks.

## 2023-12-22 NOTE — TELEPHONE ENCOUNTER
Called Zehra. Notified her of Dr. Marcelino's response. She expressed understanding. The patient started the xarelto starter pack yesterday. They had to drive an hour and a half to find a pharmacy where they had it in stock. Patient is aware not to take the plavix.     Will send a message to the provider to send in the script to ensure an appropriate transition once the starter pack is completed.     She denies any further questions or concerns. She was appreciative.    Patent

## 2023-12-22 NOTE — TELEPHONE ENCOUNTER
Given these findings I would agree with the ED plan to start with the 15mg twice daily initially and then transition to the 20mg once daily with food.  That is the rx that should be filled.  Thanks.

## 2023-12-23 LAB
APTT HEX PL PPP: 8 SEC (ref 0–11)
APTT SCREEN TO CONFIRM RATIO: 1.13 RATIO (ref 0–1.34)
APTT-LA IMM 4:1 NP PPP: 51.5 SEC (ref 0–40.5)
B2 GLYCOPROT1 IGA SERPL IA-ACNC: 6.7
B2 GLYCOPROT1 IGG SERPL IA-ACNC: 1.8
B2 GLYCOPROT1 IGM SERPL IA-ACNC: 2.9
CARDIOLIPIN IGA SER IA-ACNC: 5.9
CARDIOLIPIN IGG SER IA-ACNC: 2.3
CARDIOLIPIN IGM SER IA-ACNC: 2.6
CONFIRM APTT/NORMAL: 40.5 SEC (ref 0–47.6)
DRVVT IMM 1:2 NP PPP: 47.9 SEC (ref 0–40.4)
DRVVT SCREEN TO CONFIRM RATIO: 1.5 RATIO (ref 0.8–1.2)
LA PPP-IMP: ABNORMAL
PROT S ACT/NOR PPP: 105 % (ref 61–136)
PROT S PPP-ACNC: 125 % (ref 60–150)
SCREEN APTT: 56.8 SEC (ref 0–43.5)
SCREEN DRVVT: 60.9 SEC (ref 0–47)
THROMBIN TIME: 14.9 SEC (ref 0–23)

## 2023-12-29 LAB
Lab: NORMAL
MISCELLANEOUS LAB TEST RESULT: NORMAL
STONE ANALYSIS-IMP: NORMAL

## 2024-01-04 ENCOUNTER — EVALUATION (OUTPATIENT)
Dept: PHYSICAL THERAPY | Facility: CLINIC | Age: 64
End: 2024-01-04
Payer: COMMERCIAL

## 2024-01-04 ENCOUNTER — EVALUATION (OUTPATIENT)
Dept: SPEECH THERAPY | Facility: CLINIC | Age: 64
End: 2024-01-04
Payer: COMMERCIAL

## 2024-01-04 DIAGNOSIS — I63.411 CEREBROVASCULAR ACCIDENT (CVA) DUE TO EMBOLISM OF RIGHT MIDDLE CEREBRAL ARTERY (HCC): ICD-10-CM

## 2024-01-04 DIAGNOSIS — I63.411 CEREBROVASCULAR ACCIDENT (CVA) DUE TO EMBOLISM OF RIGHT MIDDLE CEREBRAL ARTERY (HCC): Primary | ICD-10-CM

## 2024-01-04 DIAGNOSIS — R41.841 COGNITIVE COMMUNICATION DEFICIT: Primary | ICD-10-CM

## 2024-01-04 PROCEDURE — 97162 PT EVAL MOD COMPLEX 30 MIN: CPT | Performed by: PHYSICAL THERAPIST

## 2024-01-04 PROCEDURE — 96125 COGNITIVE TEST BY HC PRO: CPT

## 2024-01-04 PROCEDURE — 97112 NEUROMUSCULAR REEDUCATION: CPT | Performed by: PHYSICAL THERAPIST

## 2024-01-04 NOTE — PROGRESS NOTES
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 male referred to outpatient skilled Speech Language Pathology services for a Cognitive evaluation s/p hospital stay from 23-23 for CVA d/t R ICA to MCA occlusion, s/p TNK and thrombectomy. PMHx includes HTN, smoking, asthma. He was seen by ST in the hospital for dysphagia, downgraded to soft and bite sized diet and then upgraded back to regular solids d/t improvement. Pt is in office today with his partner, Zehra.     Skilled evaluation indicated in order to determine cognitive status and develop responsive POC as indicated.    Patient/CG subjective report of HPI: reports increased confusion s/p stroke. Previous activities prior to stroke included traveling, shopping, house work, four wheelers; reports somewhat depressed mood, some anxiety about this happening again; somewhat more impulsive. Feeling like some of his cognitive skills are coming back.     Patient/CG goal(s): Return to work; finish therapy    Prior Status    Prior functional status: WFL    Previous therapy: Inpatient acute care - ST    Previous therapy assessment results / tx outcomes: dysphagia tx in hospital 2x only     Assessments    The Repeatable Battery for the Assessment of Neuropsychological Status  (RBANS) is a brief, individually-administered assessment which measures attention, language, visuospatial/constructional abilities, and immediate & delayed memory. The RBANS is intended for use with adolescents to adults, ages 12 to 89 years. The following results were obtained during the administration of the assessment.    Form: A    Cognitive Domain/Subtest: Index Score: Percentile Rank: Classification:   IMMEDIATE MEMORY 94  Average        1. List Learning (24/40)        2. Story Memory (18/24)       VISUOSPATIAL/  CONSTRUCTIONAL 72  Borderline        3. Figure Copy (10/20)        4. Line Orientation (17/20)       LANGUAGE 90  Average        5. Picture Naming (10/10)        6. Semantic Fluency (15/40)       ATTENTION 100  Average        7. Digit Span (15/16)        8. Coding (25/89)       DELAYED MEMORY 84  Low Average        9. List Recall (6/10)        10. List Recognition (18/20)        11. Story Recall (6/12)        12. Figure Recall (11/20)       Sum of Index Scores:  440   Index Score: 83       Classification: Low Average     Impressions    Impressions:   Patient presents with s/s suggestive of mild cognitive impairment r/t CVA with primary deficits in visuospatial awareness, delayed memory, and executive functioning. Dynamic conversation revealed verbal impulsivity, lack of awareness/insight into deficits (e.g., unrealistic expectations - returning to [employer] work next week), attention deficits (easily distracted - benefited from shutting door and closing window shade), difficulty following conversation (in terms of answering questions that were asked, remembering what was previously said). Some difficulty as well with language comprehension (was explained to several times the purpose of therapeutic testing 3-4x throughout course of session).     Recommendations:  Pt would benefit from skilled ST services for Cognition in order to Improve cognitive functioning for IND living     Rehabilitation  prognosis: Good rehab potential to reach the established goals    Goals & Intervention Plan    Long-term goals:  Pt will reduce impact of cognitive impairment from mild to occ/absent with combination of restorative and compensatory strategies as indicated.     Short-term goals:  Pt will perform EF based tasks involving problem solving, attention, planning, decision making, etc with 80% acc given min v/v cues.   Pt will complete STM based tasks with 80% acc given min v/v cues.   Pt will engage in visual scanning and visual problem solving tasks with 80% acc given min v/v cues.   Pt and family support will be educated re strategies (internal and external aids) to support IND with cognitive functioning.   Pt will complete HEP as recommended.     Frequency: 2x/week  Duration: 6-8 weeks    Intervention certification from: 1/4/2024  Intervention certification to: 03/05/2024    Intervention comments:   Partner's name is Zehra.   Standardized testing administration 1hr; scoring and reporting 2hr

## 2024-01-04 NOTE — PROGRESS NOTES
"PT Evaluation     Today's date: 2024  Patient name: Miller Mckeon  : 1960  MRN: [id number]  Referring provider: Miley Ferraro MD  Dx:   Encounter Diagnosis     ICD-10-CM    1. Stroke due to R ICA to MCA occlusion, s/p TNK and thrombectomy  I63.411 Ambulatory Referral to Physical Therapy                     Assessment  Assessment details: Pt is a  63 YOM referred to OPPT for CVA. Pt demo decreased L UE and LE strength, increased L LE tone, impaired balance (but overall balance is fair), impaired coordination, and decreased endurance. He was easily OOB and needed inhaler and frequent rest breaks with exertion. Due to fatigue, he deferred completing 6 MWT today. He also demo some poor insight into his deficits and agitation during eval. Pt would benefit from skilled PT to maximize functional mobility and improve strength, balance, and endurance in order to return to PLOF.     Goals  ST weeks  1. Complete 6 MWT to demo improved endurance  2. 5 x STS improve by 3 sec or > to demo improved LE strength and balance  LT-8 weeks  1. Return to working on houses/properties safely  2. Independent with updated HEP to self manage and maintain progress outside of PT      Plan  Planned therapy interventions: therapeutic activities, stretching, strengthening, therapeutic exercise, patient education, neuromuscular re-education, balance, gait training, home exercise program and coordination  Frequency: 2x week  Duration in weeks: 8  Plan of Care beginning date: 2024  Plan of Care expiration date: 4/3/2024  Treatment plan discussed with: PTA and patient        Subjective Evaluation    History of Present Illness  Mechanism of injury: Per EMR, \" 63 y.o. male with a medical history of but not limited to HTN, tobacco use and moderate persistent asthma who [facility] with acute onset of left sided weakness and right gaze preference while working in his garage. " "Imaging was concerning for right ICA to MCA occlusion and he was transferred to [facility]. He was intubated due to agitation and underwent thrombectomy. He was started on Precedex. On arrival to ICU he was noted to be hypoxemic with concern for right sided mucous plugging on CXR and underwent emergent bronchoscopy with clearance. He was extubated on 12/1. He was found to have an acute DVT of the L gastrocnemius, however anticoagulation was deferred due to risk for intracerebral hemorrhage. Internal Medicine was consulted due to SOB and cough as well as management of the DVT.  CT C/A/P was performed which demonstrated right lung groundglass consolidations consistent with aspiration vs atypical/viral infection. Patient completed 5 days of azithromycin. Patient continued to have cough and some SOB despite antibiotic treatment and scheduled nebulizer with Xopenex and ipratropium. Pulm was consulted and recommended the addition of Breo Ellipta qd. Patient's SOB and cough improved. Neurology workup included TAO with LVEF of 65%, Thrombosis panel, CTH on 12/6 demonstrated evolving R. MCA distrubution infarction w/ areas of hemorrhagic transformation. Patient to continue Plavix for stroke ppx, Atorvastatin 40 mg, and have OP cardiology and neuro follow up.    Internal Medicine came on as primary team on 12/6/23 for further workup of his DVT. Doppler US of the lower extremity demonstrated: \"acute DVT in proximal femoral vein (this was not present on 12/1 Doppler LE). Prev DVT in L gastrocnemius vein appears to be resolved.\" Due to clot propagation and inability to anticoagulate due to a small area of hemorrhage around his stroke patient was sent for IVC filter placement for PE prevention. He has been deemed hemodynamically stable at this time. PT/OT therapies were consulted and continue to follow patient at this time. PM&R was consulted and are recommending inpatient acute rehab when medically stable. All involved medical " "disciplines feel/agree patient is medically ready for discharge at this time. Inpatient acute rehabilitation physician was consulted. Upon review of patient's case and correspondence with PT/OT therapies and PM&R services, [facility] physician feels Antony will benefit and is a good candidate / appropriate for inpatient acute rehab at this time. Antony has demonstrated the willingness / desire and tolerance to participate in the required 3 hours or more of therapies per day.\"     ARC: - - The patient insisted on leaving, wanting to go home and refused to stay any longer. Did not have any HH due to blood clots. Had 4 blood clots.     Pt's wife Zehra was present during today's eval. Reports that vision gets blurry and has dizziness occasionally. Does not use AD but balance is not good. Grabs onto furniture and support. Has poor endurance and L LE/knee was really hurt from his blood clots. Has some n/t in B hands and feet. Per Zehra, he started blood thinner about 5-6 days ago and L LE is not as painful. Has been up and moving, but overall deconditioned and weaker. Was not doing much when he was having too much pain from blood clots.     Work: self employed, construction and handiman for rental properties    Patient Goals  Patient goals for therapy: improved balance and increased strength    Pain  Current pain ratin    Social Support  Steps to enter house: yes (1 [address])  Stairs in house: yes (ranch but steps to basement)   Lives with: spouse    Employment status: working    Diagnostic Tests    FCE comments: 23 CT head: Evolving known right MCA territory infarct without new findings.    23: VAS LE venous duplex: RIGHT LOWER LIMB LIMITED:  Evaluation shows no evidence of thrombus in the common femoral vein.  Doppler evaluation shows a normal response to augmentation maneuvers.     LEFT LOWER LIMB:  Occlusive acute deep vein thrombosis noted in the femoral, popliteal, and  paired peroneal veins. Non " occlusive acute DVT noted in the paired posterior  tibial veins.  No evidence of superficial thrombophlebitis noted.  Popliteal, posterior tibial and anterior tibial arterial Doppler waveforms are  triphasic.        Objective    BP: 124/80  HR: 82 bpm  SpO2: 94%  Balance Test    6 Minute Walk Test (ft): Deferred, TBA    FGA    Gait Speed (ft/s): 0.74 m/s    5x Sit To Stand (s): 15.63   TU.72         Coordination Left Right   Heel To Shin abnormal WNL   Finger To Nose     Rapid Alternating Movement     UE     LE abnormal WNL       Sensation Left Right   Kinesthesia WNL WNL   Light Touch intact intact   Sharp/Dull     2 Point Discrimination         Muscle Tone Left Right   Modified Nasrin Scale     Hamstring 2 0   Gastroc clonus 0   Quad 2 0     UE screen: L4/5, R 5/5 (R dominant)    Manual Muscle Testing - Hip Left Right   Flexion 4 5   Extension     Abduction 4 5   External Rotation       Manual Muscle Testing - Knee Left Right   Flexion 4 5   Extension 4 5     Manual Muscle Testing - Ankle Left Right   Doriflexion 3- 4+   Plantarflexion 4 4+             Re-eval Date:     Date        Visit Count        FOTO        Pain In        Pain Out        Vitals             Precautions HTN, asthma - inhaler        Manuals                                        Neuro Re-Ed         HKM                BW amb        Step ups         Amb with HT/HN          STS        Foam beams         Education         Ther Ex        Nustep         Resisted sidestepping         Leg Press                L UE PNF D2 flex/ext        Shukla carries                         Ther Activity        Carrying items                Gait Training                        Modalities

## 2024-01-09 ENCOUNTER — OFFICE VISIT (OUTPATIENT)
Dept: PHYSICAL THERAPY | Facility: CLINIC | Age: 64
End: 2024-01-09
Payer: COMMERCIAL

## 2024-01-09 ENCOUNTER — OFFICE VISIT (OUTPATIENT)
Dept: SPEECH THERAPY | Facility: CLINIC | Age: 64
End: 2024-01-09
Payer: COMMERCIAL

## 2024-01-09 DIAGNOSIS — I63.411 CEREBROVASCULAR ACCIDENT (CVA) DUE TO EMBOLISM OF RIGHT MIDDLE CEREBRAL ARTERY (HCC): Primary | ICD-10-CM

## 2024-01-09 DIAGNOSIS — R41.841 COGNITIVE COMMUNICATION DEFICIT: ICD-10-CM

## 2024-01-09 PROCEDURE — 92507 TX SP LANG VOICE COMM INDIV: CPT

## 2024-01-09 PROCEDURE — 97110 THERAPEUTIC EXERCISES: CPT

## 2024-01-09 PROCEDURE — 97112 NEUROMUSCULAR REEDUCATION: CPT

## 2024-01-09 NOTE — PROGRESS NOTES
"Speech Treatment Note    Today's date: 2024  Patient’s name: Miller Mckeon  : 1960  MRN: [id number]  Safety measures: s/p CVA  Referring provider: Miley Ferraro MD    Visit: # 2  Subjective/Behavioral    Cooperative    Assessment Notes & Comments    Majority of session focused on rapport building as pt was initially skeptical about coming to therapy. Pt answered questions about his daily routine, his hobbies, previous travels, life events, etc. 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 Potentially some confabulation in his story-telling. Pt was focused and engaged throughout. He mentioned wanting to get back to functional activities such as \"using a tape measure\". A tape measure was provided and he was asked to take measurements of the table - he was initially reserved about doing this but did eventually comply. He was able to measure the table accurately but struggled with fine motor dexterity using the tape measure and was slightly delayed in his answer.   Continue with POC.     Intervention/Treatment Goals  Long-term goals:  Pt will reduce impact of cognitive impairment from mild to occ/absent with combination of restorative and compensatory strategies as indicated.     Short-term goals:  Pt will perform EF based tasks involving problem solving, attention, planning, decision making, etc with 80% acc given min v/v cues.   2024: Sustained attention during conversation - approx 30-35 minutes - pt answered questions pertaining to self. Given occ-min cues and redirections, pt was able to maintain focus - pt at times changed topics potentially as a defense mechanism. Occasionally looked around the room (e.g., at the clock or out " the window) but able to maintain attention to conversation despite this.   01/09/2024: began number-based cross word puzzle - answered questions during 4/4 opportunities before running out of time. He mentioned some difficulty with keeping track visually of what part of the cross word puzzle he was looking at when going back and forth between the question and the puzzle.     Pt will complete STM based tasks with 80% acc given min v/v cues.   Pt will engage in visual scanning and visual problem solving tasks with 80% acc given min v/v cues.   Pt and family support will be educated re strategies (internal and external aids) to support IND with cognitive functioning.   Pt will complete HEP as recommended.     Frequency: 2x/week  Duration: 6-8 weeks    Intervention certification from: 1/4/2024  Intervention certification to: 03/05/2024    Intervention comments:   Partner's name is Zehra.     Plan  Continue with POC

## 2024-01-09 NOTE — PROGRESS NOTES
"Daily Note     Today's date: 2024  Patient name: Miller Mckeon  : 1960  MRN: [id number]  Referring provider: Miley Ferraro MD  Dx:   Encounter Diagnosis     ICD-10-CM    1. Stroke due to R ICA to MCA occlusion, s/p TNK and thrombectomy  I63.411                      Subjective: I am still very active   Outside in snow yesterday, plowing, throwing ball with dogs, playing with grand kids        Objective: See treatment diary below      Assessment: Tolerated treatment fairly well.  Pt reported discomfort briefly with side step with resistance R knee  denied any sx's end of activ   Pt challenged with retro ambul, retro heel/toe  Pt easily agitated and states struggles with taking direction/orders, \"I dont' walk this way\".   Pt with poor insight of coordination deficits  Pt indicates feels confident to RTW with currently no physical deficits a Patient would benefit from continued PT      Plan: Continue per plan of care.      Re-eval Date:     Date       Visit Count  2      FOTO        Pain In        Pain Out        Vitals             Precautions HTN, asthma - inhaler        Manuals                                        Neuro Re-Ed         HKM  2x40'  2x40' w/ bolster              BW amb  40'  40' w/ bolster  40' w/ HT      Step ups   Stairs 2x  2x with bolster  Descend stairs trunk rotation > R      Amb with HT/HN    2x 40' ea      Heel/toe  Fwd/retro  4x10' > difficulty retro      STS  NP      Foam beams         Education         Ther Ex        Nustep   L 4 10 min  Focus cardiovascular endurance  Cues pacing    Freq redirection for > SPM goal > 60       Resisted sidestepping   Blue  2x 40' ea dir  Cues Trunk neutral, rotates to R      Leg Press                L UE PNF D2 flex/ext        Shukla carries                         Ther Activity        Carrying items                Gait Training                        Modalities                                 "

## 2024-01-10 ENCOUNTER — OFFICE VISIT (OUTPATIENT)
Dept: FAMILY MEDICINE CLINIC | Facility: CLINIC | Age: 64
End: 2024-01-10
Payer: COMMERCIAL

## 2024-01-10 VITALS
DIASTOLIC BLOOD PRESSURE: 84 MMHG | OXYGEN SATURATION: 89 % | RESPIRATION RATE: 18 BRPM | WEIGHT: 212 LBS | BODY MASS INDEX: 29.68 KG/M2 | SYSTOLIC BLOOD PRESSURE: 126 MMHG | HEART RATE: 96 BPM | HEIGHT: 71 IN | TEMPERATURE: 98.6 F

## 2024-01-10 DIAGNOSIS — F41.9 ANXIETY AND DEPRESSION: ICD-10-CM

## 2024-01-10 DIAGNOSIS — F32.A ANXIETY AND DEPRESSION: ICD-10-CM

## 2024-01-10 DIAGNOSIS — J44.9 OBSTRUCTIVE LUNG DISEASE (HCC): ICD-10-CM

## 2024-01-10 DIAGNOSIS — G47.9 SLEEP DISTURBANCE: ICD-10-CM

## 2024-01-10 DIAGNOSIS — Z91.09 ENVIRONMENTAL ALLERGIES: ICD-10-CM

## 2024-01-10 DIAGNOSIS — J44.1 COPD WITH ACUTE EXACERBATION (HCC): Primary | ICD-10-CM

## 2024-01-10 PROCEDURE — T1015 CLINIC SERVICE: HCPCS | Performed by: FAMILY MEDICINE

## 2024-01-10 RX ORDER — AZITHROMYCIN 500 MG/1
500 TABLET, FILM COATED ORAL DAILY
Qty: 3 TABLET | Refills: 0 | Status: SHIPPED | OUTPATIENT
Start: 2024-01-10 | End: 2024-01-13

## 2024-01-10 RX ORDER — PREDNISONE 10 MG/1
10 TABLET ORAL DAILY
Qty: 30 TABLET | Refills: 0 | Status: SHIPPED | OUTPATIENT
Start: 2024-01-10 | End: 2024-02-09

## 2024-01-10 RX ORDER — ALBUTEROL SULFATE 2.5 MG/3ML
2.5 SOLUTION RESPIRATORY (INHALATION) EVERY 6 HOURS PRN
Qty: 75 ML | Refills: 0 | Status: SHIPPED | OUTPATIENT
Start: 2024-01-10

## 2024-01-10 RX ORDER — PREDNISONE 10 MG/1
10 TABLET ORAL DAILY
Qty: 30 TABLET | Refills: 0 | Status: SHIPPED | OUTPATIENT
Start: 2024-01-10 | End: 2024-01-10 | Stop reason: CLARIF

## 2024-01-10 RX ORDER — TRAZODONE HYDROCHLORIDE 50 MG/1
50 TABLET ORAL
Qty: 30 TABLET | Refills: 0 | Status: SHIPPED | OUTPATIENT
Start: 2024-01-10

## 2024-01-10 RX ORDER — MONTELUKAST SODIUM 10 MG/1
10 TABLET ORAL
Qty: 90 TABLET | Refills: 2 | Status: SHIPPED | OUTPATIENT
Start: 2024-01-10

## 2024-01-10 NOTE — PROGRESS NOTES
FAMILY MEDICINE OFFICE VISIT  [facility]      NAME: Miller Mckeon  AGE: 63 y.o. SEX: male    DATE OF ENCOUNTER: 1/10/2024    Assessment and Plan     1. COPD with acute exacerbation (HCC)  Comments:  Startd on prednisone tapered dose and Azithromycin.   Recommend albuterola nd Ipratropium nebulization.   Follwo up in 3 days  Orders:  -     azithromycin (ZITHROMAX) 500 MG tablet; Take 1 tablet (500 mg total) by mouth daily for 3 days  -     albuterol (2.5 mg/3 mL) 0.083 % nebulizer solution; Take 3 mL (2.5 mg total) by nebulization every 6 (six) hours as needed for wheezing or shortness of breath  -     ipratropium (ATROVENT) 0.02 % nebulizer solution; Take 2.5 mL (0.5 mg total) by nebulization 2 (two) times a day  -     predniSONE 10 mg tablet; Take 1 tablet (10 mg total) by mouth daily for 30 doses Start with 4 tablets daily, decrease by one tablet every 3 days till complete through 01/22/2024. >>>>>>>>>>>>> 40 mg for 3 days (four tablets daily on 01/10, 01/11, 01/12) followed by 30 mg daily for three days(Three tablets daily on 01/14, 01/15, 01/16) followed by 20 mg daily for three days (two tablets daily on 01/17, 01/18, 01/19) followed by 10 mg for three days (one tablet daily on 01/20, 01/21, 01/22) to complete.    2. Obstructive lung disease (HCC)  Comments:  Stop Breo inhaler  Start trelegy inhaler  Reviewed PFTs [facility] patient  Follow up in one week  Orders:  -     fluticasone-umeclidinium-vilanterol 200-62.5-25 mcg/actuation AEPB inhaler; Inhale 1 puff daily Rinse mouth after use.    3. Anxiety and depression  Comments:  Moderate depression on PHQ-9 and mild anxiety on GAD7.  Recommend behavioral therapy at this time.  Follow up in one month  Orders:  -     Ambulatory referral to Psych Services; Future; Expected date: 01/24/2024    4. Environmental allergies  Comments:  resume montelucast  Orders:  -     montelukast (SINGULAIR) 10 mg tablet; Take 1 tablet (10 mg total) by mouth daily  at bedtime    5. Sleep disturbance  -     traZODone (DESYREL) 50 mg tablet; Take 1 tablet (50 mg total) by mouth daily at bedtime          Miller Mckeon is 63 y.o. male presented to the office with shortness of breath and wheezing most likely secondary to COPD exacerbation. On arrival to the office his SpO2 is 88%.  Peak flow 280 on arrival, prior to nebulization.  Received albuterol nebulization in office.  Peak flow improved to 360 after nebulization. Which is his personal best.   Starting him on Prednisone tapered dose and azithromycin for COPD exacerbation. Positive on depression and anxiety screening. Recommend behavioral therapy at this time.   Will start him on trazodone 50 gm daily at night time for sleep.   Reviewed PFT's with patient. With recurrent episode of exacerbation will change the inhaler to triple drug therapy with Telegy inhaler.     Follow up in 3 days for COPD exacerbation.   Follow up in one week for obstructive lung disease.   Follow up in one month for Depression and anxiety.       Chief Complaint     Chief Complaint   Patient presents with    Cough     Patient complains of a cough, wheezing     Insomnia     Patient states he is having trouble sleeping since his stroke last month.        History of Present Illness     Patient is 63-year-old male presents to the office with wheezing and shortness of breath.  Complaint started this morning with chest tightness and gradual developed shortness of breath.  Associated with coughing.  On arrival to the office his SpO2 is 88%.  Peak flow 280 on arrival, prior to nebulization.  Received albuterol nebulization in office.  Peak flow improved to 360 after nebulization.  Patient reports similar episodes previously.  Which improved after steroid injection followed by short course steroids.  Patient also reports that he is having trouble sleeping lately since the stroke. Trouble falling asleep and staying asleep. He mentioned that he couldn't stop  thinking.  He owns a construction company business.  He was busy all the time prior to CVA.  Now he is staying at home and not actively working which made him feel depressed lately.  Also mentions that he is worried more about another CVA attack.  Reports compliance with medication and inhalers.  Tried melatonin as a sleep aid without any help.  Patient is a former smoker. Reports that he did not smoke since the stroke episode in late November/early December. Patient reports severe environmental allergies with season change. Did well when he was on montelukast. Discontinue because of lack of insurance.             The following portions of the patient's history were reviewed and updated as appropriate: allergies, current medications, past family history, past medical history, past social history, past surgical history and problem list.    Review of Systems     Review of Systems   Constitutional:  Positive for activity change and fatigue. Negative for chills and fever.   HENT:  Negative for congestion and rhinorrhea.    Respiratory:  Positive for cough, chest tightness, shortness of breath and wheezing.    Cardiovascular:  Negative for chest pain and palpitations.   Gastrointestinal:  Negative for abdominal pain, diarrhea and nausea.   Genitourinary:  Negative for difficulty urinating.   Skin:  Negative for rash.   Neurological:  Negative for light-headedness and headaches.   Psychiatric/Behavioral:  Positive for sleep disturbance. The patient is nervous/anxious.    All other systems reviewed and are negative.      Active Problem List     Patient Active Problem List   Diagnosis    Mild intermittent asthma without complication    Chronic pansinusitis    Nasal polyposis    Aspirin sensitivity    HTN (hypertension)    Smoking    Moderate persistent asthma with acute exacerbation    Elevated blood-pressure reading without diagnosis of hypertension    Fatigue    Golfers elbow    History of drug abuse (HCC)     "Environmental allergies    Erectile dysfunction    Elevated fasting blood sugar    Stroke due to R ICA to MCA occlusion, s/p TNK and thrombectomy    Hypertension    Asthma    DVT of lower extremity (deep venous thrombosis) (HCC)       Objective     /84   Pulse 96   Temp 98.6 °F (37 °C)   Resp 18   Ht 5' 11\" (1.803 m)   Wt 96.2 kg (212 lb)   SpO2 (!) 89%   BMI 29.57 kg/m²     Physical Exam  Vitals and nursing note reviewed. Exam conducted with a chaperone present.   Constitutional:       General: He is not in acute distress.     Appearance: Normal appearance. He is not toxic-appearing.   HENT:      Head: Normocephalic and atraumatic.      Right Ear: External ear normal.      Left Ear: External ear normal.      Nose: No congestion or rhinorrhea.      Mouth/Throat:      Mouth: Mucous membranes are moist.      Pharynx: Oropharynx is clear.   Eyes:      Extraocular Movements: Extraocular movements intact.      Conjunctiva/sclera: Conjunctivae normal.      Pupils: Pupils are equal, round, and reactive to light.   Cardiovascular:      Rate and Rhythm: Regular rhythm. Tachycardia present.      Pulses: Normal pulses.      Heart sounds: Normal heart sounds. No murmur heard.  Pulmonary:      Effort: No respiratory distress.      Breath sounds: Wheezing present. No rhonchi or rales.      Comments: Decreased air moment on auscultation bilaterally.   Chest:      Chest wall: No tenderness.   Abdominal:      General: Bowel sounds are normal.      Palpations: Abdomen is soft.      Tenderness: There is no abdominal tenderness.   Musculoskeletal:      Cervical back: Neck supple.      Right lower leg: No edema.      Left lower leg: No edema.   Skin:     General: Skin is warm and dry.      Capillary Refill: Capillary refill takes less than 2 seconds.   Neurological:      General: No focal deficit present.      Mental Status: He is alert and oriented to person, place, and time.   Psychiatric:         Behavior: Behavior " normal.         Pertinent Laboratory/Diagnostic Studies:    Peak flow meter: initially 280 which improved to 360 after nebulization treatment with Albuterol.       Pulmonary Function Test Report  Miller Mckeon 63 y.o. male MRN: [id number]     Date test performed: 06/23/2023     Date of test interpretation: 6/30/2023     Requesting Physician: Sandra Lomax     Reason for Testing: Moderate persistent asthma     Reference set for interpretation: [id number]     Procedure: The patient was taken to pulmonary function testing laboratory.  The patient demonstrated good effort and cooperation.  The results of this test meet ATS standards for acceptability and repeatability.       DLCO was corrected for patient's Hb     Results:  FEV1/FVC Ratio: 65 %  Forced Vital Capacity: 3.76 L    83 % predicted  FEV1: 2.45 L     71 % predicted  Post bronchodilator response: Bronchodilator was not administered     Lung volumes by body plethysmography:   Total Lung Capacity 106 % predicted   Residual volume 139 % predicted     DLCO corrected for patients hemoglobin level: 78 %     Interpretation:     Moderate obstructive airflow defect on spirometry     Bronchodilator response not performed.     Air trapping as indicated by the lung volumes     Normal diffusion capacity     Flow-volume loop consistent with obstruction        Maddison Kerr MD  Pulmonary & Critical Care  [facility]    Current Medications     Current Outpatient Medications:     albuterol (2.5 mg/3 mL) 0.083 % nebulizer solution, Take 3 mL (2.5 mg total) by nebulization every 6 (six) hours as needed for wheezing or shortness of breath, Disp: 75 mL, Rfl: 0    azithromycin (ZITHROMAX) 500 MG tablet, Take 1 tablet (500 mg total) by mouth daily for 3 days, Disp: 3 tablet, Rfl: 0    fluticasone-umeclidinium-vilanterol 200-62.5-25 mcg/actuation AEPB inhaler, Inhale 1 puff daily Rinse mouth after use., Disp: 60 blister, Rfl: 2    ipratropium  (ATROVENT) 0.02 % nebulizer solution, Take 2.5 mL (0.5 mg total) by nebulization 2 (two) times a day, Disp: 30 mL, Rfl: 1    montelukast (SINGULAIR) 10 mg tablet, Take 1 tablet (10 mg total) by mouth daily at bedtime, Disp: 90 tablet, Rfl: 2    predniSONE 10 mg tablet, Take 1 tablet (10 mg total) by mouth daily for 30 doses Start with 4 tablets daily, decrease by one tablet every 3 days till complete through 01/22/2024. >>>>>>>>>>>>> 40 mg for 3 days (four tablets daily on 01/10, 01/11, 01/12) followed by 30 mg daily for three days(Three tablets daily on 01/14, 01/15, 01/16) followed by 20 mg daily for three days (two tablets daily on 01/17, 01/18, 01/19) followed by 10 mg for three days (one tablet daily on 01/20, 01/21, 01/22) to complete., Disp: 30 tablet, Rfl: 0    rivaroxaban (Xarelto) 20 mg tablet, Take 1 tablet (20 mg total) by mouth once daily Take with food. (Patient taking differently: Take 20 mg by mouth once daily Take with food.  Takes 15 mg BID), Disp: 60 tablet, Rfl: 2    traZODone (DESYREL) 50 mg tablet, Take 1 tablet (50 mg total) by mouth daily at bedtime, Disp: 30 tablet, Rfl: 0    acetaminophen (TYLENOL) 325 mg tablet, Take 2 tablets (650 mg total) by mouth every 6 (six) hours as needed for mild pain, Disp: , Rfl: 0    albuterol (ProAir HFA) 90 mcg/act inhaler, Inhale 2 puffs every 6 (six) hours as needed for wheezing (Patient not taking: Reported on 1/10/2024), Disp: 8.5 g, Rfl: 0    atorvastatin (LIPITOR) 40 mg tablet, Take 1 tablet (40 mg total) by mouth every evening, Disp: 30 tablet, Rfl: 0    cetirizine (ZyrTEC) 5 MG tablet, Take 5 mg by mouth daily, Disp: , Rfl:     Diclofenac Sodium (VOLTAREN) 1 %, Apply 2 g topically 4 (four) times a day (Patient not taking: Reported on 1/10/2024), Disp: 100 g, Rfl: 2    pantoprazole (PROTONIX) 40 mg tablet, Take 1 tablet (40 mg total) by mouth daily in the early morning Do not start before December 10, 2023. (Patient not taking: Reported on 12/20/2023),  Disp: 30 tablet, Rfl: 0    pantoprazole (PROTONIX) 40 mg tablet, TAKE 1 TABLET BY MOUTH ONCE DAILY IN THE EARLY MORNING (Patient not taking: Reported on 12/20/2023), Disp: , Rfl:     selenium sulfide (SELSUN) 2.5 % shampoo, Apply topically 2 (two) times a week (Patient not taking: Reported on 12/20/2023), Disp: 118 mL, Rfl: 1    tadalafil (Cialis) 5 MG tablet, Take 1 tablet (5 mg total) by mouth daily as needed for erectile dysfunction, Disp: 10 tablet, Rfl: 0    TiZANidine (ZANAFLEX) 4 MG capsule, Take 1 capsule (4 mg total) by mouth 3 (three) times a day for 7 days (Patient not taking: Reported on 1/10/2024), Disp: 21 capsule, Rfl: 0    Health Maintenance     Health Maintenance   Topic Date Due    Colorectal Cancer Screening  Never done    Zoster Vaccine (1 of 2) Never done    BMI: Followup Plan  04/26/2023    Annual Physical  04/26/2023    COVID-19 Vaccine (2 - 2023-24 season) 09/01/2023    Pneumococcal Vaccine: Pediatrics (0 to 5 Years) and At-Risk Patients (6 to 64 Years) (2 - PCV) 04/18/2024 (Originally 11/9/2017)    Hepatitis B Vaccine (2 of 3 - Risk 3-dose series) 04/18/2024 (Originally 7/12/2022)    Depression Screening  01/10/2025    BMI: Adult  01/10/2025    DTaP,Tdap,and Td Vaccines (2 - Td or Tdap) 11/09/2026    HIV Screening  Completed    Hepatitis C Screening  Completed    Influenza Vaccine  Completed    HIB Vaccine  Aged Out    IPV Vaccine  Aged Out    Hepatitis A Vaccine  Aged Out    Meningococcal ACWY Vaccine  Aged Out    HPV Vaccine  Aged Out     Immunization History   Administered Date(s) Administered    COVID-19 PFIZER VACCINE 0.3 ML IM 04/16/2021    Hep A / Hep B 06/14/2022, 06/14/2022    Influenza Quadrivalent Preservative Free 3 years and older IM 11/09/2016    Influenza, injectable, quadrivalent, preservative free 0.5 mL 12/08/2023    Pneumococcal Polysaccharide PPV23 11/09/2016    Tdap 11/09/2016           Miley Ferraro MD   Family Medicine  PGY- 3  1/10/2024 5:51 PM

## 2024-01-11 ENCOUNTER — OFFICE VISIT (OUTPATIENT)
Dept: NEUROLOGY | Facility: CLINIC | Age: 64
End: 2024-01-11

## 2024-01-11 VITALS
BODY MASS INDEX: 29.68 KG/M2 | DIASTOLIC BLOOD PRESSURE: 73 MMHG | HEART RATE: 104 BPM | WEIGHT: 212 LBS | TEMPERATURE: 98.7 F | HEIGHT: 71 IN | SYSTOLIC BLOOD PRESSURE: 141 MMHG

## 2024-01-11 DIAGNOSIS — F41.9 ANXIETY AND DEPRESSION: ICD-10-CM

## 2024-01-11 DIAGNOSIS — D68.61 ANTIPHOSPHOLIPID ANTIBODY SYNDROME (HCC): ICD-10-CM

## 2024-01-11 DIAGNOSIS — F32.A ANXIETY AND DEPRESSION: ICD-10-CM

## 2024-01-11 DIAGNOSIS — Z86.73 HISTORY OF STROKE: Primary | ICD-10-CM

## 2024-01-11 DIAGNOSIS — I10 HYPERTENSION, UNSPECIFIED TYPE: ICD-10-CM

## 2024-01-11 DIAGNOSIS — I82.419 DEEP VEIN THROMBOSIS (DVT) OF FEMORAL VEIN, UNSPECIFIED CHRONICITY, UNSPECIFIED LATERALITY (HCC): ICD-10-CM

## 2024-01-11 DIAGNOSIS — G81.94 LEFT HEMIPARESIS (HCC): ICD-10-CM

## 2024-01-11 PROCEDURE — 99215 OFFICE O/P EST HI 40 MIN: CPT | Performed by: PSYCHIATRY & NEUROLOGY

## 2024-01-11 RX ORDER — ESCITALOPRAM OXALATE 10 MG/1
TABLET ORAL
Qty: 30 TABLET | Refills: 2 | Status: SHIPPED | OUTPATIENT
Start: 2024-01-11

## 2024-01-11 NOTE — PROGRESS NOTES
"Patient ID: Miller Mckeon is a 63 y.o. male.    Assessment/Plan:    No problem-specific Assessment & Plan notes found for this encounter.       {Assess/PlanSmartLinks:00048}       Subjective:    HPI    {[facility] Neurology HPI texts:34587}    {Common ambulatory SmartLinks:71948}         Objective:    Blood pressure 141/73, pulse 104, temperature 98.7 °F (37.1 °C), temperature source Temporal, height 5' 11\" (1.803 m), weight 96.2 kg (212 lb).    Physical Exam    Neurological Exam      ROS:    Review of Systems   Constitutional: Negative.    HENT: Negative.     Eyes: Negative.    Respiratory: Negative.     Cardiovascular: Negative.    Gastrointestinal: Negative.    Endocrine: Negative.    Genitourinary: Negative.    Musculoskeletal: Negative.    Skin: Negative.    Allergic/Immunologic: Negative.    Neurological:  Positive for weakness.   Hematological: Negative.    Psychiatric/Behavioral:  Positive for hallucinations.                    "

## 2024-01-11 NOTE — PROGRESS NOTES
HOSPITAL FOLLOW UP       Name:  Miller Mckeon  MRN: [id number]  : 1960  Date: 24    ASSESSMENT & PLAN     Miller Mckeon is a very pleasant 63 y.o. male with a past medical history that includes  has a past medical history of Asthma, Cataract, Hypertension, and Stroke (HCC).     Initial evaluation: 2024    Diagnoses and all orders for this visit:    History of stroke, Deep vein thrombosis (DVT) of femoral vein, unspecified chronicity, unspecified laterality (HCC), Antiphospholipid antibody syndrome (HCC), Left hemiparesis (HCC)    Patient is a 63 year old male who was referred here for evaluation of follow up from stroke. Patient has a R MCA stroke given the full occlusion of the R ICA and R M1. NIHSS 11 s/p TNK and thrombectomy TICI 3. At this time, the etiology of the patient's stroke can be attributed to his positive result of the Lupus anticoagulant. Patient is mostly back to baseline with the assistance of physical therapy and speech therapy.    Since we have the most likely etiology, there is no further Cardiac work-up warranted. Especially considering that it would not  since the patient needs to be anticoagulated regardless.    In regards to the anticoagulation, the patient was started on Xarelto but as per literature, patient's with antiphospholipid syndrome do better clinically with Warfarin. The patient has an appointment with Hematology/Oncology and we recommend speaking to them in regards to this. The patient can remain on the Xarelto until then.    Given the patient's hallucinations, we recommend that the patient follow-up with ophthalmology for a proper visual examination. On neurological examination, there were no visual deficits appreciated.    Plan:   For stroke prevention continue with: Xarelto 15 mg BID and Lipitor 40 mg  Recommend changing to Warfarin given that it has shown better results  Patient should discuss with at his Hematology/Oncology  appointment  BP goal < 130/80. At 141/73 in the office today.  LDL goal <70  Recommend mediterranean diet & regular exercise regimen atleast 4-5 times a week for 20-30 minutes.   Recommend following up with Ophthalmology to check for visual deficits given the visual hallucinations      Anxiety and depression s/p stroke  Patient has started to have anxiety and depression s/p stroke. At this time, the patient has a referral to behavioral health pending. He was started on Trazadone 50 mg daily by his PCP.  We will initiate Escitalopram at this time as well.    Plan:  Continue Trazadone 50 mg daily  Start Lexapro 5 mg daily and then increase to 10 mg daily after the first few weeks if no changes in mood symptoms.  Follow-up with behavioral health      Hypertension, unspecified type  Continue to follow-up with PCP in regards to this.      The patient indicates understanding of these issues and agrees with the plan.    Return in about 3 months (around 4/11/2024).    This patient case was discussed with Dr. Marcelino.      CHIEF COMPLAINT     Patient presents today for hospital follow-up after recent stroke.    History obtained from patient as well as available medical record review.    HISTORY OF PRESENT ILLNESS     I had the pleasure of seeing the patient, Miller Mckeon, today in the Neurology office for follow up after recent TIA/Stroke.     Miller is a 63 y.o. right-handed male with PMHx including hypertension, cataract, retinal detachment, asthma, and tobacco use.  Patient is presenting to the clinic today in regards to follow-up for his cerebrovascular accident for which he was admitted from 11/30/2023 to 12/8/2023.  Patient was on his tractor when he suddenly started having left-sided weakness including his upper extremity and lower extremity.  At that time, EMS had noted a right gaze preference.  Given that patient's symptoms started approximately 1 hour prior to presentation and his NIHSS was 11, patient was a  candidate for TNK.  Upon imaging, there was a right M1 MCA occlusion seen and patient was deemed a candidate for thrombectomy.  Patient was a TICI 3 s/p thrombectomy.  Given worsening headache, patient had a repeat CT head which showed possible hemorrhagic transformation vs contrast.  With further imaging, there was minimal hemorrhage that was confirmed.  Along with that, VAS venous duplex study was done in the hospital which showed a thrombus in the gastrocnemius vein.  As a result, patient was not started on any antiplatelet or anticoagulation during his hospital stay.  Patient was just initiated on Lipitor 40 mg daily. There was a malignancy work done with CT CAP w contrast which was negative for any malignancy.     In terms of his lab work, patient had a thrombosis panel obtained in the hospital.  There was a repeat thrombosis panel done which confirmed lupus anticoagulant.      Since then, the patient had an IVC filter placed due to the fact that the patient was unable to be anticoagulated. After the IVC filter procedure, the patient started to have severe pain in his legs. He was ordered a repeat VAS venous duplex study. After the study, patient was recently sent to the ED on 12/20/23 after discovering multiple thrombi.  In the ED, the patient received a loading dose of the Xarelto and then was started on Xarelto 15 mg BID since 12/20/23.    Today, the patient states that he is doing well and there are minimal residual deficits for him.  At times, he feels that he rambles with his speech and that his L side is still slightly weaker.  Along with that, the patient states that he still has hallucinations every day. These started when he was in the hospital. At that time, he was unable to recognize that they unreal but now, he is aware of that.     Other than that, the patient was no other questions or complaints at this time.      Summary of Recent Hospitalization:  Admit date: 11/30/23  D/c date:  12/8/23  Initial NIHSS: 11  tPA candidate?: Yes - delayed slightly due to needing BP control  Thrombectomy?: Yes - TICI3 with Dr. Fay  Please see significant findings as outlined below  D/c medications: Lipitor 40 mg daily, AP or AC given due to hemorrhage      Workup:  Labs:  A1c: 5.6  Lipid Panel: Chol 181, Trig 192, HDL 45, LDL 98  Thrombosis Panel 12/3/23  Antithrombin III - Abnormal  Activity 76  Lupus Anticoagulant - Abnormal  PTT Lupus 45.5  Dilute Viper Venom Time 42.7  Dilute Prothrombin time 55.1  Thrombin Time 14.7  DPT 0.85  Lupus Reflex Interpretation: No lupus anticoagulant detected  PTT-LA 40.0 - Normal  PTT-LA Incub 34.7 - Normal  Protein S - Normal  Total 76  Free 96  Activity 76  Beta-2 glycoprotein - Normal  Beta 2 Glyco IgG 1.5  Beta 2 Glyco IgA 2.6  Beta 2 Glyco IgM <2.4  Cardiolipin - Normal  Anticardiolipin IgG 1.4  Anticardiolipin IgA 4.7  Anticardiolipin IgM 2.8  Homocysteine: 10.6  Thrombosis Panel 12/20/23  Everything normal except Lupus Anticoagulant  PTT-LA 51.5  dRVVT 47.9  DRVTT/Confirm Ratio 1.5  KONG antibody by IFA - 1:160 High, speckled pattern      Imaging:  CTH 11/30/23: No acute intracranial hemorrhage. Right MCA distribution ischemia suspected.  CTA H/N 11/30/23: Occluded right intracranial and cervical ICA. Right M1 MCA occlusion. Distal M2 vessels are reconstituted likely from collateral vessels.  CTH 11/30/23 s/p IR intervention: Reidentified subtle areas of loss of gray-white differentiation involving the right MCA territory. However, enhancement of the right middle cerebral artery M1 segment is now visible, from prior intravenous contrast administration for a CTA head and neck performed few hours prior. An MRI brain could be performed for further evaluation.  CTH 12/1/23 - worsening headache:  Evolving acute right MCA territory infarct predominantly involving the right gangliocapsular region and corona radiata with mild regional mass effect without midline shift.  Minimal hyperdensity in the right caudate is likely contrast staining from earlier thrombectomy. Hemorrhage is less favored. Recommend close follow-up head CT. Acute/subacute sinusitis.  VAS Venous Duplex 12/01/23: RIGHT LOWER LIMB: No evidence of acute or chronic deep vein thrombosis. No evidence of superficial thrombophlebitis noted. Doppler evaluation shows a normal response to augmentation maneuvers.. Popliteal, posterior tibial and anterior tibial arterial Doppler waveform's are triphasic. LEFT LOWER LIMB: There is evidence of acute occlusive deep vein thrombosis in the gastrocnemius veins. No evidence of superficial thrombophlebitis noted. Doppler evaluation shows a normal response to augmentation maneuvers. Popliteal, posterior tibial and anterior tibial arterial Doppler waveform's are triphasic.  MRI Brain wo contrast 12/01/23: Evolving acute to early subacute right MCA distribution infarction since November 30, 2023 with focal areas of hemorrhagic transformation within the ganglial capsular region. Mass effect on the right frontal horn is stable when comparing to the head CT performed earlier the same day but increased when comparing to November 30, 2023 examination. Pansinusitis.  CT CAP w contrast 12/2/23: Patchy groundglass consolidations throughout the right lung. Correlate for aspiration versus atypical/viral infection. No definite evidence of malignancy. Mild wall thickening of the urinary bladder may be secondary to chronic outlet obstruction.  CT C-spine wo contrast 12/3/23: No cervical spine fracture or traumatic malalignment. Mild degenerative changes are noted.   CTH 12/3/23: Expected appearance of evolving right MCA distribution infarcts with areas of hemorrhagic transformation. Slight interval increase in mass effect on the frontal horn of the right lateral ventricle without midline shift. New areas of recent infarction right now better visualized located in the subcortical right parietal lobe  "and deep right frontal lobe white matter.  TAO 12/5/23: Left ventricular cavity size is normal. Wall thickness is increased. The left ventricular ejection fraction is 65%. Systolic function is normal. Wall motion is normal. LA dilated.  CTH 12/6/23: Evolving right MCA distribution infarction with areas of hemorrhagic transformation. Degree of hemorrhage within the right basal ganglia is slightly improved since the prior examination. Stable effacement of the right lateral ventricle without significant midline shift. Basilar cisterns are patent.  CTH 12/19/23: Evolving known right MCA territory infarct without new findings.   VAS Venous Duplex 12/20/23 s/p IVC filter: Impression: RIGHT LOWER LIMB LIMITED: Evaluation shows no evidence of thrombus in the common femoral vein. Doppler evaluation shows a normal response to augmentation maneuvers. LEFT LOWER LIMB: Occlusive acute deep vein thrombosis noted in the femoral, popliteal, and paired peroneal veins. Non occlusive acute DVT noted in the paired posterior tibial veins. No evidence of superficial thrombophlebitis noted. Popliteal, posterior tibial and anterior tibial arterial Doppler waveforms are triphasic.        STROKE RISK FACTORS:    [x] Current or chronic hx of tobacco use  [] Elevated cholesterol  [x] History of elevated BP  [] History of diabetes  [] Atrial fibrillation  [] History of IVDU  [] History of endocarditis  [] History of cancer  [] History of blood clotting disorder    LIFESTYLE  Sleep   Averages: 2 hours per night for the last couple months. Prior to stroke, he was still struggling with sleep  Problems falling asleep?:   Yes, \"brain doesn't shut off\"  Problems staying asleep?:  No    How often do you get up at night? 3-4x times - unable to fall asleep after  Do you snore while asleep? At times    Physical therapy/Speech therapy: 2x a week at this time    Mood: Anxiety and Depression  Has a referral for behavioral health pending,  doctor will make " the appointments    The following portions of the patient's history were reviewed and updated as appropriate: allergies, current medications, past family history, past medical history, past social history, past surgical history and problem list.    Pertinent family history:  Family history of headaches:  migraine headaches in patient  Any family history of aneurysms - No  Family history of stroke? Yes, mother - multiple TIAs   Other neurological history: Daughter (Luisana) - MS and Lupus, and Daughter (Alexia) - MS    Pertinent social history:  Work: Self-employed, rental properties and [employer]  Education: High School  Lives with lives with an adult     Illicit Drugs: Long time ago (~15 years ago)   Alcohol/tobacco:  No Alcohol/Smoking since stroke, cup of tea/coffee in the morning.    PAST MEDICAL HISTORY     Past Medical History:   Diagnosis Date    Asthma     Cataract     Hypertension     Stroke (HCC)        Patient Active Problem List   Diagnosis    Mild intermittent asthma without complication    Chronic pansinusitis    Nasal polyposis    Aspirin sensitivity    HTN (hypertension)    Smoking    Moderate persistent asthma with acute exacerbation    Elevated blood-pressure reading without diagnosis of hypertension    Fatigue    Golfers elbow    History of drug abuse ([facility])    Environmental allergies    Erectile dysfunction    Elevated fasting blood sugar    History of stroke    Hypertension    Asthma    DVT of lower extremity (deep venous thrombosis) ([facility])    Antiphospholipid antibody syndrome ([facility])    Left hemiparesis (HCC)       MEDICATIONS        Current Outpatient Medications   Medication Sig Dispense Refill    acetaminophen (TYLENOL) 325 mg tablet Take 2 tablets (650 mg total) by mouth every 6 (six) hours as needed for mild pain  0    albuterol (2.5 mg/3 mL) 0.083 % nebulizer solution Take 3 mL (2.5 mg total) by nebulization every 6 (six) hours as needed for wheezing or shortness of breath 75 mL 0     albuterol (ProAir HFA) 90 mcg/act inhaler Inhale 2 puffs every 6 (six) hours as needed for wheezing 8.5 g 0    atorvastatin (LIPITOR) 40 mg tablet Take 1 tablet (40 mg total) by mouth every evening 30 tablet 0    azithromycin (ZITHROMAX) 500 MG tablet Take 1 tablet (500 mg total) by mouth daily for 3 days 3 tablet 0    cetirizine (ZyrTEC) 5 MG tablet Take 5 mg by mouth daily      escitalopram (Lexapro) 10 mg tablet Start with 0.5 mg tablet daily for the first few weeks. If no change, then patient should increase to 10 mg daily. 30 tablet 2    fluticasone-umeclidinium-vilanterol 200-62.5-25 mcg/actuation AEPB inhaler Inhale 1 puff daily Rinse mouth after use. 60 blister 2    ipratropium (ATROVENT) 0.02 % nebulizer solution Take 2.5 mL (0.5 mg total) by nebulization 2 (two) times a day 30 mL 1    montelukast (SINGULAIR) 10 mg tablet Take 1 tablet (10 mg total) by mouth daily at bedtime 90 tablet 2    predniSONE 10 mg tablet Take 1 tablet (10 mg total) by mouth daily for 30 doses Start with 4 tablets daily, decrease by one tablet every 3 days till complete through 01/22/2024. >>>>>>>>>>>>> 40 mg for 3 days (four tablets daily on 01/10, 01/11, 01/12) followed by 30 mg daily for three days(Three tablets daily on 01/14, 01/15, 01/16) followed by 20 mg daily for three days (two tablets daily on 01/17, 01/18, 01/19) followed by 10 mg for three days (one tablet daily on 01/20, 01/21, 01/22) to complete. 30 tablet 0    rivaroxaban (Xarelto) 20 mg tablet Take 1 tablet (20 mg total) by mouth once daily Take with food. (Patient taking differently: Take 20 mg by mouth once daily Take with food.    Takes 15 mg BID) 60 tablet 2    tadalafil (Cialis) 5 MG tablet Take 1 tablet (5 mg total) by mouth daily as needed for erectile dysfunction 10 tablet 0    traZODone (DESYREL) 50 mg tablet Take 1 tablet (50 mg total) by mouth daily at bedtime 30 tablet 0    Diclofenac Sodium (VOLTAREN) 1 % Apply 2 g topically 4 (four) times a day  "(Patient not taking: Reported on 1/10/2024) 100 g 2    pantoprazole (PROTONIX) 40 mg tablet Take 1 tablet (40 mg total) by mouth daily in the early morning Do not start before December 10, 2023. (Patient not taking: Reported on 12/20/2023) 30 tablet 0    selenium sulfide (SELSUN) 2.5 % shampoo Apply topically 2 (two) times a week (Patient not taking: Reported on 12/20/2023) 118 mL 1    TiZANidine (ZANAFLEX) 4 MG capsule Take 1 capsule (4 mg total) by mouth 3 (three) times a day for 7 days (Patient not taking: Reported on 1/10/2024) 21 capsule 0     No current facility-administered medications for this visit.        ALLERGIES        Allergies   Allergen Reactions    Aspirin Anaphylaxis    Ibuprofen Anaphylaxis    Ibuprofen Other (See Comments)     Asthma flare    Augmentin [Amoxicillin-Pot Clavulanate] Rash       OBJECTIVE     Patient ID: Miller Mckeon is a 63 y.o. male    Blood pressure 141/73, pulse 104, temperature 98.7 °F (37.1 °C), temperature source Temporal, height 5' 11\" (1.803 m), weight 96.2 kg (212 lb).    GENERAL EXAM:    CONSTITUTIONAL: Well developed, well nourished, well groomed. No dysmorphic features.     Eyes:  PERRLA, EOM normal      Neck:  Normal ROM, neck supple.      HEENT:  Normocephalic atraumatic.    Chest:  Respirations regular and unlabored.    Cardiovascular:  Distal extremities warm without palpable edema or tenderness, no observed significant swelling.    Musculoskeletal:  Full range of motion.    Skin:  Warm and dry   Psychiatric:  Normal behavior and appropriate affect      NEUROLOGICAL EXAM:    Neurologic Exam     Mental Status   Oriented to person, place, and time.   Attention: normal. Concentration: normal.   Speech: speech is normal   Level of consciousness: alert  Knowledge: good.   Able to name object. Able to read. Able to repeat. Normal comprehension.     Cranial Nerves     CN II   Visual fields full to confrontation.     CN III, IV, VI   Pupils are equal, round, and " reactive to light.  Extraocular motions are normal.     CN V   Facial sensation intact.     CN VII   Right facial weakness: none  Left facial weakness: central    CN VIII   CN VIII normal.     CN IX, X   CN IX normal.   CN X normal.     CN XI   CN XI normal.     CN XII   CN XII normal.     Motor Exam   Muscle bulk: normal  Overall muscle tone: normal  Right arm pronator drift: absent  Left arm pronator drift: absent    Strength   Strength 5/5 except as noted.   Left quadriceps: 4/5  Left hamstrin/5  Left anterior tibial: 5/5  Left posterior tibial: 5/5  Left peroneal: 5/5  Left gastroc: 5/5    Sensory Exam   Pinprick normal.     Gait, Coordination, and Reflexes     Coordination   Finger to nose coordination: normal    Reflexes   Right brachioradialis: 2+  Left brachioradialis: 2+  Right biceps: 2+  Left biceps: 2+  Right triceps: 2+  Left triceps: 2+  Right patellar: 3+  Left patellar: 3+  Right achilles: 3+  Left achilles: 3+      ROS     Review of systems as documented by the MA was reviewed in full by myself, Genesis Ramachandran DO    Review of Systems   Constitutional: Negative.    HENT: Negative.     Eyes: Blurry eyes.    Respiratory: Negative.     Cardiovascular: Negative.    Gastrointestinal: Negative.    Endocrine: Negative.    Genitourinary: Negative.    Musculoskeletal: Negative.    Skin: Negative.    Allergic/Immunologic: Negative.    Neurological:  Positive for weakness.   Hematological: Negative.    Psychiatric/Behavioral:  Positive for hallucinations.      ======    Thank you for allowing me to participate in the care this patient, Miller Mckeon.       Genesis Ramachandran DO  [facility] Neurology Res[facility], PGY-2

## 2024-01-11 NOTE — LETTER
2024     Nakia Varela, OSCAR  [address]  [address]  [place] [address]    Patient: Miller Mckeon   YOB: 1960   Date of Visit: 2024       Dear Dr. Varela:    Thank you for referring Miller Mckeon to me for evaluation. Below are my notes for this consultation.    If you have questions, please do not hesitate to call me. I look forward to following your patient along with you.         Sincerely,        Omari Marcelino MD        CC: No Recipients    Genesis Ramachandran, DO  2024  2:07 PM  Sign when Signing Visit      HOSPITAL FOLLOW UP       Name:  Miller Mckeon  MRN: [id number]  : 1960  Date: 24    ASSESSMENT & PLAN     Miller Mckeon is a very pleasant 63 y.o. male with a past medical history that includes  has a past medical history of Asthma, Cataract, Hypertension, and Stroke (HCC).     Initial evaluation: 2024    Diagnoses and all orders for this visit:    History of stroke, Deep vein thrombosis (DVT) of femoral vein, unspecified chronicity, unspecified laterality (HCC), Antiphospholipid antibody syndrome (HCC), Left hemiparesis (HCC)    Patient is a 63 year old male who was referred here for evaluation of follow up from stroke. Patient has a R MCA stroke given the full occlusion of the R ICA and R M1. NIHSS 11 s/p TNK and thrombectomy TICI 3. At this time, the etiology of the patient's stroke can be attributed to his positive result of the Lupus anticoagulant. Patient is mostly back to baseline with the assistance of physical therapy and speech therapy.    Since we have the most likely etiology, there is no further Cardiac work-up warranted. Especially considering that it would not  since the patient needs to be anticoagulated regardless.    In regards to the anticoagulation, the patient was started on Xarelto but as per literature, patient's with antiphospholipid syndrome do better clinically with Warfarin. The patient has an  appointment with Hematology/Oncology and we recommend speaking to them in regards to this. The patient can remain on the Xarelto until then.    Given the patient's hallucinations, we recommend that the patient follow-up with ophthalmology for a proper visual examination. On neurological examination, there were no visual deficits appreciated.    Plan:   For stroke prevention continue with: Xarelto 15 mg BID and Lipitor 40 mg  Recommend changing to Warfarin given that it has shown better results  Patient should discuss with at his Hematology/Oncology appointment  BP goal < 130/80. At 141/73 in the office today.  LDL goal <70  Recommend mediterranean diet & regular exercise regimen atleast 4-5 times a week for 20-30 minutes.   Recommend following up with Ophthalmology to check for visual deficits given the visual hallucinations      Anxiety and depression s/p stroke  Patient has started to have anxiety and depression s/p stroke. At this time, the patient has a referral to behavioral health pending. He was started on Trazadone 50 mg daily by his PCP.  We will initiate Escitalopram at this time as well.    Plan:  Continue Trazadone 50 mg daily  Start Lexapro 5 mg daily and then increase to 10 mg daily after the first few weeks if no changes in mood symptoms.  Follow-up with behavioral health      Hypertension, unspecified type  Continue to follow-up with PCP in regards to this.      The patient indicates understanding of these issues and agrees with the plan.    Return in about 3 months (around 4/11/2024).    This patient case was discussed with Dr. Marcelino.      CHIEF COMPLAINT     Patient presents today for hospital follow-up after recent stroke.    History obtained from patient as well as available medical record review.    HISTORY OF PRESENT ILLNESS     I had the pleasure of seeing the patient, Miller Mckeon, today in the Neurology office for follow up after recent TIA/Stroke.     Miller is a 63 y.o. right-handed  male with PMHx including hypertension, cataract, retinal detachment, asthma, and tobacco use.  Patient is presenting to the clinic today in regards to follow-up for his cerebrovascular accident for which he was admitted from 11/30/2023 to 12/8/2023.  Patient was on his tractor when he suddenly started having left-sided weakness including his upper extremity and lower extremity.  At that time, EMS had noted a right gaze preference.  Given that patient's symptoms started approximately 1 hour prior to presentation and his NIHSS was 11, patient was a candidate for TNK.  Upon imaging, there was a right M1 MCA occlusion seen and patient was deemed a candidate for thrombectomy.  Patient was a TICI 3 s/p thrombectomy.  Given worsening headache, patient had a repeat CT head which showed possible hemorrhagic transformation vs contrast.  With further imaging, there was minimal hemorrhage that was confirmed.  Along with that, VAS venous duplex study was done in the hospital which showed a thrombus in the gastrocnemius vein.  As a result, patient was not started on any antiplatelet or anticoagulation during his hospital stay.  Patient was just initiated on Lipitor 40 mg daily. There was a malignancy work done with CT CAP w contrast which was negative for any malignancy.     In terms of his lab work, patient had a thrombosis panel obtained in the hospital.  There was a repeat thrombosis panel done which confirmed lupus anticoagulant.      Since then, the patient had an IVC filter placed due to the fact that the patient was unable to be anticoagulated. After the IVC filter procedure, the patient started to have severe pain in his legs. He was ordered a repeat VAS venous duplex study. After the study, patient was recently sent to the ED on 12/20/23 after discovering multiple thrombi.  In the ED, the patient received a loading dose of the Xarelto and then was started on Xarelto 15 mg BID since 12/20/23.    Today, the patient states  that he is doing well and there are minimal residual deficits for him.  At times, he feels that he rambles with his speech and that his L side is still slightly weaker.  Along with that, the patient states that he still has hallucinations every day. These started when he was in the hospital. At that time, he was unable to recognize that they unreal but now, he is aware of that.     Other than that, the patient was no other questions or complaints at this time.      Summary of Recent Hospitalization:  Admit date: 11/30/23  D/c date: 12/8/23  Initial NIHSS: 11  tPA candidate?: Yes - delayed slightly due to needing BP control  Thrombectomy?: Yes - TICI3 with Dr. Fay  Please see significant findings as outlined below  D/c medications: Lipitor 40 mg daily, AP or AC given due to hemorrhage      Workup:  Labs:  A1c: 5.6  Lipid Panel: Chol 181, Trig 192, HDL 45, LDL 98  Thrombosis Panel 12/3/23  Antithrombin III - Abnormal  Activity 76  Lupus Anticoagulant - Abnormal  PTT Lupus 45.5  Dilute Viper Venom Time 42.7  Dilute Prothrombin time 55.1  Thrombin Time 14.7  DPT 0.85  Lupus Reflex Interpretation: No lupus anticoagulant detected  PTT-LA 40.0 - Normal  PTT-LA Incub 34.7 - Normal  Protein S - Normal  Total 76  Free 96  Activity 76  Beta-2 glycoprotein - Normal  Beta 2 Glyco IgG 1.5  Beta 2 Glyco IgA 2.6  Beta 2 Glyco IgM <2.4  Cardiolipin - Normal  Anticardiolipin IgG 1.4  Anticardiolipin IgA 4.7  Anticardiolipin IgM 2.8  Homocysteine: 10.6  Thrombosis Panel 12/20/23  Everything normal except Lupus Anticoagulant  PTT-LA 51.5  dRVVT 47.9  DRVTT/Confirm Ratio 1.5  KONG antibody by IFA - 1:160 High, speckled pattern      Imaging:  [facility] 11/30/23: No acute intracranial hemorrhage. Right MCA distribution ischemia suspected.  CTA H/N 11/30/23: Occluded right intracranial and cervical ICA. Right M1 MCA occlusion. Distal M2 vessels are reconstituted likely from collateral vessels.  [facility] 11/30/23 s/p IR intervention: Reidentified  subtle areas of loss of gray-white differentiation involving the right MCA territory. However, enhancement of the right middle cerebral artery M1 segment is now visible, from prior intravenous contrast administration for a CTA head and neck performed few hours prior. An MRI brain could be performed for further evaluation.  CTH 12/1/23 - worsening headache:  Evolving acute right MCA territory infarct predominantly involving the right gangliocapsular region and corona radiata with mild regional mass effect without midline shift. Minimal hyperdensity in the right caudate is likely contrast staining from earlier thrombectomy. Hemorrhage is less favored. Recommend close follow-up head CT. Acute/subacute sinusitis.  VAS Venous Duplex 12/01/23: RIGHT LOWER LIMB: No evidence of acute or chronic deep vein thrombosis. No evidence of superficial thrombophlebitis noted. Doppler evaluation shows a normal response to augmentation maneuvers.. Popliteal, posterior tibial and anterior tibial arterial Doppler waveform's are triphasic. LEFT LOWER LIMB: There is evidence of acute occlusive deep vein thrombosis in the gastrocnemius veins. No evidence of superficial thrombophlebitis noted. Doppler evaluation shows a normal response to augmentation maneuvers. Popliteal, posterior tibial and anterior tibial arterial Doppler waveform's are triphasic.  MRI Brain wo contrast 12/01/23: Evolving acute to early subacute right MCA distribution infarction since November 30, 2023 with focal areas of hemorrhagic transformation within the ganglial capsular region. Mass effect on the right frontal horn is stable when comparing to the head CT performed earlier the same day but increased when comparing to November 30, 2023 examination. Pansinusitis.  CT CAP w contrast 12/2/23: Patchy groundglass consolidations throughout the right lung. Correlate for aspiration versus atypical/viral infection. No definite evidence of malignancy. Mild wall thickening of  the urinary bladder may be secondary to chronic outlet obstruction.  CT C-spine wo contrast 12/3/23: No cervical spine fracture or traumatic malalignment. Mild degenerative changes are noted.   CTH 12/3/23: Expected appearance of evolving right MCA distribution infarcts with areas of hemorrhagic transformation. Slight interval increase in mass effect on the frontal horn of the right lateral ventricle without midline shift. New areas of recent infarction right now better visualized located in the subcortical right parietal lobe and deep right frontal lobe white matter.  TAO 12/5/23: Left ventricular cavity size is normal. Wall thickness is increased. The left ventricular ejection fraction is 65%. Systolic function is normal. Wall motion is normal. LA dilated.  CTH 12/6/23: Evolving right MCA distribution infarction with areas of hemorrhagic transformation. Degree of hemorrhage within the right basal ganglia is slightly improved since the prior examination. Stable effacement of the right lateral ventricle without significant midline shift. Basilar cisterns are patent.  CTH 12/19/23: Evolving known right MCA territory infarct without new findings.   VAS Venous Duplex 12/20/23 s/p IVC filter: Impression: RIGHT LOWER LIMB LIMITED: Evaluation shows no evidence of thrombus in the common femoral vein. Doppler evaluation shows a normal response to augmentation maneuvers. LEFT LOWER LIMB: Occlusive acute deep vein thrombosis noted in the femoral, popliteal, and paired peroneal veins. Non occlusive acute DVT noted in the paired posterior tibial veins. No evidence of superficial thrombophlebitis noted. Popliteal, posterior tibial and anterior tibial arterial Doppler waveforms are triphasic.        STROKE RISK FACTORS:    [x] Current or chronic hx of tobacco use  [] Elevated cholesterol  [x] History of elevated BP  [] History of diabetes  [] Atrial fibrillation  [] History of IVDU  [] History of endocarditis  [] History of  "cancer  [] History of blood clotting disorder    LIFESTYLE  Sleep   Averages: 2 hours per night for the last couple months. Prior to stroke, he was still struggling with sleep  Problems falling asleep?:   Yes, \"brain doesn't shut off\"  Problems staying asleep?:  No    How often do you get up at night? 3-4x times - unable to fall asleep after  Do you snore while asleep? At times    Physical therapy/Speech therapy: 2x a week at this time    Mood: Anxiety and Depression  Has a referral for behavioral health pending,  doctor will make the appointments    The following portions of the patient's history were reviewed and updated as appropriate: allergies, current medications, past family history, past medical history, past social history, past surgical history and problem list.    Pertinent family history:  Family history of headaches:  migraine headaches in patient  Any family history of aneurysms - No  Family history of stroke? Yes, mother - multiple TIAs   Other neurological history: Daughter (Luisana) - MS and Lupus, and Daughter (Alexia) - MS    Pertinent social history:  Work: Self-employed, rental properties and [employer]  Education: High School  Lives with lives with an adult     Illicit Drugs: Long time ago (~15 years ago)   Alcohol/tobacco:  No Alcohol/Smoking since stroke, cup of tea/coffee in the morning.    PAST MEDICAL HISTORY     Past Medical History:   Diagnosis Date   • Asthma    • Cataract    • Hypertension    • Stroke (HCC)        Patient Active Problem List   Diagnosis   • Mild intermittent asthma without complication   • Chronic pansinusitis   • Nasal polyposis   • Aspirin sensitivity   • HTN (hypertension)   • Smoking   • Moderate persistent asthma with acute exacerbation   • Elevated blood-pressure reading without diagnosis of hypertension   • Fatigue   • Golfers elbow   • History of drug abuse (HCC)   • Environmental allergies   • Erectile dysfunction   • Elevated fasting blood sugar   • " History of stroke   • Hypertension   • Asthma   • DVT of lower extremity (deep venous thrombosis) (formerly [facility])   • Antiphospholipid antibody syndrome (HCC)   • Left hemiparesis (formerly [facility])       MEDICATIONS        Current Outpatient Medications   Medication Sig Dispense Refill   • acetaminophen (TYLENOL) 325 mg tablet Take 2 tablets (650 mg total) by mouth every 6 (six) hours as needed for mild pain  0   • albuterol (2.5 mg/3 mL) 0.083 % nebulizer solution Take 3 mL (2.5 mg total) by nebulization every 6 (six) hours as needed for wheezing or shortness of breath 75 mL 0   • albuterol (ProAir HFA) 90 mcg/act inhaler Inhale 2 puffs every 6 (six) hours as needed for wheezing 8.5 g 0   • atorvastatin (LIPITOR) 40 mg tablet Take 1 tablet (40 mg total) by mouth every evening 30 tablet 0   • azithromycin (ZITHROMAX) 500 MG tablet Take 1 tablet (500 mg total) by mouth daily for 3 days 3 tablet 0   • cetirizine (ZyrTEC) 5 MG tablet Take 5 mg by mouth daily     • escitalopram (Lexapro) 10 mg tablet Start with 0.5 mg tablet daily for the first few weeks. If no change, then patient should increase to 10 mg daily. 30 tablet 2   • fluticasone-umeclidinium-vilanterol 200-62.5-25 mcg/actuation AEPB inhaler Inhale 1 puff daily Rinse mouth after use. 60 blister 2   • ipratropium (ATROVENT) 0.02 % nebulizer solution Take 2.5 mL (0.5 mg total) by nebulization 2 (two) times a day 30 mL 1   • montelukast (SINGULAIR) 10 mg tablet Take 1 tablet (10 mg total) by mouth daily at bedtime 90 tablet 2   • predniSONE 10 mg tablet Take 1 tablet (10 mg total) by mouth daily for 30 doses Start with 4 tablets daily, decrease by one tablet every 3 days till complete through 01/22/2024. >>>>>>>>>>>>> 40 mg for 3 days (four tablets daily on 01/10, 01/11, 01/12) followed by 30 mg daily for three days(Three tablets daily on 01/14, 01/15, 01/16) followed by 20 mg daily for three days (two tablets daily on 01/17, 01/18, 01/19) followed by 10 mg for three days (one tablet  "daily on 01/20, 01/21, 01/22) to complete. 30 tablet 0   • rivaroxaban (Xarelto) 20 mg tablet Take 1 tablet (20 mg total) by mouth once daily Take with food. (Patient taking differently: Take 20 mg by mouth once daily Take with food.    Takes 15 mg BID) 60 tablet 2   • tadalafil (Cialis) 5 MG tablet Take 1 tablet (5 mg total) by mouth daily as needed for erectile dysfunction 10 tablet 0   • traZODone (DESYREL) 50 mg tablet Take 1 tablet (50 mg total) by mouth daily at bedtime 30 tablet 0   • Diclofenac Sodium (VOLTAREN) 1 % Apply 2 g topically 4 (four) times a day (Patient not taking: Reported on 1/10/2024) 100 g 2   • pantoprazole (PROTONIX) 40 mg tablet Take 1 tablet (40 mg total) by mouth daily in the early morning Do not start before December 10, 2023. (Patient not taking: Reported on 12/20/2023) 30 tablet 0   • selenium sulfide (SELSUN) 2.5 % shampoo Apply topically 2 (two) times a week (Patient not taking: Reported on 12/20/2023) 118 mL 1   • TiZANidine (ZANAFLEX) 4 MG capsule Take 1 capsule (4 mg total) by mouth 3 (three) times a day for 7 days (Patient not taking: Reported on 1/10/2024) 21 capsule 0     No current facility-administered medications for this visit.        ALLERGIES        Allergies   Allergen Reactions   • Aspirin Anaphylaxis   • Ibuprofen Anaphylaxis   • Ibuprofen Other (See Comments)     Asthma flare   • Augmentin [Amoxicillin-Pot Clavulanate] Rash       OBJECTIVE     Patient ID: Miller Mckeon is a 63 y.o. male    Blood pressure 141/73, pulse 104, temperature 98.7 °F (37.1 °C), temperature source Temporal, height 5' 11\" (1.803 m), weight 96.2 kg (212 lb).    GENERAL EXAM:    CONSTITUTIONAL: Well developed, well nourished, well groomed. No dysmorphic features.     Eyes:  PERRLA, EOM normal      Neck:  Normal ROM, neck supple.      HEENT:  Normocephalic atraumatic.    Chest:  Respirations regular and unlabored.    Cardiovascular:  Distal extremities warm without palpable edema or " tenderness, no observed significant swelling.    Musculoskeletal:  Full range of motion.    Skin:  Warm and dry   Psychiatric:  Normal behavior and appropriate affect      NEUROLOGICAL EXAM:    Neurologic Exam     Mental Status   Oriented to person, place, and time.   Attention: normal. Concentration: normal.   Speech: speech is normal   Level of consciousness: alert  Knowledge: good.   Able to name object. Able to read. Able to repeat. Normal comprehension.     Cranial Nerves     CN II   Visual fields full to confrontation.     CN III, IV, VI   Pupils are equal, round, and reactive to light.  Extraocular motions are normal.     CN V   Facial sensation intact.     CN VII   Right facial weakness: none  Left facial weakness: central    CN VIII   CN VIII normal.     CN IX, X   CN IX normal.   CN X normal.     CN XI   CN XI normal.     CN XII   CN XII normal.     Motor Exam   Muscle bulk: normal  Overall muscle tone: normal  Right arm pronator drift: absent  Left arm pronator drift: absent    Strength   Strength 5/5 except as noted.   Left quadriceps: 4/5  Left hamstrin/5  Left anterior tibial: 5/5  Left posterior tibial: 5/5  Left peroneal: 5/5  Left gastroc: 5/5    Sensory Exam   Pinprick normal.     Gait, Coordination, and Reflexes     Coordination   Finger to nose coordination: normal    Reflexes   Right brachioradialis: 2+  Left brachioradialis: 2+  Right biceps: 2+  Left biceps: 2+  Right triceps: 2+  Left triceps: 2+  Right patellar: 3+  Left patellar: 3+  Right achilles: 3+  Left achilles: 3+      ROS     Review of systems as documented by the MA was reviewed in full by myself, Genesis Ramachandran DO    Review of Systems   Constitutional: Negative.    HENT: Negative.     Eyes: Blurry eyes.    Respiratory: Negative.     Cardiovascular: Negative.    Gastrointestinal: Negative.    Endocrine: Negative.    Genitourinary: Negative.    Musculoskeletal: Negative.    Skin: Negative.    Allergic/Immunologic: Negative.     Neurological:  Positive for weakness.   Hematological: Negative.    Psychiatric/Behavioral:  Positive for hallucinations.      ======    Thank you for allowing me to participate in the care this patient, Miller Mckeon.       Genesis Ramachandran,   [facility], PGY-2

## 2024-01-12 ENCOUNTER — TELEPHONE (OUTPATIENT)
Dept: HEMATOLOGY ONCOLOGY | Facility: CLINIC | Age: 64
End: 2024-01-12

## 2024-01-12 ENCOUNTER — TELEPHONE (OUTPATIENT)
Dept: NEUROLOGY | Facility: CLINIC | Age: 64
End: 2024-01-12

## 2024-01-12 NOTE — TELEPHONE ENCOUNTER
Patient Call    Who are you speaking with? Patient    If it is not the patient, are they listed on an active communication consent form? N/A   What is the reason for this call? Called and lvm to reschedule consult with a MD due to Antiphospholipid antibody syndrome Dx. Gave [place] phone number   Does this require a call back? N/A   If a call back is required, please list best call back number N/a   If a call back is required, advise that a message will be forwarded to their care team and someone will return their call as soon as possible.   Did you relay this information to the patient? N/A

## 2024-01-16 DIAGNOSIS — Z23 ENCOUNTER FOR VACCINATION: Primary | ICD-10-CM

## 2024-01-17 ENCOUNTER — OFFICE VISIT (OUTPATIENT)
Dept: FAMILY MEDICINE CLINIC | Facility: CLINIC | Age: 64
End: 2024-01-17
Payer: COMMERCIAL

## 2024-01-17 VITALS
DIASTOLIC BLOOD PRESSURE: 82 MMHG | TEMPERATURE: 98.9 F | WEIGHT: 210 LBS | BODY MASS INDEX: 29.4 KG/M2 | OXYGEN SATURATION: 95 % | SYSTOLIC BLOOD PRESSURE: 128 MMHG | HEIGHT: 71 IN | RESPIRATION RATE: 18 BRPM | HEART RATE: 102 BPM

## 2024-01-17 DIAGNOSIS — J44.9 OBSTRUCTIVE LUNG DISEASE (HCC): ICD-10-CM

## 2024-01-17 DIAGNOSIS — J44.1 COPD WITH ACUTE EXACERBATION (HCC): Primary | ICD-10-CM

## 2024-01-17 DIAGNOSIS — K21.9 GASTROESOPHAGEAL REFLUX DISEASE, UNSPECIFIED WHETHER ESOPHAGITIS PRESENT: ICD-10-CM

## 2024-01-17 DIAGNOSIS — I63.411 CEREBROVASCULAR ACCIDENT (CVA) DUE TO EMBOLISM OF RIGHT MIDDLE CEREBRAL ARTERY (HCC): ICD-10-CM

## 2024-01-17 DIAGNOSIS — I10 PRIMARY HYPERTENSION: ICD-10-CM

## 2024-01-17 DIAGNOSIS — F17.200 SMOKING: ICD-10-CM

## 2024-01-17 PROCEDURE — T1015 CLINIC SERVICE: HCPCS

## 2024-01-17 RX ORDER — ATORVASTATIN CALCIUM 40 MG/1
40 TABLET, FILM COATED ORAL EVERY EVENING
Qty: 30 TABLET | Refills: 3 | Status: SHIPPED | OUTPATIENT
Start: 2024-01-17 | End: 2024-05-16

## 2024-01-17 RX ORDER — DIPHENHYDRAMINE HYDROCHLORIDE AND LIDOCAINE HYDROCHLORIDE AND ALUMINUM HYDROXIDE AND MAGNESIUM HYDRO
10 KIT EVERY 6 HOURS PRN
Qty: 119 ML | Refills: 0 | Status: SHIPPED | OUTPATIENT
Start: 2024-01-17 | End: 2024-01-24

## 2024-01-17 RX ORDER — OMEPRAZOLE 40 MG/1
40 CAPSULE, DELAYED RELEASE ORAL DAILY
Qty: 30 CAPSULE | Refills: 1 | Status: SHIPPED | OUTPATIENT
Start: 2024-01-17

## 2024-01-17 NOTE — PROGRESS NOTES
FAMILY MEDICINE OFFICE VISIT  [facility]      NAME: Miller Mckeon  AGE: 63 y.o. SEX: male    DATE OF ENCOUNTER: 1/17/2024    Assessment and Plan     1. COPD with acute exacerbation (HCC)  Comments:  Continue Trelegy inhaler  As needed albuterol inhaler for rescue  Conitnue to have symptoms despite inhaler use  Recommend pulmonology consultation  Orders:  -     Ambulatory Referral to Pulmonology; Future    2. Obstructive lung disease (HCC)  -     Ambulatory Referral to Pulmonology; Future    3. Gastroesophageal reflux disease, unspecified whether esophagitis present  -     omeprazole (PriLOSEC) 40 MG capsule; Take 1 capsule (40 mg total) by mouth daily  -     diphenhydramine, lidocaine, Al/Mg hydroxide, simethicone (Magic Mouthwash) SUSP; Swish and swallow 10 mL every 6 (six) hours as needed for mouth pain or discomfort or mucositis for up to 7 days    4. Primary hypertension  Comments:  BP stable   conitnue to monitor off of medication    5. Stroke due to R ICA to MCA occlusion, s/p TNK and thrombectomy  Comments:  Following up with neurology  On Xeralto 15 mg BID at this time  Orders:  -     atorvastatin (LIPITOR) 40 mg tablet; Take 1 tablet (40 mg total) by mouth every evening    6. Smoking  Comments:  more than 40 pack year history  Quit since 12/2023          Miller Mckeon is 63 y.o. male presented to the office for COPD follow up visit. He is doing better but not yet at baseline.   Started Trelegy two days ago. Needing albuterol nebulization twice daily. Advised to consult pulmonology with continued daily need for albuterol rescue inhaler. Orders placed in EMR.     Follow up in 4 weeks.     Chief Complaint     Chief Complaint   Patient presents with    Follow-up     Patient is here for a follow up for his breathing. He states he is still having a hard time breathing. He states that his acid reflux is causing him problems with this throat.        History of Present Illness     Miller  ALEXANDRA Mckeon is 63 y.o. male presented to the office for COPD follow up visit. He is doing better but not yet at baseline. Using albuterol nebulization for wheezing and shortness of breath. Needing albuterol twice daily. Last use this morning for wheezing. Patient also reports that he had couple of beers over the weekend followed by vomiting. Since then experiencing mild abdominal pina, heart burn and soreness in throat. NBNB emesis, contains stomach contents. NO further episodes reported.   Denies chest pain, SOB, fever, chills, nausea, vomiting, lower extremity edema at this time.          The following portions of the patient's history were reviewed and updated as appropriate: allergies, current medications, past family history, past medical history, past social history, past surgical history and problem list.    Review of Systems     Review of Systems   Constitutional:  Negative for activity change.   HENT:  Positive for sore throat. Negative for congestion and rhinorrhea.    Respiratory:  Positive for wheezing. Negative for cough, chest tightness and shortness of breath.    Cardiovascular:  Negative for chest pain and palpitations.   Gastrointestinal:  Positive for abdominal pain. Negative for constipation, diarrhea, nausea and vomiting.   Genitourinary:  Negative for difficulty urinating.   Skin:  Negative for rash.   Neurological:  Negative for light-headedness and headaches.   Psychiatric/Behavioral:  The patient is not nervous/anxious.    All other systems reviewed and are negative.      Active Problem List     Patient Active Problem List   Diagnosis    Mild intermittent asthma without complication    Chronic pansinusitis    Nasal polyposis    Aspirin sensitivity    HTN (hypertension)    Smoking    Moderate persistent asthma with acute exacerbation    Elevated blood-pressure reading without diagnosis of hypertension    Fatigue    Golfers elbow    History of drug abuse (HCC)    Environmental allergies     "Erectile dysfunction    Elevated fasting blood sugar    History of stroke    Hypertension    Asthma    DVT of lower extremity (deep venous thrombosis) (HCC)    Antiphospholipid antibody syndrome (HCC)    Left hemiparesis (HCC)    COPD with acute exacerbation (HCC)    Obstructive lung disease (HCC)    Gastroesophageal reflux disease    Stroke due to R ICA to MCA occlusion, s/p TNK and thrombectomy       Objective     /82   Pulse 102   Temp 98.9 °F (37.2 °C)   Resp 18   Ht 5' 11\" (1.803 m)   Wt 95.3 kg (210 lb)   SpO2 95%   BMI 29.29 kg/m²     Physical Exam  Vitals and nursing note reviewed. Exam conducted with a chaperone present.   Constitutional:       General: He is not in acute distress.     Appearance: Normal appearance.   HENT:      Head: Normocephalic and atraumatic.      Right Ear: External ear normal.      Left Ear: External ear normal.      Nose: No congestion or rhinorrhea.      Mouth/Throat:      Mouth: Mucous membranes are moist.      Pharynx: Oropharynx is clear.   Eyes:      Extraocular Movements: Extraocular movements intact.      Conjunctiva/sclera: Conjunctivae normal.   Cardiovascular:      Rate and Rhythm: Normal rate and regular rhythm.      Pulses: Normal pulses.      Heart sounds: Normal heart sounds. No murmur heard.  Pulmonary:      Effort: Pulmonary effort is normal. No respiratory distress.      Breath sounds: Normal breath sounds. No wheezing or rales.   Abdominal:      General: Bowel sounds are normal.      Palpations: Abdomen is soft.      Tenderness: There is abdominal tenderness (mild tenderness over epigastric region). There is no guarding or rebound.   Musculoskeletal:      Cervical back: Neck supple.      Right lower leg: No edema.      Left lower leg: No edema.   Skin:     General: Skin is warm and dry.      Capillary Refill: Capillary refill takes less than 2 seconds.   Neurological:      Mental Status: He is alert and oriented to person, place, and time. Mental " status is at baseline.   Psychiatric:         Behavior: Behavior normal.           Current Medications     Current Outpatient Medications:     atorvastatin (LIPITOR) 40 mg tablet, Take 1 tablet (40 mg total) by mouth every evening, Disp: 30 tablet, Rfl: 3    diphenhydramine, lidocaine, Al/Mg hydroxide, simethicone (Magic Mouthwash) SUSP, Swish and swallow 10 mL every 6 (six) hours as needed for mouth pain or discomfort or mucositis for up to 7 days, Disp: 119 mL, Rfl: 0    omeprazole (PriLOSEC) 40 MG capsule, Take 1 capsule (40 mg total) by mouth daily, Disp: 30 capsule, Rfl: 1    acetaminophen (TYLENOL) 325 mg tablet, Take 2 tablets (650 mg total) by mouth every 6 (six) hours as needed for mild pain, Disp: , Rfl: 0    albuterol (2.5 mg/3 mL) 0.083 % nebulizer solution, Take 3 mL (2.5 mg total) by nebulization every 6 (six) hours as needed for wheezing or shortness of breath, Disp: 75 mL, Rfl: 0    albuterol (ProAir HFA) 90 mcg/act inhaler, Inhale 2 puffs every 6 (six) hours as needed for wheezing, Disp: 8.5 g, Rfl: 0    cetirizine (ZyrTEC) 5 MG tablet, Take 5 mg by mouth daily, Disp: , Rfl:     escitalopram (Lexapro) 10 mg tablet, Start with 0.5 mg tablet daily for the first few weeks. If no change, then patient should increase to 10 mg daily., Disp: 30 tablet, Rfl: 2    fluticasone-umeclidinium-vilanterol 200-62.5-25 mcg/actuation AEPB inhaler, Inhale 1 puff daily Rinse mouth after use., Disp: 60 blister, Rfl: 2    ipratropium (ATROVENT) 0.02 % nebulizer solution, Take 2.5 mL (0.5 mg total) by nebulization 2 (two) times a day, Disp: 30 mL, Rfl: 1    montelukast (SINGULAIR) 10 mg tablet, Take 1 tablet (10 mg total) by mouth daily at bedtime, Disp: 90 tablet, Rfl: 2    predniSONE 10 mg tablet, Take 1 tablet (10 mg total) by mouth daily for 30 doses Start with 4 tablets daily, decrease by one tablet every 3 days till complete through 01/22/2024. >>>>>>>>>>>>> 40 mg for 3 days (four tablets daily on 01/10, 01/11,  01/12) followed by 30 mg daily for three days(Three tablets daily on 01/14, 01/15, 01/16) followed by 20 mg daily for three days (two tablets daily on 01/17, 01/18, 01/19) followed by 10 mg for three days (one tablet daily on 01/20, 01/21, 01/22) to complete., Disp: 30 tablet, Rfl: 0    rivaroxaban (Xarelto) 20 mg tablet, Take 1 tablet (20 mg total) by mouth once daily Take with food. (Patient taking differently: Take 20 mg by mouth once daily Take with food.  Takes 15 mg BID), Disp: 60 tablet, Rfl: 2    tadalafil (Cialis) 5 MG tablet, Take 1 tablet (5 mg total) by mouth daily as needed for erectile dysfunction, Disp: 10 tablet, Rfl: 0    traZODone (DESYREL) 50 mg tablet, Take 1 tablet (50 mg total) by mouth daily at bedtime, Disp: 30 tablet, Rfl: 0    Health Maintenance     Health Maintenance   Topic Date Due    Colorectal Cancer Screening  Never done    Zoster Vaccine (1 of 2) Never done    BMI: Followup Plan  04/26/2023    Annual Physical  04/26/2023    COVID-19 Vaccine (2 - 2023-24 season) 09/01/2023    Pneumococcal Vaccine: Pediatrics (0 to 5 Years) and At-Risk Patients (6 to 64 Years) (2 - PCV) 04/18/2024 (Originally 11/9/2017)    Hepatitis B Vaccine (2 of 3 - Risk 3-dose series) 04/18/2024 (Originally 7/12/2022)    SLP PLAN OF CARE  02/03/2024    Depression Screening  01/10/2025    Depression Follow-up Plan  01/10/2025    BMI: Adult  01/11/2025    DTaP,Tdap,and Td Vaccines (2 - Td or Tdap) 11/09/2026    HIV Screening  Completed    Hepatitis C Screening  Completed    Influenza Vaccine  Completed    HIB Vaccine  Aged Out    IPV Vaccine  Aged Out    Hepatitis A Vaccine  Aged Out    Meningococcal ACWY Vaccine  Aged Out    HPV Vaccine  Aged Out     Immunization History   Administered Date(s) Administered    COVID-19 PFIZER VACCINE 0.3 ML IM 04/16/2021    Hep A / Hep B 06/14/2022, 06/14/2022    Influenza Quadrivalent Preservative Free 3 years and older IM 11/09/2016    Influenza, injectable, quadrivalent,  preservative free 0.5 mL 12/08/2023    Pneumococcal Polysaccharide PPV23 11/09/2016    Tdap 11/09/2016           Miley Ferraro MD   Family Medicine  PGY- 3  1/17/2024 9:39 AM

## 2024-01-18 ENCOUNTER — TELEPHONE (OUTPATIENT)
Dept: HEMATOLOGY ONCOLOGY | Facility: CLINIC | Age: 64
End: 2024-01-18

## 2024-01-18 ENCOUNTER — OFFICE VISIT (OUTPATIENT)
Dept: PHYSICAL THERAPY | Facility: CLINIC | Age: 64
End: 2024-01-18

## 2024-01-18 DIAGNOSIS — I63.411 CEREBROVASCULAR ACCIDENT (CVA) DUE TO EMBOLISM OF RIGHT MIDDLE CEREBRAL ARTERY (HCC): Primary | ICD-10-CM

## 2024-01-18 PROCEDURE — 97112 NEUROMUSCULAR REEDUCATION: CPT

## 2024-01-18 PROCEDURE — 97110 THERAPEUTIC EXERCISES: CPT

## 2024-01-18 NOTE — TELEPHONE ENCOUNTER
Appointment changed to a virtual visit for 1/9/23 due to upcoming inclement weather.  Message left on voicemail. Advised to call back.

## 2024-01-18 NOTE — PROGRESS NOTES
Daily Note     Today's date: 2024  Patient name: Miller Mckeon  : 1960  MRN: [id number]  Referring provider: Miley Ferraro MD  Dx:   Encounter Diagnosis     ICD-10-CM    1. 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  I dont want to be here, dont know the purpose of why I need to come to PT when I am out cutting wood, and riding my 4 dao outside in the snow with my grandkids  I do feel I am struggling with pysch issues and plan to meet with psychiatrist up coming      Objective: See treatment diary below      Assessment: Tolerated treatment well pt indicated > mm fatigue with wt machines  Pt does not do well with following cues/direction, easily agitated and  lacks motivation to participate with TE/neuro exer  Progressed POC  as kamari  Patient demonstrated fatigue post treatment and would benefit from continued PT      Plan: Continue per plan of care.      Re-eval Date:     Date      Visit Count  2 3     FOTO        Pain In        Pain Out        Vitals             Precautions HTN, asthma - inhaler        Manuals                                        Neuro Re-Ed         HKM  2x40'  2x40' w/ bolster 2x40' w/ carry 10# DB             BW amb  40'  40' w/ bolster  40' w/ HT 2x40' w/ carry 10# DB     Step ups   Stairs 2x  2x with bolster  Descend stairs trunk rotation > R def     Amb with HT/HN    2x 40' ea      Heel/toe  Fwd/retro  4x10' > difficulty retro Fwd/retro  4x10' > difficulty retro     [place] push/pull / side step   Push/pull  20# 6 laps  Side 12# 6 laps ea dir     STS  NP      Foam beams         Education         Ther Ex        Nustep   L 4 10 min  Focus cardiovascular endurance  Cues pacing    Freq redirection for > SPM goal > 60  Pt def     Resisted sidestepping   Blue  2x 40' ea dir  Cues Trunk neutral, rotates to R      Leg Press   BL  100# 2x10  LLE 50# 2x10    Attempted HR  Pt agitated/def             L UE PNF D2 flex/ext        Shukla carries                         Ther Activity        Carrying items                Gait Training                        Modalities

## 2024-01-19 ENCOUNTER — TELEMEDICINE (OUTPATIENT)
Dept: HEMATOLOGY ONCOLOGY | Facility: CLINIC | Age: 64
End: 2024-01-19
Payer: COMMERCIAL

## 2024-01-19 DIAGNOSIS — N52.9 ERECTILE DYSFUNCTION, UNSPECIFIED ERECTILE DYSFUNCTION TYPE: ICD-10-CM

## 2024-01-19 DIAGNOSIS — D68.61 ANTIPHOSPHOLIPID ANTIBODY SYNDROME (HCC): Primary | ICD-10-CM

## 2024-01-19 DIAGNOSIS — I63.411 CEREBROVASCULAR ACCIDENT (CVA) DUE TO EMBOLISM OF RIGHT MIDDLE CEREBRAL ARTERY (HCC): ICD-10-CM

## 2024-01-19 PROCEDURE — 99215 OFFICE O/P EST HI 40 MIN: CPT | Performed by: INTERNAL MEDICINE

## 2024-01-19 NOTE — TELEPHONE ENCOUNTER
Post CVA Discharge Follow Up  Hospitalization: 11/30/23-12/8/23, 12/8/23-12/9/23 [facility]    Called patient. Spoke with his son. He reports how the patient is napping. Will call back. He was appreciative.

## 2024-01-19 NOTE — PROGRESS NOTES
Virtual Brief Visit    November 30, 2023 patient was admitted and found to have cerebral arterial thrombosis.  CBC normal.  CT chest ab pelvis showed right lung groundglass consolidations.  Mild urinary bladder wall thickening.  Medical and mechanical thrombolysis/thrombectomy.  Started on Plavix.  Developed acute DVT December 1.  Anticoagulation deferred due to high risk of hemorrhagic conversion.  December 5, 2020 3 repeat Doppler showed acute proximal femoral DVT, resolution of previous left gastrocnemius.  Retrievable IVC filter was placed.  December 3, 2023 no lupus anticoagulant was detected.  Thrombosis panel showed Antithrombin III 76, low normal 92, otherwise normal.  December 19, 2020 3 repeat CT showed stable abnormality, Plavix discontinued, Xarelto initiated.  December 20 repeat lower extremity Doppler shows acute left femoral, popliteal DVT.  Repeat thrombosis panel showed normalized Antithrombin III.  Lupus anticoagulant positive.    This Visit is being completed by telephone. The Patient is located at Home and in the following state in which I hold an active license [place]    The patient was identified by name and date of birth. Miller Mckeon was informed that this is a telemedicine visit and that the visit is being conducted through the Epic Embedded platform. He agrees to proceed..  My office door was closed. No one else was in the room.  He acknowledged consent and understanding of privacy and security of the video platform. The patient has agreed to participate and understands they can discontinue the visit at any time.    Patient is aware this is a billable service.       Assessment/Plan: In summary, this is a 63-year-old male with a history of arterial and venous thromboses as outlined.  Is currently on Xarelto 20 mg p.o. daily.  He has minimal bleeding symptoms, momentary gingival bleeding with toothbrushing.  Neurologically he is doing well.  Functions without restriction.  We reviewed the  possible significance of lupus anticoagulant.  Initial study was equivocal/negative.  Repeat study was positive.  My inclination is to repeat lupus anticoagulant in 6 weeks, 3 months since his initial in early December.  We reviewed that if persistently positive this indicates some increase in efficacy for Coumadin above Xarelto.  We reviewed some issues related to Coumadin administration, variable metabolism, monitoring, etc.  Patient and his family voiced understanding the above.  Arrangements were made accordingly.    Problem List Items Addressed This Visit    None      Recent Visits  No visits were found meeting these conditions.  Showing recent visits within past 7 days and meeting all other requirements  Today's Visits  Date Type Provider Dept   01/19/24 Telemedicine Neville Pathak DO [facility]   Showing today's visits and meeting all other requirements  Future Appointments  No visits were found meeting these conditions.  Showing future appointments within next 150 days and meeting all other requirements         Visit Time  Total Visit Duration: 25 min.

## 2024-01-19 NOTE — LETTER
January 19, 2024     [facility]  [address] [facility] [address]    Patient: Miller Mckeon   YOB: 1960   Date of Visit: 1/19/2024       Dear Dr. Wolff:    Thank you for referring Miller Mckeon to me for evaluation. Below are my notes for this consultation.    If you have questions, please do not hesitate to call me. I look forward to following your patient along with you.         Sincerely,        Neville Pathak, DO        CC: MD Neville Mccall, DO  1/19/2024 12:28 PM  Sign when Signing Visit  Virtual Brief Visit    November 30, 2023 patient was admitted and found to have cerebral arterial thrombosis.  Medical and mechanical thrombolysis/thrombectomy.  Started on Plavix.  Developed acute DVT December 1.  Anticoagulation deferred due to high risk of hemorrhagic conversion.  December 5, 2020 3 repeat Doppler showed acute proximal femoral DVT, resolution of previous left gastrocnemius.  Retrievable IVC filter was placed.  December 3, 2023 no lupus anticoagulant was detected.  Thrombosis panel showed Antithrombin III 76, low normal 92, otherwise normal.  December 19, 2020 3 repeat CT showed stable abnormality, Plavix discontinued, Xarelto initiated.  December 20 repeat lower extremity Doppler shows acute left femoral, popliteal DVT.  Repeat thrombosis panel showed normalized Antithrombin III.  Lupus anticoagulant positive.    This Visit is being completed by telephone. The Patient is located at Home and in the following state in which I hold an active license [place]    The patient was identified by name and date of birth. Miller Mckeon was informed that this is a telemedicine visit and that the visit is being conducted through the Epic Embedded platform. He agrees to proceed..  My office door was closed. No one else was in the room.  He acknowledged consent and understanding of privacy and security of the video platform. The patient has agreed to  participate and understands they can discontinue the visit at any time.    Patient is aware this is a billable service.       Assessment/Plan: In summary, this is a 63-year-old male with a history of arterial and venous thromboses as outlined.  Is currently on Xarelto 20 mg p.o. daily.  He has minimal bleeding symptoms, momentary gingival bleeding with toothbrushing.  Neurologically he is doing well.  Functions without restriction.  We reviewed the possible significance of lupus anticoagulant.  Initial study was equivocal/negative.  Repeat study was positive.  My inclination is to repeat lupus anticoagulant in 6 weeks, 3 months since his initial in early December.  We reviewed that if persistently positive this indicates some increase in efficacy for Coumadin above Xarelto.  We reviewed some issues related to Coumadin administration, variable metabolism, monitoring, etc.  Patient and his family voiced understanding the above.  Arrangements were made accordingly.    Problem List Items Addressed This Visit    None      Recent Visits  No visits were found meeting these conditions.  Showing recent visits within past 7 days and meeting all other requirements  Today's Visits  Date Type Provider Dept   01/19/24 Telemedicine Neville Pathak DO [facility]   Showing today's visits and meeting all other requirements  Future Appointments  No visits were found meeting these conditions.  Showing future appointments within next 150 days and meeting all other requirements         Visit Time  Total Visit Duration: 25 min.              98

## 2024-01-21 DIAGNOSIS — I63.411 CEREBROVASCULAR ACCIDENT (CVA) DUE TO EMBOLISM OF RIGHT MIDDLE CEREBRAL ARTERY (HCC): ICD-10-CM

## 2024-01-22 NOTE — TELEPHONE ENCOUNTER
Post CVA Discharge Follow Up  Hospitalization: 11/30/23-12/8/23, 12/8/23-12/9/23 [facility]    Called patient. Since discharge, he denies experiencing any new or worsening stroke-like symptoms. Reports he is doing well.    He is ambulating independently as well as preforming his own ADLs. Patient manages his own medications, appointments, and affairs.     Reviewed appointments - patient successfully followed up with PCP, neurology, and hematology. He continues to participate with the therapy services including PT and ST.    Reviewed medications with him. Reports having no difficulties obtaining medications. Reports he is taking as prescribed with no medication side effects or signs of bleeding. Denies any questions or concerns regarding his medications.    All of his questions were addressed. At the conclusion of the conversation, patient denies having any further questions or concerns.

## 2024-01-25 ENCOUNTER — OFFICE VISIT (OUTPATIENT)
Dept: PHYSICAL THERAPY | Facility: CLINIC | Age: 64
End: 2024-01-25

## 2024-01-25 ENCOUNTER — OFFICE VISIT (OUTPATIENT)
Dept: SPEECH THERAPY | Facility: CLINIC | Age: 64
End: 2024-01-25
Payer: COMMERCIAL

## 2024-01-25 DIAGNOSIS — I63.411 CEREBROVASCULAR ACCIDENT (CVA) DUE TO EMBOLISM OF RIGHT MIDDLE CEREBRAL ARTERY (HCC): Primary | ICD-10-CM

## 2024-01-25 DIAGNOSIS — R41.841 COGNITIVE COMMUNICATION DEFICIT: ICD-10-CM

## 2024-01-25 PROCEDURE — 97110 THERAPEUTIC EXERCISES: CPT | Performed by: PHYSICAL THERAPIST

## 2024-01-25 PROCEDURE — 92507 TX SP LANG VOICE COMM INDIV: CPT

## 2024-01-25 NOTE — PROGRESS NOTES
"Speech Treatment Note    Today's date: 2024  Patient’s name: Miller Mckeon  : 1960  MRN: [id number]  Safety measures: s/p CVA  Referring provider: Miley Ferraro MD    Visit: # 3    Subjective/Behavioral    Guarded; states he's doing well but does not want to be here - \"my girlfriend is making me come, I don't want to be here\"    Assessment Notes & Comments    Majority of session focused on brainstorming ideas to support pt's return to BL functioning. Ideas were provided including externalizing executive functions (making graphic organizers and lists of material for each building section of the house he needs to work on - plumbing, electrical, kitchen, etc.). Extensive time was also spent discussing the importance of making more achievable goals (instead of \"I want to go to the job site and do my job\", start with small house projects such as hanging a picture). Pt was easily distracted throughout session and needed multiple re-explanations of information provided. Pt is struggling to buy-in to the purpose of therapy. Functional homework activities were explained verbally to pt and his partner, Zehra. Both expressed understanding. If pt continues to not buy-in with therapy, it would be best to discharge with activities and resources provided at time of dc. Motivational interviewing and rapport building attempted at length to support compliance.  Continue with POC.     Intervention/Treatment Goals  Long-term goals:  Pt will reduce impact of cognitive impairment from mild to occ/absent with combination of restorative and compensatory strategies as indicated.     Short-term goals:  Pt will perform EF based tasks involving problem solving, attention, planning, decision making, etc with 80% acc given min v/v cues.   2024: Sustained attention during conversation - approx 30-35 minutes - pt answered questions pertaining to self. Given occ-min cues and redirections, pt was able to maintain " "focus - pt at times changed topics potentially as a defense mechanism. Occasionally looked around the room (e.g., at the clock or out the window) but able to maintain attention to conversation despite this.   01/09/2024: began number-based cross word puzzle - answered questions during 4/4 opportunities before running out of time. He mentioned some difficulty with keeping track visually of what part of the cross word puzzle he was looking at when going back and forth between the question and the puzzle.   1/25/24: pt completed first round of BLINK cards (match by number, say shape) with 90% accuracy given min cues; attempted to increase difficulty however pt only completed approx 25% of the activity before \"taking a break\" because he \"didn't want to do it anymore\". Encouragement provided but pt did not wish to continue.     Pt will complete STM based tasks with 80% acc given min v/v cues.     Pt will engage in visual scanning and visual problem solving tasks with 80% acc given min v/v cues.     Pt and family support will be educated re strategies (internal and external aids) to support IND with cognitive functioning.   1/25/24: functional activities were listed as examples for goal setting and HEP throughout the week - see note above under \"assessment\"    Pt will complete HEP as recommended.   1/25/24: HEP provided today     Frequency: 2x/week  Duration: 6-8 weeks    Intervention certification from: 1/4/2024  Intervention certification to: 03/05/2024    Intervention comments:   Partner's name is Zehra.     Plan  Continue with POC  "

## 2024-01-25 NOTE — PROGRESS NOTES
Daily Note     Today's date: 2024  Patient name: Miller Mckeon  : 1960  MRN: [id number]  Referring provider: Miley Ferraro MD  Dx:   Encounter Diagnosis     ICD-10-CM    1. Stroke due to R ICA to MCA occlusion, s/p TNK and thrombectomy  I63.411           Start Time: 1645  Stop Time: 1715  Total time in clinic (min): 30 minutes    Subjective: Wants to be D/C. Knees hurt today because he did a lot stairs today. Balance was a little off because he was stepping on unstable cinder blocks.       Objective: See treatment diary below      Assessment: Attempted lunges and squats, but pt deferred due to increased knee pain which pt attributed to doing a lot of stairs today. Reviewed HEP with both pt and his partner Zehra in which they reported understanding.     Plan: Discharge     Re-eval Date:     Date     Visit Count  2 3 4    FOTO        Pain In        Pain Out        Vitals             Precautions HTN, asthma - inhaler        Manuals                                        Neuro Re-Ed         HKM  2x40'  2x40' w/ bolster 2x40' w/ carry 10# DB             BW amb  40'  40' w/ bolster  40' w/ HT 2x40' w/ carry 10# DB     Step ups   Stairs 2x  2x with bolster  Descend stairs trunk rotation > R def     Amb with HT/HN    2x 40' ea      Heel/toe  Fwd/retro  4x10' > difficulty retro Fwd/retro  4x10' > difficulty retro     Masha push/pull / side step   Push/pull  20# 6 laps  Side 12# 6 laps ea dir     STS  NP      Foam beams         Education         Ther Ex        Nustep   L 4 10 min  Focus cardiovascular endurance  Cues pacing    Freq redirection for > SPM goal > 60  Pt def L3, 10 min for CV endurance  SPM > 50     Resisted sidestepping   Blue  2x 40' ea dir  Cues Trunk neutral, rotates to R      Leg Press   # 2x10  LLE 50# 2x10    Attempted HR  Pt agitated/def             L UE PNF D2 flex/ext        Shukla carries             Reviewed HEP  Attempted lunges, squats  2x but pt  deferred due to knee pain            Ther Activity        Carrying items                Gait Training                        Modalities                          Access Code: [email or web address]  URL: [email or web address]  Date: 01/25/2024  Prepared by: Luisana Parr  - Standard Lunge  - 1 x daily - 4 x weekly - 2 sets - 10 reps  - Squat  - 1 x daily - 4 x weekly - 2 sets - 10 reps  - Walking  - 1 x daily - 7 x weekly - 1 sets  - Lateral Lunge  - 1 x daily - 4 x weekly - 2 sets - 10 reps  - Heel Toe Raises with Counter Support  - 1 x daily - 7 x weekly - 2 sets - 20 reps

## 2024-01-26 ENCOUNTER — TELEPHONE (OUTPATIENT)
Dept: PSYCHIATRY | Facility: CLINIC | Age: 64
End: 2024-01-26

## 2024-01-26 NOTE — TELEPHONE ENCOUNTER
IC left voice message for pt about referral that was received for services and possible placement on the wait list.  2nd outreach attempt to pt.

## 2024-01-29 ENCOUNTER — OFFICE VISIT (OUTPATIENT)
Dept: SPEECH THERAPY | Facility: CLINIC | Age: 64
End: 2024-01-29

## 2024-01-29 ENCOUNTER — APPOINTMENT (OUTPATIENT)
Dept: PHYSICAL THERAPY | Facility: CLINIC | Age: 64
End: 2024-01-29

## 2024-01-29 DIAGNOSIS — R41.841 COGNITIVE COMMUNICATION DEFICIT: ICD-10-CM

## 2024-01-29 DIAGNOSIS — I63.411 CEREBROVASCULAR ACCIDENT (CVA) DUE TO EMBOLISM OF RIGHT MIDDLE CEREBRAL ARTERY (HCC): Primary | ICD-10-CM

## 2024-01-29 PROCEDURE — 92507 TX SP LANG VOICE COMM INDIV: CPT

## 2024-01-29 NOTE — PROGRESS NOTES
"Speech Treatment Note    Today's date: 2024  Patient’s name: Miller Mckeon  : 1960  MRN: [id number]  Safety measures: s/p CVA  Referring provider: Miley Ferraro MD    Visit: # 4    Subjective/Behavioral    Guarded; 10 minutes late     Assessment Notes & Comments    Reviewed pt's progress with POC. He reports that he was compliant with homework. Stated that he was able to finish all the tasks in \"20 minutes\". Attempted to brain storm tasks that would increase complexity for HEP however pt unable to list anything reasonably achievable. Continues to present with at least moderate functional cognitive deficits with specific challenges in attention, planning, task initiation, cognitive processing, auditory comprehension, STM.   Continue with POC.     Intervention/Treatment Goals  Long-term goals:  Pt will reduce impact of cognitive impairment from mild to occ/absent with combination of restorative and compensatory strategies as indicated.     Short-term goals:  Pt will perform EF based tasks involving problem solving, attention, planning, decision making, etc with 80% acc given min v/v cues.   2024: Sustained attention during conversation - approx 30-35 minutes - pt answered questions pertaining to self. Given occ-min cues and redirections, pt was able to maintain focus - pt at times changed topics potentially as a defense mechanism. Occasionally looked around the room (e.g., at the clock or out the window) but able to maintain attention to conversation despite this.   2024: began number-based cross word puzzle - answered questions during 4/4 opportunities before running out of time. He mentioned some difficulty with keeping track visually of what part of the cross word puzzle he was looking at when going back and forth between the question and the puzzle.   24: pt completed first round of BLINK cards (match by number, say shape) with 90% accuracy given min cues; attempted to " "increase difficulty however pt only completed approx 25% of the activity before \"taking a break\" because he \"didn't want to do it anymore\". Encouragement provided but pt did not wish to continue.     Pt will complete STM based tasks with 80% acc given min v/v cues.     Pt will engage in visual scanning and visual problem solving tasks with 80% acc given min v/v cues.   1/29/24: Engaged pt in functional problem solving on the computer. Pt mentioned he wanted new shoes. Systematically filtered pt's shoe preferences verbally and then had pt use voice recognition (does not like to type) to look up shoes on various websites, determine if chosen shoe is in stock, and then used [employer] maps to determine distance of store from home. Cued pt to determine if certain drive types/times were appropriate given his distractibility, etc. Pt participated with approx 75% accuracy overall, benefited from mod v/v cues.     Pt and family support will be educated re strategies (internal and external aids) to support IND with cognitive functioning.   1/25/24: functional activities were listed as examples for goal setting and HEP throughout the week - see note above under \"assessment\"  1/29/24: helped pt generate a shopping list for [place], since he states he is not able to remember where he is driving to or why - pt unwilling to complete IND but the modeling provided seemed to be beneficial as an example.     Pt will complete HEP as recommended.   1/25/24: HEP provided today   1/29/24: pt states he completed small projects around the house this past week as recommended     Frequency: 2x/week  Duration: 6-8 weeks    Intervention certification from: 1/4/2024  Intervention certification to: 03/05/2024    Intervention comments:   Partner's name is Zehra.   Schedule 1 appt at at time.     Plan  Continue with POC  "

## 2024-01-30 ENCOUNTER — CLINICAL SUPPORT (OUTPATIENT)
Dept: PULMONOLOGY | Facility: HOSPITAL | Age: 64
End: 2024-01-30

## 2024-01-30 DIAGNOSIS — I63.411 CEREBROVASCULAR ACCIDENT (CVA) DUE TO EMBOLISM OF RIGHT MIDDLE CEREBRAL ARTERY (HCC): ICD-10-CM

## 2024-01-30 DIAGNOSIS — J44.9 CHRONIC OBSTRUCTIVE PULMONARY DISEASE, UNSPECIFIED COPD TYPE (HCC): Primary | ICD-10-CM

## 2024-01-30 PROCEDURE — 94626 PHY/QHP OP PULM RHB W/MNTR: CPT

## 2024-02-07 DIAGNOSIS — J44.1 COPD WITH ACUTE EXACERBATION (HCC): ICD-10-CM

## 2024-02-07 DIAGNOSIS — R06.02 SHORTNESS OF BREATH: ICD-10-CM

## 2024-02-07 RX ORDER — ALBUTEROL SULFATE 90 UG/1
2 AEROSOL, METERED RESPIRATORY (INHALATION) EVERY 6 HOURS PRN
Qty: 8.5 G | Refills: 0 | Status: CANCELLED | OUTPATIENT
Start: 2024-02-07

## 2024-02-07 RX ORDER — ALBUTEROL SULFATE 2.5 MG/3ML
2.5 SOLUTION RESPIRATORY (INHALATION) EVERY 6 HOURS PRN
Qty: 75 ML | Refills: 0 | Status: CANCELLED | OUTPATIENT
Start: 2024-02-07

## 2024-02-07 NOTE — TELEPHONE ENCOUNTER
Patient called requesting med refills - Albuterol inhaler and nebulizer solution.  [facility]

## 2024-02-08 DIAGNOSIS — R06.02 SHORTNESS OF BREATH: ICD-10-CM

## 2024-02-08 DIAGNOSIS — J44.1 COPD WITH ACUTE EXACERBATION (HCC): ICD-10-CM

## 2024-02-08 NOTE — TELEPHONE ENCOUNTER
My name is Antony Mckeon, [id number]. I need albuterol for my nebulizer. Doctor wants me to do it twice a day and I'm out. [facility] in [place]. Give me a call back. Thank you. That Antony Linda 3954 Albuterol for the nebulizer and a rescue inhaler needs to be refilled. Thank you. Have a good day.

## 2024-02-12 RX ORDER — ALBUTEROL SULFATE 2.5 MG/3ML
2.5 SOLUTION RESPIRATORY (INHALATION) EVERY 6 HOURS PRN
Qty: 75 ML | Refills: 0 | Status: SHIPPED | OUTPATIENT
Start: 2024-02-12

## 2024-02-12 RX ORDER — ALBUTEROL SULFATE 90 UG/1
2 AEROSOL, METERED RESPIRATORY (INHALATION) EVERY 6 HOURS PRN
Qty: 8.5 G | Refills: 0 | Status: SHIPPED | OUTPATIENT
Start: 2024-02-12

## 2024-02-14 ENCOUNTER — TELEPHONE (OUTPATIENT)
Age: 64
End: 2024-02-14

## 2024-02-14 NOTE — TELEPHONE ENCOUNTER
Left message on patient's personal name identified voicemail to please call back to schedule appt.

## 2024-02-27 ENCOUNTER — TELEPHONE (OUTPATIENT)
Dept: CARDIAC REHAB | Facility: HOSPITAL | Age: 64
End: 2024-02-27

## 2024-03-08 ENCOUNTER — PATIENT MESSAGE (OUTPATIENT)
Dept: NEUROLOGY | Facility: CLINIC | Age: 64
End: 2024-03-08

## 2024-03-08 ENCOUNTER — TELEPHONE (OUTPATIENT)
Dept: NEUROLOGY | Facility: CLINIC | Age: 64
End: 2024-03-08

## 2024-03-08 NOTE — TELEPHONE ENCOUNTER
"----- Message from Milena Tobin RN sent at 3/8/2024  8:30 AM EST -----  Regarding: FW: IVC filter  Contact: [phone]    ----- Message -----  From: Miller Mckeon \"Antony\"  Sent: 3/8/2024   7:54 AM EST  To: Neurology [place] Clinical Team 5  Subject: IVC filter                                       Hello. I was told the IVC filter was temporary and would need to be removed in a few months. I do not know who to contact to schedule the removal. Is this something you could assist with or direct me to someone who can? Thank you.     "

## 2024-03-08 NOTE — TELEPHONE ENCOUNTER
I spoke to patient who requested I call his girlfriend, Conchita Ventura, [phone].  Patient did not recall any details about his hospitalization.  I called girlfriend, reached , requested cb.      I called conchita, reached , left detailed message tcb with details regarding admission IVC placement in order to better assist.  Typically neurology does not manage this. Per chart review patient follows with hematology however will await response with further details from Conchita, patient SO.

## 2024-03-29 ENCOUNTER — TELEPHONE (OUTPATIENT)
Dept: FAMILY MEDICINE CLINIC | Facility: CLINIC | Age: 64
End: 2024-03-29

## 2024-04-04 ENCOUNTER — OFFICE VISIT (OUTPATIENT)
Dept: FAMILY MEDICINE CLINIC | Facility: CLINIC | Age: 64
End: 2024-04-04
Payer: COMMERCIAL

## 2024-04-04 VITALS
OXYGEN SATURATION: 95 % | HEART RATE: 100 BPM | WEIGHT: 211.8 LBS | SYSTOLIC BLOOD PRESSURE: 140 MMHG | BODY MASS INDEX: 29.65 KG/M2 | HEIGHT: 71 IN | RESPIRATION RATE: 17 BRPM | DIASTOLIC BLOOD PRESSURE: 80 MMHG | TEMPERATURE: 98.2 F

## 2024-04-04 DIAGNOSIS — M25.50 POLYARTHRALGIA: ICD-10-CM

## 2024-04-04 DIAGNOSIS — K21.9 GASTROESOPHAGEAL REFLUX DISEASE, UNSPECIFIED WHETHER ESOPHAGITIS PRESENT: ICD-10-CM

## 2024-04-04 DIAGNOSIS — Z59.89 UNINSURED: ICD-10-CM

## 2024-04-04 DIAGNOSIS — Z82.61 FAMILY HISTORY OF RHEUMATOID ARTHRITIS: ICD-10-CM

## 2024-04-04 DIAGNOSIS — J44.9 OBSTRUCTIVE LUNG DISEASE (HCC): ICD-10-CM

## 2024-04-04 DIAGNOSIS — J44.1 COPD WITH ACUTE EXACERBATION (HCC): Primary | ICD-10-CM

## 2024-04-04 DIAGNOSIS — R06.02 SHORTNESS OF BREATH: ICD-10-CM

## 2024-04-04 PROCEDURE — T1015 CLINIC SERVICE: HCPCS | Performed by: FAMILY MEDICINE

## 2024-04-04 RX ORDER — AZITHROMYCIN 250 MG/1
TABLET, FILM COATED ORAL
Qty: 6 TABLET | Refills: 0 | Status: SHIPPED | OUTPATIENT
Start: 2024-04-04 | End: 2024-04-08

## 2024-04-04 RX ORDER — ALBUTEROL SULFATE 90 UG/1
2 AEROSOL, METERED RESPIRATORY (INHALATION) EVERY 4 HOURS PRN
Qty: 18 G | Refills: 2 | Status: SHIPPED | OUTPATIENT
Start: 2024-04-04 | End: 2024-07-03

## 2024-04-04 RX ORDER — OMEPRAZOLE 40 MG/1
40 CAPSULE, DELAYED RELEASE ORAL DAILY
Qty: 90 CAPSULE | Refills: 1 | Status: SHIPPED | OUTPATIENT
Start: 2024-04-04

## 2024-04-04 RX ORDER — ALBUTEROL SULFATE 2.5 MG/3ML
2.5 SOLUTION RESPIRATORY (INHALATION) EVERY 6 HOURS PRN
Qty: 75 ML | Refills: 1 | Status: SHIPPED | OUTPATIENT
Start: 2024-04-04 | End: 2024-07-03

## 2024-04-04 SDOH — ECONOMIC STABILITY - INCOME SECURITY: OTHER PROBLEMS RELATED TO HOUSING AND ECONOMIC CIRCUMSTANCES: Z59.89

## 2024-04-04 NOTE — PROGRESS NOTES
Assessment/Plan:    No problem-specific Assessment & Plan notes found for this encounter.       Diagnoses and all orders for this visit:    COPD with acute exacerbation (HCC)  Comments:  start azithromycin.   restart trelegy, nebulizer 2x daily   FUP 1 week  Orders:  -     ipratropium (ATROVENT) 0.02 % nebulizer solution; Take 2.5 mL (0.5 mg total) by nebulization 2 (two) times a day  -     albuterol (2.5 mg/3 mL) 0.083 % nebulizer solution; Take 3 mL (2.5 mg total) by nebulization every 6 (six) hours as needed for wheezing or shortness of breath  -     azithromycin (ZITHROMAX) 250 mg tablet; Take 2 tabs (500mg) today followed by 1 tab (250mg) daily x4 days.    Obstructive lung disease (HCC)  Comments:  restart trelegy inhaler  Follow up in one week  Orders:  -     fluticasone-umeclidinium-vilanterol 200-62.5-25 mcg/actuation AEPB inhaler; Inhale 1 puff daily Rinse mouth after use.    Shortness of breath  -     albuterol (ProAir HFA) 90 mcg/act inhaler; Inhale 2 puffs every 4 (four) hours as needed for wheezing    Uninsured  -     Ambulatory Referral to Complex Care Management Program; Future    Gastroesophageal reflux disease, unspecified whether esophagitis present  -     omeprazole (PriLOSEC) 40 MG capsule; Take 1 capsule (40 mg total) by mouth daily    Polyarthralgia  -     RF Screen w/ Reflex to Titer; Future  -     Sedimentation rate, automated; Future  -     C-reactive protein; Future  -     Lyme Total AB W Reflex to IGM/IGG; Future    Family history of rheumatoid arthritis  -     RF Screen w/ Reflex to Titer; Future  -     Sedimentation rate, automated; Future  -     C-reactive protein; Future          Subjective:      Patient ID: Miller Mckeon is a 64 y.o. male presents as same day visit for evaluation of COPD, depression, polyarthritis.    He states he has been out of his nebulizer solution for about 2 weeks and his trelegy inhaler for about 3 days. He notes increased cough, mucus production, PDN. He  "feels very fatigued since this. No fever or chills. SOB with exertion.     He states he feels down and depressed. He has little interest in doing things. He started lexapro 10mg 2 days ago. No SI/HI.     He is concern about polyarthritis mostly in his hands and knees. He is concerns as his daughter has RA. He also has exposure to ticks and a history of lyme disease. No fever, chills, rash. He is lupus anticoag +    Unfortunately he is uninsured. He pays for his medications out of pocket, which total > $2,000 per month     HPI    The following portions of the patient's history were reviewed and updated as appropriate: allergies, current medications, past family history, past medical history, past social history, past surgical history, and problem list.    Review of Systems   Constitutional:  Positive for activity change, appetite change and fatigue. Negative for chills, diaphoresis and fever.   HENT:  Positive for congestion, postnasal drip and rhinorrhea. Negative for sinus pressure, sinus pain, sneezing and sore throat.    Respiratory:  Positive for cough and shortness of breath. Negative for chest tightness and wheezing.    Cardiovascular:  Negative for chest pain, palpitations and leg swelling.   Gastrointestinal: Negative.    Musculoskeletal:  Positive for arthralgias and myalgias.   Psychiatric/Behavioral:  Positive for dysphoric mood and sleep disturbance.          Objective:      /80 (BP Location: Left arm, Patient Position: Sitting, Cuff Size: Standard)   Pulse 100   Temp 98.2 °F (36.8 °C)   Resp 17   Ht 5' 11\" (1.803 m)   Wt 96.1 kg (211 lb 12.8 oz)   SpO2 95%   BMI 29.54 kg/m²          Physical Exam  Vitals reviewed.   Constitutional:       General: He is not in acute distress.     Appearance: Normal appearance. He is well-developed and normal weight.   HENT:      Head: Normocephalic and atraumatic.      Right Ear: A middle ear effusion is present.      Left Ear: A middle ear effusion is " present.      Mouth/Throat:      Pharynx: Posterior oropharyngeal erythema present.   Eyes:      Conjunctiva/sclera: Conjunctivae normal.      Pupils: Pupils are equal, round, and reactive to light.   Neck:      Thyroid: No thyromegaly.   Cardiovascular:      Rate and Rhythm: Normal rate and regular rhythm.      Heart sounds: Normal heart sounds. No murmur heard.  Pulmonary:      Effort: Pulmonary effort is normal. No respiratory distress.      Breath sounds: Decreased breath sounds present. No wheezing or rhonchi.   Abdominal:      General: Bowel sounds are normal. There is no distension.      Palpations: Abdomen is soft.      Tenderness: There is no abdominal tenderness.   Musculoskeletal:         General: Normal range of motion.      Cervical back: Normal range of motion and neck supple.   Skin:     General: Skin is warm and dry.   Neurological:      Mental Status: He is alert and oriented to person, place, and time.   Psychiatric:         Mood and Affect: Mood normal.         Behavior: Behavior normal.         Thought Content: Thought content normal.         Judgment: Judgment normal.

## 2024-04-05 ENCOUNTER — APPOINTMENT (OUTPATIENT)
Dept: LAB | Facility: MEDICAL CENTER | Age: 64
End: 2024-04-05
Payer: COMMERCIAL

## 2024-04-05 ENCOUNTER — PATIENT OUTREACH (OUTPATIENT)
Dept: FAMILY MEDICINE CLINIC | Facility: CLINIC | Age: 64
End: 2024-04-05

## 2024-04-05 DIAGNOSIS — M25.50 POLYARTHRALGIA: ICD-10-CM

## 2024-04-05 DIAGNOSIS — D68.61 ANTIPHOSPHOLIPID ANTIBODY SYNDROME (HCC): ICD-10-CM

## 2024-04-05 DIAGNOSIS — Z82.61 FAMILY HISTORY OF RHEUMATOID ARTHRITIS: ICD-10-CM

## 2024-04-05 DIAGNOSIS — Z78.9 NEED FOR FOLLOW-UP BY SOCIAL WORKER: Primary | ICD-10-CM

## 2024-04-05 DIAGNOSIS — N52.9 ERECTILE DYSFUNCTION, UNSPECIFIED ERECTILE DYSFUNCTION TYPE: ICD-10-CM

## 2024-04-05 LAB
B BURGDOR IGG+IGM SER QL IA: NEGATIVE
CRP SERPL QL: 2.8 MG/L
ERYTHROCYTE [SEDIMENTATION RATE] IN BLOOD: 9 MM/HOUR (ref 0–19)
PSA SERPL-MCNC: 3.85 NG/ML (ref 0–4)

## 2024-04-05 PROCEDURE — 85652 RBC SED RATE AUTOMATED: CPT

## 2024-04-05 PROCEDURE — 85670 THROMBIN TIME PLASMA: CPT

## 2024-04-05 PROCEDURE — 85732 THROMBOPLASTIN TIME PARTIAL: CPT

## 2024-04-05 PROCEDURE — 36415 COLL VENOUS BLD VENIPUNCTURE: CPT

## 2024-04-05 PROCEDURE — 86430 RHEUMATOID FACTOR TEST QUAL: CPT

## 2024-04-05 PROCEDURE — 85705 THROMBOPLASTIN INHIBITION: CPT

## 2024-04-05 PROCEDURE — 85613 RUSSELL VIPER VENOM DILUTED: CPT

## 2024-04-05 PROCEDURE — 85598 HEXAGNAL PHOSPH PLTLT NEUTRL: CPT

## 2024-04-05 PROCEDURE — 86140 C-REACTIVE PROTEIN: CPT

## 2024-04-05 PROCEDURE — 86618 LYME DISEASE ANTIBODY: CPT

## 2024-04-05 PROCEDURE — 84153 ASSAY OF PSA TOTAL: CPT

## 2024-04-05 NOTE — PROGRESS NOTES
Outpatient Care Management Note:    PCP Direct Referral received. Chart reviewed.  Patient was seen in the office yesterday 4/4/24. Patient being followed at visit for COPD.  It was noted in chart that patient has been out of his nebulizer solutions for 2 weeks and his Trelegy inhaler for 3 days.  Patient reported that he is uninsured and pays for his medications out of pocket, which total > $2,000 per month.    OPCM RN placed outreach call to patient with no answer. CM left voicemail message and included CM contact information.    CM will place referral to ambulatory  to assist with insurance coverage issues.   CM will continue to reach out to follow up with patient regarding COPD self management.    CM will schedule second outreach attempt for next week.

## 2024-04-07 LAB — RHEUMATOID FACT SER QL LA: NEGATIVE

## 2024-04-09 ENCOUNTER — PATIENT OUTREACH (OUTPATIENT)
Dept: FAMILY MEDICINE CLINIC | Facility: CLINIC | Age: 64
End: 2024-04-09

## 2024-04-09 LAB
APTT HEX PL PPP: 7 SEC (ref 0–11)
APTT SCREEN TO CONFIRM RATIO: 0.91 RATIO (ref 0–1.34)
APTT-LA IMM 4:1 NP PPP: 44.8 SEC (ref 0–40.5)
CONFIRM APTT/NORMAL: 66.5 SEC (ref 0–47.6)
DRVVT IMM 1:2 NP PPP: 97.3 SEC (ref 0–40.4)
DRVVT SCREEN TO CONFIRM RATIO: 2.1 RATIO (ref 0.8–1.2)
LA PPP-IMP: ABNORMAL
SCREEN APTT: 47.7 SEC (ref 0–43.5)
SCREEN DRVVT: 170.5 SEC (ref 0–47)
THROMBIN TIME: 20.9 SEC (ref 0–23)

## 2024-04-09 NOTE — PROGRESS NOTES
Outpatient Care Management Note:    PCP direct referral. Patient is uninsured and needs assistance affording medications.    OPCM RN placed outreach call and spoke briefly with patient who confirmed that he currently does not have health insurance and he is paying for all of his medications out of pocket.    Patient is agreeable to have OPCM SW contact him to review possible options for insurance and so that he does not have to pay out of pockets for his medications.    CM previously placed referral to . CM will route message to  to notify her that patient is agreeable to her outreach.    CM remains available to assist as needed.

## 2024-04-10 ENCOUNTER — PATIENT OUTREACH (OUTPATIENT)
Dept: FAMILY MEDICINE CLINIC | Facility: CLINIC | Age: 64
End: 2024-04-10

## 2024-04-10 NOTE — PROGRESS NOTES
MICHAEL SCHWARTZ reviewed chart d/t referral from RN CM. Discussed case with RN CM during huddle. Pt referred d/t having difficulty affording his medications. Chart states pt is MICK CORDOVA pending.    MICHAEL SCHWARTZ placed call to pt to introduce self and discuss needs. Patient did not answer. MICHAEL SCHWARTZ left message asking patient to return call.

## 2024-04-15 ENCOUNTER — PATIENT OUTREACH (OUTPATIENT)
Dept: FAMILY MEDICINE CLINIC | Facility: CLINIC | Age: 64
End: 2024-04-15

## 2024-04-15 ENCOUNTER — TELEPHONE (OUTPATIENT)
Dept: HEMATOLOGY ONCOLOGY | Facility: CLINIC | Age: 64
End: 2024-04-15

## 2024-04-15 NOTE — TELEPHONE ENCOUNTER
Reviewed with Dr Pathak, returned call to Zehra, advised her he feels pts antilupus labs are + due to the xarelto but options are pt can continue on xarelto and we can seek out funding or pt can switch to coumadin. Per Zehra, funding has already been attempted and pt not eligible. Reviewed that pt can switch to coumadin 5mg po daily to start and that pt will need weekly labs until medication levels are therapeutic. She would like to review this with pt and let us know tomorrow. If pt agreeable, she would like for us to order home INR monitor for testing. Additionally, she would like to know about filter pt has placed as she believes Dr Pathak told them it would be removed after a certain length of time, and if the xarelto would also cause the dRRVT lab to be elevated. Advised her these questions will be reviewed with Dr Pathak and Katlin will follow up with her tomorrow. She voiced agreement to the plan.

## 2024-04-15 NOTE — TELEPHONE ENCOUNTER
Returned call to pts wife Zehra, she would like Dr Pathak to review pts recent labs and advise if plan is to switch over to coumadin. Pt only has 1 xarelto left and it is costly so they don't want to get a refill if not needed. Advised her I would review with Dr Pathak and get back to her.

## 2024-04-15 NOTE — PROGRESS NOTES
MICHAEL SCHWARTZ attempted second outreach to pt to f/u on referral. Pt's s/o answered the phone and then call was disconnected. MICHAEL SCHWARTZ attempted another call and spoke with s/o. Explained role and reason for calling and for referral. S/o stated that pt is applying for insurance now that he is 64 and his medications should be covered when he has insurance. S/o stated pt does not need assistance at this time.     MICHAEL SCHWARTZ encouraged s/o to call back should pt need any resources or assistance and she was agreeable to do so. Referral closed at this time. MICHAEL SCHWARTZ will remain available for future needs should pt reach out or be referred again.

## 2024-04-15 NOTE — TELEPHONE ENCOUNTER
Patient Call    Who are you speaking with? Spouse    If it is not the patient, are they listed on an active communication consent form? Yes   What is the reason for this call? Patient's wife would like to speak to the office.   Does this require a call back? Yes   If a call back is required, please list best call back number [phone]   If a call back is required, advise that a message will be forwarded to their care team and someone will return their call as soon as possible.   Did you relay this information to the patient? Yes

## 2024-04-16 ENCOUNTER — OFFICE VISIT (OUTPATIENT)
Dept: FAMILY MEDICINE CLINIC | Facility: CLINIC | Age: 64
End: 2024-04-16
Payer: COMMERCIAL

## 2024-04-16 ENCOUNTER — TELEPHONE (OUTPATIENT)
Dept: HEMATOLOGY ONCOLOGY | Facility: CLINIC | Age: 64
End: 2024-04-16

## 2024-04-16 VITALS
BODY MASS INDEX: 28.7 KG/M2 | WEIGHT: 205 LBS | SYSTOLIC BLOOD PRESSURE: 122 MMHG | HEART RATE: 91 BPM | OXYGEN SATURATION: 95 % | RESPIRATION RATE: 19 BRPM | DIASTOLIC BLOOD PRESSURE: 84 MMHG | HEIGHT: 71 IN

## 2024-04-16 DIAGNOSIS — F41.9 ANXIETY AND DEPRESSION: ICD-10-CM

## 2024-04-16 DIAGNOSIS — J44.9 OBSTRUCTIVE LUNG DISEASE (HCC): Primary | ICD-10-CM

## 2024-04-16 DIAGNOSIS — R06.02 SOB (SHORTNESS OF BREATH) ON EXERTION: ICD-10-CM

## 2024-04-16 DIAGNOSIS — D68.61 ANTIPHOSPHOLIPID ANTIBODY SYNDROME (HCC): Primary | ICD-10-CM

## 2024-04-16 DIAGNOSIS — I63.411 CEREBROVASCULAR ACCIDENT (CVA) DUE TO EMBOLISM OF RIGHT MIDDLE CEREBRAL ARTERY (HCC): ICD-10-CM

## 2024-04-16 DIAGNOSIS — I82.419 DEEP VEIN THROMBOSIS (DVT) OF FEMORAL VEIN, UNSPECIFIED CHRONICITY, UNSPECIFIED LATERALITY (HCC): ICD-10-CM

## 2024-04-16 DIAGNOSIS — M79.662 PAIN OF LEFT LOWER LEG: ICD-10-CM

## 2024-04-16 DIAGNOSIS — F32.A ANXIETY AND DEPRESSION: ICD-10-CM

## 2024-04-16 PROCEDURE — 99214 OFFICE O/P EST MOD 30 MIN: CPT

## 2024-04-16 RX ORDER — METHYLPREDNISOLONE 4 MG/1
TABLET ORAL
Qty: 21 TABLET | Refills: 0 | Status: SHIPPED | OUTPATIENT
Start: 2024-04-16

## 2024-04-16 RX ORDER — ESCITALOPRAM OXALATE 10 MG/1
TABLET ORAL
Qty: 30 TABLET | Refills: 2 | Status: SHIPPED | OUTPATIENT
Start: 2024-04-16

## 2024-04-16 RX ORDER — CLINDAMYCIN HYDROCHLORIDE 300 MG/1
300 CAPSULE ORAL 4 TIMES DAILY
Qty: 20 CAPSULE | Refills: 0 | Status: SHIPPED | OUTPATIENT
Start: 2024-04-16 | End: 2024-04-21

## 2024-04-16 NOTE — TELEPHONE ENCOUNTER
Reviewed patient/spouse questions with Dr. Pathak.  Recommending to reach out to IR if patient is candidate for filter removal and confirming xarelto is probably causing the dRRVT lab to be elevated.      Attempted to return call to [place].  Left  voice message with call back number.

## 2024-04-16 NOTE — TELEPHONE ENCOUNTER
Returned call to spouse.  Reviewed IR to decide if IVC filter to be removed.  Will confirm process and return call.    Reviewed Dr. Zo toledo the elevated dRRVT is related to xeralto.  Spouse concerned these levels were elevated + prior to xeralto use.      Patient has elected to continue with xeralto.  Will send script to pharmacy.

## 2024-04-16 NOTE — TELEPHONE ENCOUNTER
Phoned IR.  Confirmed referral to be placed for evaluation of removal of filter.  Referral placed.    Returned call to [place].  Aware referral placed

## 2024-04-16 NOTE — PROGRESS NOTES
Assessment/Plan:         Problem List Items Addressed This Visit       DVT of lower extremity (deep venous thrombosis) (HCC)     Doppler LE 12/5: No DVT in RLE. LLE shows acute DVT in proximal femoral vein. S/p IVC filter. Currently on Xarelto daily. F/u with hematologist and consider changing to Coumadin. New lower L leg pain started ~04/13/2024 with SOB. Possibly cellulitis vs DVT.  - ordered Venous US of b/l leg  - continue f/u with hematologist and Dr. Pathak on blood thinner medication and IVC filter management           Relevant Orders     VAS VENOUS DUPLEX - LOWER LIMB BILATERAL    Obstructive lung disease (HCC) - Primary     PFT shows obstructive lung disease, emphysema. Patient also has hx of asthma and occasionally smokes cigarettes.  Last COPD exacerbation in early April 2024 and completed a course of azithromycin abx a week ago. He's continuing taking albuterol and atrovent daily. Was prescribed Trelegy two weeks ago. Patient reported he still has SOB.   - prescribed oral methylprednisolone therapy pack, 6 days course  - order alpha-1-antitrypsin to check if patient has inherited disorder of the lung  - f/u up as needed if symptom worsen.         Relevant Medications    methylPREDNISolone 4 MG tablet therapy pack     Other Visit Diagnoses       SOB (shortness of breath) on exertion        see obstructive lung disease A/P    Relevant Medications    methylPREDNISolone 4 MG tablet therapy pack    Other Relevant Orders    Alpha-1-antitrypsin    Pain of left lower leg        patient has hx of DVT of the LL leg s/p IVC filter. Last Venous ultrasound done in 12/2023. Recent new L lower leg pain and swelling. Negative judith's sign.  - order venous doppler of b/l leg to r/o DVT  - order clindamycin 300mg daily for 5 days to treat cellulitis    Relevant Medications    clindamycin (CLEOCIN) 300 MG capsule    Anxiety and depression s/p stroke        stable, controlled on lexapro 10mg saqib.  - refill lexapro given     Relevant Medications    escitalopram (Lexapro) 10 mg tablet              Subjective:      Patient ID: Miller Mckeon is a 64 y.o. male.    HPI  Miller Mckeon is a 65 yo male with PMH COPD, obstructive lung disease, DVT of LL leg s/p IVC filter placement (on Xarelto), anxiety, GERD, and polyarthralgia presents with left lower leg pain and swelling. Patient reported his LLL swelling and pain started ~04/13/2024. The pain is different from previous LLL DVT. He has no fever or chills. The skin around the lower leg is warm to touch. Patient is on Xarelto daily for hx of DVT since December 2023, and he f/u with Dr. Pathak and hematologist regularly. Patient also has uncontrolled SOB. His last COPD exacerbation was two weeks ago and was prescribed Trelegy then. He finished Azithromycin last week. Currently he uses Trelegy, albuterol, and atrovent daily but still has SOB with exertion. Denied coughs, headache, palpitation, neck or arm pain, and abdominal pain.     The following portions of the patient's history were reviewed and updated as appropriate: allergies, current medications, past family history, past medical history, past social history, past surgical history, and problem list.    Review of Systems   Constitutional:  Negative for chills and fever.   HENT:  Negative for congestion.    Eyes:  Negative for pain and visual disturbance.   Respiratory:  Positive for shortness of breath. Negative for cough, wheezing and stridor.         SOB w/ exertion   Cardiovascular:  Positive for leg swelling. Negative for chest pain and palpitations.        LLL swelling with redness and pain   Gastrointestinal:  Negative for abdominal pain, nausea and vomiting.   Genitourinary:  Negative for dysuria and hematuria.   Skin:  Positive for color change. Negative for rash.        Mild redness around LLL   Neurological:  Negative for headaches.   All other systems reviewed and are negative.        Objective:      /84 (BP  "Location: Left arm, Patient Position: Sitting, Cuff Size: Large)   Pulse 91   Resp 19   Ht 5' 11\" (1.803 m)   Wt 93 kg (205 lb)   SpO2 95%   BMI 28.59 kg/m²          Physical Exam  Constitutional:       General: He is not in acute distress.     Appearance: Normal appearance. He is not ill-appearing.   HENT:      Right Ear: Tympanic membrane, ear canal and external ear normal. There is no impacted cerumen.      Left Ear: Tympanic membrane, ear canal and external ear normal. There is no impacted cerumen.      Nose: Nose normal. No congestion or rhinorrhea.      Mouth/Throat:      Pharynx: No posterior oropharyngeal erythema.   Eyes:      General:         Right eye: No discharge.         Left eye: No discharge.      Extraocular Movements: Extraocular movements intact.      Conjunctiva/sclera: Conjunctivae normal.      Pupils: Pupils are equal, round, and reactive to light.   Cardiovascular:      Rate and Rhythm: Normal rate and regular rhythm.      Pulses: Normal pulses.      Heart sounds: Normal heart sounds. No murmur heard.  Pulmonary:      Effort: Pulmonary effort is normal. No respiratory distress.      Breath sounds: Normal breath sounds. No wheezing or rales.   Abdominal:      General: Abdomen is flat. Bowel sounds are normal. There is no distension.      Palpations: Abdomen is soft.      Tenderness: There is no abdominal tenderness. There is no guarding or rebound.   Musculoskeletal:         General: Normal range of motion.      Cervical back: Normal range of motion and neck supple.      Right lower leg: No edema.      Left lower leg: Tenderness present. No edema.      Comments: LLL swelling; tender with palpation of the L tibial/shin area; warm to touch of the LLL; negative judith's sign; b/l pedal pulse 2+   Lymphadenopathy:      Cervical: No cervical adenopathy.   Skin:     General: Skin is warm.      Capillary Refill: Capillary refill takes less than 2 seconds.      Findings: No rash.   Neurological: "      General: No focal deficit present.      Mental Status: He is alert and oriented to person, place, and time.   Psychiatric:         Mood and Affect: Mood normal.           Andria Vidales (Yi-Ling), , Family Medicine PGY-1, Date and Time: 04/16/24 8:54 PM

## 2024-04-16 NOTE — TELEPHONE ENCOUNTER
Patient Call    Who are you speaking with? Spouse    If it is not the patient, are they listed on an active communication consent form? Yes   What is the reason for this call? Returning phone call about her husbands   Does this require a call back? Yes   If a call back is required, please list best call back number [phone]   If a call back is required, advise that a message will be forwarded to their care team and someone will return their call as soon as possible.   Did you relay this information to the patient? Yes

## 2024-04-17 ENCOUNTER — DOCUMENTATION (OUTPATIENT)
Dept: HEMATOLOGY ONCOLOGY | Facility: CLINIC | Age: 64
End: 2024-04-17

## 2024-04-17 ENCOUNTER — HOSPITAL ENCOUNTER (OUTPATIENT)
Dept: NON INVASIVE DIAGNOSTICS | Facility: HOSPITAL | Age: 64
Discharge: HOME | End: 2024-04-17
Payer: COMMERCIAL

## 2024-04-17 DIAGNOSIS — I82.419 DEEP VEIN THROMBOSIS (DVT) OF FEMORAL VEIN, UNSPECIFIED CHRONICITY, UNSPECIFIED LATERALITY (HCC): ICD-10-CM

## 2024-04-17 PROCEDURE — 93970 EXTREMITY STUDY: CPT

## 2024-04-17 NOTE — ASSESSMENT & PLAN NOTE
PFT shows obstructive lung disease, emphysema. Patient also has hx of asthma and occasionally smokes cigarettes.  Last COPD exacerbation in early April 2024 and completed a course of azithromycin abx a week ago. He's continuing taking albuterol and atrovent daily. Was prescribed Trelegy two weeks ago. Patient reported he still has SOB.   - prescribed oral methylprednisolone therapy pack, 6 days course  - order alpha-1-antitrypsin to check if patient has inherited disorder of the lung  - f/u up as needed if symptom worsen.

## 2024-04-17 NOTE — ASSESSMENT & PLAN NOTE
Doppler LE 12/5: No DVT in RLE. LLE shows acute DVT in proximal femoral vein. S/p IVC filter. Currently on Xarelto daily. F/u with hematologist and consider changing to Coumadin. New lower L leg pain started ~04/13/2024 with SOB. Possibly cellulitis vs DVT.  - ordered Venous US of b/l leg  - continue f/u with hematologist and Dr. Pathak on blood thinner medication and IVC filter management

## 2024-04-18 ENCOUNTER — PATIENT OUTREACH (OUTPATIENT)
Dept: FAMILY MEDICINE CLINIC | Facility: CLINIC | Age: 64
End: 2024-04-18

## 2024-04-18 PROCEDURE — 93970 EXTREMITY STUDY: CPT | Performed by: SURGERY

## 2024-04-18 NOTE — PROGRESS NOTES
Outpatient Care Management Note    OPCM RN outreach to follow up.  Received previous referral to assist with affording medications.  OPCM SW addressed this concern with patient.  OPCM RN now reaching out to see if patient has any additional care management needs.  OPCM RN placed call to patient with no answer. Voicemail message left and included OPCM contact information.    OPCM RN will scheduled another outreach attempt for 1 week if no response from patient prior to next outreach.

## 2024-04-25 ENCOUNTER — PATIENT OUTREACH (OUTPATIENT)
Dept: FAMILY MEDICINE CLINIC | Facility: CLINIC | Age: 64
End: 2024-04-25

## 2024-04-25 NOTE — PROGRESS NOTES
Outpatient Care Management Note    OP RN CM outreach follow up- second attempt.    Previous referral received to assist with affording medications. OP SW CM addressed this concern with patient.    OP RN CM reaching out now to see if patient has any additional care management needs.  OP RN CM place outreach call with no answer. Voicemail message left and included OP RN CM contact information.    Unable to reach letter sent via [employer].  OPCM will monitor for patient response.

## 2024-04-25 NOTE — LETTER
Date: 04/25/24    Dear Miller Mckeon,   My name is Sierra Uliseschester; I am a registered nurse care manager working with [address] [address].     I have not been able to reach you and would like to set a time that I can talk with you over the phone.  My work is to help patients that have complex medical conditions get the care they need. This includes patients who may have been in the hospital or emergency room.    Please call me with any questions you may have. I look forward to speaking with you.  Sincerely,  Sierra Morillo  [phone]  Outpatient Care Manager

## 2024-05-01 ENCOUNTER — OFFICE VISIT (OUTPATIENT)
Dept: PULMONOLOGY | Facility: CLINIC | Age: 64
End: 2024-05-01
Payer: MEDICARE

## 2024-05-01 VITALS
SYSTOLIC BLOOD PRESSURE: 147 MMHG | HEIGHT: 71 IN | BODY MASS INDEX: 29.18 KG/M2 | HEART RATE: 83 BPM | OXYGEN SATURATION: 95 % | WEIGHT: 208.4 LBS | TEMPERATURE: 98.1 F | DIASTOLIC BLOOD PRESSURE: 85 MMHG

## 2024-05-01 DIAGNOSIS — R91.8 GROUND GLASS OPACITY PRESENT ON IMAGING OF LUNG: ICD-10-CM

## 2024-05-01 DIAGNOSIS — R06.81 WITNESSED EPISODE OF APNEA: ICD-10-CM

## 2024-05-01 DIAGNOSIS — D72.19 PERIPHERAL EOSINOPHILIA: ICD-10-CM

## 2024-05-01 DIAGNOSIS — J45.50 SEVERE PERSISTENT ASTHMA WITHOUT COMPLICATION: Primary | ICD-10-CM

## 2024-05-01 DIAGNOSIS — R09.82 POST-NASAL DRIP: ICD-10-CM

## 2024-05-01 DIAGNOSIS — M25.542 ARTHRALGIA OF BOTH HANDS: ICD-10-CM

## 2024-05-01 DIAGNOSIS — Z91.09 ENVIRONMENTAL ALLERGIES: ICD-10-CM

## 2024-05-01 DIAGNOSIS — M25.541 ARTHRALGIA OF BOTH HANDS: ICD-10-CM

## 2024-05-01 DIAGNOSIS — G47.00 FREQUENT NOCTURNAL AWAKENING: ICD-10-CM

## 2024-05-01 DIAGNOSIS — G47.19 EXCESSIVE DAYTIME SLEEPINESS: ICD-10-CM

## 2024-05-01 DIAGNOSIS — J44.9 OBSTRUCTIVE LUNG DISEASE (HCC): ICD-10-CM

## 2024-05-01 DIAGNOSIS — J44.9 CHRONIC OBSTRUCTIVE PULMONARY DISEASE, UNSPECIFIED COPD TYPE (HCC): ICD-10-CM

## 2024-05-01 DIAGNOSIS — R76.8 POSITIVE ANA (ANTINUCLEAR ANTIBODY): ICD-10-CM

## 2024-05-01 DIAGNOSIS — F17.210 CIGARETTE NICOTINE DEPENDENCE WITHOUT COMPLICATION: ICD-10-CM

## 2024-05-01 PROBLEM — G45.9 TIA (TRANSIENT ISCHEMIC ATTACK): Status: ACTIVE | Noted: 2022-12-31

## 2024-05-01 PROCEDURE — 94618 PULMONARY STRESS TESTING: CPT

## 2024-05-01 PROCEDURE — 99204 OFFICE O/P NEW MOD 45 MIN: CPT

## 2024-05-01 RX ORDER — FLUTICASONE PROPIONATE 50 MCG
1 SPRAY, SUSPENSION (ML) NASAL DAILY
Qty: 9.9 ML | Refills: 5 | Status: SHIPPED | OUTPATIENT
Start: 2024-05-01

## 2024-05-01 RX ORDER — MONTELUKAST SODIUM 10 MG/1
10 TABLET ORAL
Qty: 90 TABLET | Refills: 2 | Status: SHIPPED | OUTPATIENT
Start: 2024-05-01

## 2024-05-01 RX ORDER — CETIRIZINE HYDROCHLORIDE 5 MG/1
5 TABLET ORAL DAILY
Qty: 30 TABLET | Refills: 5 | Status: SHIPPED | OUTPATIENT
Start: 2024-05-01

## 2024-05-01 NOTE — PATIENT INSTRUCTIONS
- Cat scan of the chest  - Please have your blood work completed  - Refill of cetirizine  - Script for montelukast  - Continue with Trelegy daily, rinse out your mouth after each use  - Continue using albuterol nebulizers and inhaler as needed  - Referral to rheumatology  - Follow up in 10 weeks

## 2024-05-01 NOTE — PROGRESS NOTES
Consultation - Pulmonary Medicine   Miller Mckeon 64 y.o. male MRN: [id number]    Physician Requesting Consult: Miley Ferraro MD  Reason for Consult: COPD with acute exacerbation  Miller Mckeon is a 64 y.o. male with PMHx of HTN, CVA, h/o DVT, COPD, asthma, GERD, former tobacco use and seasonal allergies who presents for pulmonary evaluation.    Chronic Obstructive Pulmonary Disease  Severe Persistent Asthma without Exacerbation  - The patient has a fairly significant smoking history and mild obstructive deficit with mild DLCO reduction and air trapping on PFTs from roughly 1 year ago.  He does also have a recent admission complicated by hypoxia believed to be 2/2 aspiration from his stroke.  His CT chest was somewhat limited due to respiratory motion artifact, but did not appear to have significant emphysematous changes.  - Ambulatory pulse ox in the office today did not demonstrate a requirement for oxygen with ambulation.  - Continue with Trelegy 200 mcg, discussed the importance of rinsing out his mouth after each use.  - Continue with albuterol HFA/nebs as needed  - Plan for additional lab work given prior elevated peripheral eosinophils and history of asthma, check [facility] allergen panel and CBC with differential.  He may be a candidate for additional therapies for his asthma.  - Plan for CT as below  - Discussed the importance of daily activity and maintenance of exercise tolerance  - Discussed the importance of smoking cessation especially given recent stroke and obstructive deficit on PFTs.  - Follow up in 10 weeks.  -     [facility] Allergy Panel, Adult; Future  -     CBC and differential; Future  -     POCT 6 minute walk  -     Ambulatory Referral to Pulmonology    Ground Glass Opacity Present on Imaging of Lung  - The patient had mild groundglass opacities on CT chest in 12/2023 when he was hospitalized for stroke, he also required intubation for mucous plugging and emergent bronchoscopy.   Suspect it is possible this is related to aspiration from his stroke especially as he was noted to have mild aspiration on SLP evaluation during that admission.  - Given his consolation of symptoms will repeat imaging to assure resolution of prior findings.  -     CT chest wo contrast; Future    Peripheral Eosinophilia  - It is possible the persistent  elevation in his eosinophils is 2/2 his asthma; however, given his history of nasal polyps and other symptoms will check ANCAs.  - Repeat CBC with differential  -     CBC and differential; Future  -     Anti-neutrophilic cytoplasmic antibody; Future  -     Antimyeloperoxidase Antibody; Future  -     AntiProteinase-3 Antibody; Future    Positive KONG (Antinuclear Antibody)  Arthralgias  - The patient has a positive KONG 1:160 and speckled pattern with positive DRVVT and has been felt to be 2/2  heparin and Xarelto use.  - Given arthralgias in B/L hands with positive KONG will refer to rheumatology.  -     Ambulatory Referral to Rheumatology; Future    Post-Nasal Drip  - The patient has a history of nasal polyps and prior polypectomy, he notes continued postnasal drip which may be contributing to some of his symptoms.  In the past he has used Flonase with benefit, will restart this medication.  - Can consider referral to ENT if continued symptoms.  -     fluticasone (FLONASE) 50 mcg/act nasal spray; 1 spray into each nostril daily    Environmental Allergies  - The patient reports significant environmental allergies which appear to be triggers for his asthma, will start cetirizine and montelukast.  -     cetirizine (ZyrTEC) 5 MG tablet; Take 1 tablet (5 mg total) by mouth daily  -     montelukast (SINGULAIR) 10 mg tablet; Take 1 tablet (10 mg total) by mouth daily at bedtime    Witnessed Episode of Apnea  Excessive Daytime Sleepiness  Frequent Nocturnal Awakening  - The patient reports symptoms concerning for sleep apnea including daytime sleepiness, frequent awakenings,  nocturia and witnessed apneas.  STOP-BANG during today's visit is at least 5 (neck was not measured) and further testing is warranted.  Given his history of stroke is possible he could have CSA and/or KAREN.  - Will obtain PSG, given potential for CSA and history of COPD.  - Discussed with the patient the possible diagnosis, causes and conditions associated with SDB along with potential treatments.  - Encouraged healthy lifestyle with adequate sleep (7-9 hours per night), healthy balanced diet and routine exercise.  -     Diagnostic Sleep Study; Future    Current Tobacco Use  - The patient has a roughly 40-year pack history, and is not ready to discuss smoking cessation or medications for cessation.  He has managed to cut down from 1 pack/day to 1 pack/week, but states that drinking alcohol is a trigger for him to smoke.  He is also not ready to discuss cessation of alcohol.  - He does qualify for yearly LDCT given his smoking history and age, but plan is for CT chest as above.  Assuming this is not show any concerning findings would plan for LDCT starting next year.    Thank you for allowing me to participate in the care of your patient.  If there are any questions regarding evaluation please feel free to reach out.     Return in about 10 weeks (around 7/10/2024).  ______________________________________________________________________    HPI:    Miller Mckeon is a 64 y.o. male with PMHx of HTN, CVA, h/o DVT, COPD, asthma, GERD, former tobacco use and seasonal allergies who presents for pulmonary evaluation.  The patient is referred by his PCP due to COPD with dyspnea.  He is currently maintained on Trelegy 200 mcg which she reports he is using daily and rinsing out his mouth after each use and albuterol HFA and nebulizers as needed.  Currently he reports he is using his HFA 4 times daily and nebulizers twice daily and states the HFA does not give him much relief, but he feels better after nebulizer treatments.  He  states his biggest concern currently is that he has persistent fatigue, but also notes some dyspnea on exertion.  He states he was diagnosed with asthma in his 20s and in the past he has tried Breo, Advair and Symbicort without complete benefit.  He notes triggers for his asthma include diesel fuel, car exhaust, dust, pollen, strong winds and dogs.  He is a current smoker with more significant smoking in the past and does have PFTs with mild obstruction from 6/2023.  He states in the past he was prescribed montelukast, but it was not covered by his insurance at the time.  He does note seasonal allergies with significant postnasal drip and states he has a history of nasal polyps with prior polypectomy and prior Flonase use.  He has never had [facility] allergen panel before, but does have persistently elevated eosinophils ranging anywhere from 320-910 over the past 3 years.  He denies any significant cough or hemoptysis, but does note wheezing particularly with exertion.  He states that the times he is on steroids as when he feels his best, and he was last on methylprednisolone in early April with at least 1 another course of steroids this year.  Currently he estimates he can walk 1 block on flat ground or 1 flight of steps before needing to rest, but states it is due to both fatigue and dyspnea.    Given his history of DVT and stroke he is currently on Xarelto and has been following with hematology.  He did have a positive DRVVT which was felt to be 2/2 heparin therapy and repeat was positive as well; however, he is on anti-Xa therapy.  He did have a positive KONG 1-1 60 speckled pattern with normal ESR/CRP and negative RF.  He states he does have joint pain particularly in his hands and feet, but denies any significant myalgias or rashes.    In 11/2023 he was admitted at [facility] due to acute stroke symptoms with imaging concerning for right ICA occlusion.  He required intubation 2/2 hypoxia and emergent bronchoscopy was  completed for right-sided mucous plugging with improvement in oxygen saturations.  During this admission he was found to also have a DVT of the left gastrocnemius.  He had a CT C/A/P to look for potential malignancy and was found to have mild groundglass opacifications in the RUL with bilateral lower lobe atelectasis.  While hospitalized he was seen by pulmonary who recommended the addition of Breo 100 mcg and attempts at smoking cessation.  Ultimately he was discharged to rehab and then home.    He previously used to scuba dive, but states this is not something he does anymore and he has no intention of returning to it.    The patient is present with his wife who notes that while he does not snore she has seen him pauses in his breathing at night.  He states he frequently awakens throughout the night and estimates roughly 6-7 awakenings on average.  He feels the awakenings are due to gasping/choking sensations or needing to use the restroom.  He does report that fatigue is the predominant symptom over sleepiness, but that both are present.  He has never had any sleep testing before.    The patient opened door mid way through the visit due to claustrophobia, we discussed that this would mean our conversation may no longer be completely private and he stated he was ok with this.    Tobacco/Vaping: started at 15yo, max was 1 PPD, currently smoking 1 pack/week (recent changed within the last year).  He did previously quit smoking for ~10 years, did not use medications to help with cessation at that time.  Reports he feels the need to smoke when he drinks beer and he drinks a 12 pack a week.  Doesn't want to discuss medications for cessation.  Work Hx: not currently working, but previously worked in construction, worked at a [employer] with dye exposure for ~6 months when he was younger.  Exposure Hx: doesn't think he has been exposed to asbestos, dyes  Pets: 2 dogs, babysat mother's bird for ~6 months about 5 years  ago ( [place])  Reflux Symptoms: yes, well controlled currently with omeprazole  Travel Hx: [place], [place] recently  Family Pulmonary Hx: denies    Review of Systems:  Review of Systems  10-point system review completed, all of which are negative except as mentioned above.    Current Medications:    Current Outpatient Medications:     acetaminophen (TYLENOL) 325 mg tablet, Take 2 tablets (650 mg total) by mouth every 6 (six) hours as needed for mild pain, Disp: , Rfl: 0    albuterol (2.5 mg/3 mL) 0.083 % nebulizer solution, Take 3 mL (2.5 mg total) by nebulization every 6 (six) hours as needed for wheezing or shortness of breath, Disp: 75 mL, Rfl: 1    albuterol (ProAir HFA) 90 mcg/act inhaler, Inhale 2 puffs every 4 (four) hours as needed for wheezing, Disp: 18 g, Rfl: 2    cetirizine (ZyrTEC) 5 MG tablet, Take 1 tablet (5 mg total) by mouth daily, Disp: 30 tablet, Rfl: 5    escitalopram (Lexapro) 10 mg tablet, Start with 0.5 mg tablet daily for the first few weeks. If no change, then patient should increase to 10 mg daily., Disp: 30 tablet, Rfl: 2    fluticasone (FLONASE) 50 mcg/act nasal spray, 1 spray into each nostril daily, Disp: 9.9 mL, Rfl: 5    fluticasone-umeclidinium-vilanterol 200-62.5-25 mcg/actuation AEPB inhaler, Inhale 1 puff daily Rinse mouth after use., Disp: 180 blister, Rfl: 1    ipratropium (ATROVENT) 0.02 % nebulizer solution, Take 2.5 mL (0.5 mg total) by nebulization 2 (two) times a day, Disp: 450 mL, Rfl: 1    montelukast (SINGULAIR) 10 mg tablet, Take 1 tablet (10 mg total) by mouth daily at bedtime, Disp: 90 tablet, Rfl: 2    omeprazole (PriLOSEC) 40 MG capsule, Take 1 capsule (40 mg total) by mouth daily, Disp: 90 capsule, Rfl: 1    rivaroxaban (Xarelto) 20 mg tablet, Take 1 tablet (20 mg total) by mouth daily Take with food., Disp: 30 tablet, Rfl: 3    tadalafil (Cialis) 5 MG tablet, Take 1 tablet (5 mg total) by mouth daily as needed for erectile dysfunction, Disp: 10 tablet, Rfl: 0     "traZODone (DESYREL) 50 mg tablet, Take 1 tablet (50 mg total) by mouth daily at bedtime (Patient taking differently: Take 50 mg by mouth daily at bedtime As needed), Disp: 30 tablet, Rfl: 0    atorvastatin (LIPITOR) 40 mg tablet, Take 1 tablet (40 mg total) by mouth every evening (Patient not taking: Reported on 4/16/2024), Disp: 30 tablet, Rfl: 3    Historical Information   Past Medical History:   Diagnosis Date    Asthma     Cataract     Hypertension     Stroke (HCC)      Past Surgical History:   Procedure Laterality Date    EYE SURGERY      IR IVC FILTER PLACEMENT OPTIONAL/TEMPORARY  12/07/2023    IR STROKE ALERT  11/30/2023    TX SEPTOPLASTY/SUBMUCOUS RESECJ W/WO CARTILAGE GRF N/A 7/1/2021    Procedure: SEPTOPLASTY;  Surgeon: Artem Shanks MD;  Location: [facility];  Service: ENT    TX STRTCTC CPTR ASSTD PX EXTRADURAL CRANIAL N/A 7/1/2021    Procedure: FUNCTIONAL ENDOSCOPIC SINUS SURGERY (FESS) IMAGED GUIDED, ALL SINUSES;  Surgeon: Artem Shanks MD;  Location: [facility];  Service: ENT   Social History   Social History     Tobacco Use   Smoking Status Former    Current packs/day: 0.10    Average packs/day: 0.1 packs/day for 43.4 years (4.3 ttl pk-yrs)    Types: Cigarettes    Start date: 12/5/1980   Smokeless Tobacco Never   Tobacco Comments    smokes only when drinks 2 cigs    Quit 12/012/2023     Family History:   Family History   Problem Relation Age of Onset    No Known Problems Mother     Heart attack Father      PhysicalExamination:  Vitals:   /85   Pulse 83   Temp 98.1 °F (36.7 °C) (Tympanic)   Ht 5' 11\" (1.803 m)   Wt 94.5 kg (208 lb 6.4 oz)   SpO2 95%   BMI 29.07 kg/m²   Body mass index is 29.07 kg/m².    Constitutional: NAD, well appearing, on room air, no conversational dyspnea   Skin: Warm, dry, no rashes noted   Eyes: PERRL, normal conjunctiva  ENT: Nasal congestion absent, moist mucus membranes.  Neck: No JVD, trachea is midline, no adenopathy.  Resp: CTA B/L, no W/R/R   Cardiac: " RRR, +S1/S2, no M/R/G  Abdomen: Soft, NT/ND  Extremities: No digital clubbing or pedal edema  Neuro: AAOx3    Diagnostic Data:  Labs:  I personally reviewed the most recent laboratory data pertinent to today's visit    Lab Results   Component Value Date    WBC 8.55 12/09/2023    HGB 13.1 12/09/2023    HCT 41.3 12/09/2023    MCV 95 12/09/2023     12/09/2023     Lab Results   Component Value Date    CALCIUM 8.2 (L) 12/09/2023    K 4.0 12/09/2023    CO2 26 12/09/2023     12/09/2023    BUN 15 12/09/2023    CREATININE 1.26 12/09/2023     Lab Results   Component Value Date    IGE 2,642 (H) 06/07/2022     Lab Results   Component Value Date    ALT 16 12/03/2023    AST 19 12/03/2023    ALKPHOS 42 12/03/2023     PFT results:  The most recent pulmonary function tests were reviewed.  PFTs w/o BD -- 6/23/2023  FEV1/FVC Ratio: 65 %  Forced Vital Capacity: 3.76 L    83 % predicted  FEV1: 2.45 L     71 % predicted  Post bronchodilator response: Bronchodilator was not administered     Lung volumes by body plethysmography:   Total Lung Capacity 106 % predicted   Residual volume 139 % predicted     DLCO corrected for patients hemoglobin level: 78 %    Imaging:  I personally reviewed the images in PACS pertinent to today's visit:  CT C/A/P w/ Contrast -- 12/2/2023  Patchy groundglass consolidations throughout the right lung. Correlate for aspiration versus atypical/viral infection.  No definite evidence of malignancy.  Mild wall thickening of the urinary bladder may be secondary to chronic outlet obstruction.    Other studies:  Echo Complete -- 12/1/2023    Left Ventricle: Left ventricular cavity size is normal. Wall thickness is mildly increased. The left ventricular ejection fraction is 65%. Systolic function is normal. Wall motion is normal. Diastolic function is normal for age.    Right Ventricle: Right ventricular cavity size is normal. Systolic function is normal.    Left Atrium: The atrium is mildly dilated.     "Mitral Valve: There is mild annular calcification.    No significant valvular disease    Technically difficult study.    I have spent a total time of 45 minutes on 05/09/24 in caring for this patient including Instructions for management, Patient and family education, Importance of tx compliance, Counseling / Coordination of care, Documenting in the medical record, Reviewing / ordering tests, medicine, procedures  , and Obtaining or reviewing history  .    Dorothy Hilario MD  Pulmonary-Critical Care and Sleep Medicine  05/09/24    Portions of the record may have been created with voice recognition software. Occasional wrong word or \"sound a like\" substitutions may have occurred due to the inherent limitations of voice recognition software. Please read the chart carefully and recognize, using context, where substitutions have occurred.  "

## 2024-05-09 ENCOUNTER — PATIENT OUTREACH (OUTPATIENT)
Dept: FAMILY MEDICINE CLINIC | Facility: CLINIC | Age: 64
End: 2024-05-09

## 2024-05-09 NOTE — PROGRESS NOTES
Outpatient Care Management Note    Patient was previous referral to assist with affording medication.  OP MICHAEL SCHWARTZ addressed this concern with patient.    OP RN CM made two previous telephone outreach attempts and sent unable to reach letter to patient to see if patient has any additional care management needs.      No patient response received from these previous outreaches.      OP RN CM will remove self from care team and this time and close referral.

## 2024-06-04 DIAGNOSIS — R06.02 SHORTNESS OF BREATH: ICD-10-CM

## 2024-06-04 DIAGNOSIS — J44.1 COPD WITH ACUTE EXACERBATION (HCC): ICD-10-CM

## 2024-06-04 RX ORDER — ALBUTEROL SULFATE 2.5 MG/3ML
2.5 SOLUTION RESPIRATORY (INHALATION) EVERY 6 HOURS PRN
Qty: 75 ML | Refills: 1 | Status: SHIPPED | OUTPATIENT
Start: 2024-06-04 | End: 2024-09-02

## 2024-06-04 RX ORDER — ALBUTEROL SULFATE 90 UG/1
2 AEROSOL, METERED RESPIRATORY (INHALATION) EVERY 4 HOURS PRN
Qty: 18 G | Refills: 2 | Status: SHIPPED | OUTPATIENT
Start: 2024-06-04 | End: 2024-09-02

## 2024-06-13 ENCOUNTER — APPOINTMENT (OUTPATIENT)
Dept: RADIOLOGY | Facility: CLINIC | Age: 64
End: 2024-06-13
Payer: MEDICARE

## 2024-06-13 ENCOUNTER — OFFICE VISIT (OUTPATIENT)
Dept: URGENT CARE | Facility: CLINIC | Age: 64
End: 2024-06-13
Payer: MEDICARE

## 2024-06-13 ENCOUNTER — HOSPITAL ENCOUNTER (EMERGENCY)
Facility: HOSPITAL | Age: 64
Discharge: HOME | End: 2024-06-13
Attending: STUDENT IN AN ORGANIZED HEALTH CARE EDUCATION/TRAINING PROGRAM
Payer: MEDICARE

## 2024-06-13 ENCOUNTER — APPOINTMENT (EMERGENCY)
Dept: CT IMAGING | Facility: HOSPITAL | Age: 64
End: 2024-06-13
Payer: MEDICARE

## 2024-06-13 VITALS
HEART RATE: 92 BPM | TEMPERATURE: 98.6 F | SYSTOLIC BLOOD PRESSURE: 159 MMHG | DIASTOLIC BLOOD PRESSURE: 86 MMHG | BODY MASS INDEX: 29.12 KG/M2 | RESPIRATION RATE: 18 BRPM | HEIGHT: 71 IN | WEIGHT: 208 LBS | OXYGEN SATURATION: 91 %

## 2024-06-13 VITALS
OXYGEN SATURATION: 92 % | HEIGHT: 71 IN | DIASTOLIC BLOOD PRESSURE: 82 MMHG | WEIGHT: 208 LBS | RESPIRATION RATE: 18 BRPM | BODY MASS INDEX: 29.12 KG/M2 | TEMPERATURE: 97.9 F | SYSTOLIC BLOOD PRESSURE: 156 MMHG | HEART RATE: 81 BPM

## 2024-06-13 DIAGNOSIS — R06.02 SOB (SHORTNESS OF BREATH): ICD-10-CM

## 2024-06-13 DIAGNOSIS — R06.02 SOB (SHORTNESS OF BREATH): Primary | ICD-10-CM

## 2024-06-13 DIAGNOSIS — J44.1 COPD WITH ACUTE EXACERBATION (HCC): ICD-10-CM

## 2024-06-13 DIAGNOSIS — J44.1 COPD WITH ACUTE EXACERBATION (HCC): Primary | ICD-10-CM

## 2024-06-13 LAB
ALBUMIN SERPL BCP-MCNC: 4 G/DL (ref 3.5–5)
ALP SERPL-CCNC: 60 U/L (ref 34–104)
ALT SERPL W P-5'-P-CCNC: 16 U/L (ref 7–52)
ANION GAP SERPL CALCULATED.3IONS-SCNC: 9 MMOL/L (ref 4–13)
APTT PPP: 24 SECONDS (ref 23–37)
AST SERPL W P-5'-P-CCNC: 17 U/L (ref 13–39)
ATRIAL RATE: 72 BPM
BASOPHILS # BLD AUTO: 0.08 THOUSANDS/ÂΜL (ref 0–0.1)
BASOPHILS NFR BLD AUTO: 1 % (ref 0–1)
BILIRUB SERPL-MCNC: 0.55 MG/DL (ref 0.2–1)
BUN SERPL-MCNC: 15 MG/DL (ref 5–25)
CALCIUM SERPL-MCNC: 9.1 MG/DL (ref 8.4–10.2)
CHLORIDE SERPL-SCNC: 107 MMOL/L (ref 96–108)
CO2 SERPL-SCNC: 22 MMOL/L (ref 21–32)
CREAT SERPL-MCNC: 1.13 MG/DL (ref 0.6–1.3)
EOSINOPHIL # BLD AUTO: 1.37 THOUSAND/ÂΜL (ref 0–0.61)
EOSINOPHIL NFR BLD AUTO: 15 % (ref 0–6)
ERYTHROCYTE [DISTWIDTH] IN BLOOD BY AUTOMATED COUNT: 14.4 % (ref 11.6–15.1)
GFR SERPL CREATININE-BSD FRML MDRD: 67 ML/MIN/1.73SQ M
GLUCOSE SERPL-MCNC: 96 MG/DL (ref 65–140)
HCT VFR BLD AUTO: 48.1 % (ref 36.5–49.3)
HGB BLD-MCNC: 16.3 G/DL (ref 12–17)
IMM GRANULOCYTES # BLD AUTO: 0.02 THOUSAND/UL (ref 0–0.2)
IMM GRANULOCYTES NFR BLD AUTO: 0 % (ref 0–2)
INR PPP: 1 (ref 0.84–1.19)
LACTATE SERPL-SCNC: 1.6 MMOL/L (ref 0.5–2)
LYMPHOCYTES # BLD AUTO: 2.46 THOUSANDS/ÂΜL (ref 0.6–4.47)
LYMPHOCYTES NFR BLD AUTO: 27 % (ref 14–44)
MCH RBC QN AUTO: 29.1 PG (ref 26.8–34.3)
MCHC RBC AUTO-ENTMCNC: 33.9 G/DL (ref 31.4–37.4)
MCV RBC AUTO: 86 FL (ref 82–98)
MONOCYTES # BLD AUTO: 1.23 THOUSAND/ÂΜL (ref 0.17–1.22)
MONOCYTES NFR BLD AUTO: 14 % (ref 4–12)
NEUTROPHILS # BLD AUTO: 3.95 THOUSANDS/ÂΜL (ref 1.85–7.62)
NEUTS SEG NFR BLD AUTO: 43 % (ref 43–75)
NRBC BLD AUTO-RTO: 0 /100 WBCS
P AXIS: 64 DEGREES
PLATELET # BLD AUTO: 237 THOUSANDS/UL (ref 149–390)
PMV BLD AUTO: 8.9 FL (ref 8.9–12.7)
POTASSIUM SERPL-SCNC: 4 MMOL/L (ref 3.5–5.3)
PR INTERVAL: 170 MS
PROCALCITONIN SERPL-MCNC: <0.05 NG/ML
PROT SERPL-MCNC: 6.8 G/DL (ref 6.4–8.4)
PROTHROMBIN TIME: 13.5 SECONDS (ref 11.6–14.5)
QRS AXIS: 28 DEGREES
QRSD INTERVAL: 88 MS
QT INTERVAL: 406 MS
QTC INTERVAL: 444 MS
RBC # BLD AUTO: 5.6 MILLION/UL (ref 3.88–5.62)
SODIUM SERPL-SCNC: 138 MMOL/L (ref 135–147)
T WAVE AXIS: 12 DEGREES
VENTRICULAR RATE: 72 BPM
WBC # BLD AUTO: 9.11 THOUSAND/UL (ref 4.31–10.16)

## 2024-06-13 PROCEDURE — 93005 ELECTROCARDIOGRAM TRACING: CPT

## 2024-06-13 PROCEDURE — 71275 CT ANGIOGRAPHY CHEST: CPT

## 2024-06-13 PROCEDURE — 87040 BLOOD CULTURE FOR BACTERIA: CPT | Performed by: PHYSICIAN ASSISTANT

## 2024-06-13 PROCEDURE — 93010 ELECTROCARDIOGRAM REPORT: CPT | Performed by: INTERNAL MEDICINE

## 2024-06-13 PROCEDURE — 85025 COMPLETE CBC W/AUTO DIFF WBC: CPT | Performed by: PHYSICIAN ASSISTANT

## 2024-06-13 PROCEDURE — G0463 HOSPITAL OUTPT CLINIC VISIT: HCPCS

## 2024-06-13 PROCEDURE — 84145 PROCALCITONIN (PCT): CPT | Performed by: PHYSICIAN ASSISTANT

## 2024-06-13 PROCEDURE — 71046 X-RAY EXAM CHEST 2 VIEWS: CPT

## 2024-06-13 PROCEDURE — 99285 EMERGENCY DEPT VISIT HI MDM: CPT | Performed by: PHYSICIAN ASSISTANT

## 2024-06-13 PROCEDURE — 83605 ASSAY OF LACTIC ACID: CPT | Performed by: PHYSICIAN ASSISTANT

## 2024-06-13 PROCEDURE — 85730 THROMBOPLASTIN TIME PARTIAL: CPT | Performed by: PHYSICIAN ASSISTANT

## 2024-06-13 PROCEDURE — 80053 COMPREHEN METABOLIC PANEL: CPT | Performed by: PHYSICIAN ASSISTANT

## 2024-06-13 PROCEDURE — 36415 COLL VENOUS BLD VENIPUNCTURE: CPT | Performed by: PHYSICIAN ASSISTANT

## 2024-06-13 PROCEDURE — 85610 PROTHROMBIN TIME: CPT | Performed by: PHYSICIAN ASSISTANT

## 2024-06-13 PROCEDURE — 99214 OFFICE O/P EST MOD 30 MIN: CPT

## 2024-06-13 RX ORDER — ALBUTEROL SULFATE 2.5 MG/3ML
2.5 SOLUTION RESPIRATORY (INHALATION) EVERY 6 HOURS PRN
Qty: 75 ML | Refills: 0 | Status: SHIPPED | OUTPATIENT
Start: 2024-06-13 | End: 2024-09-11

## 2024-06-13 RX ORDER — METHYLPREDNISOLONE SODIUM SUCCINATE 125 MG/2ML
60 INJECTION, POWDER, LYOPHILIZED, FOR SOLUTION INTRAMUSCULAR; INTRAVENOUS ONCE
Status: COMPLETED | OUTPATIENT
Start: 2024-06-13 | End: 2024-06-13

## 2024-06-13 RX ORDER — SODIUM CHLORIDE FOR INHALATION 0.9 %
12 VIAL, NEBULIZER (ML) INHALATION ONCE
Status: COMPLETED | OUTPATIENT
Start: 2024-06-13 | End: 2024-06-13

## 2024-06-13 RX ORDER — DOXYCYCLINE HYCLATE 100 MG/1
100 CAPSULE ORAL 2 TIMES DAILY
Qty: 14 CAPSULE | Refills: 0 | Status: SHIPPED | OUTPATIENT
Start: 2024-06-13 | End: 2024-06-20

## 2024-06-13 RX ORDER — PREDNISONE 20 MG/1
TABLET ORAL
Qty: 33 TABLET | Refills: 0 | Status: SHIPPED | OUTPATIENT
Start: 2024-06-13 | End: 2024-07-03

## 2024-06-13 RX ORDER — DOXYCYCLINE HYCLATE 100 MG/1
200 CAPSULE ORAL ONCE
Status: COMPLETED | OUTPATIENT
Start: 2024-06-13 | End: 2024-06-13

## 2024-06-13 RX ORDER — METHYLPREDNISOLONE SOD SUCC 125 MG
1 VIAL (EA) INJECTION ONCE
Status: COMPLETED | OUTPATIENT
Start: 2024-06-13 | End: 2024-06-13

## 2024-06-13 RX ORDER — IPRATROPIUM BROMIDE AND ALBUTEROL SULFATE .5; 3 MG/3ML; MG/3ML
1 SOLUTION RESPIRATORY (INHALATION) ONCE
Status: COMPLETED | OUTPATIENT
Start: 2024-06-13 | End: 2024-06-13

## 2024-06-13 RX ORDER — CEFTRIAXONE 1 G/50ML
1000 INJECTION, SOLUTION INTRAVENOUS ONCE
Status: COMPLETED | OUTPATIENT
Start: 2024-06-13 | End: 2024-06-13

## 2024-06-13 RX ADMIN — IPRATROPIUM BROMIDE 1 MG: 0.5 SOLUTION RESPIRATORY (INHALATION) at 16:17

## 2024-06-13 RX ADMIN — METHYLPREDNISOLONE SODIUM SUCCINATE 60 MG: 125 INJECTION, POWDER, FOR SOLUTION INTRAMUSCULAR; INTRAVENOUS at 16:16

## 2024-06-13 RX ADMIN — CEFTRIAXONE 1000 MG: 1 INJECTION, SOLUTION INTRAVENOUS at 16:15

## 2024-06-13 RX ADMIN — ISODIUM CHLORIDE 12 ML: 0.03 SOLUTION RESPIRATORY (INHALATION) at 16:17

## 2024-06-13 RX ADMIN — DOXYCYCLINE 200 MG: 100 CAPSULE ORAL at 18:55

## 2024-06-13 RX ADMIN — ALBUTEROL SULFATE 10 MG: 2.5 SOLUTION RESPIRATORY (INHALATION) at 16:17

## 2024-06-13 RX ADMIN — IOHEXOL 85 ML: 350 INJECTION, SOLUTION INTRAVENOUS at 17:58

## 2024-06-13 NOTE — PROGRESS NOTES
[facility]        NAME: Miller Mckeon is a 64 y.o. male  : 1960    MRN: [id number]  DATE: 2024  TIME: 5:57 PM    Assessment and Plan   SOB (shortness of breath) [R06.02]  1. SOB (shortness of breath)  XR chest pa & lateral    Transfer to other facility        EKG: NSR 72 bpm, no ectopy, no ST changes    Provider Radiology Interpretation (preliminary)   Final results will be as per official Radiology Report when available:   CHEST XRAY: no pneumonia identified    Had discussion with patient regarding treatment plan.  Due to his shortness of breath and hypoxia it is advised that he be transferred to the emergency department.  He is agreeable to go to [facility].  He will be transported by ambulance as agreed upon with the patient.    Patient Instructions     GO TO THE EMERGENCY ROOM FOR A FURTHER EVALUATION OF YOUR SHORTNESS OF BREATH AND HYPOXIA  Follow up with PCP in 3-5 days.  Proceed to  ER if symptoms worsen.    If tests are performed, our office will contact you with results only if changes need to made to the care plan discussed with you at the visit. You can review your full results on [facility].    Chief Complaint     Chief Complaint   Patient presents with    Shortness of Breath     Sob- hx of copd, emphysema,asthma   Started about 1 week ago; has been getting worse  Tightness, cough- used albuterol nebulizer at 2:00          History of Present Illness       64-year-old male patient presenting with increasing shortness of breath over the past week.  He has been using his nebulizer at home and states it is to the point where he does not feel it is helping.  He took his last neb treatment, albuterol, at 2 PM and drove himself here to this clinic.  His O2 saturation on room air was noted to be 87 to 88%.  He was subsequently placed on 2 L nasal cannula and is now satting 92 to 93%.  He states this episode started as what he terms a sinus infection  and progressed to this shortness of breath over the past week.  He states this happens to him approximately every 6 to 8 weeks    Shortness of Breath  Associated symptoms include coughing and wheezing. Pertinent negatives include no chest pain, dizziness, sore throat or stridor.       Review of Systems   Review of Systems   HENT:  Positive for congestion. Negative for drooling, sore throat and trouble swallowing.    Respiratory:  Positive for cough, chest tightness, shortness of breath and wheezing. Negative for stridor.    Cardiovascular:  Negative for chest pain.   Gastrointestinal:  Negative for abdominal pain, diarrhea, nausea and vomiting.   Musculoskeletal:  Positive for myalgias (generalized). Negative for arthralgias.   Skin:  Negative for color change.   Neurological:  Positive for headaches (around eyes only). Negative for dizziness, facial asymmetry, speech difficulty, light-headedness and numbness.         Current Medications     No current facility-administered medications for this visit.    Current Outpatient Medications:     albuterol (2.5 mg/3 mL) 0.083 % nebulizer solution, Take 3 mL (2.5 mg total) by nebulization every 6 (six) hours as needed for wheezing or shortness of breath, Disp: 75 mL, Rfl: 1    albuterol (ProAir HFA) 90 mcg/act inhaler, Inhale 2 puffs every 4 (four) hours as needed for wheezing, Disp: 18 g, Rfl: 2    cetirizine (ZyrTEC) 5 MG tablet, Take 1 tablet (5 mg total) by mouth daily, Disp: 30 tablet, Rfl: 5    fluticasone (FLONASE) 50 mcg/act nasal spray, 1 spray into each nostril daily, Disp: 9.9 mL, Rfl: 5    fluticasone-umeclidinium-vilanterol 200-62.5-25 mcg/actuation AEPB inhaler, Inhale 1 puff daily Rinse mouth after use., Disp: 180 blister, Rfl: 1    ipratropium (ATROVENT) 0.02 % nebulizer solution, Take 2.5 mL (0.5 mg total) by nebulization 2 (two) times a day, Disp: 450 mL, Rfl: 1    montelukast (SINGULAIR) 10 mg tablet, Take 1 tablet (10 mg total) by mouth daily at bedtime,  Disp: 90 tablet, Rfl: 2    omeprazole (PriLOSEC) 40 MG capsule, Take 1 capsule (40 mg total) by mouth daily, Disp: 90 capsule, Rfl: 1    rivaroxaban (Xarelto) 20 mg tablet, Take 1 tablet (20 mg total) by mouth daily Take with food. (Patient taking differently: Take 20 mg by mouth daily Take with food.), Disp: 30 tablet, Rfl: 3    tadalafil (Cialis) 5 MG tablet, Take 1 tablet (5 mg total) by mouth daily as needed for erectile dysfunction, Disp: 10 tablet, Rfl: 0    acetaminophen (TYLENOL) 325 mg tablet, Take 2 tablets (650 mg total) by mouth every 6 (six) hours as needed for mild pain, Disp: , Rfl: 0    atorvastatin (LIPITOR) 40 mg tablet, Take 1 tablet (40 mg total) by mouth every evening (Patient not taking: Reported on 4/16/2024), Disp: 30 tablet, Rfl: 3    escitalopram (Lexapro) 10 mg tablet, Start with 0.5 mg tablet daily for the first few weeks. If no change, then patient should increase to 10 mg daily. (Patient not taking: Reported on 6/13/2024), Disp: 30 tablet, Rfl: 2    traZODone (DESYREL) 50 mg tablet, Take 1 tablet (50 mg total) by mouth daily at bedtime (Patient not taking: Reported on 6/13/2024), Disp: 30 tablet, Rfl: 0    Current Allergies     Allergies as of 06/13/2024 - Reviewed 06/13/2024   Allergen Reaction Noted    Aspirin Anaphylaxis 10/06/2016    Ibuprofen Anaphylaxis 11/30/2023    Ibuprofen Other (See Comments) 10/09/2015    Augmentin [amoxicillin-pot clavulanate] Rash 12/16/2020            The following portions of the patient's history were reviewed and updated as appropriate: allergies, current medications, past family history, past medical history, past social history, past surgical history and problem list.     Past Medical History:   Diagnosis Date    Asthma     Cataract     COPD (chronic obstructive pulmonary disease) (HCC)     Emphysema lung (HCC)     Hypertension     Stroke (HCC)        Past Surgical History:   Procedure Laterality Date    EYE SURGERY      IR IVC FILTER PLACEMENT  "OPTIONAL/TEMPORARY  12/07/2023    IR STROKE ALERT  11/30/2023    FL SEPTOPLASTY/SUBMUCOUS RESECJ W/WO CARTILAGE GRF N/A 7/1/2021    Procedure: SEPTOPLASTY;  Surgeon: Artem Shanks MD;  Location: [facility];  Service: ENT    FL STRTCTC CPTR ASSTD PX EXTRADURAL CRANIAL N/A 7/1/2021    Procedure: FUNCTIONAL ENDOSCOPIC SINUS SURGERY (FESS) IMAGED GUIDED, ALL SINUSES;  Surgeon: Artem Shanks MD;  Location: [facility];  Service: ENT       Family History   Problem Relation Age of Onset    No Known Problems Mother     Heart attack Father          Medications have been verified.        Objective   /82   Pulse 81   Temp 97.9 °F (36.6 °C)   Resp 18   Ht 5' 11\" (1.803 m)   Wt 94.3 kg (208 lb)   SpO2 92%   BMI 29.01 kg/m²        Physical Exam     Physical Exam  Vitals and nursing note reviewed.   Constitutional:       General: He is in acute distress.      Appearance: He is well-developed. He is ill-appearing. He is not toxic-appearing or diaphoretic.   HENT:      Head: Normocephalic.   Cardiovascular:      Rate and Rhythm: Normal rate and regular rhythm.      Heart sounds: Normal heart sounds.   Pulmonary:      Effort: Tachypnea, accessory muscle usage and respiratory distress present.      Breath sounds: Examination of the right-upper field reveals wheezing. Examination of the left-upper field reveals wheezing. Examination of the right-middle field reveals wheezing. Examination of the left-middle field reveals wheezing. Examination of the right-lower field reveals wheezing. Examination of the left-lower field reveals wheezing. Wheezing present.   Musculoskeletal:         General: Normal range of motion.   Skin:     General: Skin is warm and dry.      Capillary Refill: Capillary refill takes less than 2 seconds.   Neurological:      Mental Status: He is alert.                   "

## 2024-06-13 NOTE — ED PROVIDER NOTES
History  Chief Complaint   Patient presents with    Shortness of Breath     Per EMS pt was at urgent care, for Sob that has been getting worse. Monthly has been given steroids there and followed up.   60 prednisone  1 duo neb    Singular was switched 1 year ago and has been off it for a year   Bsbs 156, 18g IV      Patient is a 64-year-old male with a past medical history of COPD and asthma current smoker who presents by ambulance for shortness of breath x 1 week.  Patient states that his insurance coverage changed approximately 1 year ago and he was unable to afford multiple inhalers and other medications states that he has had frequent COPD exacerbations every 4 to 6 weeks.  States that he typically goes urgent care he receives a steroid taper and has improvement.  Today went to urgent care was found to be hypoxic and was sent here for further evaluation and treatment.  The patient states that he feels tight in his chest.  He denies any fevers chills.  Denies any nausea vomiting.  And route the patient was given a nebulizer treatment and 60 mg of Solu-Medrol.          Prior to Admission Medications   Prescriptions Last Dose Informant Patient Reported? Taking?   acetaminophen (TYLENOL) 325 mg tablet   No No   Sig: Take 2 tablets (650 mg total) by mouth every 6 (six) hours as needed for mild pain   albuterol (2.5 mg/3 mL) 0.083 % nebulizer solution   No No   Sig: Take 3 mL (2.5 mg total) by nebulization every 6 (six) hours as needed for wheezing or shortness of breath   albuterol (2.5 mg/3 mL) 0.083 % nebulizer solution   No Yes   Sig: Take 3 mL (2.5 mg total) by nebulization every 6 (six) hours as needed for wheezing or shortness of breath   albuterol (ProAir HFA) 90 mcg/act inhaler   No No   Sig: Inhale 2 puffs every 4 (four) hours as needed for wheezing   atorvastatin (LIPITOR) 40 mg tablet   No No   Sig: Take 1 tablet (40 mg total) by mouth every evening   Patient not taking: Reported on 4/16/2024   cetirizine  (ZyrTEC) 5 MG tablet   No No   Sig: Take 1 tablet (5 mg total) by mouth daily   escitalopram (Lexapro) 10 mg tablet   No No   Sig: Start with 0.5 mg tablet daily for the first few weeks. If no change, then patient should increase to 10 mg daily.   Patient not taking: Reported on 2024   fluticasone (FLONASE) 50 mcg/act nasal spray   No No   Si spray into each nostril daily   fluticasone-umeclidinium-vilanterol 200-62.5-25 mcg/actuation AEPB inhaler   No No   Sig: Inhale 1 puff daily Rinse mouth after use.   ipratropium (ATROVENT) 0.02 % nebulizer solution   No No   Sig: Take 2.5 mL (0.5 mg total) by nebulization 2 (two) times a day   montelukast (SINGULAIR) 10 mg tablet   No No   Sig: Take 1 tablet (10 mg total) by mouth daily at bedtime   omeprazole (PriLOSEC) 40 MG capsule   No No   Sig: Take 1 capsule (40 mg total) by mouth daily   rivaroxaban (Xarelto) 20 mg tablet   No No   Sig: Take 1 tablet (20 mg total) by mouth daily Take with food.   Patient taking differently: Take 20 mg by mouth daily Take with food.   tadalafil (Cialis) 5 MG tablet   No No   Sig: Take 1 tablet (5 mg total) by mouth daily as needed for erectile dysfunction   traZODone (DESYREL) 50 mg tablet   No No   Sig: Take 1 tablet (50 mg total) by mouth daily at bedtime   Patient not taking: Reported on 2024      Facility-Administered Medications: None       Past Medical History:   Diagnosis Date    Asthma     Cataract     COPD (chronic obstructive pulmonary disease) (HCC)     Emphysema lung (HCC)     Hypertension     Stroke (HCC)        Past Surgical History:   Procedure Laterality Date    EYE SURGERY      IR IVC FILTER PLACEMENT OPTIONAL/TEMPORARY  2023    IR STROKE ALERT  2023    [place] SEPTOPLASTY/SUBMUCOUS RESECJ W/WO CARTILAGE GRF N/A 2021    Procedure: SEPTOPLASTY;  Surgeon: Artem Shanks MD;  Location: [facility];  Service: ENT    [place] STRTCTC CPTR ASSTD PX EXTRADURAL CRANIAL N/A 2021    Procedure: FUNCTIONAL  ENDOSCOPIC SINUS SURGERY (FESS) IMAGED GUIDED, ALL SINUSES;  Surgeon: Aretm Shanks MD;  Location: [facility];  Service: ENT       Family History   Problem Relation Age of Onset    No Known Problems Mother     Heart attack Father      I have reviewed and agree with the history as documented.    E-Cigarette/Vaping    E-Cigarette Use Never User      E-Cigarette/Vaping Substances    Nicotine No     THC No     CBD No     Flavoring No     Other No     Unknown No      Social History     Tobacco Use    Smoking status: Former     Current packs/day: 0.10     Average packs/day: 0.1 packs/day for 43.5 years (4.4 ttl pk-yrs)     Types: Cigarettes     Start date: 12/5/1980    Smokeless tobacco: Never    Tobacco comments:     smokes only when drinks 2 cigs     Quit 12/012/2023   Vaping Use    Vaping status: Never Used   Substance Use Topics    Alcohol use: Not Currently     Alcohol/week: 1.0 standard drink of alcohol     Types: 1 Cans of beer per week     Comment: occasionally/ Weekends    Drug use: Not Currently     Frequency: 1.0 times per week     Types: Marijuana       Review of Systems   All other systems reviewed and are negative.      Physical Exam  Physical Exam  Vitals and nursing note reviewed.   Constitutional:       General: He is in acute distress.      Appearance: He is well-developed.   HENT:      Head: Normocephalic and atraumatic.      Mouth/Throat:      Mouth: Oropharynx is clear and moist.   Eyes:      Extraocular Movements: EOM normal.      Pupils: Pupils are equal, round, and reactive to light.   Cardiovascular:      Rate and Rhythm: Regular rhythm. Tachycardia present.      Heart sounds: No murmur heard.  Pulmonary:      Effort: Pulmonary effort is normal. Tachypnea present. No respiratory distress.      Breath sounds: Decreased breath sounds and wheezing present.   Abdominal:      General: Bowel sounds are normal.      Palpations: Abdomen is soft.      Tenderness: There is no abdominal tenderness.    Musculoskeletal:      Cervical back: Normal range of motion.      Right lower leg: No edema.      Left lower leg: No edema.   Skin:     General: Skin is warm and dry.      Capillary Refill: Capillary refill takes less than 2 seconds.   Neurological:      Mental Status: He is alert and oriented to person, place, and time.   Psychiatric:         Mood and Affect: Mood and affect normal.         Behavior: Behavior normal.         Vital Signs  ED Triage Vitals [06/13/24 1558]   Temperature Pulse Respirations Blood Pressure SpO2   98.6 °F (37 °C) 93 18 (!) 172/93 92 %      Temp src Heart Rate Source Patient Position - Orthostatic VS BP Location FiO2 (%)   -- Monitor Lying Right arm --      Pain Score       No Pain           Vitals:    06/13/24 1800 06/13/24 1815 06/13/24 1830 06/13/24 1845   BP: 170/83  159/86    Pulse: 93 98 97 92   Patient Position - Orthostatic VS:             Visual Acuity      ED Medications  Medications   methylPREDNISolone sodium succinate (FOR EMS ONLY) (Solu-MEDROL) 125 MG injection 125 mg (0 mg Does not apply Given to EMS 6/13/24 1612)   ipratropium-albuterol (FOR EMS ONLY) (DUO-NEB) 0.5-2.5 mg/3 mL inhalation solution 3 mL (0 mL Does not apply Given to EMS 6/13/24 1612)   methylPREDNISolone sodium succinate (Solu-MEDROL) injection 60 mg (60 mg Intravenous Given 6/13/24 1616)   cefTRIAXone (ROCEPHIN) IVPB (premix in dextrose) 1,000 mg 50 mL (0 mg Intravenous Stopped 6/13/24 1826)   albuterol inhalation solution 10 mg (10 mg Nebulization Given 6/13/24 1617)   ipratropium (ATROVENT) 0.02 % inhalation solution 1 mg (1 mg Nebulization Given 6/13/24 1617)   sodium chloride 0.9 % inhalation solution 12 mL (12 mL Nebulization Given 6/13/24 1617)   iohexol (OMNIPAQUE) 350 MG/ML injection (MULTI-DOSE) 85 mL (85 mL Intravenous Given 6/13/24 1758)   doxycycline hyclate (VIBRAMYCIN) capsule 200 mg (200 mg Oral Given 6/13/24 1855)       Diagnostic Studies  Results Reviewed       Procedure Component Value  Units Date/Time    Procalcitonin [[id number]]  (Normal) Collected: 06/13/24 1608    Lab Status: Final result Specimen: Blood from Arm, Right Updated: 06/13/24 1703     Procalcitonin <0.05 ng/ml     Comprehensive metabolic panel [[id number]] Collected: 06/13/24 1608    Lab Status: Final result Specimen: Blood from Arm, Right Updated: 06/13/24 1642     Sodium 138 mmol/L      Potassium 4.0 mmol/L      Chloride 107 mmol/L      CO2 22 mmol/L      ANION GAP 9 mmol/L      BUN 15 mg/dL      Creatinine 1.13 mg/dL      Glucose 96 mg/dL      Calcium 9.1 mg/dL      AST 17 U/L      ALT 16 U/L      Alkaline Phosphatase 60 U/L      Total Protein 6.8 g/dL      Albumin 4.0 g/dL      Total Bilirubin 0.55 mg/dL      eGFR 67 ml/min/1.73sq m     Narrative:      [employer] guidelines for Chronic Kidney Disease (CKD):     Stage 1 with normal or high GFR (GFR > 90 mL/min/1.73 square meters)    Stage 2 Mild CKD (GFR = 60-89 mL/min/1.73 square meters)    Stage 3A Moderate CKD (GFR = 45-59 mL/min/1.73 square meters)    Stage 3B Moderate CKD (GFR = 30-44 mL/min/1.73 square meters)    Stage 4 Severe CKD (GFR = 15-29 mL/min/1.73 square meters)    Stage 5 End Stage CKD (GFR <15 mL/min/1.73 square meters)  Note: GFR calculation is accurate only with a steady state creatinine    Lactic acid [[id number]]  (Normal) Collected: 06/13/24 1608    Lab Status: Final result Specimen: Blood from Arm, Right Updated: 06/13/24 1637     LACTIC ACID 1.6 mmol/L     Narrative:      Result may be elevated if tourniquet was used during collection.    Protime-INR [[id number]]  (Normal) Collected: 06/13/24 1608    Lab Status: Final result Specimen: Blood from Arm, Right Updated: 06/13/24 1629     Protime 13.5 seconds      INR 1.00    APTT [[id number]]  (Normal) Collected: 06/13/24 1608    Lab Status: Final result Specimen: Blood from Arm, Right Updated: 06/13/24 1629     PTT 24 seconds     Blood culture #2 [[id number]] Collected: 06/13/24 1611    Lab  Status: In process Specimen: Blood from Arm, Right Updated: 06/13/24 1622    CBC and differential [[id number]]  (Abnormal) Collected: 06/13/24 1608    Lab Status: Final result Specimen: Blood from Arm, Right Updated: 06/13/24 1616     WBC 9.11 Thousand/uL      RBC 5.60 Million/uL      Hemoglobin 16.3 g/dL      Hematocrit 48.1 %      MCV 86 fL      MCH 29.1 pg      MCHC 33.9 g/dL      RDW 14.4 %      MPV 8.9 fL      Platelets 237 Thousands/uL      nRBC 0 /100 WBCs      Segmented % 43 %      Immature Grans % 0 %      Lymphocytes % 27 %      Monocytes % 14 %      Eosinophils Relative 15 %      Basophils Relative 1 %      Absolute Neutrophils 3.95 Thousands/µL      Absolute Immature Grans 0.02 Thousand/uL      Absolute Lymphocytes 2.46 Thousands/µL      Absolute Monocytes 1.23 Thousand/µL      Eosinophils Absolute 1.37 Thousand/µL      Basophils Absolute 0.08 Thousands/µL     Blood culture #1 [[id number]] Collected: 06/13/24 1608    Lab Status: In process Specimen: Blood from Arm, Right Updated: 06/13/24 1613                   CTA ED chest PE study   Final Result by Tessa Patel MD (06/13 1819)      No definite acute pulmonary emboli although interpretation is slightly limited by suboptimal opacification of the pulmonary arteries and motion artifact.      Mild diffuse bronchial wall thickening with scattered endobronchial debris which may be due to asthma or bronchitis.      Mild coronary artery calcification indicating atherosclerotic heart disease.            Workstation performed: [id number]                    Procedures  ECG 12 Lead Documentation Only    Date/Time: 6/13/2024 4:17 PM    Performed by: Mike Winston PA-C  Authorized by: Mike Winston PA-C    Indications / Diagnosis:  Sob  ECG reviewed by me, the ED Provider: yes    Patient location:  ED  Previous ECG:     Previous ECG:  Unavailable  Interpretation:     Interpretation: normal    Rate:     ECG rate:  76  Rhythm:     Rhythm: sinus  rhythm    Conduction:     Conduction: normal    ST segments:     ST segments:  Normal  T waves:     T waves: normal             ED Course  ED Course as of 06/13/24 1904   Thu Jun 13, 2024   1844 SpO2(!): 88 %                               SBIRT 20yo+      Flowsheet Row Most Recent Value   Initial Alcohol Screen: US AUDIT-C     1. How often do you have a drink containing alcohol? 0 Filed at: 06/13/2024 1601   2. How many drinks containing alcohol do you have on a typical day you are drinking?  0 Filed at: 06/13/2024 1601   3a. Male UNDER 65: How often do you have five or more drinks on one occasion? 0 Filed at: 06/13/2024 1601   3b. FEMALE Any Age, or MALE 65+: How often do you have 4 or more drinks on one occassion? 0 Filed at: 06/13/2024 1601   Audit-C Score 0 Filed at: 06/13/2024 1601   ODETTE: How many times in the past year have you...    Used an illegal drug or used a prescription medication for non-medical reasons? Never Filed at: 06/13/2024 1601                      Medical Decision Making  Patient is a 64-year-old male with a past medical history of COPD and asthma current smoker who presents by ambulance for shortness of breath x 1 week.  Patient states that his insurance coverage changed approximately 1 year ago and he was unable to afford multiple inhalers and other medications states that he has had frequent COPD exacerbations every 4 to 6 weeks.  States that he typically goes urgent care he receives a steroid taper and has improvement.  Today went to urgent care was found to be hypoxic and was sent here for further evaluation and treatment.  The patient states that he feels tight in his chest.  He denies any fevers chills.  Denies any nausea vomiting.  And route the patient was given a nebulizer treatment and 60 mg of Solu-Medrol.    On examination had decreased breath sounds did have wheezing was tachypneic.  Patient was on room air.  The patient had imaging obtained which was unremarkable for any acute  infiltrate.  Patient's blood work unremarkable.  Patient ambulated in the emergency department did mostly remain above 90% on room air was not short of breath but did at 1 point dropped to 88%.  Patient does not wear oxygen at home.  Did offer inpatient admission but patient wished at this time to go home.  Patient was given treatment for COPD exacerbation.  Wife is an RN at home.  Educated to return if anything worsens and he expressed understanding was agreement treatment plan    Differential diagnosis included but was not limited to :Pneumonia, Sinusitis, URI, ACS, GERD, PE,, COPD exacerbation       Amount and/or Complexity of Data Reviewed  Labs: ordered. Decision-making details documented in ED Course.  Radiology: ordered and independent interpretation performed. Decision-making details documented in ED Course.  ECG/medicine tests: ordered and independent interpretation performed. Decision-making details documented in ED Course.    Risk  Prescription drug management.             Disposition  Final diagnoses:   COPD with acute exacerbation (HCC)     Time reflects when diagnosis was documented in both MDM as applicable and the Disposition within this note       Time User Action Codes Description Comment    6/13/2024  6:49 PM Mike Winston Add [J44.1] COPD with acute exacerbation (HCC) start azithromycin.   restart trelegy, nebulizer 2x daily   FUP 1 week    6/13/2024  6:49 PM Mike Winston Add [J44.1] COPD with acute exacerbation (HCC)           ED Disposition       ED Disposition   Discharge    Condition   Stable    Date/Time   Thu Jun 13, 2024 1852    Comment   Miller Mckeon discharge to home/self care.                   Follow-up Information    None         Discharge Medication List as of 6/13/2024  6:53 PM        START taking these medications    Details   doxycycline hyclate (VIBRAMYCIN) 100 mg capsule Take 1 capsule (100 mg total) by mouth 2 (two) times a day for 7 days, Starting Thu 6/13/2024, Until Thu  6/20/2024, Normal      predniSONE 20 mg tablet Multiple Dosages:Starting Thu 6/13/2024, Until Mon 6/17/2024 at 2359, THEN Starting Tue 6/18/2024, Until Sat 6/22/2024 at 2359, THEN Starting Sun 6/23/2024, Until Thu 6/27/2024 at 2359, THEN Starting Fri 6/28/2024, Until Tue 7/2/2024 at 2359Take 3 t ablets (60 mg total) by mouth daily for 5 days, THEN 2 tablets (40 mg total) daily for 5 days, THEN 1 tablet (20 mg total) daily for 5 days, THEN 0.5 tablets (10 mg total) daily for 5 days., Normal           CONTINUE these medications which have CHANGED    Details   albuterol (2.5 mg/3 mL) 0.083 % nebulizer solution Take 3 mL (2.5 mg total) by nebulization every 6 (six) hours as needed for wheezing or shortness of breath, Starting Thu 6/13/2024, Until Wed 9/11/2024 at 2359, Normal           CONTINUE these medications which have NOT CHANGED    Details   acetaminophen (TYLENOL) 325 mg tablet Take 2 tablets (650 mg total) by mouth every 6 (six) hours as needed for mild pain, Starting Sat 12/9/2023, No Print      albuterol (ProAir HFA) 90 mcg/act inhaler Inhale 2 puffs every 4 (four) hours as needed for wheezing, Starting Tue 6/4/2024, Until Mon 9/2/2024 at 2359, Normal      atorvastatin (LIPITOR) 40 mg tablet Take 1 tablet (40 mg total) by mouth every evening, Starting Wed 1/17/2024, Until Thu 5/16/2024, Normal      cetirizine (ZyrTEC) 5 MG tablet Take 1 tablet (5 mg total) by mouth daily, Starting Wed 5/1/2024, Normal      escitalopram (Lexapro) 10 mg tablet Start with 0.5 mg tablet daily for the first few weeks. If no change, then patient should increase to 10 mg daily., Normal      fluticasone (FLONASE) 50 mcg/act nasal spray 1 spray into each nostril daily, Starting Wed 5/1/2024, Normal      fluticasone-umeclidinium-vilanterol 200-62.5-25 mcg/actuation AEPB inhaler Inhale 1 puff daily Rinse mouth after use., Starting Thu 4/4/2024, Until Wed 7/3/2024, Normal      ipratropium (ATROVENT) 0.02 % nebulizer solution Take 2.5 mL  (0.5 mg total) by nebulization 2 (two) times a day, Starting Thu 4/4/2024, Until Wed 7/3/2024, Normal      montelukast (SINGULAIR) 10 mg tablet Take 1 tablet (10 mg total) by mouth daily at bedtime, Starting Wed 5/1/2024, Normal      omeprazole (PriLOSEC) 40 MG capsule Take 1 capsule (40 mg total) by mouth daily, Starting Thu 4/4/2024, Normal      rivaroxaban (Xarelto) 20 mg tablet Take 1 tablet (20 mg total) by mouth daily Take with food., Starting Tue 4/16/2024, Normal      tadalafil (Cialis) 5 MG tablet Take 1 tablet (5 mg total) by mouth daily as needed for erectile dysfunction, Starting Tue 8/30/2022, Normal      traZODone (DESYREL) 50 mg tablet Take 1 tablet (50 mg total) by mouth daily at bedtime, Starting Wed 1/10/2024, Normal             No discharge procedures on file.    PDMP Review         Value Time User    PDMP Reviewed  Yes 7/1/2021  1:14 PM Artem Shanks MD            ED Provider  Electronically Signed by             Mike Winston PA-C  06/13/24 [id number]

## 2024-06-14 ENCOUNTER — TRANSITIONAL CARE MANAGEMENT (OUTPATIENT)
Dept: FAMILY MEDICINE CLINIC | Facility: CLINIC | Age: 64
End: 2024-06-14

## 2024-06-14 LAB
ATRIAL RATE: 76 BPM
P AXIS: 66 DEGREES
PR INTERVAL: 172 MS
QRS AXIS: 21 DEGREES
QRSD INTERVAL: 88 MS
QT INTERVAL: 394 MS
QTC INTERVAL: 443 MS
T WAVE AXIS: 15 DEGREES
VENTRICULAR RATE: 76 BPM

## 2024-06-16 LAB
BACTERIA BLD CULT: NORMAL
BACTERIA BLD CULT: NORMAL

## 2024-06-18 ENCOUNTER — PATIENT OUTREACH (OUTPATIENT)
Dept: FAMILY MEDICINE CLINIC | Facility: CLINIC | Age: 64
End: 2024-06-18

## 2024-06-18 LAB
BACTERIA BLD CULT: NORMAL
BACTERIA BLD CULT: NORMAL

## 2024-06-18 NOTE — PROGRESS NOTES
Outpatient Care Management Note    PCP IB message- patient with frequent COPD exacerbations, lack of insurance, unable to afford inhalers.  Chart reviewed.    OP RN CM placed outreach call to patient to offer care management support to assist with above identified PCP concerns.  No patient answer. Left VM message and included RN CM contact information and request for return call.    RN CM will schedule second outreach attempt for later this week if no patient response received prior.

## 2024-06-20 ENCOUNTER — PATIENT OUTREACH (OUTPATIENT)
Dept: FAMILY MEDICINE CLINIC | Facility: CLINIC | Age: 64
End: 2024-06-20

## 2024-06-20 NOTE — PROGRESS NOTES
Outpatient Care Management Note    IB reminder: second outreach attempt needed.  Patient referred by PCP for frequent COPD exacerbations, lack of insurance, unable to afford inhalers.    RN CM made second outreach call to patient to offer care management support to assist with above identified PCP concerns.  No patient answer. Left VM message and included RN CM contact information and request for return call.    Since there has been no answer/response to RN CM telephone outreach attempt, RN CM will send unable to reach letter via [employer] to patient.    RN CM will monitor for patient response to a above noted outreach attempts. Will close in two weeks if no patient response.

## 2024-06-20 NOTE — LETTER
Date: 06/20/24    Dear Miller Mckeon,   My name is Sierra Montgomerychester; I am a registered nurse care manager working with   [address] [address].     I have not been able to reach you and would like to set a time that I can talk with you over the phone.  My work is to help patients that have complex medical conditions get the care they need. This includes patients who may have been in the hospital or emergency room.    Please call me with any questions you may have. I look forward to speaking with you.  Sincerely,  Sierra Yisel  [phone]  Outpatient Care Manager

## 2024-06-26 ENCOUNTER — OFFICE VISIT (OUTPATIENT)
Dept: FAMILY MEDICINE CLINIC | Facility: CLINIC | Age: 64
End: 2024-06-26
Payer: MEDICARE

## 2024-06-26 VITALS
TEMPERATURE: 98.6 F | HEART RATE: 92 BPM | BODY MASS INDEX: 29.23 KG/M2 | OXYGEN SATURATION: 95 % | SYSTOLIC BLOOD PRESSURE: 134 MMHG | RESPIRATION RATE: 19 BRPM | WEIGHT: 208.8 LBS | HEIGHT: 71 IN | DIASTOLIC BLOOD PRESSURE: 76 MMHG

## 2024-06-26 DIAGNOSIS — J43.9 PULMONARY EMPHYSEMA, UNSPECIFIED EMPHYSEMA TYPE (HCC): ICD-10-CM

## 2024-06-26 DIAGNOSIS — J44.1 COPD WITH ACUTE EXACERBATION (HCC): Primary | ICD-10-CM

## 2024-06-26 DIAGNOSIS — I10 HYPERTENSION, UNSPECIFIED TYPE: ICD-10-CM

## 2024-06-26 PROCEDURE — 99213 OFFICE O/P EST LOW 20 MIN: CPT

## 2024-06-26 RX ORDER — ALBUTEROL SULFATE 2.5 MG/3ML
2.5 SOLUTION RESPIRATORY (INHALATION) EVERY 6 HOURS PRN
Qty: 75 ML | Refills: 0 | Status: SHIPPED | OUTPATIENT
Start: 2024-06-26 | End: 2024-09-24

## 2024-06-26 RX ORDER — AZITHROMYCIN 500 MG/1
500 TABLET, FILM COATED ORAL DAILY
Qty: 7 TABLET | Refills: 0 | Status: SHIPPED | OUTPATIENT
Start: 2024-06-26 | End: 2024-07-03

## 2024-06-26 NOTE — PATIENT INSTRUCTIONS
Referred to:  Cardiac Rehabilitation / Pulmonary Rehabilitation  services are available at the following locations:  Please call [phone] for more information or to make an appointment.

## 2024-06-26 NOTE — PROGRESS NOTES
FAMILY MEDICINE OFFICE VISIT  [facility]      NAME: Miller Mckeon  AGE: 64 y.o. SEX: male    DATE OF ENCOUNTER: 6/26/2024    Assessment and Plan     1. COPD with acute exacerbation (HCC)  Comments:  compelte prednisone taper  cont. PRN albuterol  start azithromycin.   continue trelegy inhaler   f/up 1 week  Orders:  -     Ambulatory Referral to Pulmonary Rehabilitation; Future; Expected date: 07/10/2024  -     albuterol (2.5 mg/3 mL) 0.083 % nebulizer solution; Take 3 mL (2.5 mg total) by nebulization every 6 (six) hours as needed for wheezing or shortness of breath  -     azithromycin (ZITHROMAX) 500 MG tablet; Take 1 tablet (500 mg total) by mouth daily for 7 days  2. Pulmonary emphysema, unspecified emphysema type (HCC)  3. Hypertension, unspecified type  Comments:  BP stable   continue current regimen      Miller Mckeon is 64 y.o. male presented to the office for ED follow up visit. Went to ED with COPD exacerbation on 06/13/2024. On prednisone taper. With continued shortness of breath, starting on azithromycin 500 mg daily for 7 days for it's antiinflammatory properties. Advised to complete prednisone taper, continue Trelegy and as needed albuterol nebulization/ inhaler use.      As per Hematology note on chart review:   Initial lupus anticoagulant showed abnormal DRVVT, likely related to concomitant heparin therapy.  Subsequent lupus anticoagulants remain normal, likely due to ongoing Xarelto.  I do not believe this represents lupus anticoagulant.  As such, continuation of current therapy is recommended given recurrent thrombosis.  Contact IR regarding applicability of IVC filter removal.    Plan to consult IR in future to assess for IVC filter removal.       Chief Complaint     Chief Complaint   Patient presents with    Follow-up     COPD   Patient was seen in the ED due to shortness of breath.        History of Present Illness     Miller Mckeon is 64 y.o. male presented to the  office for ED follow up visit. Went to ED with COPD exacerbation on 06/13/2024. On prednisone taper. One more week left. Reports continued shortness of breath. SOB improving compared to the day of ED visit but, he still feel he is not at his baseline. Patient continue to smoke cigarettes, 2-3 per day. Denies fever, chills, chest pain.             The following portions of the patient's history were reviewed and updated as appropriate: allergies, current medications, past family history, past medical history, past social history, past surgical history and problem list.    Review of Systems     Review of Systems   Constitutional:  Negative for activity change.   HENT:  Negative for congestion and rhinorrhea.    Respiratory:  Negative for shortness of breath and wheezing.    Cardiovascular:  Negative for chest pain and palpitations.   Gastrointestinal:  Negative for abdominal pain, diarrhea and nausea.   Genitourinary:  Negative for difficulty urinating.   Skin:  Negative for rash.   Neurological:  Negative for light-headedness and headaches.   Psychiatric/Behavioral:  The patient is not nervous/anxious.    All other systems reviewed and are negative.      Active Problem List     Patient Active Problem List   Diagnosis    Mild intermittent asthma without complication    Chronic pansinusitis    Nasal polyposis    Aspirin sensitivity    HTN (hypertension)    Smoking    Moderate persistent asthma with acute exacerbation    Elevated blood-pressure reading without diagnosis of hypertension    Fatigue    Golfers elbow    History of drug abuse (HCC)    Environmental allergies    Erectile dysfunction    Elevated fasting blood sugar    History of stroke    Hypertension    Asthma    DVT of lower extremity (deep venous thrombosis) (HCC)    Antiphospholipid antibody syndrome (HCC)    Left hemiparesis (HCC)    COPD with acute exacerbation (HCC)    Obstructive lung disease (HCC)    Gastroesophageal reflux disease    Stroke due to R  "ICA to MCA occlusion, s/p TNK and thrombectomy    TIA (transient ischemic attack)    Pulmonary emphysema (HCC)       Objective     /76 (BP Location: Left arm, Patient Position: Sitting, Cuff Size: Large)   Pulse 92   Temp 98.6 °F (37 °C) (Tympanic)   Resp 19   Ht 5' 11\" (1.803 m)   Wt 94.7 kg (208 lb 12.8 oz)   SpO2 95%   BMI 29.12 kg/m²     Physical Exam  Vitals and nursing note reviewed. Exam conducted with a chaperone present.   Constitutional:       General: He is not in acute distress.     Appearance: Normal appearance.   HENT:      Head: Normocephalic and atraumatic.      Right Ear: External ear normal.      Left Ear: External ear normal.      Nose: No congestion or rhinorrhea.      Mouth/Throat:      Mouth: Mucous membranes are moist.      Pharynx: Oropharynx is clear.   Eyes:      Extraocular Movements: Extraocular movements intact.      Conjunctiva/sclera: Conjunctivae normal.   Cardiovascular:      Rate and Rhythm: Normal rate and regular rhythm.      Pulses: Normal pulses.      Heart sounds: Normal heart sounds. No murmur heard.  Pulmonary:      Effort: Pulmonary effort is normal.      Breath sounds: Normal breath sounds. No wheezing, rhonchi or rales.   Abdominal:      General: Bowel sounds are normal.      Palpations: Abdomen is soft.      Tenderness: There is no abdominal tenderness.   Musculoskeletal:      Cervical back: Neck supple.      Right lower leg: No edema.      Left lower leg: No edema.   Skin:     General: Skin is warm and dry.      Capillary Refill: Capillary refill takes less than 2 seconds.   Neurological:      General: No focal deficit present.      Mental Status: He is alert and oriented to person, place, and time.   Psychiatric:         Behavior: Behavior normal.         Current Medications     Current Outpatient Medications:     acetaminophen (TYLENOL) 325 mg tablet, Take 2 tablets (650 mg total) by mouth every 6 (six) hours as needed for mild pain, Disp: , Rfl: 0    " albuterol (2.5 mg/3 mL) 0.083 % nebulizer solution, Take 3 mL (2.5 mg total) by nebulization every 6 (six) hours as needed for wheezing or shortness of breath, Disp: 75 mL, Rfl: 0    albuterol (ProAir HFA) 90 mcg/act inhaler, Inhale 2 puffs every 4 (four) hours as needed for wheezing, Disp: 18 g, Rfl: 2    azithromycin (ZITHROMAX) 500 MG tablet, Take 1 tablet (500 mg total) by mouth daily for 7 days, Disp: 7 tablet, Rfl: 0    cetirizine (ZyrTEC) 5 MG tablet, Take 1 tablet (5 mg total) by mouth daily, Disp: 30 tablet, Rfl: 5    escitalopram (Lexapro) 10 mg tablet, Start with 0.5 mg tablet daily for the first few weeks. If no change, then patient should increase to 10 mg daily., Disp: 30 tablet, Rfl: 2    fluticasone (FLONASE) 50 mcg/act nasal spray, 1 spray into each nostril daily, Disp: 9.9 mL, Rfl: 5    fluticasone-umeclidinium-vilanterol 200-62.5-25 mcg/actuation AEPB inhaler, Inhale 1 puff daily Rinse mouth after use., Disp: 180 blister, Rfl: 1    ipratropium (ATROVENT) 0.02 % nebulizer solution, Take 2.5 mL (0.5 mg total) by nebulization 2 (two) times a day, Disp: 450 mL, Rfl: 1    montelukast (SINGULAIR) 10 mg tablet, Take 1 tablet (10 mg total) by mouth daily at bedtime, Disp: 90 tablet, Rfl: 2    omeprazole (PriLOSEC) 40 MG capsule, Take 1 capsule (40 mg total) by mouth daily, Disp: 90 capsule, Rfl: 1    predniSONE 20 mg tablet, Take 3 tablets (60 mg total) by mouth daily for 5 days, THEN 2 tablets (40 mg total) daily for 5 days, THEN 1 tablet (20 mg total) daily for 5 days, THEN 0.5 tablets (10 mg total) daily for 5 days., Disp: 33 tablet, Rfl: 0    rivaroxaban (Xarelto) 20 mg tablet, Take 1 tablet (20 mg total) by mouth daily Take with food. (Patient taking differently: Take 20 mg by mouth daily Take with food.), Disp: 30 tablet, Rfl: 3    tadalafil (Cialis) 5 MG tablet, Take 1 tablet (5 mg total) by mouth daily as needed for erectile dysfunction, Disp: 10 tablet, Rfl: 0    traZODone (DESYREL) 50 mg tablet,  Take 1 tablet (50 mg total) by mouth daily at bedtime, Disp: 30 tablet, Rfl: 0    atorvastatin (LIPITOR) 40 mg tablet, Take 1 tablet (40 mg total) by mouth every evening (Patient not taking: Reported on 6/26/2024), Disp: 30 tablet, Rfl: 3    Health Maintenance     Health Maintenance   Topic Date Due    Colorectal Cancer Screening  Never done    Zoster Vaccine (1 of 2) Never done    Pneumococcal Vaccine: 65+ Years (2 of 2 - PCV) 11/09/2017    RSV Vaccine Age 60+ Years (1 - 1-dose 60+ series) Never done    Hepatitis B Vaccine (2 of 3 - Risk 3-dose series) 07/12/2022    BMI: Followup Plan  04/26/2023    Annual Physical  04/26/2023    COVID-19 Vaccine (2 - 2023-24 season) 09/01/2023    PT PLAN OF CARE  02/03/2024    Falls: Plan of Care  Never done    Fall Risk  01/04/2025    Depression Screening  06/13/2025    BMI: Adult  06/26/2025    DTaP,Tdap,and Td Vaccines (2 - Td or Tdap) 11/09/2026    HIV Screening  Completed    Hepatitis C Screening  Completed    Influenza Vaccine  Completed    RSV Vaccine age 0-20 Months  Aged Out    HIB Vaccine  Aged Out    IPV Vaccine  Aged Out    Hepatitis A Vaccine  Aged Out    Meningococcal ACWY Vaccine  Aged Out    HPV Vaccine  Aged Out     Immunization History   Administered Date(s) Administered    COVID-19 PFIZER VACCINE 0.3 ML IM 04/16/2021    Hep A / Hep B 06/14/2022, 06/14/2022    Influenza Quadrivalent Preservative Free 3 years and older IM 11/09/2016    Influenza, injectable, quadrivalent, preservative free 0.5 mL 12/08/2023    Pneumococcal Polysaccharide PPV23 11/09/2016    Tdap 11/09/2016           Miley Ferraro MD   Family Medicine  PGY- 3  6/26/2024 2:19 PM

## 2024-07-05 ENCOUNTER — PATIENT OUTREACH (OUTPATIENT)
Dept: FAMILY MEDICINE CLINIC | Facility: CLINIC | Age: 64
End: 2024-07-05

## 2024-07-05 NOTE — PROGRESS NOTES
Outpatient Care Management Note    IB reminder: UTR letter follow up- no patient response.    Per chart, patient had OV with PCP on 6/26/24.  He is scheduled to see PCP again on 7/9/24 and Pulmonology on 7/12/24.    RN CM unable to reach patient during two recent telephone outreach attempts. VM messages left that included RN CM contact information. No return call received from patient.    UTR letter sent via [employer] on 6/20/24 with no patient response received.     RN CM will remove self from care team at this time and close referral. No further outreach scheduled.

## (undated) DEVICE — GLOVE SRG BIOGEL 7.5

## (undated) DEVICE — 3M™ STERI-STRIP™ REINFORCED ADHESIVE SKIN CLOSURES, R1547, 1/2 IN X 4 IN (12 MM X 100 MM), 6 STRIPS/ENVELOPE: Brand: 3M™ STERI-STRIP™

## (undated) DEVICE — INTENDED FOR TISSUE SEPARATION, AND OTHER PROCEDURES THAT REQUIRE A SHARP SURGICAL BLADE TO PUNCTURE OR CUT.: Brand: BARD-PARKER ® CARBON RIB-BACK BLADES

## (undated) DEVICE — BLADE 1884080EM TRICUT 4MMX13CM M4 ROHS: Brand: FUSION®

## (undated) DEVICE — ELECTRODE NEEDLE MEGAFINE 2IN E-Z CLEAN MEGADYNE -0118

## (undated) DEVICE — TUBING SUCTION 5MM X 12 FT

## (undated) DEVICE — SPLINT INTRANASAL 0.6 X 2 IN POSISEP X

## (undated) DEVICE — PATIENT TRACKER 9734887XOM NON-INVASIVE

## (undated) DEVICE — NEEDLE 27 G X 1 1/4

## (undated) DEVICE — NEEDLE 25G X 1 1/2

## (undated) DEVICE — SPECIMEN CONTAINER STERILE PEEL PACK

## (undated) DEVICE — PAD GROUNDING ADULT

## (undated) DEVICE — TUBING 1895522 5PK STRAIGHTSHOT TO XPS: Brand: STRAIGHTSHOT®

## (undated) DEVICE — SKIN MARKER DUAL TIP WITH RULER CAP, FLEXIBLE RULER AND LABELS: Brand: DEVON

## (undated) DEVICE — NEURO PATTIES 1/2 X 3

## (undated) DEVICE — BASIC SINGLE BASIN 2-LF: Brand: MEDLINE INDUSTRIES, INC.

## (undated) DEVICE — NEEDLE SPINAL 22G X 3.5IN  QUINCKE

## (undated) DEVICE — STERILE BETHLEHEM NASAL PACK: Brand: CARDINAL HEALTH

## (undated) DEVICE — INSTRUMENT TRACKER 9733533XOM ENT 1PK

## (undated) DEVICE — SUT PLAIN 4-0 SC-1 18 IN 1828H

## (undated) DEVICE — GAUZE SPONGES,16 PLY: Brand: CURITY

## (undated) DEVICE — SYRINGE 10ML LL CONTROL TOP

## (undated) DEVICE — GLOVE INDICATOR PI UNDERGLOVE SZ 7.5 BLUE

## (undated) DEVICE — SYRINGE BULB 2 OZ

## (undated) DEVICE — SHEATH 1912000 5PK 4MM/0DEG STORZ XOMED: Brand: ENDO-SCRUB®

## (undated) DEVICE — SPLINT 1524050 5PK PAIR DOYLE II AIRWAY: Brand: DOYLE II ™

## (undated) DEVICE — ELECTRODE BLADE MOD E-Z CLEAN  2.75IN 7CM -0012AM

## (undated) DEVICE — SUT CHROMIC 5-0 P-3 18 IN 687G

## (undated) DEVICE — IV CATH 18 G X 1.16 IN

## (undated) DEVICE — ANTI-FOG SOLUTION WITH FOAM PAD: Brand: DEVON

## (undated) DEVICE — PENCIL ELECTROSURG E-Z CLEAN -0035H

## (undated) DEVICE — SHEATH 1912010 5PK 4MM/30DEG STORZ XOMED: Brand: ENDO-SCRUB®

## (undated) DEVICE — MAYO STAND COVER: Brand: CONVERTORS

## (undated) DEVICE — SUT ETHILON 4-0 PS-2 18 IN 1667H